# Patient Record
Sex: FEMALE | Race: WHITE | NOT HISPANIC OR LATINO | Employment: OTHER | URBAN - METROPOLITAN AREA
[De-identification: names, ages, dates, MRNs, and addresses within clinical notes are randomized per-mention and may not be internally consistent; named-entity substitution may affect disease eponyms.]

---

## 2022-11-11 ENCOUNTER — APPOINTMENT (OUTPATIENT)
Dept: CT IMAGING | Facility: HOSPITAL | Age: 72
End: 2022-11-11

## 2022-11-11 ENCOUNTER — HOSPITAL ENCOUNTER (OUTPATIENT)
Facility: HOSPITAL | Age: 72
Discharge: HOME | End: 2022-11-13
Attending: EMERGENCY MEDICINE | Admitting: STUDENT IN AN ORGANIZED HEALTH CARE EDUCATION/TRAINING PROGRAM

## 2022-11-11 ENCOUNTER — APPOINTMENT (OUTPATIENT)
Dept: GENERAL RADIOLOGY | Facility: HOSPITAL | Age: 72
End: 2022-11-11

## 2022-11-11 DIAGNOSIS — R07.9 CHEST PAIN, UNSPECIFIED TYPE: Primary | ICD-10-CM

## 2022-11-11 DIAGNOSIS — R06.00 DYSPNEA, UNSPECIFIED TYPE: ICD-10-CM

## 2022-11-11 PROBLEM — J44.9 COPD (CHRONIC OBSTRUCTIVE PULMONARY DISEASE): Status: ACTIVE | Noted: 2022-11-11

## 2022-11-11 PROBLEM — E78.5 HLD (HYPERLIPIDEMIA): Status: ACTIVE | Noted: 2022-11-11

## 2022-11-11 PROBLEM — R07.89 CHEST PAIN, ATYPICAL: Status: ACTIVE | Noted: 2022-11-11

## 2022-11-11 PROBLEM — R79.89 ELEVATED D-DIMER: Status: ACTIVE | Noted: 2022-11-11

## 2022-11-11 PROBLEM — R30.0 DYSURIA: Status: ACTIVE | Noted: 2022-11-11

## 2022-11-11 PROBLEM — I25.10 CAD (CORONARY ARTERY DISEASE): Status: ACTIVE | Noted: 2022-11-11

## 2022-11-11 PROBLEM — I10 HTN (HYPERTENSION): Status: ACTIVE | Noted: 2022-11-11

## 2022-11-11 LAB
ALBUMIN SERPL-MCNC: 4 G/DL (ref 3.5–5.2)
ALBUMIN/GLOB SERPL: 1.3 G/DL
ALP SERPL-CCNC: 111 U/L (ref 39–117)
ALT SERPL W P-5'-P-CCNC: 15 U/L (ref 1–33)
ANION GAP SERPL CALCULATED.3IONS-SCNC: 10.6 MMOL/L (ref 5–15)
ANION GAP SERPL CALCULATED.3IONS-SCNC: 9.9 MMOL/L (ref 5–15)
APTT PPP: 23.7 SECONDS (ref 22.7–35.4)
AST SERPL-CCNC: 15 U/L (ref 1–32)
BACTERIA UR QL AUTO: ABNORMAL /HPF
BASOPHILS # BLD AUTO: 0.02 10*3/MM3 (ref 0–0.2)
BASOPHILS # BLD AUTO: 0.04 10*3/MM3 (ref 0–0.2)
BASOPHILS NFR BLD AUTO: 0.3 % (ref 0–1.5)
BASOPHILS NFR BLD AUTO: 0.5 % (ref 0–1.5)
BILIRUB SERPL-MCNC: 0.5 MG/DL (ref 0–1.2)
BILIRUB UR QL STRIP: NEGATIVE
BUN SERPL-MCNC: 6 MG/DL (ref 8–23)
BUN SERPL-MCNC: 8 MG/DL (ref 8–23)
BUN/CREAT SERPL: 10.9 (ref 7–25)
BUN/CREAT SERPL: 14.3 (ref 7–25)
CALCIUM SPEC-SCNC: 9.1 MG/DL (ref 8.6–10.5)
CALCIUM SPEC-SCNC: 9.4 MG/DL (ref 8.6–10.5)
CHLORIDE SERPL-SCNC: 103 MMOL/L (ref 98–107)
CHLORIDE SERPL-SCNC: 99 MMOL/L (ref 98–107)
CLARITY UR: ABNORMAL
CO2 SERPL-SCNC: 23.1 MMOL/L (ref 22–29)
CO2 SERPL-SCNC: 24.4 MMOL/L (ref 22–29)
COLOR UR: YELLOW
CREAT SERPL-MCNC: 0.55 MG/DL (ref 0.57–1)
CREAT SERPL-MCNC: 0.56 MG/DL (ref 0.57–1)
D DIMER PPP FEU-MCNC: 7.64 MCGFEU/ML (ref 0–0.49)
DEPRECATED RDW RBC AUTO: 45.1 FL (ref 37–54)
DEPRECATED RDW RBC AUTO: 46.7 FL (ref 37–54)
EGFRCR SERPLBLD CKD-EPI 2021: 95.9 ML/MIN/1.73
EGFRCR SERPLBLD CKD-EPI 2021: 96.3 ML/MIN/1.73
EOSINOPHIL # BLD AUTO: 0.08 10*3/MM3 (ref 0–0.4)
EOSINOPHIL # BLD AUTO: 0.09 10*3/MM3 (ref 0–0.4)
EOSINOPHIL NFR BLD AUTO: 1.1 % (ref 0.3–6.2)
EOSINOPHIL NFR BLD AUTO: 1.3 % (ref 0.3–6.2)
ERYTHROCYTE [DISTWIDTH] IN BLOOD BY AUTOMATED COUNT: 13.4 % (ref 12.3–15.4)
ERYTHROCYTE [DISTWIDTH] IN BLOOD BY AUTOMATED COUNT: 13.7 % (ref 12.3–15.4)
FERRITIN SERPL-MCNC: 210 NG/ML (ref 13–150)
GLOBULIN UR ELPH-MCNC: 3 GM/DL
GLUCOSE SERPL-MCNC: 103 MG/DL (ref 65–99)
GLUCOSE SERPL-MCNC: 97 MG/DL (ref 65–99)
GLUCOSE UR STRIP-MCNC: NEGATIVE MG/DL
HCT VFR BLD AUTO: 30.3 % (ref 34–46.6)
HCT VFR BLD AUTO: 31.1 % (ref 34–46.6)
HGB BLD-MCNC: 10.2 G/DL (ref 12–15.9)
HGB BLD-MCNC: 10.4 G/DL (ref 12–15.9)
HGB UR QL STRIP.AUTO: ABNORMAL
HYALINE CASTS UR QL AUTO: ABNORMAL /LPF
IMM GRANULOCYTES # BLD AUTO: 0.01 10*3/MM3 (ref 0–0.05)
IMM GRANULOCYTES # BLD AUTO: 0.03 10*3/MM3 (ref 0–0.05)
IMM GRANULOCYTES NFR BLD AUTO: 0.2 % (ref 0–0.5)
IMM GRANULOCYTES NFR BLD AUTO: 0.4 % (ref 0–0.5)
INR PPP: 0.97 (ref 0.9–1.1)
IRON 24H UR-MRATE: 68 MCG/DL (ref 37–145)
IRON SATN MFR SERPL: 22 % (ref 20–50)
KETONES UR QL STRIP: ABNORMAL
LEUKOCYTE ESTERASE UR QL STRIP.AUTO: ABNORMAL
LYMPHOCYTES # BLD AUTO: 1.34 10*3/MM3 (ref 0.7–3.1)
LYMPHOCYTES # BLD AUTO: 1.62 10*3/MM3 (ref 0.7–3.1)
LYMPHOCYTES NFR BLD AUTO: 15.9 % (ref 19.6–45.3)
LYMPHOCYTES NFR BLD AUTO: 25.9 % (ref 19.6–45.3)
MCH RBC QN AUTO: 30.8 PG (ref 26.6–33)
MCH RBC QN AUTO: 31.2 PG (ref 26.6–33)
MCHC RBC AUTO-ENTMCNC: 33.4 G/DL (ref 31.5–35.7)
MCHC RBC AUTO-ENTMCNC: 33.7 G/DL (ref 31.5–35.7)
MCV RBC AUTO: 92 FL (ref 79–97)
MCV RBC AUTO: 92.7 FL (ref 79–97)
MONOCYTES # BLD AUTO: 0.42 10*3/MM3 (ref 0.1–0.9)
MONOCYTES # BLD AUTO: 0.74 10*3/MM3 (ref 0.1–0.9)
MONOCYTES NFR BLD AUTO: 6.7 % (ref 5–12)
MONOCYTES NFR BLD AUTO: 8.8 % (ref 5–12)
NEUTROPHILS NFR BLD AUTO: 4.11 10*3/MM3 (ref 1.7–7)
NEUTROPHILS NFR BLD AUTO: 6.19 10*3/MM3 (ref 1.7–7)
NEUTROPHILS NFR BLD AUTO: 65.6 % (ref 42.7–76)
NEUTROPHILS NFR BLD AUTO: 73.3 % (ref 42.7–76)
NITRITE UR QL STRIP: NEGATIVE
NRBC BLD AUTO-RTO: 0 /100 WBC (ref 0–0.2)
NRBC BLD AUTO-RTO: 0 /100 WBC (ref 0–0.2)
NT-PROBNP SERPL-MCNC: 177 PG/ML (ref 0–900)
PH UR STRIP.AUTO: 7 [PH] (ref 5–8)
PLATELET # BLD AUTO: 269 10*3/MM3 (ref 140–450)
PLATELET # BLD AUTO: 284 10*3/MM3 (ref 140–450)
PMV BLD AUTO: 8.9 FL (ref 6–12)
PMV BLD AUTO: 9 FL (ref 6–12)
POTASSIUM SERPL-SCNC: 3.5 MMOL/L (ref 3.5–5.2)
POTASSIUM SERPL-SCNC: 3.8 MMOL/L (ref 3.5–5.2)
PROT SERPL-MCNC: 7 G/DL (ref 6–8.5)
PROT UR QL STRIP: ABNORMAL
PROTHROMBIN TIME: 13 SECONDS (ref 11.7–14.2)
QT INTERVAL: 384 MS
RBC # BLD AUTO: 3.27 10*6/MM3 (ref 3.77–5.28)
RBC # BLD AUTO: 3.38 10*6/MM3 (ref 3.77–5.28)
RBC # UR STRIP: ABNORMAL /HPF
REF LAB TEST METHOD: ABNORMAL
SODIUM SERPL-SCNC: 134 MMOL/L (ref 136–145)
SODIUM SERPL-SCNC: 136 MMOL/L (ref 136–145)
SP GR UR STRIP: 1.01 (ref 1–1.03)
SQUAMOUS #/AREA URNS HPF: ABNORMAL /HPF
TIBC SERPL-MCNC: 304 MCG/DL (ref 298–536)
TRANSFERRIN SERPL-MCNC: 204 MG/DL (ref 200–360)
TROPONIN T SERPL-MCNC: <0.01 NG/ML (ref 0–0.03)
UROBILINOGEN UR QL STRIP: ABNORMAL
WBC # UR STRIP: ABNORMAL /HPF
WBC NRBC COR # BLD: 6.26 10*3/MM3 (ref 3.4–10.8)
WBC NRBC COR # BLD: 8.43 10*3/MM3 (ref 3.4–10.8)

## 2022-11-11 PROCEDURE — 82728 ASSAY OF FERRITIN: CPT | Performed by: INTERNAL MEDICINE

## 2022-11-11 PROCEDURE — G0378 HOSPITAL OBSERVATION PER HR: HCPCS

## 2022-11-11 PROCEDURE — 25010000002 HEPARIN (PORCINE) PER 1000 UNITS: Performed by: INTERNAL MEDICINE

## 2022-11-11 PROCEDURE — C1769 GUIDE WIRE: HCPCS | Performed by: INTERNAL MEDICINE

## 2022-11-11 PROCEDURE — 51798 US URINE CAPACITY MEASURE: CPT

## 2022-11-11 PROCEDURE — 63710000001 ASPIRIN 81 MG TABLET DELAYED-RELEASE: Performed by: INTERNAL MEDICINE

## 2022-11-11 PROCEDURE — A9270 NON-COVERED ITEM OR SERVICE: HCPCS | Performed by: INTERNAL MEDICINE

## 2022-11-11 PROCEDURE — C1894 INTRO/SHEATH, NON-LASER: HCPCS | Performed by: INTERNAL MEDICINE

## 2022-11-11 PROCEDURE — 0 IOPAMIDOL PER 1 ML: Performed by: INTERNAL MEDICINE

## 2022-11-11 PROCEDURE — 25010000002 MIDAZOLAM PER 1 MG: Performed by: INTERNAL MEDICINE

## 2022-11-11 PROCEDURE — 90791 PSYCH DIAGNOSTIC EVALUATION: CPT

## 2022-11-11 PROCEDURE — 93458 L HRT ARTERY/VENTRICLE ANGIO: CPT | Performed by: INTERNAL MEDICINE

## 2022-11-11 PROCEDURE — 25010000002 FENTANYL CITRATE (PF) 50 MCG/ML SOLUTION: Performed by: INTERNAL MEDICINE

## 2022-11-11 PROCEDURE — 83880 ASSAY OF NATRIURETIC PEPTIDE: CPT | Performed by: EMERGENCY MEDICINE

## 2022-11-11 PROCEDURE — 0 IOPAMIDOL PER 1 ML: Performed by: STUDENT IN AN ORGANIZED HEALTH CARE EDUCATION/TRAINING PROGRAM

## 2022-11-11 PROCEDURE — 72125 CT NECK SPINE W/O DYE: CPT

## 2022-11-11 PROCEDURE — 84484 ASSAY OF TROPONIN QUANT: CPT | Performed by: EMERGENCY MEDICINE

## 2022-11-11 PROCEDURE — 84466 ASSAY OF TRANSFERRIN: CPT | Performed by: INTERNAL MEDICINE

## 2022-11-11 PROCEDURE — 85025 COMPLETE CBC W/AUTO DIFF WBC: CPT | Performed by: INTERNAL MEDICINE

## 2022-11-11 PROCEDURE — 93005 ELECTROCARDIOGRAM TRACING: CPT | Performed by: EMERGENCY MEDICINE

## 2022-11-11 PROCEDURE — 72131 CT LUMBAR SPINE W/O DYE: CPT

## 2022-11-11 PROCEDURE — 80053 COMPREHEN METABOLIC PANEL: CPT | Performed by: INTERNAL MEDICINE

## 2022-11-11 PROCEDURE — 36415 COLL VENOUS BLD VENIPUNCTURE: CPT | Performed by: INTERNAL MEDICINE

## 2022-11-11 PROCEDURE — 99204 OFFICE O/P NEW MOD 45 MIN: CPT | Performed by: INTERNAL MEDICINE

## 2022-11-11 PROCEDURE — 63710000001 METOPROLOL TARTRATE 50 MG TABLET: Performed by: INTERNAL MEDICINE

## 2022-11-11 PROCEDURE — 63710000001 CLOPIDOGREL 75 MG TABLET: Performed by: INTERNAL MEDICINE

## 2022-11-11 PROCEDURE — 63710000001 ATORVASTATIN 80 MG TABLET: Performed by: INTERNAL MEDICINE

## 2022-11-11 PROCEDURE — 81001 URINALYSIS AUTO W/SCOPE: CPT | Performed by: STUDENT IN AN ORGANIZED HEALTH CARE EDUCATION/TRAINING PROGRAM

## 2022-11-11 PROCEDURE — 85025 COMPLETE CBC W/AUTO DIFF WBC: CPT | Performed by: EMERGENCY MEDICINE

## 2022-11-11 PROCEDURE — 87147 CULTURE TYPE IMMUNOLOGIC: CPT | Performed by: STUDENT IN AN ORGANIZED HEALTH CARE EDUCATION/TRAINING PROGRAM

## 2022-11-11 PROCEDURE — 85730 THROMBOPLASTIN TIME PARTIAL: CPT | Performed by: EMERGENCY MEDICINE

## 2022-11-11 PROCEDURE — 85379 FIBRIN DEGRADATION QUANT: CPT | Performed by: STUDENT IN AN ORGANIZED HEALTH CARE EDUCATION/TRAINING PROGRAM

## 2022-11-11 PROCEDURE — 85610 PROTHROMBIN TIME: CPT | Performed by: EMERGENCY MEDICINE

## 2022-11-11 PROCEDURE — 93010 ELECTROCARDIOGRAM REPORT: CPT | Performed by: INTERNAL MEDICINE

## 2022-11-11 PROCEDURE — 63710000001 ISOSORBIDE MONONITRATE 30 MG TABLET SUSTAINED-RELEASE 24 HOUR: Performed by: INTERNAL MEDICINE

## 2022-11-11 PROCEDURE — 87086 URINE CULTURE/COLONY COUNT: CPT | Performed by: STUDENT IN AN ORGANIZED HEALTH CARE EDUCATION/TRAINING PROGRAM

## 2022-11-11 PROCEDURE — 71045 X-RAY EXAM CHEST 1 VIEW: CPT

## 2022-11-11 PROCEDURE — 99285 EMERGENCY DEPT VISIT HI MDM: CPT

## 2022-11-11 PROCEDURE — 71275 CT ANGIOGRAPHY CHEST: CPT

## 2022-11-11 PROCEDURE — 84484 ASSAY OF TROPONIN QUANT: CPT | Performed by: NURSE PRACTITIONER

## 2022-11-11 PROCEDURE — 83540 ASSAY OF IRON: CPT | Performed by: INTERNAL MEDICINE

## 2022-11-11 RX ORDER — CLOPIDOGREL BISULFATE 75 MG/1
75 TABLET ORAL DAILY
Status: DISCONTINUED | OUTPATIENT
Start: 2022-11-11 | End: 2022-11-11

## 2022-11-11 RX ORDER — HEPARIN SODIUM 1000 [USP'U]/ML
INJECTION, SOLUTION INTRAVENOUS; SUBCUTANEOUS
Status: DISCONTINUED | OUTPATIENT
Start: 2022-11-11 | End: 2022-11-11 | Stop reason: HOSPADM

## 2022-11-11 RX ORDER — ASPIRIN 81 MG/1
81 TABLET ORAL DAILY
Status: DISCONTINUED | OUTPATIENT
Start: 2022-11-11 | End: 2022-11-13 | Stop reason: HOSPADM

## 2022-11-11 RX ORDER — ISOSORBIDE MONONITRATE 30 MG/1
30 TABLET, EXTENDED RELEASE ORAL
Status: DISCONTINUED | OUTPATIENT
Start: 2022-11-11 | End: 2022-11-13 | Stop reason: HOSPADM

## 2022-11-11 RX ORDER — NITROGLYCERIN 0.4 MG/1
0.4 TABLET SUBLINGUAL
Status: DISCONTINUED | OUTPATIENT
Start: 2022-11-11 | End: 2022-11-13 | Stop reason: HOSPADM

## 2022-11-11 RX ORDER — SODIUM CHLORIDE 9 MG/ML
100 INJECTION, SOLUTION INTRAVENOUS CONTINUOUS
Status: ACTIVE | OUTPATIENT
Start: 2022-11-11 | End: 2022-11-11

## 2022-11-11 RX ORDER — HYDROXYZINE HYDROCHLORIDE 25 MG/1
25 TABLET, FILM COATED ORAL 3 TIMES DAILY PRN
Status: DISCONTINUED | OUTPATIENT
Start: 2022-11-11 | End: 2022-11-13 | Stop reason: HOSPADM

## 2022-11-11 RX ORDER — CLOPIDOGREL BISULFATE 75 MG/1
75 TABLET ORAL DAILY
Status: DISCONTINUED | OUTPATIENT
Start: 2022-11-11 | End: 2022-11-13 | Stop reason: HOSPADM

## 2022-11-11 RX ORDER — MIDAZOLAM HYDROCHLORIDE 1 MG/ML
INJECTION INTRAMUSCULAR; INTRAVENOUS
Status: DISCONTINUED | OUTPATIENT
Start: 2022-11-11 | End: 2022-11-11 | Stop reason: HOSPADM

## 2022-11-11 RX ORDER — SODIUM CHLORIDE 0.9 % (FLUSH) 0.9 %
10 SYRINGE (ML) INJECTION AS NEEDED
Status: DISCONTINUED | OUTPATIENT
Start: 2022-11-11 | End: 2022-11-13 | Stop reason: HOSPADM

## 2022-11-11 RX ORDER — SODIUM CHLORIDE 0.9 % (FLUSH) 0.9 %
10 SYRINGE (ML) INJECTION EVERY 12 HOURS SCHEDULED
Status: DISCONTINUED | OUTPATIENT
Start: 2022-11-11 | End: 2022-11-13 | Stop reason: HOSPADM

## 2022-11-11 RX ORDER — FENTANYL CITRATE 50 UG/ML
INJECTION, SOLUTION INTRAMUSCULAR; INTRAVENOUS
Status: DISCONTINUED | OUTPATIENT
Start: 2022-11-11 | End: 2022-11-11 | Stop reason: HOSPADM

## 2022-11-11 RX ORDER — ACETAMINOPHEN 325 MG/1
650 TABLET ORAL EVERY 4 HOURS PRN
Status: DISCONTINUED | OUTPATIENT
Start: 2022-11-11 | End: 2022-11-13 | Stop reason: HOSPADM

## 2022-11-11 RX ORDER — ALBUTEROL SULFATE 2.5 MG/3ML
2.5 SOLUTION RESPIRATORY (INHALATION) EVERY 6 HOURS PRN
Status: DISCONTINUED | OUTPATIENT
Start: 2022-11-11 | End: 2022-11-13 | Stop reason: HOSPADM

## 2022-11-11 RX ORDER — SODIUM CHLORIDE 9 MG/ML
100 INJECTION, SOLUTION INTRAVENOUS CONTINUOUS
Status: DISCONTINUED | OUTPATIENT
Start: 2022-11-11 | End: 2022-11-11

## 2022-11-11 RX ORDER — VERAPAMIL HYDROCHLORIDE 2.5 MG/ML
INJECTION, SOLUTION INTRAVENOUS
Status: DISCONTINUED | OUTPATIENT
Start: 2022-11-11 | End: 2022-11-11 | Stop reason: HOSPADM

## 2022-11-11 RX ORDER — METOPROLOL TARTRATE 50 MG/1
50 TABLET, FILM COATED ORAL EVERY 12 HOURS SCHEDULED
Status: DISCONTINUED | OUTPATIENT
Start: 2022-11-11 | End: 2022-11-13 | Stop reason: HOSPADM

## 2022-11-11 RX ORDER — CYCLOBENZAPRINE HCL 10 MG
10 TABLET ORAL 3 TIMES DAILY PRN
Status: DISCONTINUED | OUTPATIENT
Start: 2022-11-11 | End: 2022-11-13 | Stop reason: HOSPADM

## 2022-11-11 RX ORDER — ONDANSETRON 2 MG/ML
4 INJECTION INTRAMUSCULAR; INTRAVENOUS EVERY 6 HOURS PRN
Status: DISCONTINUED | OUTPATIENT
Start: 2022-11-11 | End: 2022-11-13 | Stop reason: HOSPADM

## 2022-11-11 RX ORDER — ACETAMINOPHEN 650 MG/1
650 SUPPOSITORY RECTAL EVERY 4 HOURS PRN
Status: DISCONTINUED | OUTPATIENT
Start: 2022-11-11 | End: 2022-11-13 | Stop reason: HOSPADM

## 2022-11-11 RX ORDER — ATORVASTATIN CALCIUM 80 MG/1
80 TABLET, FILM COATED ORAL NIGHTLY
Status: DISCONTINUED | OUTPATIENT
Start: 2022-11-11 | End: 2022-11-13 | Stop reason: HOSPADM

## 2022-11-11 RX ORDER — ACETAMINOPHEN 160 MG/5ML
650 SOLUTION ORAL EVERY 4 HOURS PRN
Status: DISCONTINUED | OUTPATIENT
Start: 2022-11-11 | End: 2022-11-13 | Stop reason: HOSPADM

## 2022-11-11 RX ORDER — LIDOCAINE HYDROCHLORIDE 20 MG/ML
INJECTION, SOLUTION INFILTRATION; PERINEURAL
Status: DISCONTINUED | OUTPATIENT
Start: 2022-11-11 | End: 2022-11-11 | Stop reason: HOSPADM

## 2022-11-11 RX ORDER — SODIUM CHLORIDE 9 MG/ML
INJECTION, SOLUTION INTRAVENOUS
Status: COMPLETED | OUTPATIENT
Start: 2022-11-11 | End: 2022-11-11

## 2022-11-11 RX ADMIN — CLOPIDOGREL 75 MG: 75 TABLET, FILM COATED ORAL at 18:26

## 2022-11-11 RX ADMIN — ATORVASTATIN CALCIUM 80 MG: 80 TABLET, FILM COATED ORAL at 20:01

## 2022-11-11 RX ADMIN — SODIUM CHLORIDE 100 ML/HR: 9 INJECTION, SOLUTION INTRAVENOUS at 18:27

## 2022-11-11 RX ADMIN — METOPROLOL TARTRATE 50 MG: 50 TABLET, FILM COATED ORAL at 20:01

## 2022-11-11 RX ADMIN — METOPROLOL TARTRATE 50 MG: 50 TABLET, FILM COATED ORAL at 09:48

## 2022-11-11 RX ADMIN — IOPAMIDOL 95 ML: 755 INJECTION, SOLUTION INTRAVENOUS at 14:10

## 2022-11-11 RX ADMIN — Medication 10 ML: at 09:49

## 2022-11-11 RX ADMIN — Medication 10 ML: at 20:01

## 2022-11-11 RX ADMIN — ISOSORBIDE MONONITRATE 30 MG: 30 TABLET, EXTENDED RELEASE ORAL at 09:48

## 2022-11-11 RX ADMIN — ASPIRIN 81 MG: 81 TABLET, COATED ORAL at 09:48

## 2022-11-11 NOTE — ED NOTES
Nursing report ED to floor  [place]  72 y.o.  female    HPI :   Chief Complaint   Patient presents with    Shortness of Breath    Nausea    Vomiting       Admitting doctor:   Johnny Kirk MD    Admitting diagnosis:   The primary encounter diagnosis was Chest pain, unspecified type. A diagnosis of Dyspnea, unspecified type was also pertinent to this visit.    Code status:   Current Code Status       Date Active Code Status Order ID Comments User Context       11/11/2022 0514 CPR (Attempt to Resuscitate) [id number]  Samantha Adan, [facility]        Question Answer    Code Status (Patient has no pulse and is not breathing) CPR (Attempt to Resuscitate)    Medical Interventions (Patient has pulse or is breathing) Full Support                    Allergies:   Patient has no known allergies.    Isolation:   No active isolations    Intake and Output    Intake/Output Summary (Last 24 hours) at 11/11/2022 0541  Last data filed at 11/11/2022 0113  Gross per 24 hour   Intake 143 ml   Output --   Net 143 ml       Weight:       11/11/22  0222   Weight: 54.4 kg (120 lb)       Most recent vitals:   Vitals:    11/11/22 0336 11/11/22 0406 11/11/22 0436 11/11/22 0506   BP: (!) 161/108 142/62 (!) 186/91 157/78   BP Location:       Patient Position:       Pulse: 89 81 82 88   Resp:       Temp:       TempSrc:       SpO2: 94% 95% 94% 93%   Weight:       Height:           Active LDAs/IV Access:   Lines, Drains & Airways       Active LDAs       Name Placement date Placement time Site Days    Peripheral IV 11/11/22 0114 Posterior;Right Forearm 11/11/22 0114  Forearm  less than 1                    Labs (abnormal labs have a star):   Labs Reviewed   COMPREHENSIVE METABOLIC PANEL - Abnormal; Notable for the following components:       Result Value    Creatinine 0.56 (*)     Sodium 134 (*)     All other components within normal limits    Narrative:     GFR Normal >60  Chronic Kidney Disease <60  Kidney Failure <15    The GFR  formula is only valid for adults with stable renal function between ages 18 and 70.   CBC WITH AUTO DIFFERENTIAL - Abnormal; Notable for the following components:    RBC 3.38 (*)     Hemoglobin 10.4 (*)     Hematocrit 31.1 (*)     Lymphocyte % 15.9 (*)     All other components within normal limits   PROTIME-INR - Normal   APTT - Normal   BNP (IN-HOUSE) - Normal    Narrative:     Among patients with dyspnea, NT-proBNP is highly sensitive for the detection of acute congestive heart failure. In addition NT-proBNP of <300 pg/ml effectively rules out acute congestive heart failure with 99% negative predictive value.    Results may be falsely decreased if patient taking Biotin.     TROPONIN (IN-HOUSE) - Normal    Narrative:     Troponin T Reference Range:  <= 0.03 ng/mL-   Negative for AMI  >0.03 ng/mL-     Abnormal for myocardial necrosis.  Clinicians would have to utilize clinical acumen, EKG, Troponin and serial changes to determine if it is an Acute Myocardial Infarction or myocardial injury due to an underlying chronic condition.       Results may be falsely decreased if patient taking Biotin.     TROPONIN (IN-HOUSE) - Normal    Narrative:     Troponin T Reference Range:  <= 0.03 ng/mL-   Negative for AMI  >0.03 ng/mL-     Abnormal for myocardial necrosis.  Clinicians would have to utilize clinical acumen, EKG, Troponin and serial changes to determine if it is an Acute Myocardial Infarction or myocardial injury due to an underlying chronic condition.       Results may be falsely decreased if patient taking Biotin.     TROPONIN (IN-HOUSE)   BASIC METABOLIC PANEL   CBC WITH AUTO DIFFERENTIAL   IRON PROFILE   FERRITIN   CBC AND DIFFERENTIAL    Narrative:     The following orders were created for panel order CBC & Differential.  Procedure                               Abnormality         Status                     ---------                               -----------         ------                     CBC Auto  Differential[[id number]]        Abnormal            Final result                 Please view results for these tests on the individual orders.       EKG:   ECG 12 Lead Dyspnea   Preliminary Result   HEART RATE= 82  bpm   RR Interval= 732  ms   AZ Interval= 162  ms   P Horizontal Axis= -44  deg   P Front Axis= 70  deg   QRSD Interval= 96  ms   QT Interval= 384  ms   QRS Axis= -4  deg   T Wave Axis= 44  deg   - ABNORMAL ECG -   Sinus rhythm   Probable LVH with secondary repol abnrm   Electronically Signed By:    Date and Time of Study: 2022-11-11 02:13:49          Meds given in ED:   Medications   sodium chloride 0.9 % flush 10 mL (has no administration in time range)   sodium chloride 0.9 % flush 10 mL (has no administration in time range)   sodium chloride 0.9 % flush 10 mL (has no administration in time range)   nitroglycerin (NITROSTAT) SL tablet 0.4 mg (has no administration in time range)   acetaminophen (TYLENOL) tablet 650 mg (has no administration in time range)     Or   acetaminophen (TYLENOL) 160 MG/5ML solution 650 mg (has no administration in time range)     Or   acetaminophen (TYLENOL) suppository 650 mg (has no administration in time range)   ondansetron (ZOFRAN) injection 4 mg (has no administration in time range)       Imaging results:  CT Cervical Spine Without Contrast    Result Date: 11/11/2022  Electronically signed by Ambrocio Kumar M.D. on 11-11-22 at 0426    CT Lumbar Spine Without Contrast    Result Date: 11/11/2022  Electronically signed by Ambrocio Kumar M.D. on 11-11-22 at 0431    XR Chest 1 View    Result Date: 11/11/2022  Electronically signed by Jacinda Young MD on 11-11-22 at 0359     Ambulatory status:   - as tolerated    Social issues:   Social History     Socioeconomic History    Marital status:        NIH Stroke Scale:         Héctor Brewer RN  11/11/22 05:41 EST

## 2022-11-11 NOTE — ED NOTES
"Patient continues to shake in room, stating she \"cant breathe\" and \"theres something wrong with my heart\". Patient moaning in room. Patient offered nasal cannula with oxygen to help breathing although saturations are 95% on room air but patient declines. MD aware and in to assess patient.   "
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
## 2022-11-11 NOTE — H&P
Patient Name:  Asha Krishnan  YOB: 1950  MRN:  [id number]  Admit Date:  11/11/2022  Patient Care Team:  Capo Pedroza MD as PCP - General (Family Medicine)      Subjective   History Present Illness     Chief Complaint   Patient presents with   • Shortness of Breath   • Nausea   • Vomiting         History of Present Illness  Ms. Krishnan is a 72 y.o. history of hypertension, CAD status post stents, COPD, C-spine fracture 1995 status post surgery presenting with dyspnea and chest pain.   Recent admission at [facility] reviewed.  Patient was admitted for similar complaints, attempted to get Lexiscan but patient cannot tolerate lying flat.  Cardiology was consulted at this time who did not think that her chest pain was cardiac in etiology increase her Imdur to 60 mg daily and discharged her.  Patient says her chest pain has been going on and off since 2012, that she describes as pressure, in the center of her chest, nonradiating, improved since admission.  Patient also complaining of dyspnea, unable to state exact duration.    Patient also says she has had dysuria for weeks, denies any frequency or vaginal discharge.  States she feels like she nontreatment emptying her bladder.      Review of Systems   Constitutional: Negative for chills and fever.   HENT: Negative for rhinorrhea and sore throat.    Respiratory: Positive for cough and shortness of breath.    Cardiovascular: Negative for chest pain and leg swelling.   Gastrointestinal: Positive for abdominal pain and nausea. Negative for constipation, diarrhea and vomiting.   Genitourinary: Positive for difficulty urinating and dysuria. Negative for frequency and urgency.   Musculoskeletal: Negative for back pain and myalgias.   Skin: Negative for rash.   Neurological: Negative for dizziness and headaches.        Personal History     Past Medical History:   Diagnosis Date   • Hyperlipidemia      No past surgical history on file.  No family  history on file.     No current facility-administered medications on file prior to encounter.     No current outpatient medications on file prior to encounter.     No Known Allergies    Objective    Objective     Vital Signs  Temp:  [98 °F (36.7 °C)-98.3 °F (36.8 °C)] 98 °F (36.7 °C)  Heart Rate:  [] 89  Resp:  [20-22] 22  BP: (137-187)/() 156/81  SpO2:  [91 %-100 %] 97 %  on  Flow (L/min):  [2] 2;   Device (Oxygen Therapy): room air  Body mass index is 20.6 kg/m².    Physical Exam  Constitutional:       General: She is not in acute distress.     Appearance: She is not diaphoretic.   HENT:      Head: Normocephalic and atraumatic.      Mouth/Throat:      Mouth: Mucous membranes are moist.      Pharynx: Oropharynx is clear.   Eyes:      Extraocular Movements: Extraocular movements intact.      Conjunctiva/sclera: Conjunctivae normal.   Cardiovascular:      Rate and Rhythm: Normal rate and regular rhythm.      Heart sounds: No murmur heard.    No gallop.   Pulmonary:      Effort: Pulmonary effort is normal. No respiratory distress.      Breath sounds: No wheezing.      Comments: Bibaslar crackles  Abdominal:      General: Abdomen is flat. There is no distension.      Palpations: Abdomen is soft.      Tenderness: There is no abdominal tenderness.   Musculoskeletal:         General: No swelling or deformity. Normal range of motion.   Skin:     General: Skin is warm and dry.   Neurological:      General: No focal deficit present.      Mental Status: She is alert and oriented to person, place, and time.   Psychiatric:      Comments: Poor historian. Tangential thoughts.          Results Review:  I reviewed the patient's new clinical results.  I reviewed the patient's new imaging results and agree with the interpretation.  I reviewed the patient's other test results and agree with the interpretation  I personally viewed and interpreted the patient's EKG/Telemetry data      Lab Results (last 24 hours)      Procedure Component Value Units Date/Time    CBC & Differential [[id number]]  (Abnormal) Collected: 11/11/22 0233    Specimen: Blood Updated: 11/11/22 0250    Narrative:      The following orders were created for panel order CBC & Differential.  Procedure                               Abnormality         Status                     ---------                               -----------         ------                     CBC Auto Differential[[id number]]        Abnormal            Final result                 Please view results for these tests on the individual orders.    Comprehensive Metabolic Panel [[id number]]  (Abnormal) Collected: 11/11/22 0233    Specimen: Blood Updated: 11/11/22 0311     Glucose 97 mg/dL      BUN 8 mg/dL      Creatinine 0.56 mg/dL      Sodium 134 mmol/L      Potassium 3.8 mmol/L      Comment: Slight hemolysis detected by analyzer. Results may be affected.        Chloride 99 mmol/L      CO2 24.4 mmol/L      Calcium 9.4 mg/dL      Total Protein 7.0 g/dL      Albumin 4.00 g/dL      ALT (SGPT) 15 U/L      AST (SGOT) 15 U/L      Alkaline Phosphatase 111 U/L      Total Bilirubin 0.5 mg/dL      Globulin 3.0 gm/dL      A/G Ratio 1.3 g/dL      BUN/Creatinine Ratio 14.3     Anion Gap 10.6 mmol/L      eGFR 95.9 mL/min/1.73      Comment: National Kidney Foundation and American Society of Nephrology (ASN) Task Force recommended calculation based on the Chronic Kidney Disease Epidemiology Collaboration (CKD-EPI) equation refit without adjustment for race.       Narrative:      GFR Normal >60  Chronic Kidney Disease <60  Kidney Failure <15    The GFR formula is only valid for adults with stable renal function between ages 18 and 70.    Protime-INR [[id number]]  (Normal) Collected: 11/11/22 0233    Specimen: Blood Updated: 11/11/22 0326     Protime 13.0 Seconds      INR 0.97    aPTT [[id number]]  (Normal) Collected: 11/11/22 0233    Specimen: Blood Updated: 11/11/22 0326     PTT 23.7 seconds     BNP [[id number]]   (Normal) Collected: 11/11/22 0233    Specimen: Blood Updated: 11/11/22 0309     proBNP 177.0 pg/mL     Narrative:      Among patients with dyspnea, NT-proBNP is highly sensitive for the detection of acute congestive heart failure. In addition NT-proBNP of <300 pg/ml effectively rules out acute congestive heart failure with 99% negative predictive value.    Results may be falsely decreased if patient taking Biotin.      Troponin [[id number]]  (Normal) Collected: 11/11/22 0233    Specimen: Blood Updated: 11/11/22 0311     Troponin T <0.010 ng/mL     Narrative:      Troponin T Reference Range:  <= 0.03 ng/mL-   Negative for AMI  >0.03 ng/mL-     Abnormal for myocardial necrosis.  Clinicians would have to utilize clinical acumen, EKG, Troponin and serial changes to determine if it is an Acute Myocardial Infarction or myocardial injury due to an underlying chronic condition.       Results may be falsely decreased if patient taking Biotin.      CBC Auto Differential [[id number]]  (Abnormal) Collected: 11/11/22 0233    Specimen: Blood Updated: 11/11/22 0250     WBC 8.43 10*3/mm3      RBC 3.38 10*6/mm3      Hemoglobin 10.4 g/dL      Hematocrit 31.1 %      MCV 92.0 fL      MCH 30.8 pg      MCHC 33.4 g/dL      RDW 13.7 %      RDW-SD 46.7 fl      MPV 9.0 fL      Platelets 284 10*3/mm3      Neutrophil % 73.3 %      Lymphocyte % 15.9 %      Monocyte % 8.8 %      Eosinophil % 1.1 %      Basophil % 0.5 %      Immature Grans % 0.4 %      Neutrophils, Absolute 6.19 10*3/mm3      Lymphocytes, Absolute 1.34 10*3/mm3      Monocytes, Absolute 0.74 10*3/mm3      Eosinophils, Absolute 0.09 10*3/mm3      Basophils, Absolute 0.04 10*3/mm3      Immature Grans, Absolute 0.03 10*3/mm3      nRBC 0.0 /100 WBC     D-dimer, Quantitative [[id number]]  (Abnormal) Collected: 11/11/22 0233    Specimen: Blood Updated: 11/11/22 0858     D-Dimer, Quantitative 7.64 MCGFEU/mL     Narrative:      The Stago D-Dimer test used in conjunction with a clinical  pretest probability (PTP) assessment model, has been approved by the FDA to rule out the presence of venous thromboembolism (VTE) in outpatients suspected of deep venous thrombosis (DVT) or pulmonary embolism (PE). The cut-off for negative predictive value is <0.50 MCGFEU/mL.    Troponin [[id number]]  (Normal) Collected: 11/11/22 0422    Specimen: Blood Updated: 11/11/22 0454     Troponin T <0.010 ng/mL     Narrative:      Troponin T Reference Range:  <= 0.03 ng/mL-   Negative for AMI  >0.03 ng/mL-     Abnormal for myocardial necrosis.  Clinicians would have to utilize clinical acumen, EKG, Troponin and serial changes to determine if it is an Acute Myocardial Infarction or myocardial injury due to an underlying chronic condition.       Results may be falsely decreased if patient taking Biotin.            Imaging Results (Last 24 Hours)     Procedure Component Value Units Date/Time    CT Lumbar Spine Without Contrast [[id number]] Collected: 11/11/22 0432     Updated: 11/11/22 0432    Narrative:        Patient: PRIYA PARRA  Time Out: 04:31  Exam(s): CT L SPINE     EXAM:    CT Lumbar Spine Without Intravenous Contrast    CLINICAL HISTORY:     Reason for exam: back pain.    TECHNIQUE:    Axial computed tomography images of the lumbar spine without   intravenous contrast.  CTDI is 51.3 mGy and DLP is 1451.9 mGy-cm.  This   CT exam was performed according to the principle of ALARA (As Low As   Reasonably Achievable) by using one or more of the following dose   reduction techniques: automated exposure control, adjustment of the mA   and or kV according to patient size, and or use of iterative   reconstruction technique.    COMPARISON:    No relevant prior studies available.    FINDINGS:    Vertebrae:  No acute fracture.  Mild anterior superior loss of height   of the L5 vertebral body without acute fracture line consistent with a   chronic compression deformity.  Maintenance of height of the remainder of   the lumbar  vertebral bodies.  No spondylolisthesis.  Bones are osteopenic.        Discs spinal canal neural foramina: Relatively mild degenerative change   with posterior disc bulges at L3-4 and L4-5 with mild central canal   stenosis.  Bilateral facet and ligamentum flavum hypertrophy decrease in   lower lumbar spine.    Soft tissues: Paraspinal soft tissues are unremarkable.    Atherosclerotic vascular calcifications.    IMPRESSION:         No acute abnormality.  Chronic appearing L5 vertebral body wedge   deformity.  Mild multilevel degenerative changes.    Impression:          Electronically signed by Ambrocio Kumar M.D. on 11-11-22 at 0431    CT Cervical Spine Without Contrast [[id number]] Collected: 11/11/22 0427     Updated: 11/11/22 0427    Narrative:        Patient: PRIYA PARRA  Time Out: 04:26  Exam(s): CT C SPINE     EXAM:    CT Cervical Spine Without Intravenous Contrast    CLINICAL HISTORY:     Reason for exam: neck pain.    TECHNIQUE:    Axial computed tomography images of the cervical spine without   intravenous contrast.  CTDI is 51.3 mGy and DLP is 1451.9 mGy-cm.  This   CT exam was performed according to the principle of ALARA (As Low As   Reasonably Achievable) by using one or more of the following dose   reduction techniques: automated exposure control, adjustment of the mA   and or kV according to patient size, and or use of iterative   reconstruction technique.    COMPARISON:    No relevant prior studies available.    FINDINGS:    Vertebrae:  No acute fracture.  Maintenance of height of the vertebral   bodies.  No subluxation.  Bones are osteopenic.  Prominent confluent   bridging osteophytes spanning C1-T1.  Chronic appearing corticated   defects within the anterior osteophytes at C5-6 and C6-7 with   hypertrophic changes.    Discs spinal canal neural foramina: Hypertrophic changes surrounding   the odontoid with mild stenosis of the foramen magnum.  Posterior disc   ossified complex at C5-6  eccentric to the right and C6-7 eccentric to the   left with mild central canal stenosis.  Posterior laminectomy defect from   C4.- T1 with otherwise widely patent central canal.  Neural foramina are   grossly patent.    Soft tissues: Prevertebral soft tissues are unremarkable.    Atherosclerotic vascular calcifications.    IMPRESSION:       No acute fracture or subluxation.  Status post C4-C7 posterior laminectomy defect with a widely patent canal.    Multilevel degenerative changes greatest C5-6 and C6-7 with mild canal   stenosis.  Prominent anterior osteophytes throughout the cervical spine.  Recommend comparison with prior studies to determine interval change.    Impression:          Electronically signed by Ambrocio Kumar M.D. on 11-11-22 at 0426    XR Chest 1 View [[id number]] Collected: 11/11/22 0400     Updated: 11/11/22 0400    Narrative:        Patient: PRIYA PARRA  Time Out: 03:59  Exam(s): FILM CXR 1 VIEW     EXAM:    XR Chest, 1 View    CLINICAL HISTORY:     Reason for exam: soa.    TECHNIQUE:    Frontal view of the chest.    COMPARISON:    No relevant prior studies available.    FINDINGS:    Lungs:  No consolidation or mass.  Prominent interstitial markings.    Pleural space:  No acute findings    Heart:  No cardiomegaly.    Bones joints:  No acute findings.    IMPRESSION:         Prominent interstitial markings.      Impression:          Electronically signed by Jacinda Young MD on 11-11-22 at 0359              ECG 12 Lead Dyspnea   Preliminary Result   HEART RATE= 82  bpm   RR Interval= 732  ms   AR Interval= 162  ms   P Horizontal Axis= -44  deg   P Front Axis= 70  deg   QRSD Interval= 96  ms   QT Interval= 384  ms   QRS Axis= -4  deg   T Wave Axis= 44  deg   - ABNORMAL ECG -   Sinus rhythm   Probable LVH with secondary repol abnrm   Electronically Signed By:    Date and Time of Study: 2022-11-11 02:13:49           Assessment/Plan     Active Hospital Problems    Diagnosis  POA   • **Chest  pain, unspecified type [R07.9]  Yes   • Chest pain, atypical [R07.89]  Unknown   • CAD (coronary artery disease) [I25.10]  Unknown   • Dysuria [R30.0]  Unknown   • Dyspnea [R06.00]  Unknown   • Elevated d-dimer [R79.89]  Unknown   • COPD (chronic obstructive pulmonary disease) (HCC) [J44.9]  Unknown   • HTN (hypertension) [I10]  Unknown   • HLD (hyperlipidemia) [E78.5]  Unknown      Resolved Hospital Problems   No resolved problems to display.     Atypical chest pain  Dyspnea  -Cardiology consulted, appreciate recommendations  -Troponins negative, EKG without obvious signs of ischemia  -A1c 6.1%, D-dimer- 7.64, BNP normal  -CTA chest pending, lower extremity Dopplers pending  -plan for cardiac angiogram pending CTA chest    CAD status post stents  HTN  HLD  -Continue aspirin, statin, Imdur, metoprolol  -Holding Plavix for now   -Plan to restart bumex tomorrow after contrast procedures today    Dysuria, 2-3 weeks  Concern for bladder retention  -Bladder scan, UA    Anxiety  -hydroxyzine as needed.  [facility] consulted.    COPD  -Has been smoking since she was 19  -Albuterol as needed  -SaO2 goal of 88-92%          · I discussed the patient's findings and my recommendations with patient and nursing staff.    VTE Prophylaxis - SCDs. AC pending CTA and cardiac cath  Code Status - Full code.       Ino Hebert MD  [facility]  11/11/22  11:57 EST

## 2022-11-11 NOTE — ED NOTES
"Pt arrived via [facility] c/o N/V for \"days\" pt was discharge from [facility] approx 2 hours prior to EMS arrival according to family at scene.   "

## 2022-11-11 NOTE — ED PROVIDER NOTES
EMERGENCY DEPARTMENT ENCOUNTER    Room Number:  MORA/MORA  Date of encounter:  11/11/2022  PCP: Capo Pedroza MD  Historian: Patient      HPI:  Chief Complaint: Dyspnea  A complete HPI/ROS/PMH/PSH/SH/FH are unobtainable due to: None    Context: Asha Krishnan is a 72 y.o. female who presents to the ED c/o dyspnea.  Patient states that she was admitted to [facility] for CHF states he was discharged yesterday evening and was doing well at the time of discharge but became increasingly short of breath after being at home.  Reports that she had some chest pain did not resolve.  Does report some neck pain patient relates that she had a history of broken neck in 1995 states that she thinks ambulance ride exacerbated the pain in her neck.  No tingling or numbness no weakness.  No fevers or chills.  Also complaining of pain in her low back.      PAST MEDICAL HISTORY  Active Ambulatory Problems     Diagnosis Date Noted   • No Active Ambulatory Problems     Resolved Ambulatory Problems     Diagnosis Date Noted   • No Resolved Ambulatory Problems     Past Medical History:   Diagnosis Date   • Hyperlipidemia          PAST SURGICAL HISTORY  No past surgical history on file.      FAMILY HISTORY  No family history on file.      SOCIAL HISTORY  Social History     Socioeconomic History   • Marital status:          ALLERGIES  Patient has no known allergies.        REVIEW OF SYSTEMS  Review of Systems     All systems reviewed and negative except for those discussed in HPI.       PHYSICAL EXAM    I have reviewed the triage vital signs and nursing notes.    ED Triage Vitals   Temp Heart Rate Resp BP SpO2   11/11/22 0117 11/11/22 0117 11/11/22 0117 11/11/22 0117 11/11/22 0117   98.3 °F (36.8 °C) 120 20 162/74 100 %      Temp src Heart Rate Source Patient Position BP Location FiO2 (%)   11/11/22 0117 11/11/22 0222 11/11/22 0222 11/11/22 0222 --   Oral Monitor Sitting Right arm        Physical Exam  GENERAL: not  distressed  HENT: nares patent, posterior tenderness over C-spine no focal bony tenderness  EYES: no scleral icterus  CV: regular rhythm, regular rate  RESPIRATORY: normal effort, clear to auscultation bilaterally  ABDOMEN: soft  MUSCULOSKELETAL: no deformity  NEURO: alert, moves all extremities, follows commands  SKIN: warm, dry        LAB RESULTS  Recent Results (from the past 24 hour(s))   ECG 12 Lead Dyspnea    Collection Time: 11/11/22  2:13 AM   Result Value Ref Range    QT Interval 384 ms   Comprehensive Metabolic Panel    Collection Time: 11/11/22  2:33 AM    Specimen: Blood   Result Value Ref Range    Glucose 97 65 - 99 mg/dL    BUN 8 8 - 23 mg/dL    Creatinine 0.56 (L) 0.57 - 1.00 mg/dL    Sodium 134 (L) 136 - 145 mmol/L    Potassium 3.8 3.5 - 5.2 mmol/L    Chloride 99 98 - 107 mmol/L    CO2 24.4 22.0 - 29.0 mmol/L    Calcium 9.4 8.6 - 10.5 mg/dL    Total Protein 7.0 6.0 - 8.5 g/dL    Albumin 4.00 3.50 - 5.20 g/dL    ALT (SGPT) 15 1 - 33 U/L    AST (SGOT) 15 1 - 32 U/L    Alkaline Phosphatase 111 39 - 117 U/L    Total Bilirubin 0.5 0.0 - 1.2 mg/dL    Globulin 3.0 gm/dL    A/G Ratio 1.3 g/dL    BUN/Creatinine Ratio 14.3 7.0 - 25.0    Anion Gap 10.6 5.0 - 15.0 mmol/L    eGFR 95.9 >60.0 mL/min/1.73   Protime-INR    Collection Time: 11/11/22  2:33 AM    Specimen: Blood   Result Value Ref Range    Protime 13.0 11.7 - 14.2 Seconds    INR 0.97 0.90 - 1.10   aPTT    Collection Time: 11/11/22  2:33 AM    Specimen: Blood   Result Value Ref Range    PTT 23.7 22.7 - 35.4 seconds   BNP    Collection Time: 11/11/22  2:33 AM    Specimen: Blood   Result Value Ref Range    proBNP 177.0 0.0 - 900.0 pg/mL   Troponin    Collection Time: 11/11/22  2:33 AM    Specimen: Blood   Result Value Ref Range    Troponin T <0.010 0.000 - 0.030 ng/mL   CBC Auto Differential    Collection Time: 11/11/22  2:33 AM    Specimen: Blood   Result Value Ref Range    WBC 8.43 3.40 - 10.80 10*3/mm3    RBC 3.38 (L) 3.77 - 5.28 10*6/mm3    Hemoglobin  10.4 (L) 12.0 - 15.9 g/dL    Hematocrit 31.1 (L) 34.0 - 46.6 %    MCV 92.0 79.0 - 97.0 fL    MCH 30.8 26.6 - 33.0 pg    MCHC 33.4 31.5 - 35.7 g/dL    RDW 13.7 12.3 - 15.4 %    RDW-SD 46.7 37.0 - 54.0 fl    MPV 9.0 6.0 - 12.0 fL    Platelets 284 140 - 450 10*3/mm3    Neutrophil % 73.3 42.7 - 76.0 %    Lymphocyte % 15.9 (L) 19.6 - 45.3 %    Monocyte % 8.8 5.0 - 12.0 %    Eosinophil % 1.1 0.3 - 6.2 %    Basophil % 0.5 0.0 - 1.5 %    Immature Grans % 0.4 0.0 - 0.5 %    Neutrophils, Absolute 6.19 1.70 - 7.00 10*3/mm3    Lymphocytes, Absolute 1.34 0.70 - 3.10 10*3/mm3    Monocytes, Absolute 0.74 0.10 - 0.90 10*3/mm3    Eosinophils, Absolute 0.09 0.00 - 0.40 10*3/mm3    Basophils, Absolute 0.04 0.00 - 0.20 10*3/mm3    Immature Grans, Absolute 0.03 0.00 - 0.05 10*3/mm3    nRBC 0.0 0.0 - 0.2 /100 WBC   Troponin    Collection Time: 11/11/22  4:22 AM    Specimen: Blood   Result Value Ref Range    Troponin T <0.010 0.000 - 0.030 ng/mL       Ordered the above labs and independently reviewed the results.        RADIOLOGY  CT Cervical Spine Without Contrast    Result Date: 11/11/2022  Patient: PRIYA PARRA  Time Out: 04:26 Exam(s): CT C SPINE EXAM:   CT Cervical Spine Without Intravenous Contrast CLINICAL HISTORY:    Reason for exam: neck pain. TECHNIQUE:   Axial computed tomography images of the cervical spine without intravenous contrast.  CTDI is 51.3 mGy and DLP is 1451.9 mGy-cm.  This CT exam was performed according to the principle of ALARA (As Low As Reasonably Achievable) by using one or more of the following dose reduction techniques: automated exposure control, adjustment of the mA and or kV according to patient size, and or use of iterative reconstruction technique. COMPARISON:   No relevant prior studies available. FINDINGS:   Vertebrae:  No acute fracture.  Maintenance of height of the vertebral bodies.  No subluxation.  Bones are osteopenic.  Prominent confluent bridging osteophytes spanning C1-T1.  Chronic  appearing corticated defects within the anterior osteophytes at C5-6 and C6-7 with hypertrophic changes.   Discs spinal canal neural foramina: Hypertrophic changes surrounding the odontoid with mild stenosis of the foramen magnum.  Posterior disc ossified complex at C5-6 eccentric to the right and C6-7 eccentric to the left with mild central canal stenosis.  Posterior laminectomy defect from C4.- T1 with otherwise widely patent central canal.  Neural foramina are grossly patent.   Soft tissues: Prevertebral soft tissues are unremarkable.  Atherosclerotic vascular calcifications. IMPRESSION:     No acute fracture or subluxation. Status post C4-C7 posterior laminectomy defect with a widely patent canal.   Multilevel degenerative changes greatest C5-6 and C6-7 with mild canal stenosis.  Prominent anterior osteophytes throughout the cervical spine. Recommend comparison with prior studies to determine interval change.    Electronically signed by Ambrocio Kumar M.D. on 11-11-22 at 0426    CT Lumbar Spine Without Contrast    Result Date: 11/11/2022  Patient: PRIYA PARRA  Time Out: 04:31 Exam(s): CT L SPINE EXAM:   CT Lumbar Spine Without Intravenous Contrast CLINICAL HISTORY:    Reason for exam: back pain. TECHNIQUE:   Axial computed tomography images of the lumbar spine without intravenous contrast.  CTDI is 51.3 mGy and DLP is 1451.9 mGy-cm.  This CT exam was performed according to the principle of ALARA (As Low As Reasonably Achievable) by using one or more of the following dose reduction techniques: automated exposure control, adjustment of the mA and or kV according to patient size, and or use of iterative reconstruction technique. COMPARISON:   No relevant prior studies available. FINDINGS:   Vertebrae:  No acute fracture.  Mild anterior superior loss of height of the L5 vertebral body without acute fracture line consistent with a chronic compression deformity.  Maintenance of height of the remainder of the  lumbar vertebral bodies.  No spondylolisthesis.  Bones are osteopenic.     Discs spinal canal neural foramina: Relatively mild degenerative change with posterior disc bulges at L3-4 and L4-5 with mild central canal stenosis.  Bilateral facet and ligamentum flavum hypertrophy decrease in lower lumbar spine.   Soft tissues: Paraspinal soft tissues are unremarkable.  Atherosclerotic vascular calcifications. IMPRESSION:       No acute abnormality.  Chronic appearing L5 vertebral body wedge deformity.  Mild multilevel degenerative changes.    Electronically signed by Ambrocio Kumar M.D. on 11-11-22 at 0431    XR Chest 1 View    Result Date: 11/11/2022  Patient: PRIYA PARRA  Time Out: 03:59 Exam(s): FILM CXR 1 VIEW EXAM:   XR Chest, 1 View CLINICAL HISTORY:    Reason for exam: soa. TECHNIQUE:   Frontal view of the chest. COMPARISON:   No relevant prior studies available. FINDINGS:   Lungs:  No consolidation or mass.  Prominent interstitial markings.   Pleural space:  No acute findings   Heart:  No cardiomegaly.   Bones joints:  No acute findings. IMPRESSION:       Prominent interstitial markings.     Electronically signed by Jacinda Young MD on 11-11-22 at 5156      I ordered the above noted radiological studies. Reviewed by me and discussed with radiologist.  See dictation for official radiology interpretation.      PROCEDURES    Procedures      MEDICATIONS GIVEN IN ER    Medications   sodium chloride 0.9 % flush 10 mL (has no administration in time range)   sodium chloride 0.9 % flush 10 mL (has no administration in time range)   sodium chloride 0.9 % flush 10 mL (has no administration in time range)   nitroglycerin (NITROSTAT) SL tablet 0.4 mg (has no administration in time range)   acetaminophen (TYLENOL) tablet 650 mg (has no administration in time range)     Or   acetaminophen (TYLENOL) 160 MG/5ML solution 650 mg (has no administration in time range)     Or   acetaminophen (TYLENOL) suppository 650 mg (has  no administration in time range)   ondansetron (ZOFRAN) injection 4 mg (has no administration in time range)         PROGRESS, DATA ANALYSIS, CONSULTS, AND MEDICAL DECISION MAKING    All labs have been independently reviewed by me.  All radiology studies have been reviewed by me and discussed with radiologist dictating the report.   EKG's independently viewed and interpreted by me.  Discussion below represents my analysis of pertinent findings related to patient's condition, differential diagnosis, treatment plan and final disposition.        ED Course as of 11/11/22 0619 Fri Nov 11, 2022 0217 EKG          EKG time: 0213  Rhythm/Rate: Sinus rhythm rate 82  P waves and WA: Within normal limit  QRS, axis: Narrow regular  ST and T waves: Nonspecific    Interpreted Contemporaneously by me, independently viewed [TJ]   0520 I discussed reassuring findings with the patient.  Patient states that she does not feel safe going home. [TJ]      ED Course User Index  [TJ] Alexx Coe MD           PPE: The patient wore a surgical mask throughout the entire patient encounter. I wore an N95.    AS OF 06:19 EST VITALS:    BP - 137/59  HR - 76  TEMP - 98.3 °F (36.8 °C) (Oral)  O2 SATS - 93%        DIAGNOSIS  Final diagnoses:   Chest pain, unspecified type   Dyspnea, unspecified type         DISPOSITION  Admit           Alexx Coe MD  11/11/22 0619

## 2022-11-11 NOTE — PROGRESS NOTES
Discharge Planning Assessment  [facility]     Patient Name: Asha Krishnan  MRN: [id number]  Today's Date: 11/11/2022    Admit Date: 11/11/2022    Plan: Return to extended stay hotel with SO   Discharge Needs Assessment     Row Name 11/11/22 1447       Living Environment    People in Home significant other    Name(s) of People in Home Anna adam, [phone]    Current Living Arrangements hotel/motel    Provides Primary Care For no one    Family Caregiver if Needed significant other    Quality of Family Relationships helpful;involved;supportive    Able to Return to Prior Arrangements yes       Transition Planning    Patient/Family Anticipates Transition to home with family    Patient/Family Anticipated Services at Transition none    Transportation Anticipated public transportation;agency       Discharge Needs Assessment    Equipment Currently Used at Home wheelchair;cane, quad    Concerns to be Addressed discharge planning    Discharge Coordination/Progress Pending               Discharge Plan     Row Name 11/11/22 1490       Plan    Plan Return to extended stay hotel with SO    Patient/Family in Agreement with Plan yes    Plan Comments CCP met with pt at bedside to discuss d/c planning. Pt resides with her SO/kia (Anna Boggs, [phone]) in an extended stay hotel ([facility], [address], [place]), and reports use of quad cane and has a wheelchair if needed. Pt reports she keeps infrequent contact with her 6 children who live out of state and does not have their contact numbers to add to emergency contact list. Pt reports past PT when she lived in [place] where she is from, but has not had HH/SNF in [place]. Pt agreeable to Meds to Beds enrollment. Pt states that she will likely need transport at d/c. CCP to follow. Sandi Burns LCSW              Continued Care and Services - Admitted Since 11/11/2022    Coordination has not been started for this encounter.          Demographic  Summary     Row Name 11/11/22 1447       General Information    Admission Type observation    Arrived From emergency department    Required Notices Provided Observation Status Notice    Referral Source admission list    Reason for Consult discharge planning    Preferred Language English               Functional Status     Row Name 11/11/22 1447       Functional Status    Usual Activity Tolerance moderate    Current Activity Tolerance moderate       Functional Status, IADL    Medications assistive equipment and person    Meal Preparation assistive equipment and person    Housekeeping assistive equipment and person    Laundry assistive equipment and person    Shopping assistive equipment and person       Mental Status Summary    Recent Changes in Mental Status/Cognitive Functioning no changes               Psychosocial    No documentation.                Abuse/Neglect    No documentation.                Legal    No documentation.                Substance Abuse    No documentation.                Patient Forms    No documentation.                   Mena Bursn LCSW

## 2022-11-11 NOTE — DISCHARGE INSTRUCTIONS
[facility]  [address] [place] [address]    Coronary Angiogram (Radial/Ulnar Approach) After Care    Refer to this sheet in the next few weeks. These instructions provide you with information on caring for yourself after your procedure. Your caregiver may also give you more specific instructions. Your treatment has been planned according to current medical practices, but problems sometimes occur. Call your caregiver if you have any problems or questions after your procedure.    Home Care Instructions:  You may shower the day after the procedure. Remove the bandage (dressing) and gently wash the site with plain soap and water. Gently pat the site dry. You may apply a band aid daily for 2 days if desired.    Do not apply powder or lotion to the site.  Do not submerge the affected site in water for 3 to 5 days or until the site is completely healed.   Do not lift, push or pull anything over 5 pounds for 5 days after your procedure or as directed by your physician.  As a reference, a gallon of milk weighs 8 pounds.   Inspect the site at least twice daily. You may notice some bruising at the site and it may be tender for 1 to 2 weeks.     Increase your fluid intake for the next 2 days.    Keep arm elevated for 24 hours. For the remainder of the day, keep your arm in “Pledge of Allegiance” position when up and about.     You may drive 24 hours after the procedure unless otherwise instructed by your caregiver.  Do not operate machinery or power tools for 24 hours.  A responsible adult should be with you for the first 24 hours after you arrive home. Do not make any important legal decisions or sign legal papers for 24 hours.  Do not drink alcohol for 24 hours.    Metformin or any medications containing Metformin should not be taken for 48 hours after your procedure.      Call Your Doctor if:   You have unusual pain at the radial/ulnar (wrist) site.  You have redness, warmth, swelling, or pain at the  radial/ulnar (wrist) site.  You have drainage (other than a small amount of blood on the dressing).  `You have chills or a fever > 101.  Your arm becomes pale or dark, cool, tingly, or numb.  You develop chest pain, shortness of breath, feel faint or pass out.    You have heavy bleeding from the site, hold pressure on the site for 20 minutes.  If the bleeding stops, apply a fresh bandage and call your cardiologist.  However, if you        continue to have bleeding, call 911 and continue to apply pressure to the site.   You have any symptoms of a stroke.  Remember BE FAST  B-balance. Sudden trouble walking or loss of balance.  E-eyes.  Sudden changes in how you see or a sudden onset of a very bad headache.   F-face. Sudden weakness or loss of feeling of the face or facial droop on one side.   A-arms Sudden weakness or numbness in one arm.  One arm drifts down if they are both held out in front of you. This happens suddenly and usually on one side of the body.   S-speech.  Sudden trouble speaking, slurred speech or trouble understanding what are saying.   T-time  Time to call emergency services.  Write down the symptoms and the time they started.

## 2022-11-11 NOTE — ED NOTES
"This RN walked in to check on patient and found patient breathing through her mouth and shaking, stating shes having difficulty breathing and \"anxiety since coming here\". Patient alert and oriented, arousable to voice. When asked if patient has a history of anxiety, she states \"no\" but reports that the \"blood pressure cuff and all these things you're doing\" are causing her anxiety. Patient asked about medical history and home medications but states she is unable to give this information at this time stating \"I dont even know\". Patient given a warm blanket and states no further needs.  "
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
## 2022-11-11 NOTE — CONSULTS
"[facility] Consult, regarding anxiety.  Patient is evaluated alone in P587.  Chart is reviewed.  Patient was admitted for shortness of breath and chest pain.     Pt is a 72-year-old  white female with 6 adult children who live \"all over the country \".  Patient was  2 other times and both of those husbands are now .  Patient is originally from [place] and moved to [place] in .  She is living in a hotel with her  and states she wants to move out of [place].  Patient is retired, Sabianist sri, loves to read, and knit and aneesh.  Her support system consists of her  and friend  \"Darline\".  Patient states that she \"cannot wait to get out of here \".    Pt is A&O x4, calm and cooperative.  Patient becomes tearful when talking about stories on the news.    Mental Health Hx: No mental health history  Meds: Hydroxyzine as needed  Family Psych Hx: Dad with Parkinson's and brother with alcohol use disorder    Pt denies SI or HI.    Sleep: Good  Appetite: Good  Pt rates depression  0/10 and anxiety  0/10.  She states that her  gets her anxious.    Substance Use Hx: Patient smokes 1 pack/day but wants to quit.  Patient denies ETOH or illicit drug use.    Plan: Patient denies depression or anxiety.  Patient is not interested in mental health resources at this time.  Case discussed with primary RN.  Access will follow.            "
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
## 2022-11-12 ENCOUNTER — APPOINTMENT (OUTPATIENT)
Dept: CARDIOLOGY | Facility: HOSPITAL | Age: 72
End: 2022-11-12

## 2022-11-12 LAB
ANION GAP SERPL CALCULATED.3IONS-SCNC: 10.9 MMOL/L (ref 5–15)
AORTIC DIMENSIONLESS INDEX: 0.6 (DI)
BACTERIA SPEC AEROBE CULT: ABNORMAL
BH CV ECHO MEAS - AO MAX PG: 9.8 MMHG
BH CV ECHO MEAS - AO MEAN PG: 5.3 MMHG
BH CV ECHO MEAS - AO V2 MAX: 156.7 CM/SEC
BH CV ECHO MEAS - AO V2 VTI: 36.6 CM
BH CV ECHO MEAS - AVA(I,D): 1.74 CM2
BH CV ECHO MEAS - CONTRAST EF 4CH: 59 CM2
BH CV ECHO MEAS - EDV(CUBED): 74.8 ML
BH CV ECHO MEAS - EDV(MOD-SP2): 73 ML
BH CV ECHO MEAS - EDV(MOD-SP4): 90 ML
BH CV ECHO MEAS - EF(MOD-BP): 59.4 %
BH CV ECHO MEAS - EF(MOD-SP2): 61.6 %
BH CV ECHO MEAS - EF(MOD-SP4): 60 %
BH CV ECHO MEAS - ESV(CUBED): 25.7 ML
BH CV ECHO MEAS - ESV(MOD-SP2): 28 ML
BH CV ECHO MEAS - ESV(MOD-SP4): 36 ML
BH CV ECHO MEAS - FS: 30 %
BH CV ECHO MEAS - IVS/LVPW: 0.97 CM
BH CV ECHO MEAS - IVSD: 0.9 CM
BH CV ECHO MEAS - LAT PEAK E' VEL: 10.1 CM/SEC
BH CV ECHO MEAS - LV MASS(C)D: 120.7 GRAMS
BH CV ECHO MEAS - LV MAX PG: 3.5 MMHG
BH CV ECHO MEAS - LV MEAN PG: 1.56 MMHG
BH CV ECHO MEAS - LV V1 MAX: 93 CM/SEC
BH CV ECHO MEAS - LV V1 VTI: 21.3 CM
BH CV ECHO MEAS - LVIDD: 4.2 CM
BH CV ECHO MEAS - LVIDS: 2.9 CM
BH CV ECHO MEAS - LVOT AREA: 3 CM2
BH CV ECHO MEAS - LVOT DIAM: 1.95 CM
BH CV ECHO MEAS - LVPWD: 0.92 CM
BH CV ECHO MEAS - MED PEAK E' VEL: 5.7 CM/SEC
BH CV ECHO MEAS - MV A DUR: 0.16 SEC
BH CV ECHO MEAS - MV A MAX VEL: 93.4 CM/SEC
BH CV ECHO MEAS - MV DEC SLOPE: 254.4 CM/SEC2
BH CV ECHO MEAS - MV DEC TIME: 312 MSEC
BH CV ECHO MEAS - MV E MAX VEL: 70.3 CM/SEC
BH CV ECHO MEAS - MV E/A: 0.75
BH CV ECHO MEAS - MV MAX PG: 4 MMHG
BH CV ECHO MEAS - MV MEAN PG: 1.63 MMHG
BH CV ECHO MEAS - MV P1/2T: 104.5 MSEC
BH CV ECHO MEAS - MV V2 VTI: 28.7 CM
BH CV ECHO MEAS - MVA(P1/2T): 2.11 CM2
BH CV ECHO MEAS - MVA(VTI): 2.22 CM2
BH CV ECHO MEAS - PA ACC TIME: 0.13 SEC
BH CV ECHO MEAS - PA PR(ACCEL): 22 MMHG
BH CV ECHO MEAS - PA V2 MAX: 93.8 CM/SEC
BH CV ECHO MEAS - PULM A REVS DUR: 0.13 SEC
BH CV ECHO MEAS - PULM A REVS VEL: 24.8 CM/SEC
BH CV ECHO MEAS - PULM DIAS VEL: 42.8 CM/SEC
BH CV ECHO MEAS - PULM S/D: 1.43
BH CV ECHO MEAS - PULM SYS VEL: 61.4 CM/SEC
BH CV ECHO MEAS - RAP SYSTOLE: 3 MMHG
BH CV ECHO MEAS - RV MAX PG: 2.6 MMHG
BH CV ECHO MEAS - RV V1 MAX: 81 CM/SEC
BH CV ECHO MEAS - RV V1 VTI: 17.8 CM
BH CV ECHO MEAS - SV(LVOT): 63.7 ML
BH CV ECHO MEAS - SV(MOD-SP2): 45 ML
BH CV ECHO MEAS - SV(MOD-SP4): 54 ML
BH CV ECHO MEAS - TAPSE (>1.6): 1.9 CM
BH CV ECHO MEASUREMENTS AVERAGE E/E' RATIO: 8.9
BH CV LOWER VASCULAR LEFT COMMON FEMORAL AUGMENT: NORMAL
BH CV LOWER VASCULAR LEFT COMMON FEMORAL COMPETENT: NORMAL
BH CV LOWER VASCULAR LEFT COMMON FEMORAL COMPRESS: NORMAL
BH CV LOWER VASCULAR LEFT COMMON FEMORAL PHASIC: NORMAL
BH CV LOWER VASCULAR LEFT COMMON FEMORAL SPONT: NORMAL
BH CV LOWER VASCULAR LEFT DISTAL FEMORAL COMPRESS: NORMAL
BH CV LOWER VASCULAR LEFT GASTRONEMIUS COMPRESS: NORMAL
BH CV LOWER VASCULAR LEFT GREATER SAPH AK COMPRESS: NORMAL
BH CV LOWER VASCULAR LEFT GREATER SAPH BK COMPRESS: NORMAL
BH CV LOWER VASCULAR LEFT LESSER SAPH COMPRESS: NORMAL
BH CV LOWER VASCULAR LEFT MID FEMORAL AUGMENT: NORMAL
BH CV LOWER VASCULAR LEFT MID FEMORAL COMPETENT: NORMAL
BH CV LOWER VASCULAR LEFT MID FEMORAL COMPRESS: NORMAL
BH CV LOWER VASCULAR LEFT MID FEMORAL PHASIC: NORMAL
BH CV LOWER VASCULAR LEFT MID FEMORAL SPONT: NORMAL
BH CV LOWER VASCULAR LEFT PERONEAL COMPRESS: NORMAL
BH CV LOWER VASCULAR LEFT POPLITEAL AUGMENT: NORMAL
BH CV LOWER VASCULAR LEFT POPLITEAL COMPETENT: NORMAL
BH CV LOWER VASCULAR LEFT POPLITEAL COMPRESS: NORMAL
BH CV LOWER VASCULAR LEFT POPLITEAL PHASIC: NORMAL
BH CV LOWER VASCULAR LEFT POPLITEAL SPONT: NORMAL
BH CV LOWER VASCULAR LEFT POSTERIOR TIBIAL COMPRESS: NORMAL
BH CV LOWER VASCULAR LEFT PROFUNDA FEMORAL COMPRESS: NORMAL
BH CV LOWER VASCULAR LEFT PROXIMAL FEMORAL COMPRESS: NORMAL
BH CV LOWER VASCULAR LEFT SAPHENOFEMORAL JUNCTION COMPRESS: NORMAL
BH CV LOWER VASCULAR RIGHT COMMON FEMORAL AUGMENT: NORMAL
BH CV LOWER VASCULAR RIGHT COMMON FEMORAL COMPETENT: NORMAL
BH CV LOWER VASCULAR RIGHT COMMON FEMORAL COMPRESS: NORMAL
BH CV LOWER VASCULAR RIGHT COMMON FEMORAL PHASIC: NORMAL
BH CV LOWER VASCULAR RIGHT COMMON FEMORAL SPONT: NORMAL
BH CV LOWER VASCULAR RIGHT DISTAL FEMORAL COMPRESS: NORMAL
BH CV LOWER VASCULAR RIGHT GASTRONEMIUS COMPRESS: NORMAL
BH CV LOWER VASCULAR RIGHT GREATER SAPH AK COMPRESS: NORMAL
BH CV LOWER VASCULAR RIGHT GREATER SAPH BK COMPRESS: NORMAL
BH CV LOWER VASCULAR RIGHT LESSER SAPH COMPRESS: NORMAL
BH CV LOWER VASCULAR RIGHT MID FEMORAL AUGMENT: NORMAL
BH CV LOWER VASCULAR RIGHT MID FEMORAL COMPETENT: NORMAL
BH CV LOWER VASCULAR RIGHT MID FEMORAL COMPRESS: NORMAL
BH CV LOWER VASCULAR RIGHT MID FEMORAL PHASIC: NORMAL
BH CV LOWER VASCULAR RIGHT MID FEMORAL SPONT: NORMAL
BH CV LOWER VASCULAR RIGHT PERONEAL COMPRESS: NORMAL
BH CV LOWER VASCULAR RIGHT POPLITEAL AUGMENT: NORMAL
BH CV LOWER VASCULAR RIGHT POPLITEAL COMPETENT: NORMAL
BH CV LOWER VASCULAR RIGHT POPLITEAL COMPRESS: NORMAL
BH CV LOWER VASCULAR RIGHT POPLITEAL PHASIC: NORMAL
BH CV LOWER VASCULAR RIGHT POPLITEAL SPONT: NORMAL
BH CV LOWER VASCULAR RIGHT POSTERIOR TIBIAL COMPRESS: NORMAL
BH CV LOWER VASCULAR RIGHT PROFUNDA FEMORAL COMPRESS: NORMAL
BH CV LOWER VASCULAR RIGHT PROXIMAL FEMORAL COMPRESS: NORMAL
BH CV LOWER VASCULAR RIGHT SAPHENOFEMORAL JUNCTION COMPRESS: NORMAL
BH CV XLRA - RV BASE: 3.1 CM
BH CV XLRA - RV LENGTH: 6.1 CM
BH CV XLRA - RV MID: 2.7 CM
BH CV XLRA - TDI S': 11.3 CM/SEC
BUN SERPL-MCNC: 5 MG/DL (ref 8–23)
BUN/CREAT SERPL: 11.6 (ref 7–25)
CALCIUM SPEC-SCNC: 8.6 MG/DL (ref 8.6–10.5)
CHLORIDE SERPL-SCNC: 102 MMOL/L (ref 98–107)
CO2 SERPL-SCNC: 23.1 MMOL/L (ref 22–29)
CREAT SERPL-MCNC: 0.43 MG/DL (ref 0.57–1)
DEPRECATED RDW RBC AUTO: 42.9 FL (ref 37–54)
EGFRCR SERPLBLD CKD-EPI 2021: 102.2 ML/MIN/1.73
ERYTHROCYTE [DISTWIDTH] IN BLOOD BY AUTOMATED COUNT: 13 % (ref 12.3–15.4)
GLUCOSE SERPL-MCNC: 77 MG/DL (ref 65–99)
HCT VFR BLD AUTO: 28.8 % (ref 34–46.6)
HGB BLD-MCNC: 10 G/DL (ref 12–15.9)
LEFT ATRIUM VOLUME INDEX: 38.5 ML/M2
MAXIMAL PREDICTED HEART RATE: 146 BPM
MAXIMAL PREDICTED HEART RATE: 146 BPM
MCH RBC QN AUTO: 31.1 PG (ref 26.6–33)
MCHC RBC AUTO-ENTMCNC: 34.7 G/DL (ref 31.5–35.7)
MCV RBC AUTO: 89.4 FL (ref 79–97)
PLATELET # BLD AUTO: 250 10*3/MM3 (ref 140–450)
PMV BLD AUTO: 9.1 FL (ref 6–12)
POTASSIUM SERPL-SCNC: 3.7 MMOL/L (ref 3.5–5.2)
QT INTERVAL: 414 MS
RBC # BLD AUTO: 3.22 10*6/MM3 (ref 3.77–5.28)
SINUS: 3.1 CM
SODIUM SERPL-SCNC: 136 MMOL/L (ref 136–145)
STRESS TARGET HR: 124 BPM
STRESS TARGET HR: 124 BPM
WBC NRBC COR # BLD: 7.36 10*3/MM3 (ref 3.4–10.8)

## 2022-11-12 PROCEDURE — 93306 TTE W/DOPPLER COMPLETE: CPT | Performed by: INTERNAL MEDICINE

## 2022-11-12 PROCEDURE — A9270 NON-COVERED ITEM OR SERVICE: HCPCS | Performed by: INTERNAL MEDICINE

## 2022-11-12 PROCEDURE — 63710000001 ISOSORBIDE MONONITRATE 30 MG TABLET SUSTAINED-RELEASE 24 HOUR: Performed by: INTERNAL MEDICINE

## 2022-11-12 PROCEDURE — 25010000002 PERFLUTREN (DEFINITY) 8.476 MG IN SODIUM CHLORIDE (PF) 0.9 % 10 ML INJECTION: Performed by: INTERNAL MEDICINE

## 2022-11-12 PROCEDURE — 93970 EXTREMITY STUDY: CPT

## 2022-11-12 PROCEDURE — G0378 HOSPITAL OBSERVATION PER HR: HCPCS

## 2022-11-12 PROCEDURE — 93306 TTE W/DOPPLER COMPLETE: CPT

## 2022-11-12 PROCEDURE — 97162 PT EVAL MOD COMPLEX 30 MIN: CPT

## 2022-11-12 PROCEDURE — 63710000001 ATORVASTATIN 80 MG TABLET: Performed by: INTERNAL MEDICINE

## 2022-11-12 PROCEDURE — 63710000001 BUMETANIDE 2 MG TABLET: Performed by: STUDENT IN AN ORGANIZED HEALTH CARE EDUCATION/TRAINING PROGRAM

## 2022-11-12 PROCEDURE — 63710000001 HYDROXYZINE 25 MG TABLET: Performed by: INTERNAL MEDICINE

## 2022-11-12 PROCEDURE — 63710000001 ASPIRIN 81 MG TABLET DELAYED-RELEASE: Performed by: INTERNAL MEDICINE

## 2022-11-12 PROCEDURE — A9270 NON-COVERED ITEM OR SERVICE: HCPCS | Performed by: STUDENT IN AN ORGANIZED HEALTH CARE EDUCATION/TRAINING PROGRAM

## 2022-11-12 PROCEDURE — 63710000001 METOPROLOL TARTRATE 50 MG TABLET: Performed by: INTERNAL MEDICINE

## 2022-11-12 PROCEDURE — 63710000001 CYCLOBENZAPRINE 10 MG TABLET: Performed by: INTERNAL MEDICINE

## 2022-11-12 PROCEDURE — 63710000001 AMOXICILLIN 250 MG CAPSULE: Performed by: STUDENT IN AN ORGANIZED HEALTH CARE EDUCATION/TRAINING PROGRAM

## 2022-11-12 PROCEDURE — 93005 ELECTROCARDIOGRAM TRACING: CPT | Performed by: INTERNAL MEDICINE

## 2022-11-12 PROCEDURE — 80048 BASIC METABOLIC PNL TOTAL CA: CPT | Performed by: STUDENT IN AN ORGANIZED HEALTH CARE EDUCATION/TRAINING PROGRAM

## 2022-11-12 PROCEDURE — 94618 PULMONARY STRESS TESTING: CPT

## 2022-11-12 PROCEDURE — 85027 COMPLETE CBC AUTOMATED: CPT | Performed by: STUDENT IN AN ORGANIZED HEALTH CARE EDUCATION/TRAINING PROGRAM

## 2022-11-12 PROCEDURE — 97110 THERAPEUTIC EXERCISES: CPT

## 2022-11-12 PROCEDURE — 63710000001 CLOPIDOGREL 75 MG TABLET: Performed by: INTERNAL MEDICINE

## 2022-11-12 RX ORDER — BUMETANIDE 2 MG/1
2 TABLET ORAL DAILY
Status: DISCONTINUED | OUTPATIENT
Start: 2022-11-12 | End: 2022-11-13 | Stop reason: HOSPADM

## 2022-11-12 RX ORDER — AMOXICILLIN 250 MG/1
500 CAPSULE ORAL EVERY 6 HOURS SCHEDULED
Status: DISCONTINUED | OUTPATIENT
Start: 2022-11-12 | End: 2022-11-13 | Stop reason: HOSPADM

## 2022-11-12 RX ADMIN — Medication 10 ML: at 21:07

## 2022-11-12 RX ADMIN — BUMETANIDE 2 MG: 2 TABLET ORAL at 16:26

## 2022-11-12 RX ADMIN — Medication 10 ML: at 08:59

## 2022-11-12 RX ADMIN — AMOXICILLIN 500 MG: 250 CAPSULE ORAL at 16:26

## 2022-11-12 RX ADMIN — PERFLUTREN 1.5 ML: 6.52 INJECTION, SUSPENSION INTRAVENOUS at 08:27

## 2022-11-12 RX ADMIN — HYDROXYZINE HYDROCHLORIDE 25 MG: 25 TABLET ORAL at 20:46

## 2022-11-12 RX ADMIN — METOPROLOL TARTRATE 50 MG: 50 TABLET, FILM COATED ORAL at 08:59

## 2022-11-12 RX ADMIN — METOPROLOL TARTRATE 50 MG: 50 TABLET, FILM COATED ORAL at 20:46

## 2022-11-12 RX ADMIN — CYCLOBENZAPRINE 10 MG: 10 TABLET, FILM COATED ORAL at 20:46

## 2022-11-12 RX ADMIN — CLOPIDOGREL 75 MG: 75 TABLET, FILM COATED ORAL at 08:59

## 2022-11-12 RX ADMIN — ASPIRIN 81 MG: 81 TABLET, COATED ORAL at 08:59

## 2022-11-12 RX ADMIN — ISOSORBIDE MONONITRATE 30 MG: 30 TABLET, EXTENDED RELEASE ORAL at 08:59

## 2022-11-12 RX ADMIN — ATORVASTATIN CALCIUM 80 MG: 80 TABLET, FILM COATED ORAL at 20:46

## 2022-11-12 NOTE — PROGRESS NOTES
Name: Asha Krishnan ADMIT: 2022   : 1950  PCP: Capo Pedroza MD    MRN: [id number] LOS: 0 days   AGE/SEX: 72 y.o. female  ROOM: [facility]     Subjective   Subjective   No acute vents overnight.  Patient lying comfortably in bed.  Patient without any dyspnea or chest pain.  Patient seen without any oxygen on.  Requesting walker on discharge.  Review of Systems   Constitutional: Negative for chills and fever.   Respiratory: Negative for cough and shortness of breath.    Cardiovascular: Negative for chest pain and leg swelling.   Gastrointestinal: Negative for abdominal pain, diarrhea and nausea.   Skin: Negative for wound.     As above     Objective   Objective   Vital Signs  Temp:  [98.1 °F (36.7 °C)-98.7 °F (37.1 °C)] 98.1 °F (36.7 °C)  Heart Rate:  [63-72] 66  Resp:  [12-20] 20  BP: (116-159)/(53-77) 121/76  SpO2:  [92 %-98 %] 92 %  on   ;   Device (Oxygen Therapy): room air  Body mass index is 20.6 kg/m².  Physical Exam  Constitutional:       General: She is not in acute distress.     Appearance: She is not diaphoretic.   HENT:      Head: Normocephalic and atraumatic.      Mouth/Throat:      Mouth: Mucous membranes are moist.      Pharynx: Oropharynx is clear.   Eyes:      Extraocular Movements: Extraocular movements intact.      Conjunctiva/sclera: Conjunctivae normal.   Cardiovascular:      Rate and Rhythm: Normal rate and regular rhythm.      Heart sounds: No murmur heard.    No gallop.   Pulmonary:      Effort: Pulmonary effort is normal. No respiratory distress.      Breath sounds: No wheezing.      Comments: Bibaslar crackles  Abdominal:      General: Abdomen is flat. There is no distension.      Palpations: Abdomen is soft.      Tenderness: There is no abdominal tenderness.   Musculoskeletal:         General: No swelling or deformity. Normal range of motion.   Skin:     General: Skin is warm and dry.   Neurological:      General: No focal deficit present.      Mental Status: She  is alert and oriented to person, place, and time.       Results Review     I reviewed the patient's new clinical results.  Results from last 7 days   Lab Units 11/12/22  0650 11/11/22  1802 11/11/22  0233   WBC 10*3/mm3 7.36 6.26 8.43   HEMOGLOBIN g/dL 10.0* 10.2* 10.4*   PLATELETS 10*3/mm3 250 269 284     Results from last 7 days   Lab Units 11/12/22  0650 11/11/22  1802 11/11/22  0233   SODIUM mmol/L 136 136 134*   POTASSIUM mmol/L 3.7 3.5 3.8   CHLORIDE mmol/L 102 103 99   CO2 mmol/L 23.1 23.1 24.4   BUN mg/dL 5* 6* 8   CREATININE mg/dL 0.43* 0.55* 0.56*   GLUCOSE mg/dL 77 103* 97   Estimated Creatinine Clearance: 98.6 mL/min (A) (by C-G formula based on SCr of 0.43 mg/dL (L)).  Results from last 7 days   Lab Units 11/11/22  0233   ALBUMIN g/dL 4.00   BILIRUBIN mg/dL 0.5   ALK PHOS U/L 111   AST (SGOT) U/L 15   ALT (SGPT) U/L 15     Results from last 7 days   Lab Units 11/12/22  0650 11/11/22  1802 11/11/22  0233   CALCIUM mg/dL 8.6 9.1 9.4   ALBUMIN g/dL  --   --  4.00     No results found for: COVID19  Hemoglobin A1C   Date/Time Value Ref Range Status   11/10/2022 0302 6.1 (H) 4.3 - 5.6 % Final     Comment:     (note)  A1C% Reference Range:  4.3 - 5.6  Normal range  5.7 - 6.4  Pre-diabetic -increased risk for developing diabetes  mellitus.  >=6.5      Diabetic -diagnostic of diabetes mellitus.    Note: For diagnosis of diabetes in individuals without unequivocal  hyperglycemia, results should be confirmed by repeat testing.  Patients with conditions that shorten erythrocyte survival, such as  recovery from acute blood loss, hemolytic anemia, kidney disease,  or the presence of unstable hemogloblins like HbSS, HbCC, and HbSC  may yield falsely decreased HbA1c test results. Iron deficiency may  yield falsely increased HbA1c test results.       Duplex Venous Lower Extremity - Bilateral CAR  •  Normal bilateral lower extremity venous duplex scan.    I reviewed the patient's daily medications.  Scheduled  Medications  aspirin, 81 mg, Oral, Daily  atorvastatin, 80 mg, Oral, Nightly  clopidogrel, 75 mg, Oral, Daily  isosorbide mononitrate, 30 mg, Oral, Q24H  metoprolol tartrate, 50 mg, Oral, Q12H  sodium chloride, 10 mL, Intravenous, Q12H  tiotropium bromide monohydrate, 2 puff, Inhalation, Daily - RT    Infusions   Diet  Diet Regular; Cardiac         I have personally reviewed:  [x]  Laboratory   [x]  Microbiology   [x]  Radiology   []  EKG/Telemetry   [x]  Cardiology/Vascular   []  Pathology   []  Records     Assessment/Plan     Active Hospital Problems    Diagnosis  POA   • **Chest pain, unspecified type [R07.9]  Yes   • Chest pain, atypical [R07.89]  Unknown   • CAD (coronary artery disease) [I25.10]  Unknown   • Dysuria [R30.0]  Unknown   • Dyspnea [R06.00]  Unknown   • Elevated d-dimer [R79.89]  Unknown   • COPD (chronic obstructive pulmonary disease) (HCC) [J44.9]  Unknown   • HTN (hypertension) [I10]  Unknown   • HLD (hyperlipidemia) [E78.5]  Unknown      Resolved Hospital Problems   No resolved problems to display.       72 y.o. female admitted with Chest pain, unspecified type.    Atypical chest pain, resolved  Dyspnea, resolved  -Cardiology consulted, appreciate recommendations  -Troponins negative, EKG without obvious signs of ischemia  -A1c 6.1%, D-dimer- 7.64, BNP normal  -CTA with 1.3 cm nodule that needs CT follow-up in 3 months, lower extremity Dopplers with no clot  -Heart cath: Severe two-vessel coronary artery disease with 100% ostial RCA stenosis consistent with  with epicardial left to right collaterals filling the PDA and distal RCA.  Plan for medical management.  -Echo pending     CAD status post stents  HTN  HLD  -Continue Plavix, aspirin, statin, Imdur, metoprolol  -Plan to restart bumex tomorrow after contrast procedures today     Streptococcus agalactiae UTI  -UA consistent with UTI  -Amoxicillin 500 mg every 6 hours for 5 days     Anxiety  -hydroxyzine as needed.  Access Center  consulted.     COPD  -Has been smoking since she was 19  -Albuterol as needed  -SaO2 goal of 88-92%       · SCDs for DVT prophylaxis.  · Full code.  · Discussed with patient.  · Anticipate discharge home tomorrow.    PT pending.  Walker ordered.      Ino Hebert MD  [facility]  11/12/22  14:57 EST

## 2022-11-12 NOTE — PROGRESS NOTES
Patient doing well with good vital signs and no angina. Continue current medications, no contraindication to DC from cardiac standpoint.

## 2022-11-12 NOTE — THERAPY EVALUATION
Patient Name: Asha Krishnan  : 1950    MRN: [id number]                              Today's Date: 2022       Admit Date: 2022    Visit Dx:     ICD-10-CM ICD-9-CM   1. Chest pain, unspecified type  R07.9 786.50   2. Dyspnea, unspecified type  R06.00 786.09     Patient Active Problem List   Diagnosis   • Chest pain, atypical   • Chest pain, unspecified type   • CAD (coronary artery disease)   • Dysuria   • Dyspnea   • Elevated d-dimer   • COPD (chronic obstructive pulmonary disease) (HCC)   • HTN (hypertension)   • HLD (hyperlipidemia)     Past Medical History:   Diagnosis Date   • Hyperlipidemia      History reviewed. No pertinent surgical history.   General Information     Row Name 22 1509          Physical Therapy Time and Intention    Document Type evaluation  -EJ     Mode of Treatment physical therapy  -EJ     Row Name 22 1509          General Information    Patient Profile Reviewed yes  -EJ     Prior Level of Function independent:;ADL's;all household mobility;community mobility  using quad cane, also has wheelchairs at home  -EJ     Existing Precautions/Restrictions fall  -EJ     Barriers to Rehab medically complex  -EJ     Row Name 22 1509          Living Environment    People in Home significant other  living in a hotel w   -EJ     Row Name 22 1509          Cognition    Orientation Status (Cognition) oriented x 3  -EJ     Row Name 22 1509          Safety Issues, Functional Mobility    Impairments Affecting Function (Mobility) endurance/activity tolerance;strength  -EJ           User Key  (r) = Recorded By, (t) = Taken By, (c) = Cosigned By    Initials Name Provider Type    EJ Mayte Johnson, PT Physical Therapist               Mobility     Row Name 22 1514          Bed Mobility    Bed Mobility supine-sit;sit-supine  -EJ     Supine-Sit [place] (Bed Mobility) minimum assist (75% patient effort)  -EJ     Sit-Supine [place] (Bed  Mobility) standby assist;contact guard  -EJ     Assistive Device (Bed Mobility) head of bed elevated  -EJ     Row Name 11/12/22 1514          Sit-Stand Transfer    Sit-Stand [place] (Transfers) contact guard  -EJ     Assistive Device (Sit-Stand Transfers) walker, front-wheeled  -EJ     Row Name 11/12/22 1514          Gait/Stairs (Locomotion)    [place] Level (Gait) contact guard  -EJ     Assistive Device (Gait) walker, front-wheeled  -EJ     Distance in Feet (Gait) 25  -EJ     Deviations/Abnormal Patterns (Gait) m[place] decreased;stride length decreased  -EJ     Comment, (Gait/Stairs) limited by fatigue although no s/s of SOA with activity, no unsteadiness noted,  -EJ           User Key  (r) = Recorded By, (t) = Taken By, (c) = Cosigned By    Initials Name Provider Type    Mayte Pete, PT Physical Therapist               Obj/Interventions     [facility] Name 11/12/22 1515          Range of Motion Comprehensive    General Range of Motion no range of motion deficits identified  -EJ     Row Name 11/12/22 1515          Strength Comprehensive (MMT)    Comment, General Manual Muscle Testing (MMT) Assessment generalized weakness  -[facility] Name 11/12/22 1515          Balance    Balance Assessment sitting static balance;standing static balance;standing dynamic balance  -EJ     Static Sitting Balance independent  -EJ     Static Standing Balance standby assist  -EJ     Dynamic Standing Balance contact guard;standby assist  -EJ     Position/Device Used, Standing Balance walker, rolling  -EJ           User Key  (r) = Recorded By, (t) = Taken By, (c) = Cosigned By    Initials Name Provider Type    Mayte Pete, PT Physical Therapist               Goals/Plan     Row Name 11/12/22 1524          Bed Mobility Goal 1 (PT)    Activity/Assistive Device (Bed Mobility Goal 1, PT) bed mobility activities, all  -EJ     [place] Level/Cues Needed (Bed Mobility Goal 1, PT) standby assist  -EJ     Time Frame (Bed  Mobility Goal 1, PT) 5 days  -EJ     Row Name 11/12/22 1524          Transfer Goal 1 (PT)    Activity/Assistive Device (Transfer Goal 1, PT) transfers, all;walker, rolling  -EJ     [place] Level/Cues Needed (Transfer Goal 1, PT) standby assist  -EJ     Time Frame (Transfer Goal 1, PT) 5 days  -EJ     Row Name 11/12/22 1524          Gait Training Goal 1 (PT)    Activity/Assistive Device (Gait Training Goal 1, PT) gait (walking locomotion);walker, rolling  -EJ     [place] Level (Gait Training Goal 1, PT) standby assist  -EJ     Distance (Gait Training Goal 1, PT) 100  -EJ     Time Frame (Gait Training Goal 1, PT) 5 days  -EJ     Row Name 11/12/22 1524          Therapy Assessment/Plan (PT)    Planned Therapy Interventions (PT) balance training;bed mobility training;gait training;home exercise program;patient/family education;stretching;strengthening;stair training;ROM (range of motion);transfer training  -EJ           User Key  (r) = Recorded By, (t) = Taken By, (c) = Cosigned By    Initials Name Provider Type    Mayte Pete, PT Physical Therapist               Clinical Impression     Row Name 11/12/22 1517          Pain    Pretreatment Pain Rating 0/10 - no pain  -EJ     Row Name 11/12/22 1517          Plan of Care Review    Plan of Care Reviewed With patient  -EJ     Outcome Evaluation Pt seen for PT eval this afternoon. She was admitted to [facility] w complaints of chest pain. She has hx of COPD, HTN, HLD, CAD, and anxiety. At baseline, pt has been living in an extended stay hotel w her . She is using a quad cane for ambulation, but also has a wheelchair. Difficult to obtain clear hx from patient. Today, pt doing well. She states she needs oxygen, but her O2 is turned all the way down. She does present w generalized weakness, although appears close to her baseline. She was able to complete bed mobility w CGA. She also stood and ambulated approx 25 ft w CGA using Rwx. No unsteadiness noted. Pt  requested to turn around due to fatigue, although she does not exhibit any s/s of SOA.  No pulse ox connected to pt so unable to obtain O2 sats. Pt plans to return home w her  upon DC. Rolling walker ordered for pt. Discussed w MD. PT will continue to follow several times a week while pt is in hospital to ensure safety and maximize independence w mobility.  -EJ     Row Name 11/12/22 1517          Therapy Assessment/Plan (PT)    Rehab Potential (PT) good, to achieve stated therapy goals  -EJ     Criteria for Skilled Interventions Met (PT) yes  -EJ     Therapy Frequency (PT) 3 times/wk  -EJ     Row Name 11/12/22 1517          Positioning and Restraints    Pre-Treatment Position in bed  -EJ     Post Treatment Position bed  -EJ     In Bed notified nsg;supine;call light within reach;encouraged to call for assist;exit alarm on  -EJ           User Key  (r) = Recorded By, (t) = Taken By, (c) = Cosigned By    Initials Name Provider Type    Mayte Pete, PT Physical Therapist               Outcome Measures     Row Name 11/12/22 1524          How much help from another person do you currently need...    Turning from your back to your side while in flat bed without using bedrails? 4  -EJ     Moving from lying on back to sitting on the side of a flat bed without bedrails? 3  -EJ     Moving to and from a bed to a chair (including a wheelchair)? 3  -EJ     Standing up from a chair using your arms (e.g., wheelchair, bedside chair)? 3  -EJ     Climbing 3-5 steps with a railing? 3  -EJ     To walk in hospital room? 3  -EJ     AM-PAC 6 Clicks Score (PT) 19  -EJ     Highest level of mobility 6 --> Walked 10 steps or more  -EJ           User Key  (r) = Recorded By, (t) = Taken By, (c) = Cosigned By    Initials Name Provider Type    Mayte Pete, PT Physical Therapist                             Physical Therapy Education     Title: PT OT SLP Therapies (In Progress)     Topic: Physical Therapy (In Progress)      Point: Mobility training (Done)     Learning Progress Summary           Patient Acceptance, E,TB,D, VU,NR by MARGARET at 11/12/2022 9684                   Point: Home exercise program (Not Started)     Learner Progress:  Not documented in this visit.          Point: Body mechanics (Not Started)     Learner Progress:  Not documented in this visit.          Point: Precautions (Not Started)     Learner Progress:  Not documented in this visit.                      User Key     Initials Effective Dates Name Provider Type Discipline    MARGARET 06/16/21 -  Mayte Johnson, PT Physical Therapist PT              PT Recommendation and Plan  Planned Therapy Interventions (PT): balance training, bed mobility training, gait training, home exercise program, patient/family education, stretching, strengthening, stair training, ROM (range of motion), transfer training  Plan of Care Reviewed With: patient  Outcome Evaluation: Pt seen for PT eval this afternoon. She was admitted to [facility] w complaints of chest pain. She has hx of COPD, HTN, HLD, CAD, and anxiety. At baseline, pt has been living in an extended stay hotel w her . She is using a quad cane for ambulation, but also has a wheelchair. Difficult to obtain clear hx from patient. Today, pt doing well. She states she needs oxygen, but her O2 is turned all the way down. She does present w generalized weakness, although appears close to her baseline. She was able to complete bed mobility w CGA. She also stood and ambulated approx 25 ft w CGA using Rwx. No unsteadiness noted. Pt requested to turn around due to fatigue, although she does not exhibit any s/s of SOA.  No pulse ox connected to pt so unable to obtain O2 sats. Pt plans to return home w her  upon DC. Rolling walker ordered for pt. Discussed w MD. PT will continue to follow several times a week while pt is in hospital to ensure safety and maximize independence w mobility.     Time Calculation:    PT Charges     Row Name  11/12/22 1525             Time Calculation    Start Time 1445  -EJ      Stop Time 1509  -EJ      Time Calculation (min) 24 min  -EJ      PT Received On 11/12/22  -EJ      PT - Next Appointment 11/14/22  -EJ      PT Goal Re-Cert Due Date 11/17/22  -EJ         Time Calculation- PT    Total Timed Code Minutes- PT 18 minute(s)  -EJ            User Key  (r) = Recorded By, (t) = Taken By, (c) = Cosigned By    Initials Name Provider Type     Mayte Johnson, PT Physical Therapist              Therapy Charges for Today     Code Description Service Date Service Provider Modifiers Qty    66367152323 HC PT EVAL MOD COMPLEXITY 3 11/12/2022 Mayte Johnson, PT GP 1    58629747861 HC PT THER PROC EA 15 MIN 11/12/2022 Mayte Johnson, PT GP 1          PT G-Codes  AM-PAC 6 Clicks Score (PT): 19    Mayte Johnson, PT  11/12/2022

## 2022-11-12 NOTE — PLAN OF CARE
Goal Outcome Evaluation:      No new event this shift. Denies chest pain. Resting well.        Problem: Adult Inpatient Plan of Care  Goal: Plan of Care Review  Outcome: Ongoing, Progressing  Goal: Patient-Specific Goal (Individualized)  Outcome: Ongoing, Progressing  Goal: Absence of Hospital-Acquired Illness or Injury  Outcome: Ongoing, Progressing  Intervention: Identify and Manage Fall Risk  Recent Flowsheet Documentation  Taken 11/12/2022 0400 by Srinivas Sewell RN  Safety Promotion/Fall Prevention: safety round/check completed  Taken 11/12/2022 0200 by Srinivas Sewell RN  Safety Promotion/Fall Prevention: safety round/check completed  Taken 11/12/2022 0031 by Srinivas Sewell RN  Safety Promotion/Fall Prevention: safety round/check completed  Taken 11/11/2022 2208 by Srinivas Sewell RN  Safety Promotion/Fall Prevention: safety round/check completed  Taken 11/11/2022 2005 by Srinivas Sewell RN  Safety Promotion/Fall Prevention:   activity supervised   fall prevention program maintained   nonskid shoes/slippers when out of bed   safety round/check completed  Intervention: Prevent Skin Injury  Recent Flowsheet Documentation  Taken 11/12/2022 0400 by Srinivas Sewell RN  Body Position: upper extremity elevated  Taken 11/12/2022 0200 by Srinivas Sewell RN  Body Position: upper extremity elevated  Taken 11/12/2022 0031 by Srinivas Sewell RN  Body Position: upper extremity elevated  Taken 11/11/2022 2208 by Srinivas Sewell RN  Body Position: upper extremity elevated  Taken 11/11/2022 2005 by Srinivas Sewell RN  Body Position: upper extremity elevated  Intervention: Prevent and Manage VTE (Venous Thromboembolism) Risk  Recent Flowsheet Documentation  Taken 11/11/2022 2005 by Srinivas Sewell RN  Activity Management: activity adjusted per tolerance  VTE Prevention/Management:   bilateral   sequential compression devices on  Goal: Optimal Comfort and Wellbeing  Outcome: Ongoing,  Progressing  Intervention: Provide Person-Centered Care  Recent Flowsheet Documentation  Taken 11/11/2022 2005 by Srinivas Sewell RN  Trust Relationship/Rapport:   care explained   thoughts/feelings acknowledged  Goal: Readiness for Transition of Care  Outcome: Ongoing, Progressing     Problem: Pain Chronic (Persistent) (Comorbidity Management)  Goal: Acceptable Pain Control and Functional Ability  Outcome: Ongoing, Progressing  Intervention: Manage Persistent Pain  Recent Flowsheet Documentation  Taken 11/11/2022 2005 by Srinivas Sewell RN  Medication Review/Management: medications reviewed     Problem: Fall Injury Risk  Goal: Absence of Fall and Fall-Related Injury  Outcome: Ongoing, Progressing  Intervention: Identify and Manage Contributors  Recent Flowsheet Documentation  Taken 11/11/2022 2005 by Srinivas Sewell RN  Medication Review/Management: medications reviewed  Intervention: Promote Injury-Free Environment  Recent Flowsheet Documentation  Taken 11/12/2022 0400 by Srinivas Sewell RN  Safety Promotion/Fall Prevention: safety round/check completed  Taken 11/12/2022 0200 by Srinivas Sewell RN  Safety Promotion/Fall Prevention: safety round/check completed  Taken 11/12/2022 0031 by Srinivas Sewell RN  Safety Promotion/Fall Prevention: safety round/check completed  Taken 11/11/2022 2208 by Srinivas Sewell RN  Safety Promotion/Fall Prevention: safety round/check completed  Taken 11/11/2022 2005 by Srinivas Sewell RN  Safety Promotion/Fall Prevention:   activity supervised   fall prevention program maintained   nonskid shoes/slippers when out of bed   safety round/check completed     Problem: Chest Pain  Goal: Resolution of Chest Pain Symptoms  Outcome: Ongoing, Progressing

## 2022-11-12 NOTE — PROGRESS NOTES
Exercise Oximetry    Patient Name:Asha Krishnan   MRN: [id number]   Date: 11/12/22             ROOM AIR BASELINE   SpO2% 96   Heart Rate 92   Blood Pressure      EXERCISE ON ROOM AIR SpO2% EXERCISE ON O2 @  LPM SpO2%   1 MINUTE 95 1 MINUTE    2 MINUTES 93 2 MINUTES    3 MINUTES 93 3 MINUTES    4 MINUTES 94 4 MINUTES    5 MINUTES 95 5 MINUTES    6 MINUTES 92 6 MINUTES               Distance Walked  30 feet Distance Walked   Dyspnea (Nikki Scale)   Dyspnea (Nikki Scale)   Fatigue (Nikki Scale)   Fatigue (Nikki Scale)   SpO2% Post Exercise  94 SpO2% Post Exercise   HR Post Exercise  75 HR Post Exercise   Time to Recovery  none Time to Recovery     Comments: PT walked with walker and no C/O pain or SOB. PT requires no O2.

## 2022-11-12 NOTE — PLAN OF CARE
Goal Outcome Evaluation:  Plan of Care Reviewed With: patient           Outcome Evaluation: Pt seen for PT elise this afternoon. She was admitted to [facility] w complaints of chest pain. She has hx of COPD, HTN, HLD, CAD, and anxiety. At baseline, pt has been living in an extended stay hotel w her . She is using a quad cane for ambulation, but also has a wheelchair. Difficult to obtain clear hx from patient. Today, pt doing well. She states she needs oxygen, but her O2 is turned all the way down. She does present w generalized weakness, although appears close to her baseline. She was able to complete bed mobility w CGA. She also stood and ambulated approx 25 ft w CGA using Rwx. No unsteadiness noted. Pt requested to turn around due to fatigue, although she does not exhibit any s/s of SOA.  No pulse ox connected to pt so unable to obtain O2 sats. Pt plans to return home w her  upon DC. Rolling walker ordered for pt. Discussed w MD. PT will continue to follow several times a week while pt is in hospital to ensure safety and maximize independence w mobility.

## 2022-11-12 NOTE — NURSING NOTE
Access consulted due to anxiety.     Patient has been pleasant, appropriate. Slept and no complaints voiced per nurse. Declined mental health resources during initial consult.     Access following.

## 2022-11-12 NOTE — PLAN OF CARE
Problem: Adult Inpatient Plan of Care  Goal: Plan of Care Review  Outcome: Ongoing, Progressing  Flowsheets  Taken 11/12/2022 1619 by Marly Taylor, RN  Outcome Evaluation: Vitals as documented. No s/sx of distress. Alert and oriented, disorganized thought process and rambling noted. Pleasant and cooperative. Up to BR with miimal SBA. Taking PO well. Doppler of BLE negative for DVT. Plan to d/c home with  tomorrow. Will need transportation arranged.  Taken 11/12/2022 1517 by Mayte Johnson, PT  Plan of Care Reviewed With: patient  Goal: Patient-Specific Goal (Individualized)  Outcome: Ongoing, Progressing  Goal: Absence of Hospital-Acquired Illness or Injury  Outcome: Ongoing, Progressing  Intervention: Identify and Manage Fall Risk  Description: Perform standard risk assessment on admission using a validated tool or comprehensive approach appropriate to the patient; reassess fall risk frequently, with change in status or transfer to another level of care.  Communicate fall injury risk to interprofessional healthcare team.  Determine need for increased observation, equipment and environmental modification, such as low bed, signage and supportive, nonskid footwear.  Adjust safety measures to individual developmental age, stage and identified risk factors.  Reinforce the importance of safety and physical activity with patient and family.  Perform regular intentional rounding to assess need for position change, pain assessment and personal needs, including assistance with toileting.  Recent Flowsheet Documentation  Taken 11/12/2022 1400 by Marly Taylor, RN  Safety Promotion/Fall Prevention:   assistive device/personal items within reach   clutter free environment maintained   fall prevention program maintained   nonskid shoes/slippers when out of bed   room organization consistent   safety round/check completed  Taken 11/12/2022 1210 by Marly Taylor, RN  Safety Promotion/Fall Prevention:   activity  supervised   assistive device/personal items within reach   clutter free environment maintained   fall prevention program maintained   nonskid shoes/slippers when out of bed   room organization consistent   safety round/check completed  Taken 11/12/2022 1000 by Marly Taylor RN  Safety Promotion/Fall Prevention:   clutter free environment maintained   assistive device/personal items within reach   fall prevention program maintained   nonskid shoes/slippers when out of bed   safety round/check completed   room organization consistent  Taken 11/12/2022 0725 by Marly Taylor, RN  Safety Promotion/Fall Prevention:   assistive device/personal items within reach   clutter free environment maintained   fall prevention program maintained   nonskid shoes/slippers when out of bed   room organization consistent   safety round/check completed  Intervention: Prevent Skin Injury  Description: Perform a screening for skin injury risk, such as pressure or moisture associated skin damage on admission and at regular intervals throughout hospital stay.  Keep all areas of skin (especially folds) clean and dry.  Maintain adequate skin hydration.  Relieve and redistribute pressure and protect bony prominences; implement measures based on patient-specific risk factors.  Match turning and repositioning schedule to clinical condition.  Encourage weight shift frequently; assist with reposition if unable to complete independently.  Float heels off bed; avoid pressure on the Achilles tendon.  Keep skin free from extended contact with medical devices.  Encourage functional activity and mobility, as early as tolerated.  Use aids (e.g., slide boards, mechanical lift) during transfer.  Recent Flowsheet Documentation  Taken 11/12/2022 1400 by Marly Taylor, RN  Body Position:   position changed independently   neutral body alignment   neutral head position  Taken 11/12/2022 1210 by Marly Taylor, RN  Body Position:   position changed  independently   neutral head position   neutral body alignment   sitting up in bed  Taken 11/12/2022 1000 by Marly Taylor RN  Body Position:   position changed independently   neutral body alignment   neutral head position   sitting up in bed  Taken 11/12/2022 0725 by Marly Taylor RN  Body Position:   neutral body alignment   neutral head position   position changed independently   left   tilted  Intervention: Prevent and Manage VTE (Venous Thromboembolism) Risk  Description: Assess for VTE (venous thromboembolism) risk.  Encourage and assist with early ambulation.  Initiate and maintain compression or other therapy, as indicated, based on identified risk in accordance with organizational protocol and provider order.  Encourage both active and passive leg exercises while in bed, if unable to ambulate.  Recent Flowsheet Documentation  Taken 11/12/2022 1400 by Marly Taylor RN  Activity Management:   activity adjusted per tolerance   sitting, edge of bed  Taken 11/12/2022 1210 by Marly Taylor RN  Activity Management: activity adjusted per tolerance  Taken 11/12/2022 1000 by Marly Taylor RN  Activity Management: activity adjusted per tolerance  Taken 11/12/2022 0725 by Marly Taylor RN  Activity Management: activity adjusted per tolerance  Intervention: Prevent Infection  Description: Maintain skin and mucous membrane integrity; promote hand, oral and pulmonary hygiene.  Optimize fluid balance, nutrition, sleep and glycemic control to maximize infection resistance.  Identify potential sources of infection early to prevent or mitigate progression of infection (e.g., wound, lines, devices).  Evaluate ongoing need for invasive devices; remove promptly when no longer indicated.  Recent Flowsheet Documentation  Taken 11/12/2022 1400 by Marly Taylor RN  Infection Prevention:   environmental surveillance performed   equipment surfaces disinfected   personal protective equipment utilized   hand hygiene  promoted   rest/sleep promoted   single patient room provided   visitors restricted/screened  Taken 11/12/2022 1210 by Marly Taylor RN  Infection Prevention:   environmental surveillance performed   equipment surfaces disinfected   hand hygiene promoted   personal protective equipment utilized   single patient room provided   rest/sleep promoted   visitors restricted/screened  Taken 11/12/2022 1000 by Marly Taylor RN  Infection Prevention:   environmental surveillance performed   equipment surfaces disinfected   hand hygiene promoted   personal protective equipment utilized   rest/sleep promoted   single patient room provided   visitors restricted/screened  Taken 11/12/2022 0725 by Marly Taylor RN  Infection Prevention:   environmental surveillance performed   equipment surfaces disinfected   hand hygiene promoted   personal protective equipment utilized   rest/sleep promoted   single patient room provided   visitors restricted/screened  Goal: Optimal Comfort and Wellbeing  Outcome: Ongoing, Progressing  Intervention: Provide Person-Centered Care  Description: Use a family-focused approach to care.  Develop trust and rapport by proactively providing information, encouraging questions, addressing concerns and offering reassurance.  Acknowledge emotional response to hospitalization.  Recognize and utilize personal coping strategies.  Honor spiritual and cultural preferences.  Recent Flowsheet Documentation  Taken 11/12/2022 1210 by Marly Taylor RN  Trust Relationship/Rapport:   care explained   emotional support provided   empathic listening provided   choices provided   questions answered   questions encouraged   reassurance provided   thoughts/feelings acknowledged  Taken 11/12/2022 0725 by Marly Taylor RN  Trust Relationship/Rapport:   care explained   choices provided   emotional support provided   empathic listening provided   questions answered   questions encouraged   reassurance provided    thoughts/feelings acknowledged  Goal: Readiness for Transition of Care  Outcome: Ongoing, Progressing     Problem: Pain Chronic (Persistent) (Comorbidity Management)  Goal: Acceptable Pain Control and Functional Ability  Outcome: Ongoing, Progressing  Intervention: Manage Persistent Pain  Description: Evaluate pain level, effect of treatment and patient response at regular intervals.  Minimize pain stimuli; coordinate care and adjust environment (e.g., light, noise, unnecessary movement); promote sleep/rest.  Match pharmacologic analgesia to severity and type of pain mechanism (e.g., neuropathic, muscle, inflammatory); consider multimodal approach (e.g., nonopioid, opioid, adjuvant).  Provide medication at regular intervals; titrate to patient response.  Manage breakthrough pain with additional doses; consider rotation or switching medication.  Monitor for signs of substance tolerance (increased dose to reach desired effect, decreased effect with same dose).  Avoid abrupt withdrawal of medication, especially agents capable of causing physical dependence.  Manage medication-induced effects, such as constipation, nausea, pruritus, urinary retention, somnolence and dizziness.  Provide multimodal treatment interventions, such as physical activity, therapeutic exercise, yoga, TENS (transcutaneous electrical nerve stimulation) and manual therapy.  Train in functional activity modifications, such as body mechanics, posture, ergonomics, energy conservation and activity pacing.  Consider addition of complementary or alternative therapy, such as acupuncture, hypnosis or therapeutic touch.  Recent Flowsheet Documentation  Taken 11/12/2022 1400 by Marly Taylor RN  Medication Review/Management: medications reviewed  Taken 11/12/2022 1210 by Marly Taylor RN  Medication Review/Management: medications reviewed  Taken 11/12/2022 1000 by Marly Taylor RN  Medication Review/Management: medications reviewed  Taken 11/12/2022  0725 by Marly Taylor, RN  Medication Review/Management: medications reviewed  Intervention: Optimize Psychosocial Wellbeing  Description: Facilitate patient’s self-control over pain by providing pain information and allowing choices in treatment.  Consider and address emotional response to pain.  Explore and promote use of coping strategies; address barriers to successful coping.  Evaluate and assist with psychosocial, cultural and spiritual factors impacting pain.  Modify pain perception by using techniques, such as distraction, mindfulness, guided imagery, meditation or music.  Assess and monitor for signs and symptoms of behavioral health concerns, such as unhealthy substance use, depression and suicidal ideation.  Consider referral for ongoing coping support, such as cognitive behavioral therapy and mindfulness-based stress reduction.  Recent Flowsheet Documentation  Taken 11/12/2022 1210 by Marly Taylor, RN  Diversional Activities: television     Problem: Fall Injury Risk  Goal: Absence of Fall and Fall-Related Injury  Outcome: Ongoing, Progressing  Intervention: Identify and Manage Contributors  Description: Develop a fall prevention plan with the patient and caregiver/family.  Provide reorientation, appropriate sensory stimulation and routines with changes in mental status to decrease risk of fall.  Promote use of personal vision and auditory aids.  Assess assistance level required for safe and effective self-care; provide support as needed, such as toileting, mobilization. For age 65 and older, implement timed toileting with assistance.  Encourage physical activity, such as performance of mobility and self-care at highest level of patient ability, multicomponent exercise program and provision of appropriate assistive devices.  If fall occurs, assess the severity of injury; implement fall injury protocol. Determine the cause and revise fall injury prevention plan.  Regularly review medication contribution  to fall risk; adjust medication administration times to minimize risk of falling.  Consider risk related to polypharmacy and age.  Balance adequate pain management with potential for oversedation.  Recent Flowsheet Documentation  Taken 11/12/2022 1400 by Marly Taylor RN  Medication Review/Management: medications reviewed  Taken 11/12/2022 1210 by Marly Taylor RN  Medication Review/Management: medications reviewed  Taken 11/12/2022 1000 by Marly Taylor RN  Medication Review/Management: medications reviewed  Taken 11/12/2022 0725 by Marly Taylor RN  Medication Review/Management: medications reviewed  Intervention: Promote Injury-Free Environment  Description: Provide a safe, barrier-free environment that encourages independent activity.  Keep care area uncluttered and well-lighted.  Determine need for increased observation or monitoring.  Avoid use of devices that minimize mobility, such as restraints or indwelling urinary catheter.  Recent Flowsheet Documentation  Taken 11/12/2022 1400 by Marly Taylor RN  Safety Promotion/Fall Prevention:   assistive device/personal items within reach   clutter free environment maintained   fall prevention program maintained   nonskid shoes/slippers when out of bed   room organization consistent   safety round/check completed  Taken 11/12/2022 1210 by Marly Taylor RN  Safety Promotion/Fall Prevention:   activity supervised   assistive device/personal items within reach   clutter free environment maintained   fall prevention program maintained   nonskid shoes/slippers when out of bed   room organization consistent   safety round/check completed  Taken 11/12/2022 1000 by Marly Taylor RN  Safety Promotion/Fall Prevention:   clutter free environment maintained   assistive device/personal items within reach   fall prevention program maintained   nonskid shoes/slippers when out of bed   safety round/check completed   room organization consistent  Taken 11/12/2022 0725  by Marly Taylor, RN  Safety Promotion/Fall Prevention:   assistive device/personal items within reach   clutter free environment maintained   fall prevention program maintained   nonskid shoes/slippers when out of bed   room organization consistent   safety round/check completed     Problem: Chest Pain  Goal: Resolution of Chest Pain Symptoms  Outcome: Ongoing, Progressing   Goal Outcome Evaluation:              Outcome Evaluation: Vitals as documented. No s/sx of distress. Alert and oriented, disorganized thought process and rambling noted. Pleasant and cooperative. Up to BR with miimal SBA. Taking PO well. Doppler of BLE negative for DVT. Plan to d/c home with  tomorrow. Will need transportation arranged.

## 2022-11-13 VITALS
OXYGEN SATURATION: 93 % | HEIGHT: 64 IN | RESPIRATION RATE: 16 BRPM | TEMPERATURE: 97.6 F | SYSTOLIC BLOOD PRESSURE: 110 MMHG | WEIGHT: 120 LBS | HEART RATE: 66 BPM | BODY MASS INDEX: 20.49 KG/M2 | DIASTOLIC BLOOD PRESSURE: 56 MMHG

## 2022-11-13 LAB
ANION GAP SERPL CALCULATED.3IONS-SCNC: 8 MMOL/L (ref 5–15)
BUN SERPL-MCNC: 9 MG/DL (ref 8–23)
BUN/CREAT SERPL: 16.4 (ref 7–25)
CALCIUM SPEC-SCNC: 8.6 MG/DL (ref 8.6–10.5)
CHLORIDE SERPL-SCNC: 99 MMOL/L (ref 98–107)
CO2 SERPL-SCNC: 26 MMOL/L (ref 22–29)
CREAT SERPL-MCNC: 0.55 MG/DL (ref 0.57–1)
DEPRECATED RDW RBC AUTO: 44.8 FL (ref 37–54)
EGFRCR SERPLBLD CKD-EPI 2021: 96.3 ML/MIN/1.73
ERYTHROCYTE [DISTWIDTH] IN BLOOD BY AUTOMATED COUNT: 13.3 % (ref 12.3–15.4)
GLUCOSE SERPL-MCNC: 96 MG/DL (ref 65–99)
HCT VFR BLD AUTO: 29.9 % (ref 34–46.6)
HGB BLD-MCNC: 10.2 G/DL (ref 12–15.9)
MCH RBC QN AUTO: 31.1 PG (ref 26.6–33)
MCHC RBC AUTO-ENTMCNC: 34.1 G/DL (ref 31.5–35.7)
MCV RBC AUTO: 91.2 FL (ref 79–97)
PLATELET # BLD AUTO: 274 10*3/MM3 (ref 140–450)
PMV BLD AUTO: 9.1 FL (ref 6–12)
POTASSIUM SERPL-SCNC: 3.7 MMOL/L (ref 3.5–5.2)
RBC # BLD AUTO: 3.28 10*6/MM3 (ref 3.77–5.28)
SODIUM SERPL-SCNC: 133 MMOL/L (ref 136–145)
WBC NRBC COR # BLD: 6.39 10*3/MM3 (ref 3.4–10.8)

## 2022-11-13 PROCEDURE — 80048 BASIC METABOLIC PNL TOTAL CA: CPT | Performed by: STUDENT IN AN ORGANIZED HEALTH CARE EDUCATION/TRAINING PROGRAM

## 2022-11-13 PROCEDURE — A9270 NON-COVERED ITEM OR SERVICE: HCPCS | Performed by: INTERNAL MEDICINE

## 2022-11-13 PROCEDURE — 63710000001 ASPIRIN 81 MG TABLET DELAYED-RELEASE: Performed by: INTERNAL MEDICINE

## 2022-11-13 PROCEDURE — 85027 COMPLETE CBC AUTOMATED: CPT | Performed by: STUDENT IN AN ORGANIZED HEALTH CARE EDUCATION/TRAINING PROGRAM

## 2022-11-13 PROCEDURE — A9270 NON-COVERED ITEM OR SERVICE: HCPCS | Performed by: STUDENT IN AN ORGANIZED HEALTH CARE EDUCATION/TRAINING PROGRAM

## 2022-11-13 PROCEDURE — 63710000001 BUMETANIDE 2 MG TABLET: Performed by: STUDENT IN AN ORGANIZED HEALTH CARE EDUCATION/TRAINING PROGRAM

## 2022-11-13 PROCEDURE — 63710000001 AMOXICILLIN 250 MG CAPSULE: Performed by: STUDENT IN AN ORGANIZED HEALTH CARE EDUCATION/TRAINING PROGRAM

## 2022-11-13 PROCEDURE — 63710000001 ISOSORBIDE MONONITRATE 30 MG TABLET SUSTAINED-RELEASE 24 HOUR: Performed by: INTERNAL MEDICINE

## 2022-11-13 PROCEDURE — G0378 HOSPITAL OBSERVATION PER HR: HCPCS

## 2022-11-13 PROCEDURE — 63710000001 CLOPIDOGREL 75 MG TABLET: Performed by: INTERNAL MEDICINE

## 2022-11-13 PROCEDURE — 63710000001 METOPROLOL TARTRATE 50 MG TABLET: Performed by: INTERNAL MEDICINE

## 2022-11-13 RX ORDER — NITROGLYCERIN 0.4 MG/1
0.4 TABLET SUBLINGUAL
Refills: 12
Start: 2022-11-13

## 2022-11-13 RX ORDER — ATORVASTATIN CALCIUM 80 MG/1
80 TABLET, FILM COATED ORAL NIGHTLY
Qty: 90 TABLET
Start: 2022-11-13

## 2022-11-13 RX ORDER — METOPROLOL TARTRATE 50 MG/1
50 TABLET, FILM COATED ORAL EVERY 12 HOURS SCHEDULED
Start: 2022-11-13

## 2022-11-13 RX ORDER — CLOPIDOGREL BISULFATE 75 MG/1
75 TABLET ORAL DAILY
Qty: 30 TABLET
Start: 2022-11-13

## 2022-11-13 RX ORDER — ASPIRIN 81 MG/1
81 TABLET ORAL DAILY
Qty: 30 TABLET | Refills: 0 | Status: SHIPPED | OUTPATIENT
Start: 2022-11-13 | End: 2022-12-13

## 2022-11-13 RX ORDER — ISOSORBIDE MONONITRATE 30 MG/1
30 TABLET, EXTENDED RELEASE ORAL
Start: 2022-11-13

## 2022-11-13 RX ORDER — BUMETANIDE 2 MG/1
2 TABLET ORAL DAILY
Start: 2022-11-13

## 2022-11-13 RX ORDER — AMOXICILLIN 500 MG/1
500 CAPSULE ORAL EVERY 6 HOURS SCHEDULED
Qty: 17 CAPSULE | Refills: 0 | Status: SHIPPED | OUTPATIENT
Start: 2022-11-13 | End: 2022-11-18

## 2022-11-13 RX ADMIN — ISOSORBIDE MONONITRATE 30 MG: 30 TABLET, EXTENDED RELEASE ORAL at 10:32

## 2022-11-13 RX ADMIN — CLOPIDOGREL 75 MG: 75 TABLET, FILM COATED ORAL at 10:31

## 2022-11-13 RX ADMIN — ASPIRIN 81 MG: 81 TABLET, COATED ORAL at 10:31

## 2022-11-13 RX ADMIN — METOPROLOL TARTRATE 50 MG: 50 TABLET, FILM COATED ORAL at 10:32

## 2022-11-13 RX ADMIN — AMOXICILLIN 500 MG: 250 CAPSULE ORAL at 06:18

## 2022-11-13 RX ADMIN — AMOXICILLIN 500 MG: 250 CAPSULE ORAL at 00:43

## 2022-11-13 RX ADMIN — BUMETANIDE 2 MG: 2 TABLET ORAL at 10:31

## 2022-11-13 NOTE — NURSING NOTE
Access center follow up.    Pt.alert and oriented talkative with this writer. Pt. rested overnight. Appetite good this am. Pt. discussed not having money for meds at d/c. Informed pt.  to assist with d/c needs. Anxiety 4/10. Yesterday staff noted pt. mood pleasant and cooperative, conversation disorganized and rambling. Pt. participated in PT yesterday. D/C plan home with spouse. Access following.

## 2022-11-13 NOTE — PLAN OF CARE
Pt rested comfortably all shift, vss.      Problem: Adult Inpatient Plan of Care  Goal: Plan of Care Review  Outcome: Ongoing, Progressing  Goal: Patient-Specific Goal (Individualized)  Outcome: Ongoing, Progressing  Goal: Absence of Hospital-Acquired Illness or Injury  Outcome: Ongoing, Progressing  Intervention: Identify and Manage Fall Risk  Recent Flowsheet Documentation  Taken 11/13/2022 0400 by Mena Beebe RN  Safety Promotion/Fall Prevention:   fall prevention program maintained   safety round/check completed  Taken 11/13/2022 0200 by Mena Beebe RN  Safety Promotion/Fall Prevention:   fall prevention program maintained   safety round/check completed  Taken 11/13/2022 0000 by Mena Beebe RN  Safety Promotion/Fall Prevention:   fall prevention program maintained   safety round/check completed  Taken 11/12/2022 2000 by Mena Beebe RN  Safety Promotion/Fall Prevention:   activity supervised   assistive device/personal items within reach   clutter free environment maintained   fall prevention program maintained   lighting adjusted   nonskid shoes/slippers when out of bed   room organization consistent   safety round/check completed  Intervention: Prevent Skin Injury  Recent Flowsheet Documentation  Taken 11/13/2022 0400 by Mena Beebe RN  Body Position: position changed independently  Taken 11/13/2022 0200 by Mena Beebe RN  Body Position: position changed independently  Taken 11/13/2022 0000 by Mena Beebe RN  Body Position: position changed independently  Taken 11/12/2022 2000 by Mena Beebe RN  Body Position:   position changed independently   supine, legs elevated  Intervention: Prevent and Manage VTE (Venous Thromboembolism) Risk  Recent Flowsheet Documentation  Taken 11/13/2022 0400 by Mena Beebe RN  Activity Management: activity adjusted per tolerance  Taken 11/13/2022 0200 by Mena Beebe RN  Activity Management: activity adjusted per tolerance  Taken 11/13/2022 0000 by Abiel  RENALDO San  Activity Management: activity adjusted per tolerance  Taken 11/12/2022 2000 by Mena Beebe RN  Activity Management: activity adjusted per tolerance  VTE Prevention/Management:   sequential compression devices on   bilateral  Intervention: Prevent Infection  Recent Flowsheet Documentation  Taken 11/13/2022 0400 by Mena Beebe RN  Infection Prevention:   environmental surveillance performed   rest/sleep promoted   single patient room provided  Taken 11/13/2022 0200 by Mena Beebe RN  Infection Prevention:   environmental surveillance performed   rest/sleep promoted   single patient room provided  Taken 11/13/2022 0000 by Mena Beebe RN  Infection Prevention:   environmental surveillance performed   personal protective equipment utilized   single patient room provided   rest/sleep promoted  Taken 11/12/2022 2000 by Mena Beebe RN  Infection Prevention:   hand hygiene promoted   personal protective equipment utilized   rest/sleep promoted   single patient room provided  Goal: Optimal Comfort and Wellbeing  Outcome: Ongoing, Progressing  Intervention: Provide Person-Centered Care  Recent Flowsheet Documentation  Taken 11/12/2022 2000 by Mena Beebe RN  Trust Relationship/Rapport:   care explained   choices provided   emotional support provided  Goal: Readiness for Transition of Care  Outcome: Ongoing, Progressing     Problem: Pain Chronic (Persistent) (Comorbidity Management)  Goal: Acceptable Pain Control and Functional Ability  Outcome: Ongoing, Progressing  Intervention: Manage Persistent Pain  Recent Flowsheet Documentation  Taken 11/13/2022 0200 by Mena Beebe RN  Medication Review/Management: medications reviewed  Taken 11/12/2022 2000 by eMna Beebe RN  Medication Review/Management: medications reviewed     Problem: Fall Injury Risk  Goal: Absence of Fall and Fall-Related Injury  Outcome: Ongoing, Progressing  Intervention: Identify and Manage Contributors  Recent Flowsheet  Documentation  Taken 11/13/2022 0200 by Mena Beebe RN  Medication Review/Management: medications reviewed  Taken 11/12/2022 2000 by Mena Beebe RN  Medication Review/Management: medications reviewed  Intervention: Promote Injury-Free Environment  Recent Flowsheet Documentation  Taken 11/13/2022 0400 by Mena Beebe RN  Safety Promotion/Fall Prevention:   fall prevention program maintained   safety round/check completed  Taken 11/13/2022 0200 by Mena Beebe RN  Safety Promotion/Fall Prevention:   fall prevention program maintained   safety round/check completed  Taken 11/13/2022 0000 by Mena Beebe RN  Safety Promotion/Fall Prevention:   fall prevention program maintained   safety round/check completed  Taken 11/12/2022 2000 by Mena Beebe RN  Safety Promotion/Fall Prevention:   activity supervised   assistive device/personal items within reach   clutter free environment maintained   fall prevention program maintained   lighting adjusted   nonskid shoes/slippers when out of bed   room organization consistent   safety round/check completed     Problem: Chest Pain  Goal: Resolution of Chest Pain Symptoms  Outcome: Ongoing, Progressing     Problem: Skin Injury Risk Increased  Goal: Skin Health and Integrity  Outcome: Ongoing, Progressing  Intervention: Optimize Skin Protection  Recent Flowsheet Documentation  Taken 11/13/2022 0400 by Mena Beebe RN  Head of Bed (HOB) Positioning: HOB elevated  Taken 11/13/2022 0200 by Mena Beebe RN  Head of Bed (HOB) Positioning: HOB elevated  Taken 11/13/2022 0000 by Mena Beebe RN  Head of Bed (HOB) Positioning: HOB at 20-30 degrees  Taken 11/12/2022 2000 by Mena Beebe RN  Head of Bed (HOB) Positioning: HOB at 30-45 degrees   Goal Outcome Evaluation:

## 2022-11-13 NOTE — DISCHARGE SUMMARY
Patient Name: Asha Krishnan  : 1950  MRN: [id number]    Date of Admission: 2022  Date of Discharge:  2022  Primary Care Physician: Capo Pedroza MD      Chief Complaint:   Shortness of Breath, Nausea, and Vomiting      Discharge Diagnoses     Active Hospital Problems    Diagnosis  POA   • **Chest pain, unspecified type [R07.9]  Yes   • Chest pain, atypical [R07.89]  Unknown   • CAD (coronary artery disease) [I25.10]  Unknown   • Dysuria [R30.0]  Unknown   • Dyspnea [R06.00]  Unknown   • Elevated d-dimer [R79.89]  Unknown   • COPD (chronic obstructive pulmonary disease) (HCC) [J44.9]  Unknown   • HTN (hypertension) [I10]  Unknown   • HLD (hyperlipidemia) [E78.5]  Unknown      Resolved Hospital Problems   No resolved problems to display.        Hospital Course     Ms. Krishnan is a 72 y.o. female with a history of hypertension, hyperlipidemia, CAD status post stents presenting with chest pain and dyspnea.  D-dimer was elevated at 7, CTA was done which showed no pulmonary embolus.  Did have 1.3 cm lung nodule.  Patient states that she has been told she has a lung nodule before but does not know what size it was.  Needs follow-up CT scan in 3 months for monitoring.  Had cardiac cath with severe two-vessel coronary artery disease with collaterals, cardiology plans for medical management at this time.  Patient was found to have Streptococcus agalactiae UTI and was prescribed amoxicillin for total 5 days.  Patient has a history of COPD and still smoking, not on any inhalers at home.  Declines any inhalers with steroids in them.  Started Spiriva.  Patient worked with PT does not require home oxygen, walker was prescribed on discharge.    At the time of discharge patient was told to take all medications as prescribed, keep all follow-up appointments, and call their doctor or return to the hospital with any worsening or concerning symptoms.    Day of Discharge     Subjective:  Patient   sleeping in bed.  Sitting up.  States she had some gas overnight.  Been tolerating p.o. well.  Patient is anxious about being discharged home.    Review of Systems   Constitutional: Negative for chills and fever.   Respiratory: Positive for shortness of breath. Negative for cough.    Cardiovascular: Negative for chest pain and leg swelling.   Gastrointestinal: Positive for nausea. Negative for abdominal pain and diarrhea.       Physical Exam:  Temp:  [97.6 °F (36.4 °C)-98.1 °F (36.7 °C)] 97.6 °F (36.4 °C)  Heart Rate:  [61-66] 66  Resp:  [16-20] 16  BP: (110-142)/(56-76) 110/56  Body mass index is 20.6 kg/m².  Physical Exam  Constitutional:       General: She is not in acute distress.     Appearance: She is not diaphoretic.   HENT:      Head: Normocephalic and atraumatic.      Mouth/Throat:      Mouth: Mucous membranes are moist.      Pharynx: Oropharynx is clear.   Eyes:      Extraocular Movements: Extraocular movements intact.      Conjunctiva/sclera: Conjunctivae normal.   Cardiovascular:      Rate and Rhythm: Normal rate and regular rhythm.      Heart sounds: No murmur heard.    No gallop.   Pulmonary:      Effort: Pulmonary effort is normal. No respiratory distress.      Breath sounds: No wheezing.      Comments: Bibaslar crackles  Abdominal:      General: Abdomen is flat. There is no distension.      Palpations: Abdomen is soft.      Tenderness: There is no abdominal tenderness.   Musculoskeletal:         General: No swelling or deformity. Normal range of motion.   Skin:     General: Skin is warm and dry.   Neurological:      General: No focal deficit present.      Mental Status: She is alert and oriented to person, place, and time.       Consultants     Consult Orders (all) (From admission, onward)     Start     Ordered    11/11/22 0877  Inpatient Access Center Consult  Once        Provider:  (Not yet assigned)    11/11/22 0833    11/11/22 0514  Inpatient Cardiology Consult  Once        Specialty:  Cardiology   Provider:  Hema Aguiar MD    11/11/22 0514    11/11/22 0453  [facility] (on-call MD unless specified) Details  Once        Specialty:  Hospitalist  Provider:  Johnny Kirk MD    11/11/22 0452              Procedures     Imaging Results (All)     Procedure Component Value Units Date/Time    CT Angiogram Chest With & Without Contrast [[id number]] Collected: 11/11/22 1433     Updated: 11/11/22 1442    Narrative:      CT ANGIOGRAPHY OF THE CHEST WITH INTRAVENOUS CONTRAST AND 3-D  RECONSTRUCTIONS     HISTORY: Chest pain. Elevated d-dimer.     The CT scan was performed as an emergency procedure with CT angiography  protocol using intravenous contrast and 3-D reconstructions. The  following findings are present:     1. The pulmonary arteries are well-opacified and there is no evidence of  pulmonary embolus. There is no evidence of aortic aneurysm or  dissection.     2. There are severe changes of pulmonary emphysema. There is an area of  vaguely masslike parenchymal density in the right upper lobe measuring  up to 13 mm as seen on image 76. This is nonspecific and a follow-up  chest CT scan without contrast in 3 months is recommended to assess  short-term stability. Alternatively, this might be evaluated with PET  scan.     3. There is no mediastinal adenopathy. There is a slightly enlarged  right hilar lymph node measuring up to 1.8 cm which is nonspecific and  may be reactive. Surveillance on follow-up CT scan is recommended as  described above. There is no axillary adenopathy.     4. There is no pericardial effusion. The CT images through the upper  liver, spleen, and both adrenal glands are unremarkable.                 Radiation dose reduction techniques were utilized, including automated  exposure control and exposure modulation based on body size.     This report was finalized on 11/11/2022 2:39 PM by Dr. Jesus Jin M.D.       CT Lumbar Spine Without Contrast [[id number]] Collected: 11/11/22 0680      Updated: 11/11/22 0432    Narrative:        Patient: PRIYA PARRA  Time Out: 04:31  Exam(s): CT L SPINE     EXAM:    CT Lumbar Spine Without Intravenous Contrast    CLINICAL HISTORY:     Reason for exam: back pain.    TECHNIQUE:    Axial computed tomography images of the lumbar spine without   intravenous contrast.  CTDI is 51.3 mGy and DLP is 1451.9 mGy-cm.  This   CT exam was performed according to the principle of ALARA (As Low As   Reasonably Achievable) by using one or more of the following dose   reduction techniques: automated exposure control, adjustment of the mA   and or kV according to patient size, and or use of iterative   reconstruction technique.    COMPARISON:    No relevant prior studies available.    FINDINGS:    Vertebrae:  No acute fracture.  Mild anterior superior loss of height   of the L5 vertebral body without acute fracture line consistent with a   chronic compression deformity.  Maintenance of height of the remainder of   the lumbar vertebral bodies.  No spondylolisthesis.  Bones are osteopenic.        Discs spinal canal neural foramina: Relatively mild degenerative change   with posterior disc bulges at L3-4 and L4-5 with mild central canal   stenosis.  Bilateral facet and ligamentum flavum hypertrophy decrease in   lower lumbar spine.    Soft tissues: Paraspinal soft tissues are unremarkable.    Atherosclerotic vascular calcifications.    IMPRESSION:         No acute abnormality.  Chronic appearing L5 vertebral body wedge   deformity.  Mild multilevel degenerative changes.    Impression:          Electronically signed by Ambrocio Kumar M.D. on 11-11-22 at 0431    CT Cervical Spine Without Contrast [[id number]] Collected: 11/11/22 0427     Updated: 11/11/22 0427    Narrative:        Patient: PRIYA PARRA  Time Out: 04:26  Exam(s): CT C SPINE     EXAM:    CT Cervical Spine Without Intravenous Contrast    CLINICAL HISTORY:     Reason for exam: neck pain.    TECHNIQUE:    Axial  computed tomography images of the cervical spine without   intravenous contrast.  CTDI is 51.3 mGy and DLP is 1451.9 mGy-cm.  This   CT exam was performed according to the principle of ALARA (As Low As   Reasonably Achievable) by using one or more of the following dose   reduction techniques: automated exposure control, adjustment of the mA   and or kV according to patient size, and or use of iterative   reconstruction technique.    COMPARISON:    No relevant prior studies available.    FINDINGS:    Vertebrae:  No acute fracture.  Maintenance of height of the vertebral   bodies.  No subluxation.  Bones are osteopenic.  Prominent confluent   bridging osteophytes spanning C1-T1.  Chronic appearing corticated   defects within the anterior osteophytes at C5-6 and C6-7 with   hypertrophic changes.    Discs spinal canal neural foramina: Hypertrophic changes surrounding   the odontoid with mild stenosis of the foramen magnum.  Posterior disc   ossified complex at C5-6 eccentric to the right and C6-7 eccentric to the   left with mild central canal stenosis.  Posterior laminectomy defect from   C4.- T1 with otherwise widely patent central canal.  Neural foramina are   grossly patent.    Soft tissues: Prevertebral soft tissues are unremarkable.    Atherosclerotic vascular calcifications.    IMPRESSION:       No acute fracture or subluxation.  Status post C4-C7 posterior laminectomy defect with a widely patent canal.    Multilevel degenerative changes greatest C5-6 and C6-7 with mild canal   stenosis.  Prominent anterior osteophytes throughout the cervical spine.  Recommend comparison with prior studies to determine interval change.    Impression:          Electronically signed by Ambrocio Kumar M.D. on 11-11-22 at 0426    XR Chest 1 View [[id number]] Collected: 11/11/22 0400     Updated: 11/11/22 0400    Narrative:        Patient: PRIYA PARRA  Time Out: 03:59  Exam(s): FILM CXR 1 VIEW     EXAM:    XR Chest, 1  View    CLINICAL HISTORY:     Reason for exam: soa.    TECHNIQUE:    Frontal view of the chest.    COMPARISON:    No relevant prior studies available.    FINDINGS:    Lungs:  No consolidation or mass.  Prominent interstitial markings.    Pleural space:  No acute findings    Heart:  No cardiomegaly.    Bones joints:  No acute findings.    IMPRESSION:         Prominent interstitial markings.      Impression:          Electronically signed by Jacinda Young MD on 11-11-22 at 0359          Pertinent Labs     Results from last 7 days   Lab Units 11/13/22  0648 11/12/22  0650 11/11/22  1802 11/11/22  0233   WBC 10*3/mm3 6.39 7.36 6.26 8.43   HEMOGLOBIN g/dL 10.2* 10.0* 10.2* 10.4*   PLATELETS 10*3/mm3 274 250 269 284     Results from last 7 days   Lab Units 11/13/22  0648 11/12/22  0650 11/11/22 1802 11/11/22  0233   SODIUM mmol/L 133* 136 136 134*   POTASSIUM mmol/L 3.7 3.7 3.5 3.8   CHLORIDE mmol/L 99 102 103 99   CO2 mmol/L 26.0 23.1 23.1 24.4   BUN mg/dL 9 5* 6* 8   CREATININE mg/dL 0.55* 0.43* 0.55* 0.56*   GLUCOSE mg/dL 96 77 103* 97   Estimated Creatinine Clearance: 77.1 mL/min (A) (by C-G formula based on SCr of 0.55 mg/dL (L)).  Results from last 7 days   Lab Units 11/11/22  0233   ALBUMIN g/dL 4.00   BILIRUBIN mg/dL 0.5   ALK PHOS U/L 111   AST (SGOT) U/L 15   ALT (SGPT) U/L 15     Results from last 7 days   Lab Units 11/13/22  0648 11/12/22  0650 11/11/22  1802 11/11/22  0233   CALCIUM mg/dL 8.6 8.6 9.1 9.4   ALBUMIN g/dL  --   --   --  4.00       Results from last 7 days   Lab Units 11/11/22  1802 11/11/22  0422 11/11/22  0233 11/09/22  2042 11/09/22  1848   TROPONIN I ng/mL  --   --   --  0.016 0.015   TROPONIN T ng/mL <0.010 <0.010 <0.010  --   --    PROBNP pg/mL  --   --  177.0  --   --    D DIMER QUANT MCGFEU/mL  --   --  7.64*  --   --            Invalid input(s): LDLCALC  Results from last 7 days   Lab Units 11/11/22  1224   URINECX  >100,000 CFU/mL Streptococcus agalactiae (Group B)*       Test  Results Pending at Discharge       Discharge Details        Discharge Medications      New Medications      Instructions Start Date   Aspirin Low Dose 81 MG EC tablet  Generic drug: aspirin   81 mg, Oral, Daily      atorvastatin 80 MG tablet  Commonly known as: LIPITOR   80 mg, Oral, Nightly      clopidogrel 75 MG tablet  Commonly known as: PLAVIX   75 mg, Oral, Daily      isosorbide mononitrate 30 MG 24 hr tablet  Commonly known as: IMDUR   30 mg, Oral, Every 24 Hours Scheduled      metoprolol tartrate 50 MG tablet  Commonly known as: LOPRESSOR   50 mg, Oral, Every 12 Hours Scheduled      nitroglycerin 0.4 MG SL tablet  Commonly known as: NITROSTAT   0.4 mg, Sublingual, Every 5 Minutes PRN, Take no more than 3 doses in 15 minutes.      Spiriva Respimat 2.5 MCG/ACT aerosol solution inhaler  Generic drug: tiotropium bromide monohydrate   2 puffs, Inhalation, Daily - RT             Allergies   Allergen Reactions   • Latex Anaphylaxis     Pt states whole body swells including throat   • Tylenol [Acetaminophen] Anaphylaxis     States whole body swells including throat         Discharge Disposition:  Home or Self Care    Discharge Diet:  Diet Order   Procedures   • Diet Regular; Cardiac       Discharge Activity:   As tolerated    CODE STATUS:    Code Status and Medical Interventions:   Ordered at: 11/11/22 4398     Level Of Support Discussed With:    Patient     Code Status (Patient has no pulse and is not breathing):    CPR (Attempt to Resuscitate)     Medical Interventions (Patient has pulse or is breathing):    Full Support     Release to patient:    Routine Release       No future appointments.   Follow-up Information     Capo Pedroza MD. Schedule an appointment as soon as possible for a visit.    Specialty: Family Medicine  Contact information:  [address] [id number] [phone]                         Time Spent on Discharge:  Greater than 30 minutes      Ino Hebert  MD [facility]  11/13/22  09:37 EST

## 2022-11-13 NOTE — PROGRESS NOTES
Continued Stay Note  [facility]     Patient Name: Asha Krishnna  MRN: [id number]  Today's Date: 11/13/2022    Admit Date: 11/11/2022    Plan: DC to extended stay hotel (pt. living there prior to admit) via [employer] cab........Jerson RN   Discharge Plan     Row Name 11/13/22 0950       Plan    Plan DC to extended stay hotel (pt. living there prior to admit) via [employer] cab........Jerson MACARIO    Patient/Family in Agreement with Plan yes    Plan Comments Inbound call from RN stating patient to discharge today and will need transport. Pt. verifies discharge to [address]. Pt. able to get in and out of a vehicle unassisted and agreeable to z-trip for transport. [employer]-trip voucher completed and given to RN to schedule when patient ready for DC. Pt. states she is unable to pay $50.16 co-pay for medications from [facility]. [facility] and CCP will cover medication cost, meds will be delivered to bedside, RN aware. No further needs identified........Jerson RN               Discharge Codes    No documentation.               Expected Discharge Date and Time     Expected Discharge Date Expected Discharge Time    Nov 12, 2022             Mena Thompson, RN

## 2022-11-15 NOTE — PROGRESS NOTES
Case Management Discharge Note      Final Note: [facility] Extended Stay Hotel via [employer] taxi         Selected Continued Care - Discharged on 11/13/2022 Admission date: 11/11/2022 - Discharge disposition: Home or Self Care    Destination    No services have been selected for the patient.              Durable Medical Equipment    No services have been selected for the patient.              Dialysis/Infusion    No services have been selected for the patient.              Home Medical Care    No services have been selected for the patient.              Therapy    No services have been selected for the patient.              Community Resources    No services have been selected for the patient.              Community & DME    No services have been selected for the patient.                  Transportation Services  Taxi: other ([employer])    Final Discharge Disposition Code: 01 - home or self-care

## 2023-01-02 ENCOUNTER — TRANSCRIBE ORDERS (OUTPATIENT)
Dept: PHYSICAL THERAPY | Facility: HOSPITAL | Age: 73
End: 2023-01-02
Payer: MEDICARE

## 2023-01-02 DIAGNOSIS — Z74.09 REQUIRES SCOOTER FOR MOBILITY: Primary | ICD-10-CM

## 2023-01-13 ENCOUNTER — HOSPITAL ENCOUNTER (OUTPATIENT)
Dept: PHYSICAL THERAPY | Facility: HOSPITAL | Age: 73
Setting detail: THERAPIES SERIES
Discharge: HOME | End: 2023-01-13
Payer: MEDICARE

## 2023-01-13 DIAGNOSIS — Z46.89 FITTING OR ADJUSTMENT OF WHEELCHAIR: Primary | ICD-10-CM

## 2023-01-13 DIAGNOSIS — R26.89 BALANCE PROBLEMS: ICD-10-CM

## 2023-01-13 DIAGNOSIS — R29.6 FALLS FREQUENTLY: ICD-10-CM

## 2023-01-13 PROCEDURE — 97162 PT EVAL MOD COMPLEX 30 MIN: CPT

## 2023-01-13 NOTE — THERAPY EVALUATION
.Outpatient Physical Therapy Neuro Initial Evaluation  [facility]     Patient Name: Asha Krishnan  : 1950  MRN: [id number]  Today's Date: 2023      Visit Date: 2023    Patient Active Problem List   Diagnosis   • Chest pain, atypical   • Chest pain, unspecified type   • CAD (coronary artery disease)   • Dysuria   • Dyspnea   • Elevated d-dimer   • COPD (chronic obstructive pulmonary disease) (HCC)   • HTN (hypertension)   • HLD (hyperlipidemia)        Past Medical History:   Diagnosis Date   • Hyperlipidemia         Past Surgical History:   Procedure Laterality Date   • CARDIAC CATHETERIZATION N/A 2022    Procedure: Left Heart Cath;  Surgeon: Tashi De La Torre MD;  Location:  [facility];  Service: Cardiovascular;  Laterality: N/A;   • CARDIAC CATHETERIZATION N/A 2022    Procedure: Coronary angiography;  Surgeon: Tashi De La Torre MD;  Location:  [facility]ATH [facility];  Service: Cardiovascular;  Laterality: N/A;         Visit Dx:     ICD-10-CM ICD-9-CM   1. Fitting or adjustment of wheelchair  Z46.89 V53.8   2. Falls frequently  R29.6 V15.88   3. Balance problems  R26.89 781.99        Patient History     Row Name 23 1145             History    Chief Complaint Balance Problems;Difficulty Walking;Difficulty with daily activities  -LB      Date Current Problem(s) Began 23  Referral date  -LB      Brief Description of Current Complaint Ms. Krishnan is 73 y/o female who was referred to PT for evaluation of motorized mobility device.  The patient reports constant falls within her home and difficulty using a manual wheelchair  -LB      Patient/Caregiver Goals Improve mobility  -LB      Smoking Status yes  -LB      Hand Dominance right-handed  -LB         Fall Risk Assessment    Any falls in the past year: Yes  -LB      Number of falls reported in the last 12 months over 300  -LB      Factors that contributed to the fall: Lost balance;Tripped   -LB      Does patient have a fear of falling Yes (comment)  -LB         Services    Are you currently receiving Home Health services No  -LB         Daily Activities    Primary Language English  -LB      Are you able to read Yes  -LB      Are you able to write Yes  -LB      Pt Participated in POC and Goals Yes  -LB         Safety    Are you being hurt, hit, or frightened by anyone at home or in your life? No  -LB      Are you being neglected by a caregiver No  -LB            User Key  (r) = Recorded By, (t) = Taken By, (c) = Cosigned By    Initials Name Provider Type    LB Vianca Geller, PT Physical Therapist                     PT Neuro     Row Name 01/13/23 7913             Subjective Comments    Subjective Comments I fall every day.  I fall out of bed or while walking with my walker or transfering to my wheelchair  -LB         Precautions and Contraindications    Precautions/Limitations fall precautions  -LB         Subjective Pain    Able to rate subjective pain? yes  -LB      Pre-Treatment Pain Level 10  -LB      Post-Treatment Pain Level 10  -LB      Subjective Pain Comment Legs, shoulders, and back  -LB         Home Living    Current Living Arrangements hotel/motel  Extended stay hotel  -LB      Home Accessibility wheelchair accessible  -LB      Home Equipment Rolling walker;Wheelchair-manual  -LB         Cognition    Overall Cognitive Status WFL  -LB      Arousal/Alertness Appropriate responses to stimuli  -LB      Memory Appears intact  -LB      Orientation Level Oriented X4  -LB      Deficits Fully aware of deficits  -LB         Sensation    Sensation WNL? WFL  -LB         Posture/Observations    Alignment Options Forward head;Rounded shoulders  -LB      Forward Head Mild  -LB      Rounded Shoulders Mild  -LB      Posture/Observations Comments Pt arrived to unit in manual wheelchair.  SO present with the patient  -LB         General ROM    GENERAL ROM COMMENTS Bilateral UE Shoulder ROM to only about 80  degrees; Bilateral LEs PROM is WFL for sitting, transfers and standing  -LB         MMT (Manual Muscle Testing)    Rt Lower Ext Rt Knee WFL;Rt Ankle WFL;Rt Hip Flexion;Rt Hip ABduction  -LB      Lt Lower Ext Lt Hip Flexion;Lt Hip ABduction;Lt Knee Extension;Lt Knee Flexion;Lt Ankle Plantarflexion;Lt Ankle Dorsiflexion  -LB         MMT Right Lower Ext    Rt Hip Flexion MMT, Gross Movement (3+/5) fair plus  -LB      Rt Hip ABduction MMT, Gross Movement (3+/5) fair plus  -LB         MMT Left Lower Ext    Lt Hip Flexion MMT, Gross Movement (3-/5) fair minus  -LB      Lt Hip ABduction MMT, Gross Movement (3-/5) fair minus  -LB      Lt Knee Extension MMT, Gross Movement (3-/5) fair minus  -LB      Lt Knee Flexion MMT, Gross Movement (3-/5) fair minus  -LB      Lt Ankle Plantarflexion MMT, Gross Movement (3/5) fair  -LB      Lt Ankle Dorsiflexion MMT, Gross Movement (3/5) fair  -LB         Mobility    Additional Documentation Wheelchair Mobility/Management (Group)  -LB         Transfers    Sit-Stand [place] (Transfers) standby assist  -LB      Stand-Sit [place] (Transfers) standby assist  -LB      Transfers, Sit-Stand-Sit, Assist Device rolling walker  -LB      Comment, (Transfers) Pt initially stands with flexed knees and hips, reaching for support from assistive device or objects close by  -LB         Gait/Stairs (Locomotion)    [place] Level (Gait) standby assist  -LB      Assistive Device (Gait) walker, front-wheeled  -LB      Distance in Feet (Gait) 20; 30  -LB      Pattern (Gait) step-to;step-through  -LB      Deviations/Abnormal Patterns (Gait) bilateral deviations;base of support, narrow;gait speed decreased;stride length decreased  -LB      Bilateral Gait Deviations forward flexed posture;heel strike decreased  -LB      Gait Assessment/Intervention Knees flexed in stance; small stride length and foot clearane  -LB         Wheelchair Mobility/Management    Mobility Activities (Wheelchair) forward  propulsion;turning;steering  -LB      Forward Propulsion [place] (Wheelchair) modified independence  -LB      Steering [place] (Wheelchair) modified independence  -LB      Turning [place] (Wheelchair) modified independence  -LB      Distance Propelled in Feet (Wheelchair) 50  -LB      Comment, Wheelchair Mobility pt took 18 secs to propell a light weight manual wheelchair with a turn  -LB      Management Activities (Wheelchair) armrest management;footrest/footplate management;wheel locks/brakes management  -LB      Footrest Management [place] (Wheelchair) independent  -LB      Wheel Locks/Brakes Management [place] (Wheelchair) independent  -LB         Balance Skills Training    Sitting-Level of Assistance Independent  -LB      Standing-Level of Assistance Close supervision  -LB      Static Standing Balance Support assistive device  -LB      Gait Balance-Level of Assistance Contact guard;Close supervision  -LB      Gait Balance Support assistive device  -LB      Balance Comments See TUG and Tinetti  -LB            User Key  (r) = Recorded By, (t) = Taken By, (c) = Cosigned By    Initials Name Provider Type    Vianca Aranda, PT Physical Therapist                        Therapy Education  Education Details: Risk for falls and Different assistive devices available  Given: Fall prevention and home safety, Mobility training  Program: New  How Provided: Verbal, Demonstration  Provided to: Patient  Level of Understanding: Verbalized         PT Assessment/Plan     Row Name 01/13/23 3136          PT Assessment    Assessment Comments Ms. Krishnan is 71 y/o female with history of HTN, hyperlidemia, CAD with recent cath and stents, and COPD.  Based on evaluation findings which included both the Tinetti and Tug outcome measures the patient does have a risk for falls.  The patient reports falls over many years and currently today the patient reports daily falls.  The falls are occuring while the  patient is transfering out of bed, using a wheeled walker and while tranfering into her light weight manual wheelchair. The patient has history of cervical spine fracture in 1995 resulting in residual pain and limited shoulder ROM and strength and therefore has complaints of pain while propelling a manaul wheelchair of 10/10.  While using a manual wheelchair the patient did complain of SOA but 02 sats were 94% on RA and HR was 78.   The patient displays several gait deviations with the use a front wheeled walker including poor bilateral foot clearance and mild knee flexion in all phases of gait and forward flexed posture.   The patient time on the TUG was 75 seconds and Tinetti score of 13/28 using a front wheeled walker indicates a high risk for falls. The patient reports a history of left leg fracture and repair and tested with  weakness throughout the LEs that might hinder her ability to quickly place a foot on the floor if a scooter would tip and therefore the patient could not use a scooter to assist with ADLs.   The patient has issues of incontinence due to the significant time it takes to navigate to the bathroom.  The patient does have a BSC but is reports falls during the tranfer there as well.  The patient does have the mental capabilities and right UE ROM and strength to safely operate a power wheelchair.  The patient's current home is an extended stay hotel and her SO is assisting her to propel to the elevator and down to the lobby. The [employer] 613 power wheelchair with a captain seat and swing away joystick mount will allow the patient to remain independent in her home environment.  Chema root, ATP, a [place]  was present in the aid in the determination of appropriate powered mobility device.  -LB     Please refer to paper survey for additional self-reported information Yes  -LB     Rehab Potential Good  -LB     Patient/caregiver participated in establishment of treatment plan and  "goals Yes  -LB        PT Plan    PT Frequency One time visit  -LB     Planned CPT's? PT EVAL MOD COMPLELITY: 35215  -LB           User Key  (r) = Recorded By, (t) = Taken By, (c) = Cosigned By    Initials Name Provider Type    Vianca Aranda PT Physical Therapist                    OP Exercises     Row Name 01/13/23 4219             Subjective Comments    Subjective Comments I fall every day.  I fall out of bed or while walking with my walker or transfering to my wheelchair  -LB         Subjective Pain    Able to rate subjective pain? yes  -LB      Pre-Treatment Pain Level 10  -LB      Post-Treatment Pain Level 10  -LB      Subjective Pain Comment Legs, shoulders, and back  -LB            User Key  (r) = Recorded By, (t) = Taken By, (c) = Cosigned By    Initials Name Provider Type    Vianca Aranda, PT Physical Therapist                            Outcome Measure Options: Tinetti, Timed Up and Go (TUG)  Tinetti Assessment  Sitting Balance: Steady,safe  Arises: Able, uses arms  Attempts to Rise: Able, requires > 1 attempt  Immediate Standing Balance (first 5 sec): Steady, with support  Standing Balance: Steady, stance > 4 inch LAN & requires support  Sternal Nudge (feet close together): Stagger, grabs, catches self  Eyes Closed (feet close together): Steady  Turning 360 Degrees- Steps: Continuous steps  Turning 360 Degrees- Steadiness: Unsteady (staggers, grabs)  Sitting Down: Uses arms or not a smooth motion  Tinetti Balance Score: 9  Gait Initiation (immediate after told \"go\"): Any hesitancy, multiple attempts to start  Step Length- Right Swing: Right swing foot does not pass Left stance leg  Step Length- Left Swing: Left swing foot does not pass Right  Foot Clearance- Right Foot: Right foot does not completely clear floor  Foot Clearance- Left Foot: Left foot does not completely clear floor  Step Symmetry: Right and Left step length equal  Step Continuity: Steps appear continuous  Path (excursion): " Mild/moderate deviation or use of aid  Trunk: Marked sway or uses device  Base of Support: Heels close while walking  Gait Score: 4  Tinetti Total Score: 13  Tinetti Assistive Device: rolling walker  Timed Up and Go (TUG)  TUG Test 1: 75 seconds  Timed Up and Go Comments: FWW, pt distracted and talked during TUG    Time Calculation:   Start Time: 1100  Stop Time: 1145  Time Calculation (min): 45 min  Untimed Charges  PT Eval/Re-eval Minutes: 45  Total Minutes  Untimed Charges Total Minutes: 45   Total Minutes: 45   Therapy Charges for Today     Code Description Service Date Service Provider Modifiers Qty    55142600663 HC PT EVAL MOD COMPLEXITY 3 1/13/2023 Vianca Geller, PT GP 1          PT G-Codes  Outcome Measure Options: Tinetti, Timed Up and Go (TUG)  Tinetti Total Score: 13  TUG Test 1: 75 seconds     Patient was intermittently wearing a face mask during this therapy encounter. Therapist used appropriate personal protective equipment including mask and gloves.  Mask used was standard procedure mask. Appropriate PPE was worn during the entire therapy session. Hand hygiene was completed before and after therapy session. Patient is not in enhanced droplet precautions.         Vianca Geller, PT  1/13/2023

## 2023-08-01 ENCOUNTER — HOSPITAL ENCOUNTER (INPATIENT)
Facility: HOSPITAL | Age: 73
LOS: 2 days | Discharge: HOME | DRG: 948 | End: 2023-08-03
Attending: EMERGENCY MEDICINE | Admitting: INTERNAL MEDICINE
Payer: MEDICARE

## 2023-08-01 ENCOUNTER — APPOINTMENT (OUTPATIENT)
Dept: CT IMAGING | Facility: HOSPITAL | Age: 73
DRG: 948 | End: 2023-08-01
Payer: MEDICARE

## 2023-08-01 DIAGNOSIS — R41.82 ALTERED MENTAL STATUS, UNSPECIFIED ALTERED MENTAL STATUS TYPE: Primary | ICD-10-CM

## 2023-08-01 PROBLEM — R91.1 PULMONARY NODULE 1 CM OR GREATER IN DIAMETER: Status: ACTIVE | Noted: 2023-08-01

## 2023-08-01 PROBLEM — I48.0 PAF (PAROXYSMAL ATRIAL FIBRILLATION): Status: ACTIVE | Noted: 2023-08-01

## 2023-08-01 PROBLEM — D64.9 ANEMIA: Status: ACTIVE | Noted: 2023-08-01

## 2023-08-01 LAB
ALBUMIN SERPL-MCNC: 4.2 G/DL (ref 3.5–5.2)
ALBUMIN/GLOB SERPL: 1.6 G/DL
ALP SERPL-CCNC: 129 U/L (ref 39–117)
ALT SERPL W P-5'-P-CCNC: 16 U/L (ref 1–33)
AMPHET+METHAMPHET UR QL: NEGATIVE
ANION GAP SERPL CALCULATED.3IONS-SCNC: 11 MMOL/L (ref 5–15)
AST SERPL-CCNC: 26 U/L (ref 1–32)
BACTERIA UR QL AUTO: ABNORMAL /HPF
BARBITURATES UR QL SCN: NEGATIVE
BASOPHILS # BLD AUTO: 0.02 10*3/MM3 (ref 0–0.2)
BASOPHILS NFR BLD AUTO: 0.3 % (ref 0–1.5)
BENZODIAZ UR QL SCN: NEGATIVE
BILIRUB SERPL-MCNC: 0.6 MG/DL (ref 0–1.2)
BILIRUB UR QL STRIP: NEGATIVE
BUN SERPL-MCNC: 7 MG/DL (ref 8–23)
BUN/CREAT SERPL: 15.6 (ref 7–25)
CALCIUM SPEC-SCNC: 9.5 MG/DL (ref 8.6–10.5)
CANNABINOIDS SERPL QL: NEGATIVE
CHLORIDE SERPL-SCNC: 105 MMOL/L (ref 98–107)
CK SERPL-CCNC: 397 U/L (ref 20–180)
CLARITY UR: ABNORMAL
CO2 SERPL-SCNC: 24 MMOL/L (ref 22–29)
COCAINE UR QL: NEGATIVE
COLOR UR: ABNORMAL
CREAT SERPL-MCNC: 0.45 MG/DL (ref 0.57–1)
DEPRECATED RDW RBC AUTO: 44.8 FL (ref 37–54)
EGFRCR SERPLBLD CKD-EPI 2021: 100.5 ML/MIN/1.73
EOSINOPHIL # BLD AUTO: 0.02 10*3/MM3 (ref 0–0.4)
EOSINOPHIL NFR BLD AUTO: 0.3 % (ref 0.3–6.2)
ERYTHROCYTE [DISTWIDTH] IN BLOOD BY AUTOMATED COUNT: 13.4 % (ref 12.3–15.4)
ETHANOL BLD-MCNC: <10 MG/DL (ref 0–10)
ETHANOL UR QL: <0.01 %
FENTANYL UR-MCNC: NEGATIVE NG/ML
GEN 5 2HR TROPONIN T REFLEX: 15 NG/L
GLOBULIN UR ELPH-MCNC: 2.6 GM/DL
GLUCOSE SERPL-MCNC: 97 MG/DL (ref 65–99)
GLUCOSE UR STRIP-MCNC: NEGATIVE MG/DL
HCT VFR BLD AUTO: 32 % (ref 34–46.6)
HGB BLD-MCNC: 10.7 G/DL (ref 12–15.9)
HGB UR QL STRIP.AUTO: NEGATIVE
HYALINE CASTS UR QL AUTO: ABNORMAL /LPF
IMM GRANULOCYTES # BLD AUTO: 0.01 10*3/MM3 (ref 0–0.05)
IMM GRANULOCYTES NFR BLD AUTO: 0.2 % (ref 0–0.5)
INR PPP: 1.09 (ref 0.9–1.1)
KETONES UR QL STRIP: NEGATIVE
LEUKOCYTE ESTERASE UR QL STRIP.AUTO: NEGATIVE
LYMPHOCYTES # BLD AUTO: 1.2 10*3/MM3 (ref 0.7–3.1)
LYMPHOCYTES NFR BLD AUTO: 19 % (ref 19.6–45.3)
MAGNESIUM SERPL-MCNC: 1.8 MG/DL (ref 1.6–2.4)
MCH RBC QN AUTO: 30.6 PG (ref 26.6–33)
MCHC RBC AUTO-ENTMCNC: 33.4 G/DL (ref 31.5–35.7)
MCV RBC AUTO: 91.4 FL (ref 79–97)
METHADONE UR QL SCN: NEGATIVE
MONOCYTES # BLD AUTO: 0.33 10*3/MM3 (ref 0.1–0.9)
MONOCYTES NFR BLD AUTO: 5.2 % (ref 5–12)
NEUTROPHILS NFR BLD AUTO: 4.73 10*3/MM3 (ref 1.7–7)
NEUTROPHILS NFR BLD AUTO: 75 % (ref 42.7–76)
NITRITE UR QL STRIP: POSITIVE
NRBC BLD AUTO-RTO: 0 /100 WBC (ref 0–0.2)
NT-PROBNP SERPL-MCNC: 499 PG/ML (ref 0–1800)
OPIATES UR QL: NEGATIVE
OXYCODONE UR QL SCN: NEGATIVE
PH UR STRIP.AUTO: 5.5 [PH] (ref 5–8)
PLATELET # BLD AUTO: 294 10*3/MM3 (ref 140–450)
PMV BLD AUTO: 9 FL (ref 6–12)
POTASSIUM SERPL-SCNC: 3.7 MMOL/L (ref 3.5–5.2)
PROT SERPL-MCNC: 6.8 G/DL (ref 6–8.5)
PROT UR QL STRIP: ABNORMAL
PROTHROMBIN TIME: 14.3 SECONDS (ref 11.7–14.2)
QT INTERVAL: 392 MS
RBC # BLD AUTO: 3.5 10*6/MM3 (ref 3.77–5.28)
RBC # UR STRIP: ABNORMAL /HPF
REF LAB TEST METHOD: ABNORMAL
SODIUM SERPL-SCNC: 140 MMOL/L (ref 136–145)
SP GR UR STRIP: 1.02 (ref 1–1.03)
SQUAMOUS #/AREA URNS HPF: ABNORMAL /HPF
TROPONIN T DELTA: -3 NG/L
TROPONIN T SERPL HS-MCNC: 18 NG/L
TSH SERPL DL<=0.05 MIU/L-ACNC: 2.46 UIU/ML (ref 0.27–4.2)
UROBILINOGEN UR QL STRIP: ABNORMAL
WBC # UR STRIP: ABNORMAL /HPF
WBC NRBC COR # BLD: 6.31 10*3/MM3 (ref 3.4–10.8)

## 2023-08-01 PROCEDURE — 84484 ASSAY OF TROPONIN QUANT: CPT | Performed by: EMERGENCY MEDICINE

## 2023-08-01 PROCEDURE — 93005 ELECTROCARDIOGRAM TRACING: CPT | Performed by: EMERGENCY MEDICINE

## 2023-08-01 PROCEDURE — 85025 COMPLETE CBC W/AUTO DIFF WBC: CPT | Performed by: EMERGENCY MEDICINE

## 2023-08-01 PROCEDURE — 84443 ASSAY THYROID STIM HORMONE: CPT | Performed by: EMERGENCY MEDICINE

## 2023-08-01 PROCEDURE — 70450 CT HEAD/BRAIN W/O DYE: CPT

## 2023-08-01 PROCEDURE — 82550 ASSAY OF CK (CPK): CPT | Performed by: EMERGENCY MEDICINE

## 2023-08-01 PROCEDURE — 85610 PROTHROMBIN TIME: CPT | Performed by: EMERGENCY MEDICINE

## 2023-08-01 PROCEDURE — 80307 DRUG TEST PRSMV CHEM ANLYZR: CPT | Performed by: EMERGENCY MEDICINE

## 2023-08-01 PROCEDURE — 36415 COLL VENOUS BLD VENIPUNCTURE: CPT

## 2023-08-01 PROCEDURE — 82077 ASSAY SPEC XCP UR&BREATH IA: CPT | Performed by: EMERGENCY MEDICINE

## 2023-08-01 PROCEDURE — 80053 COMPREHEN METABOLIC PANEL: CPT | Performed by: EMERGENCY MEDICINE

## 2023-08-01 PROCEDURE — 99285 EMERGENCY DEPT VISIT HI MDM: CPT

## 2023-08-01 PROCEDURE — 83735 ASSAY OF MAGNESIUM: CPT | Performed by: EMERGENCY MEDICINE

## 2023-08-01 PROCEDURE — 83880 ASSAY OF NATRIURETIC PEPTIDE: CPT | Performed by: EMERGENCY MEDICINE

## 2023-08-01 PROCEDURE — 25010000002 ZIPRASIDONE MESYLATE PER 10 MG: Performed by: EMERGENCY MEDICINE

## 2023-08-01 PROCEDURE — 81001 URINALYSIS AUTO W/SCOPE: CPT | Performed by: EMERGENCY MEDICINE

## 2023-08-01 PROCEDURE — 93010 ELECTROCARDIOGRAM REPORT: CPT | Performed by: INTERNAL MEDICINE

## 2023-08-01 RX ORDER — POLYETHYLENE GLYCOL 3350 17 G/17G
17 POWDER, FOR SOLUTION ORAL DAILY PRN
Status: DISCONTINUED | OUTPATIENT
Start: 2023-08-01 | End: 2023-08-03 | Stop reason: HOSPADM

## 2023-08-01 RX ORDER — ISOSORBIDE MONONITRATE 30 MG/1
30 TABLET, EXTENDED RELEASE ORAL
Status: DISCONTINUED | OUTPATIENT
Start: 2023-08-01 | End: 2023-08-03 | Stop reason: HOSPADM

## 2023-08-01 RX ORDER — SODIUM CHLORIDE 0.9 % (FLUSH) 0.9 %
10 SYRINGE (ML) INJECTION AS NEEDED
Status: DISCONTINUED | OUTPATIENT
Start: 2023-08-01 | End: 2023-08-03 | Stop reason: HOSPADM

## 2023-08-01 RX ORDER — BUMETANIDE 2 MG/1
2 TABLET ORAL DAILY
Status: DISCONTINUED | OUTPATIENT
Start: 2023-08-01 | End: 2023-08-03 | Stop reason: HOSPADM

## 2023-08-01 RX ORDER — BISACODYL 5 MG/1
5 TABLET, DELAYED RELEASE ORAL DAILY PRN
Status: DISCONTINUED | OUTPATIENT
Start: 2023-08-01 | End: 2023-08-03 | Stop reason: HOSPADM

## 2023-08-01 RX ORDER — METOPROLOL TARTRATE 50 MG/1
50 TABLET, FILM COATED ORAL EVERY 12 HOURS SCHEDULED
Status: DISCONTINUED | OUTPATIENT
Start: 2023-08-01 | End: 2023-08-03 | Stop reason: HOSPADM

## 2023-08-01 RX ORDER — SODIUM CHLORIDE 0.9 % (FLUSH) 0.9 %
10 SYRINGE (ML) INJECTION EVERY 12 HOURS SCHEDULED
Status: DISCONTINUED | OUTPATIENT
Start: 2023-08-01 | End: 2023-08-03 | Stop reason: HOSPADM

## 2023-08-01 RX ORDER — BISACODYL 10 MG
10 SUPPOSITORY, RECTAL RECTAL DAILY PRN
Status: DISCONTINUED | OUTPATIENT
Start: 2023-08-01 | End: 2023-08-03 | Stop reason: HOSPADM

## 2023-08-01 RX ORDER — OLANZAPINE 5 MG/1
5 TABLET ORAL NIGHTLY
Status: DISCONTINUED | OUTPATIENT
Start: 2023-08-01 | End: 2023-08-03

## 2023-08-01 RX ORDER — ATORVASTATIN CALCIUM 20 MG/1
80 TABLET, FILM COATED ORAL NIGHTLY
Status: DISCONTINUED | OUTPATIENT
Start: 2023-08-01 | End: 2023-08-03 | Stop reason: HOSPADM

## 2023-08-01 RX ORDER — CLOPIDOGREL BISULFATE 75 MG/1
75 TABLET ORAL DAILY
Status: DISCONTINUED | OUTPATIENT
Start: 2023-08-01 | End: 2023-08-03 | Stop reason: HOSPADM

## 2023-08-01 RX ORDER — SODIUM CHLORIDE 9 MG/ML
40 INJECTION, SOLUTION INTRAVENOUS AS NEEDED
Status: DISCONTINUED | OUTPATIENT
Start: 2023-08-01 | End: 2023-08-03 | Stop reason: HOSPADM

## 2023-08-01 RX ORDER — AMOXICILLIN 250 MG
2 CAPSULE ORAL 2 TIMES DAILY
Status: DISCONTINUED | OUTPATIENT
Start: 2023-08-01 | End: 2023-08-03 | Stop reason: HOSPADM

## 2023-08-01 RX ORDER — NITROGLYCERIN 0.4 MG/1
0.4 TABLET SUBLINGUAL
Status: DISCONTINUED | OUTPATIENT
Start: 2023-08-01 | End: 2023-08-03 | Stop reason: HOSPADM

## 2023-08-01 RX ORDER — ONDANSETRON 2 MG/ML
4 INJECTION INTRAMUSCULAR; INTRAVENOUS EVERY 6 HOURS PRN
Status: DISCONTINUED | OUTPATIENT
Start: 2023-08-01 | End: 2023-08-03 | Stop reason: HOSPADM

## 2023-08-01 RX ORDER — ZIPRASIDONE MESYLATE 20 MG/ML
20 INJECTION, POWDER, LYOPHILIZED, FOR SOLUTION INTRAMUSCULAR ONCE
Status: COMPLETED | OUTPATIENT
Start: 2023-08-01 | End: 2023-08-01

## 2023-08-01 RX ORDER — SODIUM CHLORIDE 9 MG/ML
50 INJECTION, SOLUTION INTRAVENOUS CONTINUOUS
Status: DISCONTINUED | OUTPATIENT
Start: 2023-08-01 | End: 2023-08-03 | Stop reason: HOSPADM

## 2023-08-01 RX ADMIN — ZIPRASIDONE MESYLATE 20 MG: 20 INJECTION, POWDER, LYOPHILIZED, FOR SOLUTION INTRAMUSCULAR at 11:03

## 2023-08-01 RX ADMIN — METOPROLOL TARTRATE 50 MG: 50 TABLET, FILM COATED ORAL at 20:39

## 2023-08-01 RX ADMIN — OLANZAPINE 5 MG: 5 TABLET ORAL at 20:39

## 2023-08-01 RX ADMIN — BUMETANIDE 2 MG: 2 TABLET ORAL at 20:39

## 2023-08-01 RX ADMIN — ATORVASTATIN CALCIUM 80 MG: 20 TABLET, FILM COATED ORAL at 20:39

## 2023-08-01 RX ADMIN — CLOPIDOGREL BISULFATE 75 MG: 75 TABLET, FILM COATED ORAL at 20:39

## 2023-08-01 RX ADMIN — ISOSORBIDE MONONITRATE 30 MG: 30 TABLET, EXTENDED RELEASE ORAL at 20:39

## 2023-08-01 RX ADMIN — SODIUM CHLORIDE 100 ML/HR: 9 INJECTION, SOLUTION INTRAVENOUS at 18:24

## 2023-08-01 RX ADMIN — Medication 10 ML: at 20:40

## 2023-08-01 NOTE — ED NOTES
Nursing report ED to floor  Asha Krishnan  73 y.o.  female    HPI :   Chief Complaint   Patient presents with    Altered Mental Status     Per the family,she is confused today       Admitting doctor:   Dale Rey MD    Admitting diagnosis:   The encounter diagnosis was Altered mental status, unspecified altered mental status type.    Code status:   Current Code Status       Date Active Code Status Order ID Comments User Context       Prior            Allergies:   Latex and Tylenol [acetaminophen]    Isolation:   No active isolations    Intake and Output  No intake or output data in the 24 hours ending 08/01/23 1548    Weight:       08/01/23  1035   Weight: 49.9 kg (110 lb)       Most recent vitals:   Vitals:    08/01/23 1413 08/01/23 1438 08/01/23 1445 08/01/23 1458   BP:  (!) 189/51     BP Location:       Patient Position:       Pulse: 95  101 95   Resp:       Temp:       TempSrc:       SpO2: 93%  97% 96%   Weight:       Height:           Active LDAs/IV Access:   Lines, Drains & Airways       Active LDAs       None                    Labs (abnormal labs have a star):   Labs Reviewed   COMPREHENSIVE METABOLIC PANEL - Abnormal; Notable for the following components:       Result Value    BUN 7 (*)     Creatinine 0.45 (*)     Alkaline Phosphatase 129 (*)     All other components within normal limits    Narrative:     GFR Normal >60  Chronic Kidney Disease <60  Kidney Failure <15    The GFR formula is only valid for adults with stable renal function between ages 18 and 70.   PROTIME-INR - Abnormal; Notable for the following components:    Protime 14.3 (*)     All other components within normal limits   URINALYSIS W/ MICROSCOPIC IF INDICATED (NO CULTURE) - Abnormal; Notable for the following components:    Color, UA Dark Yellow (*)     Appearance, UA Cloudy (*)     Protein, UA 30 mg/dL (1+) (*)     Nitrite, UA Positive (*)     All other components within normal limits   CK - Abnormal; Notable for the following  components:    Creatine Kinase 397 (*)     All other components within normal limits   TROPONIN - Abnormal; Notable for the following components:    HS Troponin T 18 (*)     All other components within normal limits    Narrative:     High Sensitive Troponin T Reference Range:  <10.0 ng/L- Negative Female for AMI  <15.0 ng/L- Negative Male for AMI  >=10 - Abnormal Female indicating possible myocardial injury.  >=15 - Abnormal Male indicating possible myocardial injury.   Clinicians would have to utilize clinical acumen, EKG, Troponin, and serial changes to determine if it is an Acute Myocardial Infarction or myocardial injury due to an underlying chronic condition.        CBC WITH AUTO DIFFERENTIAL - Abnormal; Notable for the following components:    RBC 3.50 (*)     Hemoglobin 10.7 (*)     Hematocrit 32.0 (*)     Lymphocyte % 19.0 (*)     All other components within normal limits   HIGH SENSITIVITIY TROPONIN T 2HR - Abnormal; Notable for the following components:    HS Troponin T 15 (*)     All other components within normal limits    Narrative:     High Sensitive Troponin T Reference Range:  <10.0 ng/L- Negative Female for AMI  <15.0 ng/L- Negative Male for AMI  >=10 - Abnormal Female indicating possible myocardial injury.  >=15 - Abnormal Male indicating possible myocardial injury.   Clinicians would have to utilize clinical acumen, EKG, Troponin, and serial changes to determine if it is an Acute Myocardial Infarction or myocardial injury due to an underlying chronic condition.        URINALYSIS, MICROSCOPIC ONLY - Abnormal; Notable for the following components:    Bacteria, UA 1+ (*)     All other components within normal limits   BNP (IN-HOUSE) - Normal    Narrative:     Among patients with dyspnea, NT-proBNP is highly sensitive for the detection of acute congestive heart failure. In addition NT-proBNP of <300 pg/ml effectively rules out acute congestive heart failure with 99% negative predictive  value.    Results may be falsely decreased if patient taking Biotin.     URINE DRUG SCREEN - Normal    Narrative:     Negative Thresholds Per Drugs Screened:    Amphetamines                 500 ng/ml  Barbiturates                 200 ng/ml  Benzodiazepines              100 ng/ml  Cocaine                      300 ng/ml  Methadone                    300 ng/ml  Opiates                      300 ng/ml  Oxycodone                    100 ng/ml  THC                           50 ng/ml  Fentanyl                       5 ng/ml      The Normal Value for all drugs tested is negative. This report includes final unconfirmed screening results to be used for medical treatment purposes only. Unconfirmed results must not be used for non-medical purposes such as employment or legal testing. Clinical consideration should be applied to any drug of abuse test, particularly when unconfirmed results are used.           MAGNESIUM - Normal   TSH - Normal   ETHANOL   CBC AND DIFFERENTIAL    Narrative:     The following orders were created for panel order CBC & Differential.  Procedure                               Abnormality         Status                     ---------                               -----------         ------                     CBC Auto Differential[[id number]]        Abnormal            Final result                 Please view results for these tests on the individual orders.       EKG:   ECG 12 Lead Altered Mental Status   Final Result   HEART RATE= 81  bpm   RR Interval= 741  ms   LA Interval= 155  ms   P Horizontal Axis= -31  deg   P Front Axis= 66  deg   QRSD Interval= 87  ms   QT Interval= 392  ms   QRS Axis= -9  deg   T Wave Axis= 40  deg   - OTHERWISE NORMAL ECG -   Sinus rhythm   Atrial premature complex   When compared with ECG of 12-Nov-2022 6:38:00,   No significant change   Electronically Signed By: Tashi De La Torre ([facility]) 01-Aug-2023 14:12:14   Date and Time of Study: 2023-08-01 11:51:20          Meds given in ED:    Medications   ziprasidone (GEODON) injection 20 mg (20 mg Intramuscular Given 8/1/23 1103)       Imaging results:  CT Head Without Contrast    Result Date: 8/1/2023  There is no evidence of acute infarction or of intracranial hemorrhage. Further evaluation could be performed with MRI examination of the brain as indicated.    Radiation dose reduction techniques were utilized, including automated exposure control and exposure modulation based on body size.        Ambulatory status:   -up with assist    Social issues:   Social History     Socioeconomic History    Marital status:    Substance and Sexual Activity    Alcohol use: Not Currently       NIH Stroke Scale:       Shandra Cooney RN  08/01/23 15:48 EDT

## 2023-08-01 NOTE — H&P
Internal medicine history and physical  INTERNAL MEDICINE   [facility]       Patient Identification:  Name: Asha Krishnan  Age: 73 y.o.  Sex: female  :  1950  MRN: [id number]                   Primary Care Physician: Capo Pedroza MD                               Date of admission:2023    Chief Complaint: Brought to the emergency room with patient's  for evaluation of progressive confusion disorientation and acting differently and not making any sense since    History of Present Illness:   Source of information review of records and discussion with ER care provider.  Patient herself is all over the place and not oriented to place and time and at present denies any physical complaints.  Patient is a 73-year-old female with past medical history remarkable for hypertension paroxysmal atrial fibrillation COPD coronary artery disease and anemia and also has known history of pulmonary nodules which upon recent CT scan of the chest at [facility] when she was there for evaluation of chest pain shows enlargement with nodule have spiculated margins and size greater than 1 cm.  Patient was recently at [facility] for evaluation of chest pain with negative work-up.  Patient does have underlying history of anxiety and was recently started on Ambien.  Patient is actively hallucinating and as reported in the emergency room note from her  that she is not taking any of her medications.  At present patient denies any specific complaints but does engage in conversations with no substance or contacts.      Past Medical History:  Past Medical History:   Diagnosis Date    Cataract     Hyperlipidemia      Past Surgical History:  Past Surgical History:   Procedure Laterality Date    CARDIAC CATHETERIZATION N/A 2022    Procedure: Left Heart Cath;  Surgeon: Tashi De La Torre MD;  Location: [facility] INVASIVE LOCATION;  Service: Cardiovascular;  Laterality: N/A;     CARDIAC CATHETERIZATION N/A 11/11/2022    Procedure: Coronary angiography;  Surgeon: Tashi De La Torre MD;  Location: [facility] CATH INVASIVE LOCATION;  Service: Cardiovascular;  Laterality: N/A;      Home Meds:  Medications Prior to Admission   Medication Sig Dispense Refill Last Dose    nitroglycerin (NITROSTAT) 0.4 MG SL tablet Place 1 tablet under the tongue Every 5 (Five) Minutes As Needed for Chest Pain (Only if SBP Greater Than 100). Take no more than 3 doses in 15 minutes.  12 Past Month    atorvastatin (LIPITOR) 80 MG tablet Take 1 tablet by mouth Every Night. 90 tablet  Unknown    bumetanide (BUMEX) 2 MG tablet Take 1 tablet by mouth Daily.   Unknown    clopidogrel (PLAVIX) 75 MG tablet Take 1 tablet by mouth Daily. 30 tablet  Unknown    isosorbide mononitrate (IMDUR) 30 MG 24 hr tablet Take 1 tablet by mouth Daily.   Unknown    metoprolol tartrate (LOPRESSOR) 50 MG tablet Take 1 tablet by mouth Every 12 (Twelve) Hours.   Unknown    tiotropium bromide monohydrate (SPIRIVA RESPIMAT) 2.5 MCG/ACT aerosol solution inhaler Inhale 2 puffs Daily. 4 g 0 Unknown     Current Meds:   No current facility-administered medications for this encounter.  Allergies:  Allergies   Allergen Reactions    Latex Anaphylaxis     Pt states whole body swells including throat    Tylenol [Acetaminophen] Anaphylaxis     States whole body swells including throat     Social History:   Social History     Tobacco Use    Smoking status: Every Day     Packs/day: 2.00     Types: Cigarettes    Smokeless tobacco: Not on file   Substance Use Topics    Alcohol use: Not Currently      Family History:  History reviewed. No pertinent family history.       Review of Systems  See history of present illness and past medical history.  Could not be obtained from patient because of the hallucinations confusion and out of context tangential conversation.      Vitals:   /57 (BP Location: Right arm, Patient Position: Lying)   Pulse 73   Temp 97.5 øF  "(36.4 øC) (Oral)   Resp 18   Ht 162.6 cm (64\")   Wt 49.9 kg (110 lb)   SpO2 95%   BMI 18.88 kg/mý   I/O: No intake or output data in the 24 hours ending 08/01/23 4093  Exam:  Patient is examined using the personal protective equipment as per guidelines from infection control for this particular patient as enacted.  Hand washing was performed before and after patient interaction.  General Appearance:  Alert awake female who is talking about things without any context but is not in any acute distress and does not appear ill or toxic.   Head:    Normocephalic, without obvious abnormality, atraumatic   Eyes:    PERRL, conjunctiva/corneas clear, EOM's intact, both eyes   Ears:    Normal external ear canals, both ears   Nose:   Nares normal, septum midline, mucosa normal, no drainage    or sinus tenderness   Throat:   Lips, tongue, gums normal; oral mucosa pink and moist   Neck: No adenopathy detected   Back:     Symmetric, no curvature, ROM normal, no CVA tenderness   Lungs:     Clear to auscultation bilaterally, respirations unlabored   Chest Wall:    No tenderness or deformity    Heart:  S1-S2 regular   Abdomen:   Soft nontender   Extremities:   Extremities normal, atraumatic, no cyanosis or edema   Pulses:   Pulses palpable in all extremities; symmetric all extremities   Skin: No bruising or ecchymotic changes noted   Neurologic: Grossly nonfocal examination but not oriented to place and time       Data Review:      I reviewed the patient's new clinical results.  Results from last 7 days   Lab Units 08/01/23  1149 07/26/23  0130   WBC 10*3/mm3 6.31 6.67   HEMOGLOBIN g/dL 10.7* 10.4*   PLATELETS 10*3/mm3 294 297     Results from last 7 days   Lab Units 08/01/23  1149 07/27/23  0304 07/26/23  0944   SODIUM mmol/L 140  --   --    POTASSIUM mmol/L 3.7 4.3 3.1*   CHLORIDE mmol/L 105  --   --    CO2 mmol/L 24.0  --   --    BUN mg/dL 7*  --   --    CREATININE mg/dL 0.45*  --   --    CALCIUM mg/dL 9.5  --   --    GLUCOSE " mg/dL 97  --   --      CT Head Without Contrast    Result Date: 8/1/2023  There is no evidence of acute infarction or of intracranial hemorrhage. Further evaluation could be performed with MRI examination of the brain as indicated.    Radiation dose reduction techniques were utilized, including automated exposure control and exposure modulation based on body size.  This report was finalized on 8/1/2023 6:45 PM by Dr. Johnny Alfredo M.D.     Microbiology Results (last 10 days)       ** No results found for the last 240 hours. **          ECG 12 Lead Altered Mental Status   Final Result   HEART RATE= 81  bpm   RR Interval= 741  ms   FL Interval= 155  ms   P Horizontal Axis= -31  deg   P Front Axis= 66  deg   QRSD Interval= 87  ms   QT Interval= 392  ms   QRS Axis= -9  deg   T Wave Axis= 40  deg   - OTHERWISE NORMAL ECG -   Sinus rhythm   Atrial premature complex   When compared with ECG of 12-Nov-2022 6:38:00,   No significant change   Electronically Signed By: Tasih De La Torre ([facility]) 01-Aug-2023 14:12:14   Date and Time of Study: 2023-08-01 11:51:20        CT scan of the chest performed on 7/26/2023 interpreted by Dr. Damon at [facility]:  IMPRESSION: 1. Abnormal exam. Irregularly marginated 1.4 x 1.2 cm left upper lobenodule demonstrated in the setting of severe chronic pulmonaryemphysema and previously measuring 0.7 cm on comparison June 27, 2022is considered highly suspicious for pulmonary malignancy. Furtherevaluation is recommended. Consider biopsy for confirmation.Dictated by: Moris Damon M.D.Images and Report reviewed and interpreted by: Moris Damon M.D.<PS><Electronically signed by: Moris Damon M.D.>07/26/2023 1129D: 07/26/2023 1126T: 07/26/2023 1126   Assessment:  Active Hospital Problems    Diagnosis  POA    **Altered mental status, unspecified [R41.82]  Yes    PAF (paroxysmal atrial fibrillation) [I48.0]  Unknown    Pulmonary nodule 1 cm or greater in diameter [R91.1]  Unknown     Anemia [D64.9]  Unknown    COPD (chronic obstructive pulmonary disease) [J44.9]  Yes    HTN (hypertension) [I10]  Yes    CAD (coronary artery disease) [I25.10]  Yes       Medical decision making/care plan: See admitting orders  Altered mental status with hallucination disorientation pressured conversation and noncompliance with medications starting earlier this morning in the context of recent diagnosis of anxiety and interaction of Ambien by primary care provider with work-up at our facility so far negative though recent CT scan of the chest shows enlarging pulmonary nodule concerning for malignancy-this presentation could very well be progression of underlying psychiatric disorder versus phenomena secondary to underlying other medical issues causing stress and possibilities to consider would be metastatic lesions to the brain not seen on the CAT scan versus withdrawal from Ambien versus paraneoplastic process.  Patient and her  reportedly denies any ongoing drinking alcohol so withdrawal less likely or but possibility of [place] need to be explored if she had prior history of drinking.  Plan is to admit the patient monitor her behavior and address her hallucinations with Zyprexa.  Patient has received Geodon in the emergency room.  Consult psychiatry service and arrange for MRI of the brain and provide her empirically with thiamine and multivitamins.  Check B12 and folate level and consider neurology consultation.  History of paroxysmal atrial fibrillation-continue her beta-blocker patient is currently not noted to be on any anticoagulation therapy and that needs to be explored.  Currently in sinus rhythm.  Pulmonary nodule greater than 1 cm noted on the CT scan with enlargement in size documented on the CT scan report-consider thoracic surgery consultation or at least make arrangements for out of hospital follow-up.  COPD-continue with as needed nebulizer treatment and inhalers and provided with  continuous pulse ox monitoring.  Hypertension-continue antihypertensive regimen and avoid hypotensive episodes.  Coronary artery disease-continue her statin and Plavix and continue with beta-blocker and nitrates.    Dale Rey MD   8/1/2023  17:53 EDT    Parts of this note may be an electronic transcription/translation of spoken language to printed text using the Dragon dictation system.

## 2023-08-01 NOTE — ED PROVIDER NOTES
" EMERGENCY DEPARTMENT ENCOUNTER    Room Number:  P384/1  PCP: Capo Pedroza MD  Patient Care Team:  Capo Pedroza MD as PCP - General (Family Medicine)   Independent Historians: Patient's  although even this is limited    HPI:  Chief Complaint: \"Talking crazy\"    A complete HPI/ROS/PMH/PSH/SH/FH are unobtainable due to: Patient's altered mental status      Context: Asha Krishnan is a 73 y.o. female who presents to the ED c/o acute change in mental status this morning.  Patient's  brings her to the emergency department because she is hallucinating and talking about witches and acting bizarre this morning.  He says that since she was discharged from [facility] she has not been taking any of her heart medications but just started acting strangely this am.  He says he barely got patient in a robe and almost called ems for patient because he could not convince her to come to the ER.  They have been together for more than 2 decades and he has never seen her act this way. She has no psych diagnoses, has never been hospitalized for psych concerns, is not on any psychiatric medications except for ambien for sleep which is new since June he believes.  Medical history and presenting history both confounded by patient and  being very distraught, yelling in the department at each other, and  noted to be wearing a hospital bracelet from another facility but unwilling to give details of what his hospital visit was for or how a hospital visit for himself fits into his wife's HPI today.    Review of prior external notes (non-ED) -and- Review of prior external test results outside of this encounter: I reviewed outside records from admission last week at a [facility] facility for chest pain.  Pt has known CAD and had ct chest and multiple trops all negative after eval by cardiology.  Cont medical management per cards notes. I also reviewed her pmd notes from Dr. Pedroza in June where she " expressed concerns about anxiety and sleep and he did do a depression assessment on her and decided to start/continue ambien although patient had requested xanax per his note.    Prescription drug monitoring program review:         PAST MEDICAL HISTORY  Active Ambulatory Problems     Diagnosis Date Noted    Chest pain, atypical 11/11/2022    Chest pain, unspecified type 11/11/2022    CAD (coronary artery disease) 11/11/2022    Dysuria 11/11/2022    Dyspnea 11/11/2022    Elevated d-dimer 11/11/2022    COPD (chronic obstructive pulmonary disease) 11/11/2022    HTN (hypertension) 11/11/2022    HLD (hyperlipidemia) 11/11/2022     Resolved Ambulatory Problems     Diagnosis Date Noted    No Resolved Ambulatory Problems     Past Medical History:   Diagnosis Date    Cataract     Hyperlipidemia          PAST SURGICAL HISTORY  Past Surgical History:   Procedure Laterality Date    CARDIAC CATHETERIZATION N/A 11/11/2022    Procedure: Left Heart Cath;  Surgeon: Tashi De La Torre MD;  Location: [facility] CATH INVASIVE LOCATION;  Service: Cardiovascular;  Laterality: N/A;    CARDIAC CATHETERIZATION N/A 11/11/2022    Procedure: Coronary angiography;  Surgeon: Tashi De La Torre MD;  Location: [facility] CATH INVASIVE LOCATION;  Service: Cardiovascular;  Laterality: N/A;         FAMILY HISTORY  History reviewed. No pertinent family history.      SOCIAL HISTORY  Social History     Socioeconomic History    Marital status:    Tobacco Use    Smoking status: Every Day     Packs/day: 2.00     Types: Cigarettes   Vaping Use    Vaping Use: Never used   Substance and Sexual Activity    Alcohol use: Not Currently    Drug use: Defer    Sexual activity: Defer     Heavy daily tobacco smoker    ALLERGIES  Latex and Tylenol [acetaminophen]        REVIEW OF SYSTEMS  Review of Systems  Included in HPI  All systems reviewed and negative except for those discussed in HPI.      PHYSICAL EXAM    I have reviewed the triage vital signs and nursing  notes.    ED Triage Vitals [08/01/23 0945]   Temp Heart Rate Resp BP SpO2   98.5 øF (36.9 øC) 111 18 -- 90 %      Temp src Heart Rate Source Patient Position BP Location FiO2 (%)   Tympanic Monitor -- -- --       Physical Exam  GENERAL: disheveled, thin cf, difficult to redirect or get to answer any questions, alert, agitated and oriented to person and place only, wearing dirty house slippers and a robe  SKIN: Warm, dry  HENT: Normocephalic, atraumatic  EYES: no scleral icterus  CV: regular rhythm, regular rate, no murmurs  RESPIRATORY: normal effort, lungs clear, no wheezes  ABDOMEN: soft, nontender, nondistended  MUSCULOSKELETAL: no deformity  NEURO: alert, moves all extremities, agitated, sitting in a wheelchair but using her legs to push herself into hallway in efforts to leave, face symmetric, oriented to person and place  Psych: denies si/hi, denies hallucinations but is tangential and confabulating with rapid speech and difficult to redirect at all, she does not appear to be responding to internal stimuli, she does not appear paranoid                                                               LAB RESULTS  Recent Results (from the past 24 hour(s))   Comprehensive Metabolic Panel    Collection Time: 08/01/23 11:49 AM    Specimen: Blood   Result Value Ref Range    Glucose 97 65 - 99 mg/dL    BUN 7 (L) 8 - 23 mg/dL    Creatinine 0.45 (L) 0.57 - 1.00 mg/dL    Sodium 140 136 - 145 mmol/L    Potassium 3.7 3.5 - 5.2 mmol/L    Chloride 105 98 - 107 mmol/L    CO2 24.0 22.0 - 29.0 mmol/L    Calcium 9.5 8.6 - 10.5 mg/dL    Total Protein 6.8 6.0 - 8.5 g/dL    Albumin 4.2 3.5 - 5.2 g/dL    ALT (SGPT) 16 1 - 33 U/L    AST (SGOT) 26 1 - 32 U/L    Alkaline Phosphatase 129 (H) 39 - 117 U/L    Total Bilirubin 0.6 0.0 - 1.2 mg/dL    Globulin 2.6 gm/dL    A/G Ratio 1.6 g/dL    BUN/Creatinine Ratio 15.6 7.0 - 25.0    Anion Gap 11.0 5.0 - 15.0 mmol/L    eGFR 100.5 >60.0 mL/min/1.73   Protime-INR    Collection Time: 08/01/23  11:49 AM    Specimen: Blood   Result Value Ref Range    Protime 14.3 (H) 11.7 - 14.2 Seconds    INR 1.09 0.90 - 1.10   BNP    Collection Time: 08/01/23 11:49 AM    Specimen: Blood   Result Value Ref Range    proBNP 499.0 0.0 - 1,800.0 pg/mL   CK    Collection Time: 08/01/23 11:49 AM    Specimen: Blood   Result Value Ref Range    Creatine Kinase 397 (H) 20 - 180 U/L   High Sensitivity Troponin T    Collection Time: 08/01/23 11:49 AM    Specimen: Blood   Result Value Ref Range    HS Troponin T 18 (H) <10 ng/L   Ethanol    Collection Time: 08/01/23 11:49 AM    Specimen: Blood   Result Value Ref Range    Ethanol <10 0 - 10 mg/dL    Ethanol % <0.010 %   Magnesium    Collection Time: 08/01/23 11:49 AM    Specimen: Blood   Result Value Ref Range    Magnesium 1.8 1.6 - 2.4 mg/dL   TSH    Collection Time: 08/01/23 11:49 AM    Specimen: Blood   Result Value Ref Range    TSH 2.460 0.270 - 4.200 uIU/mL   CBC Auto Differential    Collection Time: 08/01/23 11:49 AM    Specimen: Blood   Result Value Ref Range    WBC 6.31 3.40 - 10.80 10*3/mm3    RBC 3.50 (L) 3.77 - 5.28 10*6/mm3    Hemoglobin 10.7 (L) 12.0 - 15.9 g/dL    Hematocrit 32.0 (L) 34.0 - 46.6 %    MCV 91.4 79.0 - 97.0 fL    MCH 30.6 26.6 - 33.0 pg    MCHC 33.4 31.5 - 35.7 g/dL    RDW 13.4 12.3 - 15.4 %    RDW-SD 44.8 37.0 - 54.0 fl    MPV 9.0 6.0 - 12.0 fL    Platelets 294 140 - 450 10*3/mm3    Neutrophil % 75.0 42.7 - 76.0 %    Lymphocyte % 19.0 (L) 19.6 - 45.3 %    Monocyte % 5.2 5.0 - 12.0 %    Eosinophil % 0.3 0.3 - 6.2 %    Basophil % 0.3 0.0 - 1.5 %    Immature Grans % 0.2 0.0 - 0.5 %    Neutrophils, Absolute 4.73 1.70 - 7.00 10*3/mm3    Lymphocytes, Absolute 1.20 0.70 - 3.10 10*3/mm3    Monocytes, Absolute 0.33 0.10 - 0.90 10*3/mm3    Eosinophils, Absolute 0.02 0.00 - 0.40 10*3/mm3    Basophils, Absolute 0.02 0.00 - 0.20 10*3/mm3    Immature Grans, Absolute 0.01 0.00 - 0.05 10*3/mm3    nRBC 0.0 0.0 - 0.2 /100 WBC   ECG 12 Lead Altered Mental Status    Collection  Time: 08/01/23 11:51 AM   Result Value Ref Range    QT Interval 392 ms   Urinalysis With Microscopic If Indicated (No Culture) - Urine, Clean Catch    Collection Time: 08/01/23 12:37 PM    Specimen: Urine, Clean Catch   Result Value Ref Range    Color, UA Dark Yellow (A) Yellow, Straw    Appearance, UA Cloudy (A) Clear    pH, UA 5.5 5.0 - 8.0    Specific Gravity, UA 1.020 1.005 - 1.030    Glucose, UA Negative Negative    Ketones, UA Negative Negative    Bilirubin, UA Negative Negative    Blood, UA Negative Negative    Protein, UA 30 mg/dL (1+) (A) Negative    Leuk Esterase, UA Negative Negative    Nitrite, UA Positive (A) Negative    Urobilinogen, UA 0.2 E.U./dL 0.2 - 1.0 E.U./dL   Urine Drug Screen - Urine, Clean Catch    Collection Time: 08/01/23 12:37 PM    Specimen: Urine, Clean Catch   Result Value Ref Range    Amphet/Methamphet, Screen Negative Negative    Barbiturates Screen, Urine Negative Negative    Benzodiazepine Screen, Urine Negative Negative    Cocaine Screen, Urine Negative Negative    Opiate Screen Negative Negative    THC, Screen, Urine Negative Negative    Methadone Screen, Urine Negative Negative    Oxycodone Screen, Urine Negative Negative    Fentanyl, Urine Negative Negative   Urinalysis, Microscopic Only - Urine, Clean Catch    Collection Time: 08/01/23 12:37 PM    Specimen: Urine, Clean Catch   Result Value Ref Range    RBC, UA None Seen None Seen, 0-2 /HPF    WBC, UA None Seen None Seen, 0-2 /HPF    Bacteria, UA 1+ (A) None Seen /HPF    Squamous Epithelial Cells, UA 0-2 None Seen, 0-2 /HPF    Hyaline Casts, UA None Seen None Seen /LPF    Methodology Manual Light Microscopy    High Sensitivity Troponin T 2Hr    Collection Time: 08/01/23  1:48 PM    Specimen: Arm, Right; Blood   Result Value Ref Range    HS Troponin T 15 (H) <10 ng/L    Troponin T Delta -3 >=-4 - <+4 ng/L       Ordered the above labs and independently reviewed the results.        RADIOLOGY  CT Head Without Contrast    Result  Date: 8/1/2023  CT HEAD WITHOUT CONTRAST  HISTORY: Delirium, confusion.  COMPARISON: None.  FINDINGS: The brain and ventricles are symmetrical. There is age-appropriate atrophy. Moderate-to-severe vascular calcification involving the carotid siphons and involving the dominant left vertebral artery is appreciated. No focal area of decreased attenuation to suggest acute infarction is identified.      There is no evidence of acute infarction or of intracranial hemorrhage. Further evaluation could be performed with MRI examination of the brain as indicated.    Radiation dose reduction techniques were utilized, including automated exposure control and exposure modulation based on body size.  This report was finalized on 8/1/2023 6:45 PM by Dr. Johnny Alfredo M.D.       I ordered the above noted radiological studies. Reviewed by me and discussed with radiologist.  See dictation for official radiology interpretation.    I independently viewed patient's head ct and found no midline shift nor obvious ICH on my interpretation.    EKG          EKG time: 11:51  Rhythm/Rate: NSR at rate 81  P waves and TX: Normal  QRS, axis: Normal  ST and T waves: No ST segment changes or T wave changes    Interpreted Contemporaneously by me, independently viewed    PROCEDURES    Procedures      MEDICATIONS GIVEN IN ER    Medications   atorvastatin (LIPITOR) tablet 80 mg (80 mg Oral Given 8/1/23 2039)   clopidogrel (PLAVIX) tablet 75 mg (75 mg Oral Given 8/1/23 2039)   isosorbide mononitrate (IMDUR) 24 hr tablet 30 mg (30 mg Oral Given 8/1/23 2039)   metoprolol tartrate (LOPRESSOR) tablet 50 mg (50 mg Oral Given 8/1/23 2039)   nitroglycerin (NITROSTAT) SL tablet 0.4 mg (has no administration in time range)   tiotropium (SPIRIVA RESPIMAT) 2.5 mcg/act aerosol solution inhaler (has no administration in time range)   bumetanide (BUMEX) tablet 2 mg (2 mg Oral Given 8/1/23 2039)   sodium chloride 0.9 % flush 10 mL (10 mL Intravenous Given 8/1/23  2040)   sodium chloride 0.9 % flush 10 mL (has no administration in time range)   sodium chloride 0.9 % infusion 40 mL (has no administration in time range)   sennosides-docusate (PERICOLACE) 8.6-50 MG per tablet 2 tablet (2 tablets Oral Not Given 8/1/23 2039)     And   polyethylene glycol (MIRALAX) packet 17 g (has no administration in time range)     And   bisacodyl (DULCOLAX) EC tablet 5 mg (has no administration in time range)     And   bisacodyl (DULCOLAX) suppository 10 mg (has no administration in time range)   sodium chloride 0.9 % infusion (100 mL/hr Intravenous New Bag 8/1/23 1824)   Potassium Replacement - Follow Nurse / BPA Driven Protocol (has no administration in time range)   Magnesium Standard Dose Replacement - Follow Nurse / BPA Driven Protocol (has no administration in time range)   Phosphorus Replacement - Follow Nurse / BPA Driven Protocol (has no administration in time range)   Calcium Replacement - Follow Nurse / BPA Driven Protocol (has no administration in time range)   ondansetron (ZOFRAN) injection 4 mg (has no administration in time range)   OLANZapine (zyPREXA) tablet 5 mg (5 mg Oral Given 8/1/23 2039)   ziprasidone (GEODON) injection 20 mg (20 mg Intramuscular Given 8/1/23 1103)         ORDERS PLACED DURING THIS VISIT:  Orders Placed This Encounter   Procedures    CT Head Without Contrast    Comprehensive Metabolic Panel    Protime-INR    BNP    Urinalysis With Microscopic If Indicated (No Culture) - Urine, Clean Catch    Urine Drug Screen - Urine, Clean Catch    CK    High Sensitivity Troponin T    Ethanol    Magnesium    TSH    CBC Auto Differential    High Sensitivity Troponin T 2Hr    Urinalysis, Microscopic Only - Urine, Clean Catch    Comprehensive Metabolic Panel    CBC Auto Differential    Iron Profile    Ferritin    Procalcitonin    CK    Diet: Cardiac Diets; Healthy Heart (2-3 Na+); Texture: Regular Texture (IDDSI 7); Fluid Consistency: Thin (IDDSI 0)    Vital Signs    Intake  & Output    Weigh Patient    Oral Care    Saline Lock & Maintain IV Access    Place Sequential Compression Device    Maintain Sequential Compression Device    Pulse Oximetry, Continuous    Code Status and Medical Interventions:    [facility] (on-call MD unless specified) Details    Psych / Access Center    Inpatient Psychiatrist Consult    ECG 12 Lead Altered Mental Status    Insert Peripheral IV    Inpatient Admission    Legal Status 72 Hour Hold    CBC & Differential         PROGRESS, DATA ANALYSIS, CONSULTS, AND MEDICAL DECISION MAKING    All labs have been independently interpreted by me.  All radiology studies have been reviewed by me and discussed with radiologist dictating the report.   EKG's independently viewed and interpreted by me.  Discussion below represents my analysis of pertinent findings related to patient's condition, differential diagnosis, treatment plan and final disposition.    Differential diagnosis includes but is not limited to medication effects, delirium due to electrolyte disturbance, renal failure, hepatic encephalopathy, UTI/urosepsis, intoxication or drug effects, acs, stroke, malignancy, dementia with acute decompensation.  Given that patient has no psych history per  and record review (other than insomnia), plan broad workup, 72 hour hold (as patient already threatening to leave), geodon IM X 1 as needed for extreme agitation to facilitate medical workup, tox screen and labs to include trop, tsh, urine and electrolytes as well as imaging to include ct head.   is amenable to plan and patient is non-decisional to agree or disagree with further evaluation.    ED Course as of 08/01/23 2311   Tue Aug 01, 2023   1523 I discussed the patient's case with Dr. Rye. Will consult psych [AR]      ED Course User Index  [AR] Katelyn Pitts MD             AS OF 23:11 EDT VITALS:    BP - 126/55  HR - 79  TEMP - 97.6 øF (36.4 øC) (Oral)  O2 SATS - 97%        DIAGNOSIS  Final diagnoses:    Altered mental status, unspecified altered mental status type         DISPOSITION  ED Disposition       ED Disposition   Decision to Admit    Condition   --    Comment   Level of Care: Med/Surg [1]   Diagnosis: Altered mental status, unspecified [[id number]]   Admitting Physician: JAZZY GALLARDO [[id number]]   Attending Physician: JAZZY GALLARDO [[id number]]   Bed Request Comments: will need 1:1 sitter, on 72 hour hold   Certification: I Certify That Inpatient Hospital Services Are Medically Necessary For Greater Than 2 Midnights                    Note Disclaimer: At [facility], we believe that sharing information builds trust and better relationships. You are receiving this note because you recently visited [facility]. It is possible you will see health information before a provider has talked with you about it. This kind of information can be easy to misunderstand. To help you fully understand what it means for your health, we urge you to discuss this note with your provider.         Katelyn Pitts MD  08/01/23 8217

## 2023-08-01 NOTE — CONSULTS
Noted consult for [facility]. Chart review indicates d/t behaviors and inability to obtain accurate assessment that this patient will require consult for psychiatrist. Will place order.

## 2023-08-01 NOTE — ED NOTES
"Pt arrival to room 28 @ home with her spouse, c/o confusion, agitation, delious  conversations about \"Moravian and Witches\". She denies any complaints , her family reports she is argumentative. She expresses word salad, and tangential thoughts. She has been aggressively arguing with her spouse, stating \"we are not \". IT is difficult to determine any complaint from the conversation with her. He further states that he needs to go pay the rent and get his phone fixed. He has left phone numbers to contact him.  "

## 2023-08-02 ENCOUNTER — APPOINTMENT (OUTPATIENT)
Dept: MRI IMAGING | Facility: HOSPITAL | Age: 73
DRG: 948 | End: 2023-08-02
Payer: MEDICARE

## 2023-08-02 LAB
ALBUMIN SERPL-MCNC: 3.7 G/DL (ref 3.5–5.2)
ALBUMIN/GLOB SERPL: 1.5 G/DL
ALP SERPL-CCNC: 114 U/L (ref 39–117)
ALT SERPL W P-5'-P-CCNC: 14 U/L (ref 1–33)
ANION GAP SERPL CALCULATED.3IONS-SCNC: 10 MMOL/L (ref 5–15)
AST SERPL-CCNC: 20 U/L (ref 1–32)
BASOPHILS # BLD AUTO: 0.04 10*3/MM3 (ref 0–0.2)
BASOPHILS NFR BLD AUTO: 0.5 % (ref 0–1.5)
BILIRUB SERPL-MCNC: 0.6 MG/DL (ref 0–1.2)
BUN SERPL-MCNC: 7 MG/DL (ref 8–23)
BUN/CREAT SERPL: 12.5 (ref 7–25)
CALCIUM SPEC-SCNC: 8.9 MG/DL (ref 8.6–10.5)
CHLORIDE SERPL-SCNC: 102 MMOL/L (ref 98–107)
CK SERPL-CCNC: 180 U/L (ref 20–180)
CO2 SERPL-SCNC: 28 MMOL/L (ref 22–29)
CREAT SERPL-MCNC: 0.56 MG/DL (ref 0.57–1)
DEPRECATED RDW RBC AUTO: 43.8 FL (ref 37–54)
EGFRCR SERPLBLD CKD-EPI 2021: 95.3 ML/MIN/1.73
EOSINOPHIL # BLD AUTO: 0.11 10*3/MM3 (ref 0–0.4)
EOSINOPHIL NFR BLD AUTO: 1.4 % (ref 0.3–6.2)
ERYTHROCYTE [DISTWIDTH] IN BLOOD BY AUTOMATED COUNT: 13.4 % (ref 12.3–15.4)
FERRITIN SERPL-MCNC: 98.1 NG/ML (ref 13–150)
FOLATE SERPL-MCNC: 10.6 NG/ML (ref 4.78–24.2)
GLOBULIN UR ELPH-MCNC: 2.4 GM/DL
GLUCOSE SERPL-MCNC: 87 MG/DL (ref 65–99)
HCT VFR BLD AUTO: 30.4 % (ref 34–46.6)
HGB BLD-MCNC: 10.3 G/DL (ref 12–15.9)
IMM GRANULOCYTES # BLD AUTO: 0.02 10*3/MM3 (ref 0–0.05)
IMM GRANULOCYTES NFR BLD AUTO: 0.3 % (ref 0–0.5)
IRON 24H UR-MRATE: 40 MCG/DL (ref 37–145)
IRON SATN MFR SERPL: 14 % (ref 20–50)
LYMPHOCYTES # BLD AUTO: 1.48 10*3/MM3 (ref 0.7–3.1)
LYMPHOCYTES NFR BLD AUTO: 19 % (ref 19.6–45.3)
MCH RBC QN AUTO: 30.7 PG (ref 26.6–33)
MCHC RBC AUTO-ENTMCNC: 33.9 G/DL (ref 31.5–35.7)
MCV RBC AUTO: 90.7 FL (ref 79–97)
MONOCYTES # BLD AUTO: 0.52 10*3/MM3 (ref 0.1–0.9)
MONOCYTES NFR BLD AUTO: 6.7 % (ref 5–12)
NEUTROPHILS NFR BLD AUTO: 5.6 10*3/MM3 (ref 1.7–7)
NEUTROPHILS NFR BLD AUTO: 72.1 % (ref 42.7–76)
NRBC BLD AUTO-RTO: 0.1 /100 WBC (ref 0–0.2)
PLATELET # BLD AUTO: 306 10*3/MM3 (ref 140–450)
PMV BLD AUTO: 9.1 FL (ref 6–12)
POTASSIUM SERPL-SCNC: 3.5 MMOL/L (ref 3.5–5.2)
POTASSIUM SERPL-SCNC: 4.7 MMOL/L (ref 3.5–5.2)
PROCALCITONIN SERPL-MCNC: 0.04 NG/ML (ref 0–0.25)
PROT SERPL-MCNC: 6.1 G/DL (ref 6–8.5)
RBC # BLD AUTO: 3.35 10*6/MM3 (ref 3.77–5.28)
SODIUM SERPL-SCNC: 140 MMOL/L (ref 136–145)
TIBC SERPL-MCNC: 288 MCG/DL (ref 298–536)
TRANSFERRIN SERPL-MCNC: 193 MG/DL (ref 200–360)
VIT B12 BLD-MCNC: 263 PG/ML (ref 211–946)
WBC NRBC COR # BLD: 7.77 10*3/MM3 (ref 3.4–10.8)

## 2023-08-02 PROCEDURE — 0 GADOBENATE DIMEGLUMINE 529 MG/ML SOLUTION: Performed by: STUDENT IN AN ORGANIZED HEALTH CARE EDUCATION/TRAINING PROGRAM

## 2023-08-02 PROCEDURE — 82607 VITAMIN B-12: CPT | Performed by: INTERNAL MEDICINE

## 2023-08-02 PROCEDURE — 94799 UNLISTED PULMONARY SVC/PX: CPT

## 2023-08-02 PROCEDURE — 85025 COMPLETE CBC W/AUTO DIFF WBC: CPT | Performed by: INTERNAL MEDICINE

## 2023-08-02 PROCEDURE — 84145 PROCALCITONIN (PCT): CPT | Performed by: INTERNAL MEDICINE

## 2023-08-02 PROCEDURE — 83540 ASSAY OF IRON: CPT | Performed by: INTERNAL MEDICINE

## 2023-08-02 PROCEDURE — 84466 ASSAY OF TRANSFERRIN: CPT | Performed by: INTERNAL MEDICINE

## 2023-08-02 PROCEDURE — 82550 ASSAY OF CK (CPK): CPT | Performed by: INTERNAL MEDICINE

## 2023-08-02 PROCEDURE — 84132 ASSAY OF SERUM POTASSIUM: CPT | Performed by: STUDENT IN AN ORGANIZED HEALTH CARE EDUCATION/TRAINING PROGRAM

## 2023-08-02 PROCEDURE — 99221 1ST HOSP IP/OBS SF/LOW 40: CPT | Performed by: PSYCHIATRY & NEUROLOGY

## 2023-08-02 PROCEDURE — 70553 MRI BRAIN STEM W/O & W/DYE: CPT

## 2023-08-02 PROCEDURE — 82728 ASSAY OF FERRITIN: CPT | Performed by: INTERNAL MEDICINE

## 2023-08-02 PROCEDURE — 82746 ASSAY OF FOLIC ACID SERUM: CPT | Performed by: INTERNAL MEDICINE

## 2023-08-02 PROCEDURE — A9577 INJ MULTIHANCE: HCPCS | Performed by: STUDENT IN AN ORGANIZED HEALTH CARE EDUCATION/TRAINING PROGRAM

## 2023-08-02 PROCEDURE — 94640 AIRWAY INHALATION TREATMENT: CPT

## 2023-08-02 PROCEDURE — 80053 COMPREHEN METABOLIC PANEL: CPT | Performed by: INTERNAL MEDICINE

## 2023-08-02 RX ORDER — POTASSIUM CHLORIDE 750 MG/1
40 TABLET, FILM COATED, EXTENDED RELEASE ORAL EVERY 4 HOURS
Status: COMPLETED | OUTPATIENT
Start: 2023-08-02 | End: 2023-08-02

## 2023-08-02 RX ADMIN — POTASSIUM CHLORIDE 40 MEQ: 750 TABLET, EXTENDED RELEASE ORAL at 12:08

## 2023-08-02 RX ADMIN — METOPROLOL TARTRATE 50 MG: 50 TABLET, FILM COATED ORAL at 09:08

## 2023-08-02 RX ADMIN — Medication 10 ML: at 20:57

## 2023-08-02 RX ADMIN — ISOSORBIDE MONONITRATE 30 MG: 30 TABLET, EXTENDED RELEASE ORAL at 09:08

## 2023-08-02 RX ADMIN — POTASSIUM CHLORIDE 40 MEQ: 750 TABLET, EXTENDED RELEASE ORAL at 09:08

## 2023-08-02 RX ADMIN — ATORVASTATIN CALCIUM 80 MG: 20 TABLET, FILM COATED ORAL at 20:56

## 2023-08-02 RX ADMIN — BUMETANIDE 2 MG: 2 TABLET ORAL at 09:08

## 2023-08-02 RX ADMIN — TIOTROPIUM BROMIDE INHALATION SPRAY 2 PUFF: 3.12 SPRAY, METERED RESPIRATORY (INHALATION) at 08:50

## 2023-08-02 RX ADMIN — OLANZAPINE 5 MG: 5 TABLET ORAL at 20:56

## 2023-08-02 RX ADMIN — CLOPIDOGREL BISULFATE 75 MG: 75 TABLET, FILM COATED ORAL at 09:08

## 2023-08-02 RX ADMIN — Medication 10 ML: at 09:08

## 2023-08-02 RX ADMIN — SENNOSIDES AND DOCUSATE SODIUM 2 TABLET: 50; 8.6 TABLET ORAL at 09:08

## 2023-08-02 RX ADMIN — METOPROLOL TARTRATE 50 MG: 50 TABLET, FILM COATED ORAL at 20:55

## 2023-08-02 RX ADMIN — GADOBENATE DIMEGLUMINE 10 ML: 529 INJECTION, SOLUTION INTRAVENOUS at 10:51

## 2023-08-02 NOTE — PROGRESS NOTES
Name: Asha Krishnan ADMIT: 2023   : 1950  PCP: Capo Pedroza MD    MRN: [id number] LOS: 1 days   AGE/SEX: 73 y.o. female  ROOM: [facility]     Subjective   Subjective   No complaints. Appears improved. Oriented x 3. Rapid speech but appropriate w/conversation       Objective   Objective   Vital Signs  Temp:  [97.1 øF (36.2 øC)-98 øF (36.7 øC)] 97.5 øF (36.4 øC)  Heart Rate:  [] 59  Resp:  [15-18] 18  BP: (109-189)/(48-58) 113/54  SpO2:  [91 %-97 %] 95 %  on   ;   Device (Oxygen Therapy): room air  Body mass index is 18.88 kg/mý.  Physical Exam  Vitals and nursing note reviewed.   Constitutional:       General: She is not in acute distress.     Appearance: She is underweight. She is ill-appearing. She is not toxic-appearing.      Comments: Frail, elderly  Chronically ill appearing   HENT:      Head: Normocephalic.      Mouth/Throat:      Mouth: Mucous membranes are moist.   Eyes:      Conjunctiva/sclera: Conjunctivae normal.   Cardiovascular:      Rate and Rhythm: Normal rate and regular rhythm.   Pulmonary:      Effort: Pulmonary effort is normal. No respiratory distress.      Breath sounds: No wheezing or rales.   Abdominal:      General: Bowel sounds are normal.      Palpations: Abdomen is soft.   Musculoskeletal:      Cervical back: Neck supple.      Right lower leg: No edema.      Left lower leg: No edema.   Skin:     General: Skin is warm and dry.      Findings: Bruising present.      Comments: Lateral aspect rt eye bruising   Neurological:      Mental Status: She is alert and oriented to person, place, and time.   Psychiatric:         Mood and Affect: Mood normal.      Comments: Rapid speech but appropriate w/conversation     Results Review     I reviewed the patient's new clinical results.  Results from last 7 days   Lab Units 23  0646 23  1149   WBC 10*3/mm3 7.77 6.31   HEMOGLOBIN g/dL 10.3* 10.7*   PLATELETS 10*3/mm3 306 294     Results from last 7 days   Lab  Units 08/02/23  0646 08/01/23  1149 07/27/23  0304   SODIUM mmol/L 140 140  --    POTASSIUM mmol/L 3.5 3.7 4.3   CHLORIDE mmol/L 102 105  --    CO2 mmol/L 28.0 24.0  --    BUN mg/dL 7* 7*  --    CREATININE mg/dL 0.56* 0.45*  --    GLUCOSE mg/dL 87 97  --    EGFR mL/min/1.73 95.3 100.5  --      Results from last 7 days   Lab Units 08/02/23  0646 08/01/23  1149   ALBUMIN g/dL 3.7 4.2   BILIRUBIN mg/dL 0.6 0.6   ALK PHOS U/L 114 129*   AST (SGOT) U/L 20 26   ALT (SGPT) U/L 14 16     Results from last 7 days   Lab Units 08/02/23  0646 08/01/23  1149   CALCIUM mg/dL 8.9 9.5   ALBUMIN g/dL 3.7 4.2   MAGNESIUM mg/dL  --  1.8     Results from last 7 days   Lab Units 08/02/23  0646   PROCALCITONIN ng/mL 0.04     No results found for: HGBA1C, POCGLU    CT Head Without Contrast    Result Date: 8/1/2023  There is no evidence of acute infarction or of intracranial hemorrhage. Further evaluation could be performed with MRI examination of the brain as indicated.    Radiation dose reduction techniques were utilized, including automated exposure control and exposure modulation based on body size.  This report was finalized on 8/1/2023 6:45 PM by Dr. Johnny Alfredo M.D.       I have personally reviewed all medications:  Scheduled Medications  atorvastatin, 80 mg, Oral, Nightly  bumetanide, 2 mg, Oral, Daily  clopidogrel, 75 mg, Oral, Daily  isosorbide mononitrate, 30 mg, Oral, Q24H  metoprolol tartrate, 50 mg, Oral, Q12H  OLANZapine, 5 mg, Oral, Nightly  senna-docusate sodium, 2 tablet, Oral, BID  sodium chloride, 10 mL, Intravenous, Q12H  thiamine, 100 mg, Oral, Daily  tiotropium bromide monohydrate, 2 puff, Inhalation, Daily - RT    Infusions  sodium chloride, 50 mL/hr, Last Rate: Stopped (08/02/23 0240)    Diet  Diet: Cardiac Diets; Healthy Heart (2-3 Na+); Texture: Regular Texture (IDDSI 7); Fluid Consistency: Thin (IDDSI 0)    I have personally reviewed:  [x]  Laboratory   [x]  Microbiology   [x]  Radiology   [x]   "EKG/Telemetry  [x]  Cardiology/Vascular   []  Pathology    []  Records       Assessment/Plan     Active Hospital Problems    Diagnosis  POA    **Altered mental status, unspecified [R41.82]  Yes    PAF (paroxysmal atrial fibrillation) [I48.0]  Unknown    Pulmonary nodule 1 cm or greater in diameter [R91.1]  Unknown    Anemia [D64.9]  Unknown    COPD (chronic obstructive pulmonary disease) [J44.9]  Yes    HTN (hypertension) [I10]  Yes    CAD (coronary artery disease) [I25.10]  Yes      Resolved Hospital Problems   No resolved problems to display.       73 y.o. female admitted with Altered mental status, unspecified.    AMS:  -Presented with hallucinations. CTH negative. Prescribed ambien, pt thinks she has been getting her medications mixed up, possibly taking incorrectly; recommend to stop this medication due to presenting symptoms and report of frequent falls. Oriented x 3 on exam. MRI brain pending. Psychiatry & Neurology consulted to evaluate. Started on zyprexa, thiamine. CPK elevated on arrival, now normalized. Decrease IVF's    COPD/Pulm nodule:  -No wheezing or hypoxia. Inhaler continued. CT chest from recent hospitalization showed pulm nodule. Per dc summary, \"Patient offered lung biopsy but she refused for now anyhow IR were not able to have it done because patient is on blood thinner and patient has to stay off blood thinner for 3 days. Patient to follow-up closely with pulmonary and IR as outpatient I recommend highly to have the lung biopsy done to rule out lung cancer\".     CAD/Paroxysmal afib:  -Denies chest pain, CHF symptoms. HR controlled. Not on AC. Metoprolol, plavix, isosorbide    HTN:  -BP acceptable acutely. Echo 11/2022 EF 59%. LV diastolic impaired relaxation. Prescribed bumex    Anemia:  -Hgb stable. Monitor      SCDs for DVT prophylaxis.  Full code.  Discussed with patient and nursing staff.  Anticipate discharge  TBD, PT eval pending        EMORY Jones  [facility]  08/02/23  13:05 EDT

## 2023-08-02 NOTE — NURSING NOTE
Nurse paged  on call patient refused to be pricked again to place a new IV ,the previous Ivs were infiltrated.  We will continue monitoring

## 2023-08-02 NOTE — PLAN OF CARE
Goal Outcome Evaluation:  Patient is alert v/s stable plan is EEG . MRI brain unremarkable. We will continue monitoring.

## 2023-08-02 NOTE — CONSULTS
NEUROLOGY CONSULT     DOS: 2023  NAME: Asha Krishnan   : 1950  PCP: Capo Pedroza MD  CC: Stroke  Referring MD: Dale Rey MD      Neurological Problem and Interval History:  She appears to be at her neurological baseline (which may include some tangential speech and psychotic symptoms). She is alert and oriented. Not clear but  endorses this si her baseline. Underlying psyc? MRI is unremarkable. She and  relate AMS to the Ambien which is reasonable. Should get EEG to round out evaluation. Psychiatry to evaluate also as Ambien D/C'ed).      Past Medical/Surgical Hx:  Past Medical History:   Diagnosis Date    Cataract     Hyperlipidemia      Past Surgical History:   Procedure Laterality Date    CARDIAC CATHETERIZATION N/A 2022    Procedure: Left Heart Cath;  Surgeon: Tashi De La Torre MD;  Location: [facility] CATH INVASIVE LOCATION;  Service: Cardiovascular;  Laterality: N/A;    CARDIAC CATHETERIZATION N/A 2022    Procedure: Coronary angiography;  Surgeon: Tashi De La Torre MD;  Location: [facility] CATH INVASIVE LOCATION;  Service: Cardiovascular;  Laterality: N/A;         Medications On Admission  Medications Prior to Admission   Medication Sig Dispense Refill Last Dose    atorvastatin (LIPITOR) 80 MG tablet Take 1 tablet by mouth Every Night. 90 tablet  2023    bumetanide (BUMEX) 2 MG tablet Take 1 tablet by mouth Daily.   2023    clopidogrel (PLAVIX) 75 MG tablet Take 1 tablet by mouth Daily. 30 tablet  2023    isosorbide mononitrate (IMDUR) 30 MG 24 hr tablet Take 1 tablet by mouth Daily. (Patient taking differently: Take 2 tablets by mouth Daily.)   2023    nitroglycerin (NITROSTAT) 0.4 MG SL tablet Place 1 tablet under the tongue Every 5 (Five) Minutes As Needed for Chest Pain (Only if SBP Greater Than 100). Take no more than 3 doses in 15 minutes.  12 Past Month    metoprolol tartrate (LOPRESSOR) 50 MG tablet Take 1 tablet by mouth Every 12  "(Twelve) Hours.   Unknown    tiotropium bromide monohydrate (SPIRIVA RESPIMAT) 2.5 MCG/ACT aerosol solution inhaler Inhale 2 puffs Daily. 4 g 0 Unknown       Allergies:  Allergies   Allergen Reactions    Latex Anaphylaxis     Pt states whole body swells including throat    Tylenol [Acetaminophen] Anaphylaxis     States whole body swells including throat       Social Hx:  Social History     Socioeconomic History    Marital status:    Tobacco Use    Smoking status: Every Day     Packs/day: 2.00     Types: Cigarettes   Vaping Use    Vaping Use: Never used   Substance and Sexual Activity    Alcohol use: Not Currently    Drug use: Defer    Sexual activity: Defer       Family Hx:  History reviewed. No pertinent family history.    Review of Imaging (Interpretation of images not reports):  MRI brain: unremarkable    Laboratory Results:   Lab Results   Component Value Date    GLUCOSE 87 08/02/2023    CALCIUM 8.9 08/02/2023     08/02/2023    K 4.7 08/02/2023    CO2 28.0 08/02/2023     08/02/2023    BUN 7 (L) 08/02/2023    CREATININE 0.56 (L) 08/02/2023    BCR 12.5 08/02/2023    ANIONGAP 10.0 08/02/2023     Lab Results   Component Value Date    WBC 7.77 08/02/2023    HGB 10.3 (L) 08/02/2023    HCT 30.4 (L) 08/02/2023    MCV 90.7 08/02/2023     08/02/2023     No results found for: CHOL  No results found for: HDL  No results found for: LDL  No results found for: TRIG  Lab Results   Component Value Date    HGBA1C 6.1 (H) 11/10/2022     Lab Results   Component Value Date    INR 1.09 08/01/2023    INR 0.97 11/11/2022    INR 1.0 11/09/2022    PROTIME 14.3 (H) 08/01/2023    PROTIME 13.0 11/11/2022    PROTIME 12.0 11/09/2022         Physical Examination:   /47 (BP Location: Left arm, Patient Position: Sitting)   Pulse 67   Temp 97.1 øF (36.2 øC) (Oral)   Resp 18   Ht 162.6 cm (64\")   Wt 49.9 kg (110 lb)   SpO2 94%   BMI 18.88 kg/mý   General Appearance:   Well developed, well nourished, well " groomed, alert, and cooperative.  HEENT: Normocephalic. Atraumatic. No JVD, no tracheal deviation.  Neck and Spine: Normal range of motion.  Normal alignment. No mass or tenderness. No bruits.  Peripheral Vasculature: Radial pulses are equal and symmetric. No signs of distal embolization.  Extremities:    No edema or deformities.   Skin:    No rashes or discoloration    Neurological examination:  Higher Integrative  Function: Oriented to time, place and person.  CN II: Pupils are equal, round, and reactive to light. Blinks to visual threat and counts fingers in all fields  CN III IV VI: Extraocular movements are full without nystagmus.   CN V: Normal facial sensation   CN VII: Facial movements are symmetric. No labial dysarthria  CN VIII:   Auditory acuity is normal.  CN IX & X:   No palatal or pharnygeal dysarthria.  CN XI: Turns head without abnormality.   CN XII:   The tongue is midline.  No lingual dysarthria.   Motor: Normal muscle strength, bulk and tone in upper and lower extremities.  No fasciculations, rigidity, spasticity, or abnormal movements.  Reflexes: symmetric in the upper and lower extremities. Plantar responses are flexor.  Sensation: Normal to light touch  Station and Gait: Deferred for bed rest    Coordination: Finger-to-nose test shows no dysmetria.        Diagnoses / Discussion:  She appears to be at her neurological baseline (which may include some tangential speech and psychotic symptoms). She is alert and oriented. Not clear but  endorses this si her baseline. Underlying psyc? MRI is unremarkable. She and  relate AMS to the Ambien which is reasonable. Should get EEG to round out evaluation. Psychiatry to evaluate also as Ambien D/C'ed).    Plan:  EEG  MRI brain unremarkable  B12 on the low side, defer to the primary team for replacement  F/u in neurology clinic in 1-2 months for detailed neuropsychological evaluation     I have discussed the above with the patient and  family.  Time spent with patient: 60min    MDM  Reviewed: previous chart, nursing note and vitals  Reviewed previous: labs and MRI  Interpretation: labs and MRI  Total time providing critical care: 30-74 minutes. This excludes time spent performing separately reportable procedures and services.  Consults: neurology       Gardenia Barrios MD  Neurology

## 2023-08-02 NOTE — CASE MANAGEMENT/SOCIAL WORK
Discharge Planning Assessment  [facility]     Patient Name: Asha Krishnan  MRN: [id number]  Today's Date: 8/2/2023    Admit Date: 8/1/2023    Plan: Home with SO   Discharge Needs Assessment       Row Name 08/02/23 1543       Living Environment    People in Home significant other    Current Living Arrangements hotel/motel    Potentially Unsafe Housing Conditions none    Primary Care Provided by self    Provides Primary Care For no one    Family Caregiver if Needed significant other    Quality of Family Relationships involved;supportive    Able to Return to Prior Arrangements yes       Resource/Environmental Concerns    Resource/Environmental Concerns none    Transportation Concerns no car       Transition Planning    Patient/Family Anticipates Transition to home with family    Patient/Family Anticipated Services at Transition none    Transportation Anticipated public transportation       Discharge Needs Assessment    Readmission Within the Last 30 Days no previous admission in last 30 days    Equipment Currently Used at Home cane, quad tip;cane, quad;wheelchair, motorized    Concerns to be Addressed discharge planning    Equipment Needed After Discharge none                   Discharge Plan       Row Name 08/02/23 1600       Plan    Plan Home with SO    Patient/Family in Agreement with Plan yes    Plan Comments CCP met with pt at bedside to discuss d/c planning. Pt resides with her SO/fianc‚ (Anna Boggs, [phone]) in an extended stay hotel ([facility], [address] , [place]), and reports use of quad cane and has a motorized wheelchair. Pt reports past PT when she lived in [place] where she is from, but has not had HH/SNF in [place]. Pt agreeable to Meds to Beds enrollment. Pt states that she will likely need transport at d/c.                  Continued Care and Services - Admitted Since 8/1/2023    Coordination has not been started for this encounter.          Demographic Summary       Row Name  08/02/23 1542       General Information    Admission Type inpatient    Required Notices Provided Important Message from Medicare    Referral Source admission list    Reason for Consult discharge planning    Preferred Language English                   Functional Status       Row Name 08/02/23 1543       Functional Status    Usual Activity Tolerance moderate    Current Activity Tolerance fair       Functional Status, IADL    Medications independent    Meal Preparation independent    Housekeeping independent    Laundry independent    Shopping independent       Mental Status    General Appearance WDL WDL       Mental Status Summary    Recent Changes in Mental Status/Cognitive Functioning behavior patterns                   Psychosocial    No documentation.                  Abuse/Neglect    No documentation.                  Legal    No documentation.                  Substance Abuse    No documentation.                  Patient Forms    No documentation.                     Padmaja Shteh RN

## 2023-08-02 NOTE — PLAN OF CARE
Problem: Adult Inpatient Plan of Care  Goal: Plan of Care Review  Outcome: Ongoing, Progressing  Flowsheets (Taken 8/2/2023 1042)  Progress: improving  Plan of Care Reviewed With: patient  Goal: Patient-Specific Goal (Individualized)  Outcome: Ongoing, Progressing  Goal: Absence of Hospital-Acquired Illness or Injury  Outcome: Ongoing, Progressing  Intervention: Identify and Manage Fall Risk  Recent Flowsheet Documentation  Taken 8/2/2023 1000 by Betty Lemus RN  Safety Promotion/Fall Prevention: (MRI)   activity supervised   safety round/check completed   patient off unit  Taken 8/2/2023 0800 by Betty Lemus RN  Safety Promotion/Fall Prevention:   activity supervised   safety round/check completed  Intervention: Prevent Skin Injury  Recent Flowsheet Documentation  Taken 8/2/2023 0800 by Betty Lemus RN  Body Position:   supine   legs elevated  Intervention: Prevent and Manage VTE (Venous Thromboembolism) Risk  Recent Flowsheet Documentation  Taken 8/2/2023 0800 by Betty Lemus RN  Activity Management: activity minimized  Goal: Optimal Comfort and Wellbeing  Outcome: Ongoing, Progressing  Goal: Readiness for Transition of Care  Outcome: Ongoing, Progressing     Problem: Fall Injury Risk  Goal: Absence of Fall and Fall-Related Injury  Outcome: Ongoing, Progressing  Intervention: Identify and Manage Contributors  Recent Flowsheet Documentation  Taken 8/2/2023 0800 by Betty Lemus RN  Medication Review/Management: medications reviewed  Intervention: Promote Injury-Free Environment  Recent Flowsheet Documentation  Taken 8/2/2023 1000 by Betty Lemus RN  Safety Promotion/Fall Prevention: (MRI)   activity supervised   safety round/check completed   patient off unit  Taken 8/2/2023 0800 by Betty Lemus RN  Safety Promotion/Fall Prevention:   activity supervised   safety round/check completed  Goal: Absence of Fall and Fall-Related Injury  Outcome: Ongoing, Progressing  Intervention: Identify and Manage  Contributors  Recent Flowsheet Documentation  Taken 8/2/2023 0800 by Betty Lemus RN  Medication Review/Management: medications reviewed  Intervention: Promote Injury-Free Environment  Recent Flowsheet Documentation  Taken 8/2/2023 1000 by Betty Lemus RN  Safety Promotion/Fall Prevention: (MRI)   activity supervised   safety round/check completed   patient off unit  Taken 8/2/2023 0800 by Betty Lemus RN  Safety Promotion/Fall Prevention:   activity supervised   safety round/check completed     Problem: Confusion Acute  Goal: Optimal Cognitive Function  Outcome: Ongoing, Progressing     Problem: Skin Injury Risk Increased  Goal: Skin Health and Integrity  Outcome: Ongoing, Progressing  Intervention: Optimize Skin Protection  Recent Flowsheet Documentation  Taken 8/2/2023 0800 by Betty Lemus RN  Head of Bed (HOB) Positioning: HOB elevated   Goal Outcome Evaluation:  Plan of Care Reviewed With: patient        Progress: improving

## 2023-08-03 VITALS
OXYGEN SATURATION: 98 % | SYSTOLIC BLOOD PRESSURE: 176 MMHG | HEIGHT: 64 IN | RESPIRATION RATE: 16 BRPM | DIASTOLIC BLOOD PRESSURE: 88 MMHG | HEART RATE: 74 BPM | BODY MASS INDEX: 18.78 KG/M2 | TEMPERATURE: 97.3 F | WEIGHT: 110 LBS

## 2023-08-03 PROCEDURE — 97162 PT EVAL MOD COMPLEX 30 MIN: CPT

## 2023-08-03 PROCEDURE — 99233 SBSQ HOSP IP/OBS HIGH 50: CPT | Performed by: NURSE PRACTITIONER

## 2023-08-03 PROCEDURE — 94664 DEMO&/EVAL PT USE INHALER: CPT

## 2023-08-03 PROCEDURE — 94799 UNLISTED PULMONARY SVC/PX: CPT

## 2023-08-03 PROCEDURE — 97530 THERAPEUTIC ACTIVITIES: CPT

## 2023-08-03 RX ORDER — CHOLECALCIFEROL (VITAMIN D3) 125 MCG
500 CAPSULE ORAL DAILY
Status: DISCONTINUED | OUTPATIENT
Start: 2023-08-03 | End: 2023-08-03 | Stop reason: HOSPADM

## 2023-08-03 RX ORDER — LANOLIN ALCOHOL/MO/W.PET/CERES
500 CREAM (GRAM) TOPICAL DAILY
Qty: 15 TABLET | Refills: 0 | Status: SHIPPED | OUTPATIENT
Start: 2023-08-04

## 2023-08-03 RX ORDER — LANOLIN ALCOHOL/MO/W.PET/CERES
100 CREAM (GRAM) TOPICAL DAILY
Qty: 30 TABLET | Refills: 0 | Status: SHIPPED | OUTPATIENT
Start: 2023-08-04

## 2023-08-03 RX ORDER — CYANOCOBALAMIN 1000 UG/ML
100 INJECTION, SOLUTION INTRAMUSCULAR; SUBCUTANEOUS DAILY
Status: DISCONTINUED | OUTPATIENT
Start: 2023-08-03 | End: 2023-08-03

## 2023-08-03 RX ADMIN — Medication 10 ML: at 09:23

## 2023-08-03 RX ADMIN — METOPROLOL TARTRATE 50 MG: 50 TABLET, FILM COATED ORAL at 09:22

## 2023-08-03 RX ADMIN — BUMETANIDE 2 MG: 2 TABLET ORAL at 09:23

## 2023-08-03 RX ADMIN — Medication 100 MG: at 09:22

## 2023-08-03 RX ADMIN — Medication 500 MCG: at 09:23

## 2023-08-03 RX ADMIN — ISOSORBIDE MONONITRATE 30 MG: 30 TABLET, EXTENDED RELEASE ORAL at 09:23

## 2023-08-03 RX ADMIN — CLOPIDOGREL BISULFATE 75 MG: 75 TABLET, FILM COATED ORAL at 09:22

## 2023-08-03 RX ADMIN — TIOTROPIUM BROMIDE INHALATION SPRAY 2 PUFF: 3.12 SPRAY, METERED RESPIRATORY (INHALATION) at 08:01

## 2023-08-03 RX ADMIN — SENNOSIDES AND DOCUSATE SODIUM 2 TABLET: 50; 8.6 TABLET ORAL at 09:22

## 2023-08-03 NOTE — PLAN OF CARE
Problem: Adult Inpatient Plan of Care  Goal: Plan of Care Review  8/3/2023 1422 by Betty Lemus RN  Outcome: Ongoing, Progressing  8/3/2023 1422 by Betty Lemus RN  Outcome: Ongoing, Progressing  Flowsheets  Taken 8/3/2023 1422 by Betty Lemus RN  Progress: no change  Taken 8/3/2023 1108 by Flavio Vaughn PT  Plan of Care Reviewed With: patient  8/3/2023 1021 by Betty Lemus RN  Outcome: Ongoing, Progressing  Flowsheets  Taken 8/3/2023 1021  Progress: no change  Taken 8/2/2023 1042  Plan of Care Reviewed With: patient  Goal: Patient-Specific Goal (Individualized)  8/3/2023 1422 by Betty Lemus RN  Outcome: Ongoing, Progressing  8/3/2023 1422 by Betty Lemus RN  Outcome: Ongoing, Progressing  8/3/2023 1021 by Betty Lemus RN  Outcome: Ongoing, Progressing  Goal: Absence of Hospital-Acquired Illness or Injury  8/3/2023 1422 by Betty Lemus RN  Outcome: Ongoing, Progressing  8/3/2023 1422 by Betty Lemus RN  Outcome: Ongoing, Progressing  8/3/2023 1021 by Betty Lemus RN  Outcome: Ongoing, Progressing  Intervention: Identify and Manage Fall Risk  Recent Flowsheet Documentation  Taken 8/3/2023 1000 by Betty Lemus RN  Safety Promotion/Fall Prevention:   safety round/check completed   room organization consistent  Intervention: Prevent Skin Injury  Recent Flowsheet Documentation  Taken 8/3/2023 1000 by Betty Lemus RN  Body Position: sitting up in bed  Taken 8/3/2023 0800 by Betty Lemus RN  Body Position: sitting up in bed  Intervention: Prevent and Manage VTE (Venous Thromboembolism) Risk  Recent Flowsheet Documentation  Taken 8/3/2023 1000 by Betty Lemus RN  Activity Management: activity encouraged  Goal: Optimal Comfort and Wellbeing  8/3/2023 1422 by Betty Lemus RN  Outcome: Ongoing, Progressing  8/3/2023 1422 by Betty Lemus RN  Outcome: Ongoing, Progressing  8/3/2023 1021 by Betty Lemus, RN  Outcome: Ongoing, Progressing  Goal: Readiness for Transition of Care  8/3/2023  1422 by Betty Lemus RN  Outcome: Ongoing, Progressing  8/3/2023 1422 by Betty Lemus RN  Outcome: Ongoing, Progressing  8/3/2023 1021 by Betty Lemus RN  Outcome: Ongoing, Progressing     Problem: Fall Injury Risk  Goal: Absence of Fall and Fall-Related Injury  8/3/2023 1422 by Betty Lemus RN  Outcome: Ongoing, Progressing  8/3/2023 1422 by Betty Lemus RN  Outcome: Ongoing, Progressing  8/3/2023 1021 by Betty Lemus RN  Outcome: Ongoing, Progressing  Intervention: Promote Injury-Free Environment  Recent Flowsheet Documentation  Taken 8/3/2023 1000 by Betty Lemus RN  Safety Promotion/Fall Prevention:   safety round/check completed   room organization consistent  Goal: Absence of Fall and Fall-Related Injury  8/3/2023 1422 by Betty Lemus RN  Outcome: Ongoing, Progressing  8/3/2023 1422 by Betty Lemus RN  Outcome: Ongoing, Progressing  8/3/2023 1021 by Betty Lemus RN  Outcome: Ongoing, Progressing  Intervention: Promote Injury-Free Environment  Recent Flowsheet Documentation  Taken 8/3/2023 1000 by Betty Lemus RN  Safety Promotion/Fall Prevention:   safety round/check completed   room organization consistent     Problem: Confusion Acute  Goal: Optimal Cognitive Function  8/3/2023 1422 by Betty Lemus RN  Outcome: Ongoing, Progressing  8/3/2023 1422 by Betty Lemus RN  Outcome: Ongoing, Progressing  8/3/2023 1021 by Betty Lemus RN  Outcome: Ongoing, Progressing     Problem: Skin Injury Risk Increased  Goal: Skin Health and Integrity  8/3/2023 1422 by Betty Lemus RN  Outcome: Ongoing, Progressing  8/3/2023 1422 by Betty Lemus RN  Outcome: Ongoing, Progressing  8/3/2023 1021 by Betty Lemus RN  Outcome: Ongoing, Progressing  Intervention: Optimize Skin Protection  Recent Flowsheet Documentation  Taken 8/3/2023 1000 by Betty Lemus RN  Head of Bed (HOB) Positioning: HOB at 30-45 degrees  Taken 8/3/2023 0800 by Betty Lemus, RN  Head of Bed (HOB) Positioning: HOB at 30-45  degrees   Goal Outcome Evaluation:  Plan of Care Reviewed With: patient        Progress: no change

## 2023-08-03 NOTE — PROGRESS NOTES
IDENTIFYING INFORMATION: The patient is a 73-year-old white female admitted with mental status changes now thought to have been caused by zolpidem.    CHIEF COMPLAINT: None given    INFORMANT: Patient,  and chart    RELIABILITY: Good    HISTORY OF PRESENT ILLNESS: The patient is a 73-year-old white female admitted with mental status changes.  She had had progressive confusion and disorientation according to the chart.  The symptoms appear to have occurred concurrently with recent initiation of zolpidem.  On admission the patient was actively hallucinating.  The symptoms seem to have resolved with discontinuation of zolpidem.  When seen today, the patient is pleasant cooperative and denies any psychotic symptoms.  She denies hallucinations and denies any suicidal or homicidal ideation.  She denies any recent changes in mood or appetite but reports chronic sleep issues.  She denies abuse of any psychoactive substances.  She reports that her only prior psychiatric contact was related to issues with her son  many years ago.    PAST PSYCHIATRIC HISTORY: As above    PAST MEDICAL HISTORY: Significant for hypertension, paroxysmal atrial fibrillation, COPD, coronary artery disease and anemia, cataracts and hyperlipidemia    MEDICATIONS:   Current Facility-Administered Medications   Medication Dose Route Frequency Provider Last Rate Last Admin    atorvastatin (LIPITOR) tablet 80 mg  80 mg Oral Nightly Dale Rey MD   80 mg at 08/02/23 2056    sennosides-docusate (PERICOLACE) 8.6-50 MG per tablet 2 tablet  2 tablet Oral BID Dale Rey MD   2 tablet at 08/03/23 0922    And    polyethylene glycol (MIRALAX) packet 17 g  17 g Oral Daily PRN Dale Rey MD        And    bisacodyl (DULCOLAX) EC tablet 5 mg  5 mg Oral Daily PRN Dale Rey MD        And    bisacodyl (DULCOLAX) suppository 10 mg  10 mg Rectal Daily PRN Dale Rey MD        bumetanide (BUMEX) tablet 2 mg  2 mg Oral Daily Dale Rey MD   2 mg at  08/03/23 0923    Calcium Replacement - Follow Nurse / BPA Driven Protocol   Does not apply PRN Dale Rey MD        clopidogrel (PLAVIX) tablet 75 mg  75 mg Oral Daily Dale Rey MD   75 mg at 08/03/23 0922    isosorbide mononitrate (IMDUR) 24 hr tablet 30 mg  30 mg Oral Q24H Dale Rey MD   30 mg at 08/03/23 0923    Magnesium Standard Dose Replacement - Follow Nurse / BPA Driven Protocol   Does not apply PRN Dale Rey MD        metoprolol tartrate (LOPRESSOR) tablet 50 mg  50 mg Oral Q12H Dale Rey MD   50 mg at 08/03/23 0922    nitroglycerin (NITROSTAT) SL tablet 0.4 mg  0.4 mg Sublingual Q5 Min PRN Dale Rey MD        OLANZapine (zyPREXA) tablet 5 mg  5 mg Oral Nightly Dale Rey MD   5 mg at 08/02/23 2056    ondansetron (ZOFRAN) injection 4 mg  4 mg Intravenous Q6H PRN Dale Rey MD        Phosphorus Replacement - Follow Nurse / BPA Driven Protocol   Does not apply PRN Dale Rey MD        Potassium Replacement - Follow Nurse / BPA Driven Protocol   Does not apply PRN Dale Rey MD        sodium chloride 0.9 % flush 10 mL  10 mL Intravenous Q12H Dale Rey MD   10 mL at 08/03/23 0923    sodium chloride 0.9 % flush 10 mL  10 mL Intravenous PRN Dale Rey MD        sodium chloride 0.9 % infusion 40 mL  40 mL Intravenous PRN Dale Rey MD        sodium chloride 0.9 % infusion  50 mL/hr Intravenous Continuous Hailey Humphreys, APRN 50 mL/hr at 08/02/23 1318 50 mL/hr at 08/02/23 1318    thiamine (VITAMIN B-1) tablet 100 mg  100 mg Oral Daily Dale Rey MD   100 mg at 08/03/23 0922    tiotropium (SPIRIVA RESPIMAT) 2.5 mcg/act aerosol solution inhaler  2 puff Inhalation Daily - RT Dale Rey MD   2 puff at 08/03/23 0801    vitamin B-12 (CYANOCOBALAMIN) tablet 500 mcg  500 mcg Oral Daily Hailey Humphreys APRN   500 mcg at 08/03/23 0945     Latex Tylenol    ALLERGIES: Latex and Tylenol    FAMILY HISTORY: The patient's son suffered from psychiatric  illness    SOCIAL HISTORY: The patient lives with her .  She denies abuse of any psychoactive substances.    MENTAL STATUS EXAM: The patient is a thin white female appearing her stated age.  She has no apparent physical distress at the time of examination.  She is awake alert and oriented all spheres.  Her mood is euthymic her affect congruent.  Speech is well coherent.  There are no gross deficits in memory or cognition noted.  Intelligence is judged to be in the average range based on fund of knowledge, the patient is cooperative Athen review.  She denies current suicidal or homicidal ideation or psychotic features.  Her judgment and insight are intact.    ASSETS/LIABILITIES: Supportive family/health issues    DIAGNOSTIC IMPRESSION: Metabolic encephalopathy secondary to zolpidem, resolved, medical problems as noted previously    PLAN: According to , patient is at her baseline of mentation and is exhibiting no signs of psychosis or other alteration of mentation.  It would appear that her altered mental status was secondary to her zolpidem (which she may have been taking an excessive dose).  I have recommended that the patient not take Ambien in the future (it is already been discontinued).  From a psychiatric standpoint she appears stable and I will sign off.

## 2023-08-03 NOTE — THERAPY EVALUATION
Patient Name: Asha Krishnan  : 1950    MRN: [id number]                              Today's Date: 8/3/2023       Admit Date: 2023    Visit Dx:     ICD-10-CM ICD-9-CM   1. Altered mental status, unspecified altered mental status type  R41.82 780.97     Patient Active Problem List   Diagnosis    Chest pain, atypical    Chest pain, unspecified type    CAD (coronary artery disease)    Dysuria    Dyspnea    Elevated d-dimer    COPD (chronic obstructive pulmonary disease)    HTN (hypertension)    HLD (hyperlipidemia)    Altered mental status, unspecified    PAF (paroxysmal atrial fibrillation)    Pulmonary nodule 1 cm or greater in diameter    Anemia     Past Medical History:   Diagnosis Date    Cataract     Hyperlipidemia      Past Surgical History:   Procedure Laterality Date    CARDIAC CATHETERIZATION N/A 2022    Procedure: Left Heart Cath;  Surgeon: Tashi De La Torre MD;  Location: [facility] CATH INVASIVE LOCATION;  Service: Cardiovascular;  Laterality: N/A;    CARDIAC CATHETERIZATION N/A 2022    Procedure: Coronary angiography;  Surgeon: Tashi De La Torre MD;  Location: [facility] CATH INVASIVE LOCATION;  Service: Cardiovascular;  Laterality: N/A;      General Information       Row Name 23 1103          Physical Therapy Time and Intention    Document Type evaluation  -MS     Mode of Treatment physical therapy;individual therapy  -MS       Row Name 23 1103          General Information    Patient Profile Reviewed yes  -MS     Prior Level of Function --  Primary means of mobility is via electric W/C but she is able to ambulate short distances in home with use of cane/Rwx if needed.  -MS     Existing Precautions/Restrictions fall   Exit alarm; Sitting present in room.  -MS       Row Name 23 1103          Cognition    Orientation Status (Cognition) oriented to;person;place  -MS       Row Name 23 1103          Safety Issues, Functional Mobility    Comment, Safety  Issues/Impairments (Mobility) Gait belt used for safety.  -MS               User Key  (r) = Recorded By, (t) = Taken By, (c) = Cosigned By      Initials Name Provider Type    MS Vaughn Flavio BALL, PT Physical Therapist                   Mobility       Row Name 08/03/23 1104          Bed Mobility    Bed Mobility supine-sit;sit-supine  -MS     Supine-Sit [place] (Bed Mobility) contact guard  -MS     Sit-Supine [place] (Bed Mobility) contact guard  -MS       Row Name 08/03/23 1104          Sit-Stand Transfer    Sit-Stand [place] (Transfers) contact guard  -MS     Assistive Device (Sit-Stand Transfers) --  HHA x1  -MS       Row Name 08/03/23 1104          Gait/Stairs (Locomotion)    [place] Level (Gait) contact guard  -MS     Assistive Device (Gait) --  HHA x 1  -MS     Distance in Feet (Gait) 12 feet  -MS     Deviations/Abnormal Patterns (Gait) michael decreased;stride length decreased  -MS     Bilateral Gait Deviations forward flexed posture  -MS     Comment, (Gait/Stairs) Verbal/tactile cues given for posture correction.  -MS               User Key  (r) = Recorded By, (t) = Taken By, (c) = Cosigned By      Initials Name Provider Type    MS Vaughn Flavio BALL, PT Physical Therapist                   Obj/Interventions       Row Name 08/03/23 1105          Range of Motion Comprehensive    Comment, General Range of Motion BUE/LE (WFL's)  -MS       Row Name 08/03/23 1105          Strength Comprehensive (MMT)    Comment, General Manual Muscle Testing (MMT) Assessment BUE/LE (3+/5)  -MS               User Key  (r) = Recorded By, (t) = Taken By, (c) = Cosigned By      Initials Name Provider Type    MS Vaughn Flavio BALL, PT Physical Therapist                   Goals/Plan       Row Name 08/03/23 1106          Bed Mobility Goal 1 (PT)    Activity/Assistive Device (Bed Mobility Goal 1, PT) bed mobility activities, all  -MS     [place] Level/Cues Needed (Bed Mobility Goal 1, PT) independent  -MS     Time  Frame (Bed Mobility Goal 1, PT) long term goal (LTG);1 week  -MS       Row Name 08/03/23 1106          Transfer Goal 1 (PT)    Activity/Assistive Device (Transfer Goal 1, PT) transfers, all  -MS     [place] Level/Cues Needed (Transfer Goal 1, PT) standby assist  -MS     Time Frame (Transfer Goal 1, PT) long term goal (LTG);1 week  -MS       Row Name 08/03/23 1106          Gait Training Goal 1 (PT)    Activity/Assistive Device (Gait Training Goal 1, PT) gait (walking locomotion)  With A.A.D.  -MS     [place] Level (Gait Training Goal 1, PT) standby assist  -MS     Distance (Gait Training Goal 1, PT) 30 feet  -MS     Time Frame (Gait Training Goal 1, PT) long term goal (LTG);1 week  -MS       Row Name 08/03/23 1106          Therapy Assessment/Plan (PT)    Planned Therapy Interventions (PT) balance training;bed mobility training;gait training;home exercise program;postural re-education;transfer training;strengthening;patient/family education  -MS               User Key  (r) = Recorded By, (t) = Taken By, (c) = Cosigned By      Initials Name Provider Type    MS Flavio Vaughn LIZZY, PT Physical Therapist                   Clinical Impression       Row Name 08/03/23 1105          Pain    Pretreatment Pain Rating 0/10 - no pain  -MS     Posttreatment Pain Rating 0/10 - no pain  -MS       Row Name 08/03/23 1105          Plan of Care Review    Plan of Care Reviewed With patient;family  -MS       Row Name 08/03/23 1105          Therapy Assessment/Plan (PT)    Rehab Potential (PT) good, to achieve stated therapy goals  -MS     Criteria for Skilled Interventions Met (PT) skilled treatment is necessary  -MS     Therapy Frequency (PT) 5 times/wk  -MS       Row Name 08/03/23 1105          Positioning and Restraints    Pre-Treatment Position in bed  -MS     Post Treatment Position bed  -MS     In Bed notified nsg;supine;call light within reach;encouraged to call for assist;exit alarm on;with family/caregiver;with nsg   Sitter present in room.  -MS               User Key  (r) = Recorded By, (t) = Taken By, (c) = Cosigned By      Initials Name Provider Type    MS RuckerVaughnFlavio, PT Physical Therapist                   Outcome Measures       Row Name 08/03/23 1107          How much help from another person do you currently need...    Turning from your back to your side while in flat bed without using bedrails? 3  -MS     Moving from lying on back to sitting on the side of a flat bed without bedrails? 3  -MS     Moving to and from a bed to a chair (including a wheelchair)? 3  -MS     Standing up from a chair using your arms (e.g., wheelchair, bedside chair)? 3  -MS     Climbing 3-5 steps with a railing? 3  -MS     To walk in hospital room? 3  -MS     AM-PAC 6 Clicks Score (PT) 18  -MS     Highest level of mobility 6 --> Walked 10 steps or more  -MS       Row Name 08/03/23 1107          Functional Assessment    Outcome Measure Options AM-PAC 6 Clicks Basic Mobility (PT)  -MS               User Key  (r) = Recorded By, (t) = Taken By, (c) = Cosigned By      Initials Name Provider Type    MS VaughnFlavio, PT Physical Therapist                                 Physical Therapy Education       Title: PT OT SLP Therapies (Done)       Topic: Physical Therapy (Done)       Point: Mobility training (Done)       Learning Progress Summary             Patient Acceptance, E,D, VU,NR by MS at 8/3/2023 1107    Acceptance, E, VU by AW at 8/3/2023 1021    Acceptance, E, VU by AW at 8/2/2023 1042                         Point: Home exercise program (Done)       Learning Progress Summary             Patient Acceptance, E, VU by AW at 8/3/2023 1021    Acceptance, E, VU by AW at 8/2/2023 1042                         Point: Body mechanics (Done)       Learning Progress Summary             Patient Acceptance, E,D, VU,NR by MS at 8/3/2023 1107    Acceptance, E, VU by AW at 8/3/2023 1021    Acceptance, E, VU by AW at 8/2/2023 1042                          Point: Precautions (Done)       Learning Progress Summary             Patient Acceptance, E,D, VU,NR by MS at 8/3/2023 1107    Acceptance, E, VU by AW at 8/3/2023 1021    Acceptance, E, VU by AW at 8/2/2023 1042                                         User Key       Initials Effective Dates Name Provider Type Discipline    MS 06/16/21 -  Flavio Vaughn, PT Physical Therapist PT    AW 06/02/23 -  Betty Lemus, RN Registered Nurse Nurse                  PT Recommendation and Plan  Planned Therapy Interventions (PT): balance training, bed mobility training, gait training, home exercise program, postural re-education, transfer training, strengthening, patient/family education  Plan of Care Reviewed With: patient  Outcome Evaluation: Pt. is a 73 year old Female admitted to the hospital with A.M.S.  Pt. reports that prior to admission her primary means of mobility is via Electric W/C but she is able to ambulate short distances with use of Cane/Wx if needed.  Pt. currently presents with decreased strength, decreased balance, and decreased tolerance to functional activity.  This AM, pt. able to ambulate 12 feet, CGA x 1, with HHA x 1.  Pt. requires CGA x 1 for bed mobility and CGA x 1 for sit <-> stand transfers.  Verbal/tactile cues given during ambulation for posture correction.  Pt. will benefit from skilled inpt. P.T. (while here in the hospital) to address her functional deficits and to assist pt. in regaining her maximum level of independence with functional mobility.     Time Calculation:         PT Charges       Row Name 08/03/23 1111             Time Calculation    Start Time 1025  -MS      Stop Time 1039  -MS      Time Calculation (min) 14 min  -MS      PT Received On 08/03/23  -MS      PT - Next Appointment 08/04/23  -MS      PT Goal Re-Cert Due Date 08/10/23  -MS         Time Calculation- PT    Total Timed Code Minutes- PT 13 minute(s)  -MS                User Key  (r) = Recorded By, (t) = Taken By, (c) =  Cosigned By      Initials Name Provider Type    Flavio Gonzales, PT Physical Therapist                  Therapy Charges for Today       Code Description Service Date Service Provider Modifiers Qty    48323413736 HC PT EVAL MOD COMPLEXITY 2 8/3/2023 Flavio Vaughn, PT GP 1    43868959826 HC PT THERAPEUTIC ACT EA 15 MIN 8/3/2023 Flavio Vaughn, PT GP 1            PT G-Codes  Outcome Measure Options: AM-PAC 6 Clicks Basic Mobility (PT)  AM-PAC 6 Clicks Score (PT): 18  PT Discharge Summary  Anticipated Discharge Disposition (PT): home with assist    Flavio Vaughn, PT  8/3/2023

## 2023-08-03 NOTE — DISCHARGE SUMMARY
Patient Name: Asha Krishnan  : 1950  MRN: [id number]    Date of Admission: 2023  Date of Discharge:  8/3/2023  Primary Care Physician: Capo Pedroza MD      Chief Complaint:   Altered Mental Status (Per the family,she is confused today)      Discharge Diagnoses     Active Hospital Problems    Diagnosis  POA    **Altered mental status, unspecified [R41.82]  Yes    PAF (paroxysmal atrial fibrillation) [I48.0]  Unknown    Pulmonary nodule 1 cm or greater in diameter [R91.1]  Unknown    Anemia [D64.9]  Unknown    COPD (chronic obstructive pulmonary disease) [J44.9]  Yes    HTN (hypertension) [I10]  Yes    CAD (coronary artery disease) [I25.10]  Yes      Resolved Hospital Problems   No resolved problems to display.        Hospital Course     Ms. Krishnan is a 73 y.o. female with a history of HLD, anemia, pulmonary nodule, paroxysmal afib, COPD, CAD, HTN who presented to [facility] initially complaining of AMS.  Please see the admitting history and physical for further details.  Recently prescribed ambien for sleep. Pt feels she may have mixed up her meds and took ambien instead of her daily aspirin. CTH and MRI brain were negative. Neurology and Psychiatry were consulted. Given short course zyprexa for agitation/hallucinations when first admitted. She has returned to baseline, confirmed by her significant other. Psychiatry did not feel further workup was needed but pt should avoid ambien indefinitely. Neurology recommended EEG for completeness but pt is eager to discharge. This can be completed outpatient although not as sensitive after time has passed. Found to have low B12, started on po replacement. It is noted that patient had finding of pulmonary nodule at previous, recent hospitalization at [facility] where lung biopsy was offered but pt refused. She will need further follow up. Medically stable for discharge.  Day of Discharge     Subjective:  Feels well. Wants to go home. No  new issues.     Physical Exam:  Temp:  [97.1 øF (36.2 øC)-97.3 øF (36.3 øC)] 97.3 øF (36.3 øC)  Heart Rate:  [65-76] 74  Resp:  [16-18] 16  BP: (104-176)/(47-88) 176/88  Body mass index is 18.88 kg/mý.  Physical Exam  Vitals and nursing note reviewed.   Constitutional:       General: She is not in acute distress.     Appearance: She is ill-appearing. She is not toxic-appearing.   HENT:      Head: Normocephalic.      Mouth/Throat:      Mouth: Mucous membranes are moist.   Eyes:      Conjunctiva/sclera: Conjunctivae normal.   Cardiovascular:      Rate and Rhythm: Normal rate and regular rhythm.   Pulmonary:      Effort: Pulmonary effort is normal. No respiratory distress.      Breath sounds: No wheezing or rales.   Abdominal:      General: Bowel sounds are normal.      Palpations: Abdomen is soft.   Musculoskeletal:      Cervical back: Neck supple.      Right lower leg: No edema.      Left lower leg: No edema.   Skin:     General: Skin is warm and dry.   Neurological:      General: No focal deficit present.      Mental Status: She is alert and oriented to person, place, and time.   Psychiatric:         Mood and Affect: Mood normal.         Behavior: Behavior normal.       Consultants     Consult Orders (all) (From admission, onward)       Start     Ordered    08/02/23 0431  Inpatient Neurology Consult General  Once        Specialty:  Neurology  Provider:  Lizzy Caceres MD    08/02/23 0431    08/01/23 1910  Inpatient Psychiatrist Consult  Once        Specialty:  Psychiatry  Provider:  Capo Anderson III, MD    08/01/23 1910    08/01/23 1523  Psych / [facility] Center  Once        Provider:  (Not yet assigned)    08/01/23 1523    08/01/23 1445  [facility] (on-call MD unless specified) Details  Once        Specialty:  Hospitalist  Provider:  (Not yet assigned)    08/01/23 1444                  Procedures     * Surgery not found *    Imaging Results (All)       Procedure Component Value Units Date/Time    MRI Brain  With & Without Contrast [[id number]] Collected: 08/02/23 1341     Updated: 08/02/23 1657    Narrative:      MRI EXAMINATION OF THE BRAIN WITH AND WITHOUT CONTRAST     HISTORY: Confusion, agitation.     COMPARISON: CT head 08/01/2023.     TECHNIQUE: An MRI examination of the brain was performed utilizing  sagittal T1, axial diffusion, T1, T2, T2 FLAIR, gradient echo T2 as well  as axial and coronal T1 postcontrast weighted sequences.     FINDINGS: There is no evidence of restricted diffusion to suggest acute  infarction. The brain and ventricles are symmetrical. There is expected  flow-void in the basilar artery and in the distal aspect of the internal  carotid arteries bilaterally on the axial T2 sequence. The left  vertebral artery is dominant. Scattered areas of increased signal  intensity involving the white matter of the cerebral hemispheres are  appreciated bilaterally on the T2 FLAIR sequence consistent with  mild-to-moderate small vessel ischemic disease. There is no evidence of  mass, mass effect or of abnormal enhancement after contrast  administration. A prominent pannus is noted posterior to the dens. A CT  examination of the cervical spine performed 11/11/2022 demonstrated  fusion from C1 to C5.       Impression:      There is no evidence of acute infarction. Small vessel  ischemic disease is noted. There is no evidence of mass or of abnormal  enhancement.     This report was finalized on 8/2/2023 4:54 PM by Dr. Johnny Alfredo M.D.       CT Head Without Contrast [[id number]] Collected: 08/01/23 1223     Updated: 08/01/23 1848    Narrative:      CT HEAD WITHOUT CONTRAST     HISTORY: Delirium, confusion.     COMPARISON: None.     FINDINGS: The brain and ventricles are symmetrical. There is  age-appropriate atrophy. Moderate-to-severe vascular calcification  involving the carotid siphons and involving the dominant left vertebral  artery is appreciated. No focal area of decreased attenuation to suggest  acute  infarction is identified.       Impression:      There is no evidence of acute infarction or of intracranial  hemorrhage. Further evaluation could be performed with MRI examination  of the brain as indicated.           Radiation dose reduction techniques were utilized, including automated  exposure control and exposure modulation based on body size.     This report was finalized on 8/1/2023 6:45 PM by Dr. Johnny Alfredo M.D.             Results for orders placed during the hospital encounter of 11/11/22    Duplex Venous Lower Extremity - Bilateral CAR    Interpretation Summary    Normal bilateral lower extremity venous duplex scan.    Results for orders placed during the hospital encounter of 11/11/22    Adult Transthoracic Echo Complete W/ Cont if Necessary Per Protocol    Interpretation Summary    Left ventricular systolic function is normal. Calculated left ventricular EF = 59.4% Normal left ventricular cavity size and wall thickness noted. All left ventricular wall segments contract normally. Left ventricular diastolic function is consistent with (grade I) impaired relaxation.    Pertinent Labs     Results from last 7 days   Lab Units 08/02/23  0646 08/01/23  1149   WBC 10*3/mm3 7.77 6.31   HEMOGLOBIN g/dL 10.3* 10.7*   PLATELETS 10*3/mm3 306 294     Results from last 7 days   Lab Units 08/02/23  1437 08/02/23  0646 08/01/23  1149   SODIUM mmol/L  --  140 140   POTASSIUM mmol/L 4.7 3.5 3.7   CHLORIDE mmol/L  --  102 105   CO2 mmol/L  --  28.0 24.0   BUN mg/dL  --  7* 7*   CREATININE mg/dL  --  0.56* 0.45*   GLUCOSE mg/dL  --  87 97   EGFR mL/min/1.73  --  95.3 100.5     Results from last 7 days   Lab Units 08/02/23  0646 08/01/23  1149   ALBUMIN g/dL 3.7 4.2   BILIRUBIN mg/dL 0.6 0.6   ALK PHOS U/L 114 129*   AST (SGOT) U/L 20 26   ALT (SGPT) U/L 14 16     Results from last 7 days   Lab Units 08/02/23  0646 08/01/23  1149   CALCIUM mg/dL 8.9 9.5   ALBUMIN g/dL 3.7 4.2   MAGNESIUM mg/dL  --  1.8       Results  from last 7 days   Lab Units 08/02/23  0646 08/01/23  1348 08/01/23  1149   CK TOTAL U/L 180  --  397*   HSTROP T ng/L  --  15* 18*   PROBNP pg/mL  --   --  499.0           Invalid input(s): LDLCALC          Test Results Pending at Discharge       Discharge Details        Discharge Medications        New Medications        Instructions Start Date   thiamine 100 MG tablet  Commonly known as: VITAMIN B1   100 mg, Oral, Daily   Start Date: August 4, 2023     vitamin B-12 1000 MCG tablet  Commonly known as: CYANOCOBALAMIN   500 mcg, Oral, Daily   Start Date: August 4, 2023            Continue These Medications        Instructions Start Date   atorvastatin 80 MG tablet  Commonly known as: LIPITOR   80 mg, Oral, Nightly      bumetanide 2 MG tablet  Commonly known as: BUMEX   2 mg, Oral, Daily      clopidogrel 75 MG tablet  Commonly known as: PLAVIX   75 mg, Oral, Daily      isosorbide mononitrate 30 MG 24 hr tablet  Commonly known as: IMDUR   30 mg, Oral, Every 24 Hours Scheduled      metoprolol tartrate 50 MG tablet  Commonly known as: LOPRESSOR   50 mg, Oral, Every 12 Hours Scheduled      nitroglycerin 0.4 MG SL tablet  Commonly known as: NITROSTAT   0.4 mg, Sublingual, Every 5 Minutes PRN, Take no more than 3 doses in 15 minutes.      Spiriva Respimat 2.5 MCG/ACT aerosol solution inhaler  Generic drug: tiotropium bromide monohydrate   2 puffs, Inhalation, Daily - RT               Allergies   Allergen Reactions    Latex Anaphylaxis     Pt states whole body swells including throat    Tylenol [Acetaminophen] Anaphylaxis     States whole body swells including throat       Discharge Disposition:  Home or Self Care      Discharge Diet:  Diet Order   Procedures    Diet: Cardiac Diets; Healthy Heart (2-3 Na+); Texture: Regular Texture (IDDSI 7); Fluid Consistency: Thin (IDDSI 0)       Discharge Activity:   Activity Instructions       Activity as Tolerated              CODE STATUS:    Code Status and Medical Interventions:    Ordered at: 08/01/23 1756     Code Status (Patient has no pulse and is not breathing):    CPR (Attempt to Resuscitate)     Medical Interventions (Patient has pulse or is breathing):    Full Support     Release to patient:    Routine Release       No future appointments.   Follow-up Information       Capo Pedroza MD. Schedule an appointment as soon as possible for a visit in 1 week(s).    Specialty: Family Medicine  Why: Hospital follow up  Contact information:  [address] [facility] [address]  [facility] [id number] [phone]               Gardenia Barrios MD. Schedule an appointment as soon as possible for a visit.    Specialty: Neurology  Why: Follow up as recommended  Contact information:  [address]  [facility] [address]  [facility] [id number] [phone]                             Time Spent on Discharge:  Greater than 30 minutes      EMORY Jones  [facility]  08/03/23  15:36 EDT

## 2023-08-03 NOTE — PLAN OF CARE
Problem: Adult Inpatient Plan of Care  Goal: Plan of Care Review  8/3/2023 1422 by Betty Lemus RN  Outcome: Ongoing, Progressing  Flowsheets  Taken 8/3/2023 1422 by Betty Lemus RN  Progress: no change  Taken 8/3/2023 1108 by Flavio Vaughn PT  Plan of Care Reviewed With: patient  8/3/2023 1021 by Betty Lemus RN  Outcome: Ongoing, Progressing  Flowsheets  Taken 8/3/2023 1021  Progress: no change  Taken 8/2/2023 1042  Plan of Care Reviewed With: patient  Goal: Patient-Specific Goal (Individualized)  8/3/2023 1422 by Betty Lemus RN  Outcome: Ongoing, Progressing  8/3/2023 1021 by Betty Lemus RN  Outcome: Ongoing, Progressing  Goal: Absence of Hospital-Acquired Illness or Injury  8/3/2023 1422 by Betty Lemus RN  Outcome: Ongoing, Progressing  8/3/2023 1021 by Betty Lemus RN  Outcome: Ongoing, Progressing  Intervention: Identify and Manage Fall Risk  Recent Flowsheet Documentation  Taken 8/3/2023 1000 by Betty Lemus RN  Safety Promotion/Fall Prevention:   safety round/check completed   room organization consistent  Intervention: Prevent Skin Injury  Recent Flowsheet Documentation  Taken 8/3/2023 1000 by Betty Lemus RN  Body Position: sitting up in bed  Taken 8/3/2023 0800 by Betty Lemus RN  Body Position: sitting up in bed  Intervention: Prevent and Manage VTE (Venous Thromboembolism) Risk  Recent Flowsheet Documentation  Taken 8/3/2023 1000 by Betty Lemus RN  Activity Management: activity encouraged  Goal: Optimal Comfort and Wellbeing  8/3/2023 1422 by Betty Lemus RN  Outcome: Ongoing, Progressing  8/3/2023 1021 by Betty Lemus RN  Outcome: Ongoing, Progressing  Goal: Readiness for Transition of Care  8/3/2023 1422 by Betty Lemus RN  Outcome: Ongoing, Progressing  8/3/2023 1021 by Betty Lemus RN  Outcome: Ongoing, Progressing     Problem: Fall Injury Risk  Goal: Absence of Fall and Fall-Related Injury  8/3/2023 1422 by Betty Lemus, RENALDO  Outcome: Ongoing,  Progressing  8/3/2023 1021 by Betty Lemus RN  Outcome: Ongoing, Progressing  Intervention: Promote Injury-Free Environment  Recent Flowsheet Documentation  Taken 8/3/2023 1000 by Betty Lemus RN  Safety Promotion/Fall Prevention:   safety round/check completed   room organization consistent  Goal: Absence of Fall and Fall-Related Injury  8/3/2023 1422 by Betty Lemus RN  Outcome: Ongoing, Progressing  8/3/2023 1021 by Betty Lemus RN  Outcome: Ongoing, Progressing  Intervention: Promote Injury-Free Environment  Recent Flowsheet Documentation  Taken 8/3/2023 1000 by Betty Lemus RN  Safety Promotion/Fall Prevention:   safety round/check completed   room organization consistent     Problem: Confusion Acute  Goal: Optimal Cognitive Function  8/3/2023 1422 by Betty Lemus RN  Outcome: Ongoing, Progressing  8/3/2023 1021 by Betty Lemus RN  Outcome: Ongoing, Progressing     Problem: Skin Injury Risk Increased  Goal: Skin Health and Integrity  8/3/2023 1422 by Betty Lemus RN  Outcome: Ongoing, Progressing  8/3/2023 1021 by Betty Lemus RN  Outcome: Ongoing, Progressing  Intervention: Optimize Skin Protection  Recent Flowsheet Documentation  Taken 8/3/2023 1000 by Betty Lemus RN  Head of Bed (HOB) Positioning: HOB at 30-45 degrees  Taken 8/3/2023 0800 by Betty Lemus RN  Head of Bed (HOB) Positioning: HOB at 30-45 degrees   Goal Outcome Evaluation:  Plan of Care Reviewed With: patient        Progress: no change

## 2023-08-03 NOTE — PROGRESS NOTES
"DOS: 8/3/2023  NAME: Asha Krishnan   : 1950  PCP: Capo Pedroza MD  Chief Complaint   Patient presents with    Altered Mental Status     Per the family,she is confused today     Stroke    Subjective: Patient is anxious to leave, asking when EEG will be completed.  States she had an episode of AMS due to confusing medications and perhaps taking extra Ambien, stating she usually only takes half a pill. Denies confusion, h/a, or focal weakness. Sitter at bedside. Pt seen in follow up today, however the problem is new to the examiner.      Objective:  Vital signs: /88 (BP Location: Right arm, Patient Position: Lying)   Pulse 74   Temp 97.3 øF (36.3 øC) (Oral)   Resp 16   Ht 162.6 cm (64\")   Wt 49.9 kg (110 lb)   SpO2 98%   BMI 18.88 kg/mý       General appearance: Thin, alert and cooperative.   HEENT: Normocephalic.   Cardiac: Regular rate and rhythm. No murmurs.   Peripheral Vasculature: Radial pulses are equal and symmetric.  Chest Exam: Clear to auscultation bilaterally, no wheezes, no rhonchi.  Extremities: Normal, no edema.   Skin: No rashes or birthmarks.     Higher integrative function: Oriented to time, place, person. Mildly impaired recent memory, attention/concentration. Spontaneous speech, fund of vocabulary are normal.   CN II: Normal visual fields.   CN III IV VI: Extraocular movements are full without nystagmus. Pupils are equal, round, and reactive to light.   CN V: Normal facial sensation.  CN VII: Facial movements are symmetric, no weakness.   CN VIII: Auditory acuity is normal.   CN IX & X: Symmetric palatal movement.   CN XI: Sternocleidomastoid and trapezius are normal. No weakness.   CN XII: The tongue is midline.   Motor: Normal muscle strength, bulk, and tone in upper and lower extremities. No fasciculations, rigidity, spasticity or abnormal movements.   Sensation: Normal light touch in all extremities.   Station and gait: Deferred.   Coordination: Finger to nose " test showed no dysmetria.     Scheduled Meds:atorvastatin, 80 mg, Oral, Nightly  bumetanide, 2 mg, Oral, Daily  clopidogrel, 75 mg, Oral, Daily  isosorbide mononitrate, 30 mg, Oral, Q24H  metoprolol tartrate, 50 mg, Oral, Q12H  OLANZapine, 5 mg, Oral, Nightly  senna-docusate sodium, 2 tablet, Oral, BID  sodium chloride, 10 mL, Intravenous, Q12H  thiamine, 100 mg, Oral, Daily  tiotropium bromide monohydrate, 2 puff, Inhalation, Daily - RT  vitamin B-12, 500 mcg, Oral, Daily      Continuous Infusions:sodium chloride, 50 mL/hr, Last Rate: 50 mL/hr (08/02/23 1318)      PRN Meds:.  senna-docusate sodium **AND** polyethylene glycol **AND** bisacodyl **AND** bisacodyl    Calcium Replacement - Follow Nurse / BPA Driven Protocol    Magnesium Standard Dose Replacement - Follow Nurse / BPA Driven Protocol    nitroglycerin    ondansetron    Phosphorus Replacement - Follow Nurse / BPA Driven Protocol    Potassium Replacement - Follow Nurse / BPA Driven Protocol    sodium chloride    sodium chloride    Laboratory results:  Lab Results   Component Value Date    GLUCOSE 87 08/02/2023    CALCIUM 8.9 08/02/2023     08/02/2023    K 4.7 08/02/2023    CO2 28.0 08/02/2023     08/02/2023    BUN 7 (L) 08/02/2023    CREATININE 0.56 (L) 08/02/2023    BCR 12.5 08/02/2023    ANIONGAP 10.0 08/02/2023     Lab Results   Component Value Date    WBC 7.77 08/02/2023    HGB 10.3 (L) 08/02/2023    HCT 30.4 (L) 08/02/2023    MCV 90.7 08/02/2023     08/02/2023     No results found for: CHOL  No results found for: HDL  No results found for: LDL  No results found for: TRIG       Review and interpretation of imaging: MR brain images viewed by me with, no acute findings seen.  CT Head Without Contrast    Result Date: 8/1/2023  CT HEAD WITHOUT CONTRAST  HISTORY: Delirium, confusion.  COMPARISON: None.  FINDINGS: The brain and ventricles are symmetrical. There is age-appropriate atrophy. Moderate-to-severe vascular calcification involving  the carotid siphons and involving the dominant left vertebral artery is appreciated. No focal area of decreased attenuation to suggest acute infarction is identified.      There is no evidence of acute infarction or of intracranial hemorrhage. Further evaluation could be performed with MRI examination of the brain as indicated.    Radiation dose reduction techniques were utilized, including automated exposure control and exposure modulation based on body size.  This report was finalized on 8/1/2023 6:45 PM by Dr. Johnny Alfredo M.D.      MRI Brain With & Without Contrast    Result Date: 8/2/2023  MRI EXAMINATION OF THE BRAIN WITH AND WITHOUT CONTRAST  HISTORY: Confusion, agitation.  COMPARISON: CT head 08/01/2023.  TECHNIQUE: An MRI examination of the brain was performed utilizing sagittal T1, axial diffusion, T1, T2, T2 FLAIR, gradient echo T2 as well as axial and coronal T1 postcontrast weighted sequences.  FINDINGS: There is no evidence of restricted diffusion to suggest acute infarction. The brain and ventricles are symmetrical. There is expected flow-void in the basilar artery and in the distal aspect of the internal carotid arteries bilaterally on the axial T2 sequence. The left vertebral artery is dominant. Scattered areas of increased signal intensity involving the white matter of the cerebral hemispheres are appreciated bilaterally on the T2 FLAIR sequence consistent with mild-to-moderate small vessel ischemic disease. There is no evidence of mass, mass effect or of abnormal enhancement after contrast administration. A prominent pannus is noted posterior to the dens. A CT examination of the cervical spine performed 11/11/2022 demonstrated fusion from C1 to C5.      There is no evidence of acute infarction. Small vessel ischemic disease is noted. There is no evidence of mass or of abnormal enhancement.  This report was finalized on 8/2/2023 4:54 PM by Dr. Johnny Alfredo M.D.       Impression:  73-year-old  female, smoker, with hypertension, hyperlipidemia, paroxysmal A-fib not on AC, COPD, anxiety and known history of lung nodules who was admitted with AMS.  The patient reported she got her medications confused and may have taken extra Ambien.  In the ED she was noted to be actively hallucinating.  It was reported that Ambien had recently been started.  When she was initially seen by Dr. Barrios yesterday she was noted to have some tangential speech and psychotic symptoms but was alert and oriented and felt to be at or near her neurologic baseline.  Initial CT head was negative for acute findings.  MRI brain and EEG were ordered.    Work-up:  MRI brain with and without contrast completed 8/2: No acute findings.  Small vessel disease   noted.  No mass or abnormal enhancement.  Labs: Folate 10.6, B12 263, CK level 397 down to 180, UA with 1+ bacteria, no WBCs and leuk esterase, positive for nitrates.  TSH 2.46, alcohol level not elevated, UDS negative.    Diagnosis:  AMS, toxic/metabolic encephalopathy r/t Ambien use  Low B12    Plan:  Recommend inpatient EEG, patient refusing, will place outpatient EEG order though not as sensitive after time has passed  Started on oral B12 replacement  Seen by psychiatry today, rec no further ambien.   D/W Dr Barrios and [facility] today.

## 2023-08-03 NOTE — PLAN OF CARE
Goal Outcome Evaluation:  Plan of Care Reviewed With: patient           Outcome Evaluation: Pt. is a 73 year old Female admitted to the hospital with ROSA.  Pt. reports that prior to admission her primary means of mobility is via Electric W/C but she is able to ambulate short distances with use of Cane/Wx if needed.  Pt. currently presents with decreased strength, decreased balance, and decreased tolerance to functional activity.  This AM, pt. able to ambulate 12 feet, CGA x 1, with HHA x 1.  Pt. requires CGA x 1 for bed mobility and CGA x 1 for sit <-> stand transfers.  Verbal/tactile cues given during ambulation for posture correction.  Pt. will benefit from skilled inpt. P.T. (while here in the hospital) to address her functional deficits and to assist pt. in regaining her maximum level of independence with functional mobility.      Anticipated Discharge Disposition (PT): home with assist    Patient was not wearing a face mask during this therapy encounter. Therapist used appropriate personal protective equipment including eye protection, mask, and gloves.  Mask used was standard procedure mask. Appropriate PPE was worn during the entire therapy session. Hand hygiene was completed before and after therapy session. Patient is not in enhanced droplet precautions.

## 2023-08-04 ENCOUNTER — READMISSION MANAGEMENT (OUTPATIENT)
Dept: CALL CENTER | Facility: HOSPITAL | Age: 73
End: 2023-08-04
Payer: MEDICARE

## 2023-08-04 NOTE — CASE MANAGEMENT/SOCIAL WORK
Case Management Discharge Note      Final Note: Home with family transport. Orders reviewed no further needs.         Selected Continued Care - Discharged on 8/3/2023 Admission date: 8/1/2023 - Discharge disposition: Home or Self Care      Destination    No services have been selected for the patient.                Durable Medical Equipment    No services have been selected for the patient.                Dialysis/Infusion    No services have been selected for the patient.                Home Medical Care    No services have been selected for the patient.                Therapy    No services have been selected for the patient.                Community Resources    No services have been selected for the patient.                Community & DME    No services have been selected for the patient.                         Final Discharge Disposition Code: 01 - home or self-care

## 2023-08-08 ENCOUNTER — READMISSION MANAGEMENT (OUTPATIENT)
Dept: CALL CENTER | Facility: HOSPITAL | Age: 73
End: 2023-08-08
Payer: MEDICARE

## 2023-08-08 NOTE — OUTREACH NOTE
COPD/PN Week 1 Survey      Flowsheet Row Responses   [facility] patient discharged from? [facility]   Does the patient have one of the following disease processes/diagnoses(primary or secondary)? COPD   Week 1 attempt successful? Yes   Call start time 0813   Call end time 0822   List who call center can speak with pt   Meds reviewed with patient/caregiver? Yes   Is the patient having any side effects they believe may be caused by any medication additions or changes? No   Does the patient have all medications ordered at discharge? Yes   Is the patient taking all medications as directed (includes completed medication regime)? Yes   Comments regarding appointments Pt will call the office for appointment.   Does the patient have a primary care provider?  Yes   Does the patient have an appointment with their PCP or specialist within 7 days of discharge? No   Nursing Interventions Educated patient on importance of making appointment   Has the patient kept scheduled appointments due by today? N/A   Has home health visited the patient within 72 hours of discharge? N/A   Pulse Ox monitoring None   Psychosocial issues? No   Did the patient receive a copy of their discharge instructions? Yes   Nursing interventions Reviewed instructions with patient   What is the patient's perception of their health status since discharge? Improving   Is the patient able to teach back COPD zones? Yes   Patient reports what zone on this call? Green Zone   Green Zone Reports doing well, Breathing without shortness of breath   Green Zone interventions: Take daily medications   Week 1 call completed? Yes   Wrap up additional comments Pt reports feeling well. Pt does not require 02 @ this time. Pt reports having all medications. Pt will call the office for f/u appointment. Pt presently living in an extended stay hotel.   Call end time 0822            [facility] CALL - Registered Nurse

## 2024-07-13 ENCOUNTER — APPOINTMENT (OUTPATIENT)
Dept: CT IMAGING | Facility: HOSPITAL | Age: 74
End: 2024-07-13
Payer: MEDICARE

## 2024-07-13 ENCOUNTER — APPOINTMENT (OUTPATIENT)
Dept: GENERAL RADIOLOGY | Facility: HOSPITAL | Age: 74
End: 2024-07-13
Payer: MEDICARE

## 2024-07-13 ENCOUNTER — HOSPITAL ENCOUNTER (INPATIENT)
Facility: HOSPITAL | Age: 74
LOS: 16 days | Discharge: NURSING, REHAB OR LONG-TERM CARE | End: 2024-08-01
Attending: EMERGENCY MEDICINE | Admitting: HOSPITALIST
Payer: MEDICARE

## 2024-07-13 DIAGNOSIS — J96.01 ACUTE HYPOXEMIC RESPIRATORY FAILURE: Primary | ICD-10-CM

## 2024-07-13 DIAGNOSIS — J44.1 COPD WITH EXACERBATION: ICD-10-CM

## 2024-07-13 DIAGNOSIS — I48.91 ATRIAL FIBRILLATION WITH RAPID VENTRICULAR RESPONSE: ICD-10-CM

## 2024-07-13 DIAGNOSIS — E43 SEVERE MALNUTRITION: ICD-10-CM

## 2024-07-13 LAB
ALBUMIN SERPL-MCNC: 3.9 G/DL (ref 3.5–5.2)
ALBUMIN/GLOB SERPL: 1.4 G/DL
ALP SERPL-CCNC: 100 U/L (ref 39–117)
ALT SERPL W P-5'-P-CCNC: 10 U/L (ref 1–33)
ANION GAP SERPL CALCULATED.3IONS-SCNC: 13 MMOL/L (ref 5–15)
APTT PPP: 31.1 SECONDS (ref 22.7–35.4)
ARTERIAL PATENCY WRIST A: ABNORMAL
AST SERPL-CCNC: 17 U/L (ref 1–32)
ATMOSPHERIC PRESS: 752.6 MMHG
B PARAPERT DNA SPEC QL NAA+PROBE: NOT DETECTED
B PERT DNA SPEC QL NAA+PROBE: NOT DETECTED
BASE EXCESS BLDA CALC-SCNC: 3.8 MMOL/L (ref 0–2)
BASOPHILS # BLD AUTO: 0.03 10*3/MM3 (ref 0–0.2)
BASOPHILS NFR BLD AUTO: 0.4 % (ref 0–1.5)
BDY SITE: ABNORMAL
BILIRUB SERPL-MCNC: 0.7 MG/DL (ref 0–1.2)
BUN SERPL-MCNC: 20 MG/DL (ref 8–23)
BUN/CREAT SERPL: 30.8 (ref 7–25)
C PNEUM DNA NPH QL NAA+NON-PROBE: NOT DETECTED
CALCIUM SPEC-SCNC: 9 MG/DL (ref 8.6–10.5)
CHLORIDE SERPL-SCNC: 100 MMOL/L (ref 98–107)
CO2 BLDA-SCNC: 30 MMOL/L (ref 23–27)
CO2 SERPL-SCNC: 28 MMOL/L (ref 22–29)
CREAT SERPL-MCNC: 0.65 MG/DL (ref 0.57–1)
D DIMER PPP FEU-MCNC: 1.72 MCGFEU/ML (ref 0–0.76)
D-LACTATE SERPL-SCNC: 1.5 MMOL/L (ref 0.5–2)
DEPRECATED RDW RBC AUTO: 44.6 FL (ref 37–54)
DEVICE COMMENT: ABNORMAL
EGFRCR SERPLBLD CKD-EPI 2021: 91.4 ML/MIN/1.73
EOSINOPHIL # BLD AUTO: 0 10*3/MM3 (ref 0–0.4)
EOSINOPHIL NFR BLD AUTO: 0 % (ref 0.3–6.2)
ERYTHROCYTE [DISTWIDTH] IN BLOOD BY AUTOMATED COUNT: 13.5 % (ref 12.3–15.4)
FLUAV SUBTYP SPEC NAA+PROBE: NOT DETECTED
FLUBV RNA ISLT QL NAA+PROBE: NOT DETECTED
GLOBULIN UR ELPH-MCNC: 2.8 GM/DL
GLUCOSE SERPL-MCNC: 75 MG/DL (ref 65–99)
HADV DNA SPEC NAA+PROBE: NOT DETECTED
HCO3 BLDA-SCNC: 28.7 MMOL/L (ref 22–28)
HCOV 229E RNA SPEC QL NAA+PROBE: NOT DETECTED
HCOV HKU1 RNA SPEC QL NAA+PROBE: NOT DETECTED
HCOV NL63 RNA SPEC QL NAA+PROBE: NOT DETECTED
HCOV OC43 RNA SPEC QL NAA+PROBE: NOT DETECTED
HCT VFR BLD AUTO: 35.7 % (ref 34–46.6)
HEMODILUTION: NO
HGB BLD-MCNC: 11.7 G/DL (ref 12–15.9)
HMPV RNA NPH QL NAA+NON-PROBE: NOT DETECTED
HPIV1 RNA ISLT QL NAA+PROBE: NOT DETECTED
HPIV2 RNA SPEC QL NAA+PROBE: NOT DETECTED
HPIV3 RNA NPH QL NAA+PROBE: NOT DETECTED
HPIV4 P GENE NPH QL NAA+PROBE: NOT DETECTED
IMM GRANULOCYTES # BLD AUTO: 0.02 10*3/MM3 (ref 0–0.05)
IMM GRANULOCYTES NFR BLD AUTO: 0.3 % (ref 0–0.5)
INHALED O2 CONCENTRATION: 21 %
INR PPP: 1.08 (ref 0.9–1.1)
LYMPHOCYTES # BLD AUTO: 1.12 10*3/MM3 (ref 0.7–3.1)
LYMPHOCYTES NFR BLD AUTO: 15.7 % (ref 19.6–45.3)
M PNEUMO IGG SER IA-ACNC: NOT DETECTED
MCH RBC QN AUTO: 29.8 PG (ref 26.6–33)
MCHC RBC AUTO-ENTMCNC: 32.8 G/DL (ref 31.5–35.7)
MCV RBC AUTO: 91.1 FL (ref 79–97)
MODALITY: ABNORMAL
MONOCYTES # BLD AUTO: 0.42 10*3/MM3 (ref 0.1–0.9)
MONOCYTES NFR BLD AUTO: 5.9 % (ref 5–12)
NEUTROPHILS NFR BLD AUTO: 5.53 10*3/MM3 (ref 1.7–7)
NEUTROPHILS NFR BLD AUTO: 77.7 % (ref 42.7–76)
NRBC BLD AUTO-RTO: 0 /100 WBC (ref 0–0.2)
NT-PROBNP SERPL-MCNC: 1789 PG/ML (ref 0–1800)
O2 A-A PPRESDIFF RESPIRATORY: 0.6 MMHG
PCO2 BLDA: 43.3 MM HG (ref 35–45)
PH BLDA: 7.43 PH UNITS (ref 7.35–7.45)
PLATELET # BLD AUTO: 293 10*3/MM3 (ref 140–450)
PMV BLD AUTO: 8.9 FL (ref 6–12)
PO2 BLD: 303 MM[HG] (ref 0–500)
PO2 BLDA: 63.7 MM HG (ref 80–100)
POTASSIUM SERPL-SCNC: 3.4 MMOL/L (ref 3.5–5.2)
PROCALCITONIN SERPL-MCNC: 0.04 NG/ML (ref 0–0.25)
PROT SERPL-MCNC: 6.7 G/DL (ref 6–8.5)
PROTHROMBIN TIME: 14.2 SECONDS (ref 11.7–14.2)
RBC # BLD AUTO: 3.92 10*6/MM3 (ref 3.77–5.28)
RHINOVIRUS RNA SPEC NAA+PROBE: NOT DETECTED
RSV RNA NPH QL NAA+NON-PROBE: NOT DETECTED
SAO2 % BLDCOA: 92.4 % (ref 92–98.5)
SARS-COV-2 RNA NPH QL NAA+NON-PROBE: NOT DETECTED
SODIUM SERPL-SCNC: 141 MMOL/L (ref 136–145)
TOTAL RATE: 16 BREATHS/MINUTE
TROPONIN T SERPL HS-MCNC: 22 NG/L
WBC NRBC COR # BLD AUTO: 7.12 10*3/MM3 (ref 3.4–10.8)

## 2024-07-13 PROCEDURE — 84484 ASSAY OF TROPONIN QUANT: CPT | Performed by: EMERGENCY MEDICINE

## 2024-07-13 PROCEDURE — 83880 ASSAY OF NATRIURETIC PEPTIDE: CPT | Performed by: EMERGENCY MEDICINE

## 2024-07-13 PROCEDURE — 84145 PROCALCITONIN (PCT): CPT | Performed by: EMERGENCY MEDICINE

## 2024-07-13 PROCEDURE — 71045 X-RAY EXAM CHEST 1 VIEW: CPT

## 2024-07-13 PROCEDURE — 93005 ELECTROCARDIOGRAM TRACING: CPT | Performed by: EMERGENCY MEDICINE

## 2024-07-13 PROCEDURE — 99285 EMERGENCY DEPT VISIT HI MDM: CPT

## 2024-07-13 PROCEDURE — 25010000002 METHYLPREDNISOLONE PER 125 MG: Performed by: EMERGENCY MEDICINE

## 2024-07-13 PROCEDURE — 85025 COMPLETE CBC W/AUTO DIFF WBC: CPT | Performed by: EMERGENCY MEDICINE

## 2024-07-13 PROCEDURE — 36600 WITHDRAWAL OF ARTERIAL BLOOD: CPT | Performed by: EMERGENCY MEDICINE

## 2024-07-13 PROCEDURE — 80053 COMPREHEN METABOLIC PANEL: CPT | Performed by: EMERGENCY MEDICINE

## 2024-07-13 PROCEDURE — 36415 COLL VENOUS BLD VENIPUNCTURE: CPT

## 2024-07-13 PROCEDURE — 85730 THROMBOPLASTIN TIME PARTIAL: CPT | Performed by: EMERGENCY MEDICINE

## 2024-07-13 PROCEDURE — 93010 ELECTROCARDIOGRAM REPORT: CPT | Performed by: INTERNAL MEDICINE

## 2024-07-13 PROCEDURE — 0202U NFCT DS 22 TRGT SARS-COV-2: CPT | Performed by: EMERGENCY MEDICINE

## 2024-07-13 PROCEDURE — 94799 UNLISTED PULMONARY SVC/PX: CPT

## 2024-07-13 PROCEDURE — 94640 AIRWAY INHALATION TREATMENT: CPT

## 2024-07-13 PROCEDURE — 94761 N-INVAS EAR/PLS OXIMETRY MLT: CPT

## 2024-07-13 PROCEDURE — 82803 BLOOD GASES ANY COMBINATION: CPT | Performed by: EMERGENCY MEDICINE

## 2024-07-13 PROCEDURE — 83605 ASSAY OF LACTIC ACID: CPT | Performed by: EMERGENCY MEDICINE

## 2024-07-13 PROCEDURE — 85610 PROTHROMBIN TIME: CPT | Performed by: EMERGENCY MEDICINE

## 2024-07-13 PROCEDURE — 85379 FIBRIN DEGRADATION QUANT: CPT | Performed by: HOSPITALIST

## 2024-07-13 RX ORDER — PREDNISONE 20 MG/1
20 TABLET ORAL 2 TIMES DAILY WITH MEALS
Status: DISCONTINUED | OUTPATIENT
Start: 2024-07-13 | End: 2024-07-13

## 2024-07-13 RX ORDER — METHYLPREDNISOLONE SODIUM SUCCINATE 125 MG/2ML
125 INJECTION, POWDER, LYOPHILIZED, FOR SOLUTION INTRAMUSCULAR; INTRAVENOUS ONCE
Status: COMPLETED | OUTPATIENT
Start: 2024-07-13 | End: 2024-07-13

## 2024-07-13 RX ORDER — ALBUTEROL SULFATE 2.5 MG/3ML
2.5 SOLUTION RESPIRATORY (INHALATION) 4 TIMES DAILY
Status: DISCONTINUED | OUTPATIENT
Start: 2024-07-13 | End: 2024-07-14

## 2024-07-13 RX ORDER — ASPIRIN 81 MG/1
324 TABLET, CHEWABLE ORAL ONCE
Status: COMPLETED | OUTPATIENT
Start: 2024-07-13 | End: 2024-07-13

## 2024-07-13 RX ORDER — SODIUM CHLORIDE 0.9 % (FLUSH) 0.9 %
10 SYRINGE (ML) INJECTION AS NEEDED
Status: DISCONTINUED | OUTPATIENT
Start: 2024-07-13 | End: 2024-08-01 | Stop reason: HOSPADM

## 2024-07-13 RX ORDER — BUDESONIDE 0.5 MG/2ML
0.5 INHALANT ORAL
Status: DISCONTINUED | OUTPATIENT
Start: 2024-07-13 | End: 2024-08-01 | Stop reason: HOSPADM

## 2024-07-13 RX ORDER — PREDNISONE 20 MG/1
20 TABLET ORAL 2 TIMES DAILY WITH MEALS
Status: DISCONTINUED | OUTPATIENT
Start: 2024-07-14 | End: 2024-07-13

## 2024-07-13 RX ORDER — IPRATROPIUM BROMIDE AND ALBUTEROL SULFATE 2.5; .5 MG/3ML; MG/3ML
3 SOLUTION RESPIRATORY (INHALATION) ONCE
Status: COMPLETED | OUTPATIENT
Start: 2024-07-13 | End: 2024-07-13

## 2024-07-13 RX ORDER — POTASSIUM CHLORIDE 1.5 G/1.58G
40 POWDER, FOR SOLUTION ORAL ONCE
Status: COMPLETED | OUTPATIENT
Start: 2024-07-13 | End: 2024-07-14

## 2024-07-13 RX ORDER — DILTIAZEM HYDROCHLORIDE 5 MG/ML
10 INJECTION INTRAVENOUS ONCE
Status: COMPLETED | OUTPATIENT
Start: 2024-07-13 | End: 2024-07-13

## 2024-07-13 RX ADMIN — IPRATROPIUM BROMIDE AND ALBUTEROL SULFATE 3 ML: .5; 3 SOLUTION RESPIRATORY (INHALATION) at 21:32

## 2024-07-13 RX ADMIN — METHYLPREDNISOLONE SODIUM SUCCINATE 125 MG: 125 INJECTION INTRAMUSCULAR; INTRAVENOUS at 21:29

## 2024-07-13 RX ADMIN — DILTIAZEM HYDROCHLORIDE 10 MG: 5 INJECTION, SOLUTION INTRAVENOUS at 21:56

## 2024-07-13 RX ADMIN — ASPIRIN 324 MG: 81 TABLET, CHEWABLE ORAL at 21:30

## 2024-07-14 PROBLEM — E43 SEVERE MALNUTRITION: Status: ACTIVE | Noted: 2024-07-14

## 2024-07-14 LAB
GEN 5 2HR TROPONIN T REFLEX: 21 NG/L
QT INTERVAL: 329 MS
QT INTERVAL: 421 MS
QTC INTERVAL: 441 MS
QTC INTERVAL: 451 MS
TROPONIN T DELTA: -1 NG/L

## 2024-07-14 PROCEDURE — 94799 UNLISTED PULMONARY SVC/PX: CPT

## 2024-07-14 PROCEDURE — G0378 HOSPITAL OBSERVATION PER HR: HCPCS

## 2024-07-14 PROCEDURE — 99222 1ST HOSP IP/OBS MODERATE 55: CPT | Performed by: INTERNAL MEDICINE

## 2024-07-14 PROCEDURE — 94761 N-INVAS EAR/PLS OXIMETRY MLT: CPT

## 2024-07-14 PROCEDURE — 63710000001 PREDNISONE PER 1 MG: Performed by: INTERNAL MEDICINE

## 2024-07-14 PROCEDURE — 94760 N-INVAS EAR/PLS OXIMETRY 1: CPT

## 2024-07-14 PROCEDURE — 93005 ELECTROCARDIOGRAM TRACING: CPT | Performed by: INTERNAL MEDICINE

## 2024-07-14 PROCEDURE — 84484 ASSAY OF TROPONIN QUANT: CPT | Performed by: EMERGENCY MEDICINE

## 2024-07-14 PROCEDURE — 94664 DEMO&/EVAL PT USE INHALER: CPT

## 2024-07-14 PROCEDURE — 93010 ELECTROCARDIOGRAM REPORT: CPT | Performed by: INTERNAL MEDICINE

## 2024-07-14 RX ORDER — ALBUTEROL SULFATE 2.5 MG/3ML
2.5 SOLUTION RESPIRATORY (INHALATION)
Status: DISCONTINUED | OUTPATIENT
Start: 2024-07-14 | End: 2024-07-20

## 2024-07-14 RX ORDER — CLOPIDOGREL BISULFATE 75 MG/1
75 TABLET ORAL DAILY
Status: DISCONTINUED | OUTPATIENT
Start: 2024-07-14 | End: 2024-07-25

## 2024-07-14 RX ORDER — LOSARTAN POTASSIUM 25 MG/1
25 TABLET ORAL NIGHTLY
Status: DISCONTINUED | OUTPATIENT
Start: 2024-07-14 | End: 2024-07-25

## 2024-07-14 RX ORDER — ATORVASTATIN CALCIUM 80 MG/1
80 TABLET, FILM COATED ORAL NIGHTLY
Status: DISCONTINUED | OUTPATIENT
Start: 2024-07-14 | End: 2024-07-25

## 2024-07-14 RX ORDER — POTASSIUM CHLORIDE 1.5 G/1.58G
40 POWDER, FOR SOLUTION ORAL ONCE
Status: COMPLETED | OUTPATIENT
Start: 2024-07-14 | End: 2024-07-14

## 2024-07-14 RX ORDER — BUMETANIDE 2 MG/1
2 TABLET ORAL DAILY
Status: DISCONTINUED | OUTPATIENT
Start: 2024-07-14 | End: 2024-07-16

## 2024-07-14 RX ORDER — ISOSORBIDE MONONITRATE 30 MG/1
30 TABLET, EXTENDED RELEASE ORAL
Status: DISCONTINUED | OUTPATIENT
Start: 2024-07-14 | End: 2024-07-25

## 2024-07-14 RX ORDER — PREDNISONE 20 MG/1
20 TABLET ORAL
Status: COMPLETED | OUTPATIENT
Start: 2024-07-14 | End: 2024-07-15

## 2024-07-14 RX ORDER — UREA 10 %
500 LOTION (ML) TOPICAL DAILY
Status: DISCONTINUED | OUTPATIENT
Start: 2024-07-14 | End: 2024-07-25

## 2024-07-14 RX ORDER — POTASSIUM CHLORIDE 750 MG/1
40 TABLET, FILM COATED, EXTENDED RELEASE ORAL ONCE
Status: DISCONTINUED | OUTPATIENT
Start: 2024-07-14 | End: 2024-07-14

## 2024-07-14 RX ADMIN — TIOTROPIUM BROMIDE INHALATION SPRAY 2 PUFF: 3.12 SPRAY, METERED RESPIRATORY (INHALATION) at 07:54

## 2024-07-14 RX ADMIN — POTASSIUM CHLORIDE 40 MEQ: 1.5 POWDER, FOR SOLUTION ORAL at 15:38

## 2024-07-14 RX ADMIN — METOPROLOL TARTRATE 25 MG: 25 TABLET, FILM COATED ORAL at 20:14

## 2024-07-14 RX ADMIN — ALBUTEROL SULFATE 2.5 MG: 2.5 SOLUTION RESPIRATORY (INHALATION) at 20:04

## 2024-07-14 RX ADMIN — BUDESONIDE 0.5 MG: 0.5 INHALANT ORAL at 20:05

## 2024-07-14 RX ADMIN — CLOPIDOGREL BISULFATE 75 MG: 75 TABLET, FILM COATED ORAL at 02:50

## 2024-07-14 RX ADMIN — Medication 100 MG: at 08:25

## 2024-07-14 RX ADMIN — Medication 500 MCG: at 08:25

## 2024-07-14 RX ADMIN — ALBUTEROL SULFATE 2.5 MG: 2.5 SOLUTION RESPIRATORY (INHALATION) at 11:40

## 2024-07-14 RX ADMIN — ATORVASTATIN CALCIUM 80 MG: 80 TABLET, FILM COATED ORAL at 20:14

## 2024-07-14 RX ADMIN — ALBUTEROL SULFATE 2.5 MG: 2.5 SOLUTION RESPIRATORY (INHALATION) at 07:54

## 2024-07-14 RX ADMIN — ISOSORBIDE MONONITRATE 30 MG: 30 TABLET, EXTENDED RELEASE ORAL at 08:25

## 2024-07-14 RX ADMIN — BUMETANIDE 2 MG: 2 TABLET ORAL at 15:38

## 2024-07-14 RX ADMIN — PREDNISONE 20 MG: 20 TABLET ORAL at 15:38

## 2024-07-14 RX ADMIN — POTASSIUM CHLORIDE 40 MEQ: 1.5 POWDER, FOR SOLUTION ORAL at 02:49

## 2024-07-14 RX ADMIN — ATORVASTATIN CALCIUM 80 MG: 80 TABLET, FILM COATED ORAL at 02:50

## 2024-07-14 RX ADMIN — BUDESONIDE 0.5 MG: 0.5 INHALANT ORAL at 07:54

## 2024-07-14 RX ADMIN — METOPROLOL TARTRATE 25 MG: 25 TABLET, FILM COATED ORAL at 02:50

## 2024-07-14 RX ADMIN — ALBUTEROL SULFATE 2.5 MG: 2.5 SOLUTION RESPIRATORY (INHALATION) at 15:18

## 2024-07-14 NOTE — PROGRESS NOTES
Name: Asha Krishnan ADMIT: 2024   : 1950  PCP: Capo Pedroza MD    MRN: [id number] LOS: 1 days   AGE/SEX: 74 y.o. female  ROOM: [facility]   Subjective   Chief Complaint   Patient presents with    Shortness of Breath     74-year-old female with COPD, coronary artery disease with stable angina, last heart catheterization was 2022.  At that time showed severe two-vessel coronary artery disease with 100% ostial stenosis consistent with total occlusion and distal RCA as well as an 80% ostial in-stent stenosis at that time they recommended medical management given complex stent anatomy and questionable compliance.  They recommended dual antiplatelet therapy and beta-blocker and nitro and high-dose statin.     Patient now comes the hospital because of shortness of air off and on for a while, tachycardia and found to be in atrial fibrillation with a heart rate of 108.  Was given Cardizem in the emergency room and heart rate has improved. Patient is given Solu-Medrol, DuoNebs in the emergency room and the patient has improved. D-dimer is elevated greater than 1 but the patient is currently refusing CT angio of the chest    ROS  No f/c  No n/v  No cp/palp  + soa/cough    Objective   Vital Signs  Temp:  [97 °F (36.1 °C)-98 °F (36.7 °C)] 97 °F (36.1 °C)  Heart Rate:  [] 74  Resp:  [16-20] 18  BP: (130-165)/(57-80) 132/57  SpO2:  [91 %-100 %] 95 %  on  Flow (L/min):  [2] 2;   Device (Oxygen Therapy): room air  Body mass index is 15.12 kg/m².    Physical Exam  Constitutional:       General: She is not in acute distress.     Appearance: She is ill-appearing (chronically).   HENT:      Head: Normocephalic and atraumatic.   Eyes:      General: No scleral icterus.  Cardiovascular:      Rate and Rhythm: Regular rhythm.      Heart sounds: Normal heart sounds.   Pulmonary:      Effort: Pulmonary effort is normal. Respiratory distress (slight) present.      Breath sounds: Wheezing (slight) present.  "  Abdominal:      General: There is no distension.      Palpations: Abdomen is soft.   Musculoskeletal:      Cervical back: Neck supple.   Neurological:      Mental Status: She is alert.   Psychiatric:         Behavior: Behavior normal.     Very chronically ill  Looks older than stated age  Underweight      Results Review:       I reviewed the patient's new clinical results.  Results from last 7 days   Lab Units 07/13/24  2101   WBC 10*3/mm3 7.12   HEMOGLOBIN g/dL 11.7*   PLATELETS 10*3/mm3 293     Results from last 7 days   Lab Units 07/13/24  2101   SODIUM mmol/L 141   POTASSIUM mmol/L 3.4*   CHLORIDE mmol/L 100   CO2 mmol/L 28.0   BUN mg/dL 20   CREATININE mg/dL 0.65   GLUCOSE mg/dL 75   Estimated Creatinine Clearance: 43.6 mL/min (by C-G formula based on SCr of 0.65 mg/dL).  Results from last 7 days   Lab Units 07/13/24  2101   ALBUMIN g/dL 3.9   BILIRUBIN mg/dL 0.7   ALK PHOS U/L 100   AST (SGOT) U/L 17   ALT (SGPT) U/L 10     Results from last 7 days   Lab Units 07/13/24  2101   CALCIUM mg/dL 9.0   ALBUMIN g/dL 3.9     Results from last 7 days   Lab Units 07/13/24  2101   PROCALCITONIN ng/mL 0.04   LACTATE mmol/L 1.5       Coag   Results from last 7 days   Lab Units 07/13/24  2101   INR  1.08   APTT seconds 31.1     HbA1C   Lab Results   Component Value Date    HGBA1C 6.1 (H) 11/10/2022     Infection   Results from last 7 days   Lab Units 07/13/24  2101   PROCALCITONIN ng/mL 0.04     Radiology(recent) No radiology results for the last day  HS Troponin T   Date Value Ref Range Status   07/14/2024 21 (H) <14 ng/L Final   07/13/2024 22 (H) <14 ng/L Final     No components found for: \"TSH;2\"    albuterol, 2.5 mg, Nebulization, 4x Daily - RT  atorvastatin, 80 mg, Oral, Nightly  budesonide, 0.5 mg, Nebulization, BID - RT  clopidogrel, 75 mg, Oral, Daily  isosorbide mononitrate, 30 mg, Oral, Q24H  losartan, 25 mg, Oral, Nightly  metoprolol tartrate, 25 mg, Oral, Q12H  thiamine, 100 mg, Oral, Daily  tiotropium bromide " monohydrate, 2 puff, Inhalation, Daily - RT  vitamin B-12, 500 mcg, Oral, Daily       Diet: Cardiac; Healthy Heart (2-3 Na+); Texture: Mechanical Ground (NDD 2); Fluid Consistency: Thin (IDDSI 0)      Assessment & Plan      Active Hospital Problems    Diagnosis  POA    **Acute respiratory failure with hypoxia [J96.01]  Yes    PAF (paroxysmal atrial fibrillation) [I48.0]  Yes    CAD (coronary artery disease) [I25.10]  Yes    Elevated d-dimer [R79.89]  Yes    COPD (chronic obstructive pulmonary disease) [J44.9]  Yes    Dyspnea [R06.00]  Yes    HTN (hypertension) [I10]  Yes      Resolved Hospital Problems   No resolved problems to display.     74-year-old female with COPD, coronary artery disease with stable angina, last heart catheterization was 11/11/2022.  At that time showed severe two-vessel coronary artery disease with 100% ostial stenosis consistent with total occlusion and distal RCA as well as an 80% ostial in-stent stenosis at that time they recommended medical management given complex stent anatomy and questionable compliance.  They recommended dual antiplatelet therapy and beta-blocker and nitro and high-dose statin.     Patient now comes the hospital because of shortness of air off and on for a while, tachycardia and found to be in atrial fibrillation with a heart rate of 108.  Was given Cardizem in the emergency room and heart rate has improved. Patient is given Solu-Medrol, DuoNebs in the emergency room and the patient has improved. D-dimer is elevated greater than 1 but the patient is currently refusing CT angio of the chest       Copd  -possible exacerbation  -spiriva/pulmicort/aluterol  -Patient was given steroids in the emergency room  -Hold off on further IV steroids, will use inhaled steroids for now and give 2 days of po prednisone     Atrial fib with RVR with normalization of HR with cardizem 10 iv  -she states she has not been taking her home meds because her  usually helps but he is  unable to help her with the meds  -Restart the patient's metoprolol to slightly lower dose  -Will ask cardiology to the patient consultation  -Minimal troponin elevation of 22 and on repeat 21  -EKG no evidence of ischemia but does show atrial fibrillation  -Not on anticoagulation but is on antiplatelet therapy      Hypokalemia  -replace     Severe coronary artery disease with multiple prior stents  -Troponin stable as noted in EKG no ischemic change  -Cardiology evaluation  -Continue with plavix, aspirin given in the emergency room  -Continue with statin, Imdur and metoprolol    ELVI Haji MD  [facility]  07/14/24  07:49 EDT

## 2024-07-14 NOTE — PLAN OF CARE
Goal Outcome Evaluation:  Plan of Care Reviewed With: patient        Progress: no change  Outcome Evaluation: VSS, no complaints. O2 sats fine on room air. No complaints of shortness of breath

## 2024-07-14 NOTE — CONSULTS
"Nutrition Services    Patient Name:  Asha Krishnan  YOB: 1950  MRN: [id number]  Admit Date:  7/13/2024Assessment Date:  07/14/24    Summary: ALEIDA. 74 yoF admitted with SOA. PMH: COPD, afib, CAD, heart cath 11/2022. She eats a soft diet at home. She has difficulty chewing meats- will down grade. C/o difficulty swallowing- SLP consulted. Noted 28# weight loss in the last year (-25.5%, significant). She does drink a boost per day at home. She is severely malnourished.   Labs: Na 141, K 3.4L, BUN 20, Cr 0.65, glu 75  Meds: lipitor, plavix, B1, B12    Patient meets ASPEN/AND criteria for nutrition diagnosis of severe malnutrition of starvation related illness based on: NFPE results, BMI, poor oral intake <50% for >5 days, significant weight loss 25.5% in 1 year     PLAN  - downgrade to ground meats  - boost BID   - encourage oral intakes   - follow SLP evaluation and recommendations   - replace K     RD to follow     CLINICAL NUTRITION ASSESSMENT      Reason for Assessment Nurse Admission Screen     Diagnosis/Problem   Acute respiratory failure    Medical/Surgical History Past Medical History:   Diagnosis Date    Atrial fibrillation, unspecified type     Cataract     COPD (chronic obstructive pulmonary disease)     Hyperlipidemia     Hypertension        Past Surgical History:   Procedure Laterality Date    CARDIAC CATHETERIZATION N/A 11/11/2022    Procedure: Left Heart Cath;  Surgeon: Tashi De La Torre MD;  Location: [facility] INVASIVE LOCATION;  Service: Cardiovascular;  Laterality: N/A;    CARDIAC CATHETERIZATION N/A 11/11/2022    Procedure: Coronary angiography;  Surgeon: Tashi De La Torre MD;  Location: [facility] INVASIVE LOCATION;  Service: Cardiovascular;  Laterality: N/A;        Anthropometrics        Current Height  Current Weight  BMI kg/m2 Height: 157.5 cm (62\")  Weight: 37.5 kg (82 lb 10.8 oz) (07/14/24 0202)  Body mass index is 15.12 kg/m².   Adjusted BMI (if applicable)    BMI " Category Underweight (18.4 or below)   Ideal Body Weight (IBW) 110#   Usual Body Weight (UBW) 110#   Weight Trend Loss   Weight History Wt Readings from Last 30 Encounters:   07/14/24 0202 37.5 kg (82 lb 10.8 oz)   07/13/24 2156 40.8 kg (90 lb)   08/01/23 1035 49.9 kg (110 lb)   11/12/22 0826 54.4 kg (120 lb)   11/11/22 0222 54.4 kg (120 lb)   11/11/22 0117 54.4 kg (120 lb)        Estimated/Assessed Needs       Energy Requirements    Weight for Calculation 37.5kg   Method for Estimation  35-40 kcal/kg   EST Needs (kcal/day) 4157-8041       Protein Requirements    Weight for Calculation 37.5kg   EST Protein Needs (g/kg) 1.2 - 1.5 gm/kg   EST Daily Needs (g/day) 45-57       Fluid Requirements     Method for Estimation 1 mL/kcal    Estimated Needs (mL/day) 6393-3269      Labs       Pertinent Labs    Results from last 7 days   Lab Units 07/13/24  2101   SODIUM mmol/L 141   POTASSIUM mmol/L 3.4*   CHLORIDE mmol/L 100   CO2 mmol/L 28.0   BUN mg/dL 20   CREATININE mg/dL 0.65   CALCIUM mg/dL 9.0   BILIRUBIN mg/dL 0.7   ALK PHOS U/L 100   ALT (SGPT) U/L 10   AST (SGOT) U/L 17   GLUCOSE mg/dL 75     Results from last 7 days   Lab Units 07/13/24  2101   HEMOGLOBIN g/dL 11.7*   HEMATOCRIT % 35.7   WBC 10*3/mm3 7.12   ALBUMIN g/dL 3.9     Results from last 7 days   Lab Units 07/13/24  2101   INR  1.08   APTT seconds 31.1   PLATELETS 10*3/mm3 293     COVID19   Date Value Ref Range Status   07/13/2024 Not Detected Not Detected - Ref. Range Final     Lab Results   Component Value Date    HGBA1C 6.1 (H) 11/10/2022          Medications           Scheduled Medications albuterol, 2.5 mg, Nebulization, 4x Daily - RT  atorvastatin, 80 mg, Oral, Nightly  budesonide, 0.5 mg, Nebulization, BID - RT  clopidogrel, 75 mg, Oral, Daily  isosorbide mononitrate, 30 mg, Oral, Q24H  losartan, 25 mg, Oral, Nightly  metoprolol tartrate, 25 mg, Oral, Q12H  thiamine, 100 mg, Oral, Daily  tiotropium bromide monohydrate, 2 puff, Inhalation, Daily -  RT  vitamin B-12, 500 mcg, Oral, Daily       Infusions     PRN Medications   [COMPLETED] Insert Peripheral IV **AND** sodium chloride     Physical Findings          General Findings alert, frail, generalized wasting, loss of muscle mass, loss of subcutaneous fat, oriented   Oral/Mouth Cavity WDL, dental appliance   Edema  no edema   Gastrointestinal WDL, last bowel movement: 7/13   Skin  skin intact   Tubes/Drains/Lines none   NFPE See Malnutrition Severity Assessment, Date Completed: 7/14 HT     Malnutrition Severity Assessment      Patient meets criteria for : Severe Malnutrition  Malnutrition Type (Last 8 Hours)       Malnutrition Severity Assessment       Row Name 07/14/24 1334       Malnutrition Severity Assessment    Malnutrition Type Starvation - Related Malnutrition      Row Name 07/14/24 1334       Insufficient Energy Intake     Insufficient Energy Intake Findings Severe    Insufficient Energy Intake  < or equal to 50% of est. energy requirement for > or equal to 5d)      Row Name 07/14/24 1334       Unintentional Weight Loss     Unintentional Weight Loss Findings None      Row Name 07/14/24 1334       Muscle Loss    Loss of Muscle Mass Findings Severe    [place] Region Severe - deep hollowing/scooping, lack of muscle to touch, facial bones well defined    Clavicle Bone Region Severe - protruding prominent bone    Acromion Bone Region Severe - squared shoulders, bones, and acromion process protrusion prominent    Scapular Bone Region Severe - prominent bones, depressions easily visible between ribs, scapula, spine, shoulders    Dorsal Hand Region Severe - prominent depression    Patellar Region Severe - prominent bone, square looking, very little muscle definition    Anterior Thigh Region Severe - line/depression along thigh, obviously thin    Posterior Calf Region Severe - thin with very little definition/firmness      Row Name 07/14/24 1334       Fat Loss    Subcutaneous Fat Loss Findings Severe    Orbital  Region  Severe - pronounced hollowness/depression, dark circles, loose saggy skin    Upper Arm Region Severe - mostly skin, very little space between folds, fingers touch    Thoracic & Lumbar Region Severe - ribs visible with prominent depressions, iliac crest very prominent      Row Name 07/14/24 1334       Criteria Met (Must meet criteria for severity in at least 2 of these categories: M Wasting, Fat Loss, Fluid, Secondary Signs, Wt. Status, Intake)    Patient meets criteria for  Severe Malnutrition                        Current Nutrition Orders & Evaluation of Intake       Oral Nutrition     Food Allergies NKFA   Current PO Diet Diet: Cardiac; Healthy Heart (2-3 Na+); Texture: Soft to Chew (NDD 3); Soft to Chew: Chopped Meat; Fluid Consistency: Thin (IDDSI 0)   Supplement n/a   PO Evaluation     % PO Intake <25%    Factors Affecting Intake: chewing difficulty, decreased appetite, early satiety , swallow impairment   --  PES STATEMENT / NUTRITION DIAGNOSIS      Nutrition Dx Problem  Problem: Malnutrition (severe)  Etiology: Factors Affecting Nutrition - weakness, fatigue, dysphagia    Signs/Symptoms: Report of Minimal PO Intake, NFPE Results, BMI, Unintended Weight Change, and Report/Observation     NUTRITION INTERVENTION / PLAN OF CARE      Intervention Goal(s) Meet estimated needs, Increase intake, Accepts oral nutrition supplement, Maintain weight, and No significant weight loss         RD Intervention/Action Liberalize or adjust diet to: regular, Adjusted supplement, Encourage intake, Continue to monitor, Care plan reviewed, and Recommend/order: ground meats   --      Prescription/Orders:       PO Diet Soft to chew with ground meats      Supplements Boost BID       Enteral Nutrition       Parenteral Nutrition    New Prescription Ordered? Yes   --      Monitor/Evaluation Per protocol, I&O, PO intake, Supplement intake, Pertinent labs, Weight   Discharge Plan/Needs Pending clinical course   --    RD to follow  per protocol.      Electronically signed by:  Natalee Mullins RD  07/14/24 13:00 EDT

## 2024-07-14 NOTE — CONSULTS
Date of Hospital Visit: 24  Encounter Provider: Padmaja Carr MD  Place of Service: [facility] CARDIOLOGY  Patient Name: Asha Krishnan  :1950  Referral Provider: Ed Coe MD    Chief complaint    History of Present Illness:    Asha Krishnan is a 74 y.o. female       She has history of COPD, CAD, h/o breast cancer, GERD, mobility issues and uses a wheelchair and a cane, hypertension, hyperlipidemia, history of migraines.  She has known history of  RCA , s/p bifurcation stenting of the left main LAD and circumflex arteries in 2017.    Cardiac catheterization done 2022 which showed patent stent in the ostial left main into the LAD, mild mid LAD in-stent restenosis, 80% in-stent circumflex stenosis likely from a crush stent, occluded ostial RCA.  Medical management was recommended.  She has had normal LVEF.    Stress nuclear perfusion study on 3/15/2024 at [facility] showed medium to large size inferior infarct, normal LV systolic function, no evidence of ischemia.  Echocardiogram done 2023 showed ejection fraction 60%, normal diastolic function, no significant valvular abnormalities.    She presented with acute respiratory failure with hypoxia and was found to be in atrial fibrillation with RVR.  She was also treated with Solu-Medrol and DuoNebs in the emergency department.  Chest x-ray showed hyperinflation of lungs.  D-dimer was elevated but patient refused CTA chest.  High-sensitivity troponin was minimally elevated, normal proBNP, hemoglobin 11.7 with otherwise normal CBC.  ECG showed atrial fibrillation, heart rate 108 bpm, no ischemic ST changes.    She has known history of atrial fibrillation and says that as long she takes her medications, she remains in rhythm.  She normally has to crush her medications to swallow them that she has dysphagia.  Her  got sick and hospitalized with chest pain and he crushes her meds for her.  She does  not have the strength to do it.  Because of this, she had not been able to take her metoprolol.  She does fall.  She is very weak.  She has very poor appetite.  She only gets chest pain if her heart rate gets fast.  She has mild chronic short windedness.      Past Medical History:   Diagnosis Date    Atrial fibrillation, unspecified type     Cataract     COPD (chronic obstructive pulmonary disease)     Hyperlipidemia     Hypertension        Past Surgical History:   Procedure Laterality Date    CARDIAC CATHETERIZATION N/A 11/11/2022    Procedure: Left Heart Cath;  Surgeon: Tashi De La Torre MD;  Location: [facility] CATH INVASIVE LOCATION;  Service: Cardiovascular;  Laterality: N/A;    CARDIAC CATHETERIZATION N/A 11/11/2022    Procedure: Coronary angiography;  Surgeon: Tashi De La Torre MD;  Location: [facility] INVASIVE LOCATION;  Service: Cardiovascular;  Laterality: N/A;       Medications Prior to Admission   Medication Sig Dispense Refill Last Dose    atorvastatin (LIPITOR) 80 MG tablet Take 1 tablet by mouth Every Night. 90 tablet  Past Month    bumetanide (BUMEX) 2 MG tablet Take 1 tablet by mouth Daily.   Past Month    clopidogrel (PLAVIX) 75 MG tablet Take 1 tablet by mouth Daily. 30 tablet  Past Month    isosorbide mononitrate (IMDUR) 30 MG 24 hr tablet Take 1 tablet by mouth Daily. (Patient taking differently: Take 2 tablets by mouth Daily.)   Past Month    metoprolol tartrate (LOPRESSOR) 50 MG tablet Take 1 tablet by mouth Every 12 (Twelve) Hours.   Past Month    nitroglycerin (NITROSTAT) 0.4 MG SL tablet Place 1 tablet under the tongue Every 5 (Five) Minutes As Needed for Chest Pain (Only if SBP Greater Than 100). Take no more than 3 doses in 15 minutes.  12 7/13/2024    thiamine (VITAMIN B1) 100 MG tablet Take 1 tablet by mouth Daily. 30 tablet 0 Past Month    tiotropium bromide monohydrate (SPIRIVA RESPIMAT) 2.5 MCG/ACT aerosol solution inhaler Inhale 2 puffs Daily. 4 g 0 Past Month    vitamin B-12  "(CYANOCOBALAMIN) 1000 MCG tablet Take 0.5 tablets by mouth Daily. 15 tablet 0 Past Month       Current Meds  Scheduled Meds:albuterol, 2.5 mg, Nebulization, 4x Daily - RT  atorvastatin, 80 mg, Oral, Nightly  budesonide, 0.5 mg, Nebulization, BID - RT  clopidogrel, 75 mg, Oral, Daily  isosorbide mononitrate, 30 mg, Oral, Q24H  metoprolol tartrate, 25 mg, Oral, Q12H  thiamine, 100 mg, Oral, Daily  tiotropium bromide monohydrate, 2 puff, Inhalation, Daily - RT  vitamin B-12, 500 mcg, Oral, Daily      Continuous Infusions:   PRN Meds:.  [COMPLETED] Insert Peripheral IV **AND** sodium chloride    Allergies as of 07/13/2024 - Reviewed 07/13/2024   Allergen Reaction Noted    Latex Anaphylaxis 11/11/2022    Tylenol [acetaminophen] Anaphylaxis 11/11/2022       Social History     Socioeconomic History    Marital status:    Tobacco Use    Smoking status: Every Day     Current packs/day: 2.00     Types: Cigarettes    Smokeless tobacco: Never   Vaping Use    Vaping status: Never Used   Substance and Sexual Activity    Alcohol use: Not Currently    Drug use: Not Currently    Sexual activity: Defer       History reviewed. No pertinent family history.    REVIEW OF SYSTEMS:   12 point ROS was performed and is negative except as outlined in HPI       Objective:   Temp:  [97.6 °F (36.4 °C)-98 °F (36.7 °C)] 97.6 °F (36.4 °C)  Heart Rate:  [] 61  Resp:  [16-20] 20  BP: (130-165)/(58-80) 130/58  Body mass index is 15.12 kg/m².  Flowsheet Rows      Flowsheet Row First Filed Value   Admission Height 157.5 cm (62\") Documented at 07/13/2024 2156   Admission Weight 40.8 kg (90 lb) Documented at 07/13/2024 2156          Vitals:    07/14/24 0754   BP:    Pulse: 61   Resp: 20   Temp:    SpO2: 100%       General Appearance:    Alert, cooperative, in no acute distress, cachexia   Head:    Normocephalic, without obvious abnormality, atraumatic   Throat:   oral mucosa moist   Lungs:   Decreased throughout,respirations regular, even and " unlabored    Heart:    Regular rhythm and normal rate, normal S1 and S2, no murmur, no gallop, no rub, no click   Extremities:   Moves all extremities well, no edema, no cyanosis, no redness   Pulses:   Pulses palpable and equal bilaterally. Normal radial, carotid,  dorsalis pedis and posterior tibial pulses bilaterally.      Lab Review:      Results from last 7 days   Lab Units 07/13/24  2101   SODIUM mmol/L 141   POTASSIUM mmol/L 3.4*   CHLORIDE mmol/L 100   CO2 mmol/L 28.0   BUN mg/dL 20   CREATININE mg/dL 0.65   CALCIUM mg/dL 9.0   BILIRUBIN mg/dL 0.7   ALK PHOS U/L 100   ALT (SGPT) U/L 10   AST (SGOT) U/L 17   GLUCOSE mg/dL 75     Results from last 7 days   Lab Units 07/14/24  0044 07/13/24  2101   HSTROP T ng/L 21* 22*     @LABRCNT(bnp)@  Results from last 7 days   Lab Units 07/13/24  2101   WBC 10*3/mm3 7.12   HEMOGLOBIN g/dL 11.7*   HEMATOCRIT % 35.7   PLATELETS 10*3/mm3 293     Results from last 7 days   Lab Units 07/13/24  2101   INR  1.08   APTT seconds 31.1               I personally viewed and interpreted the patient's EKG/Telemetry data        Acute respiratory failure with hypoxia      Assessment and Plan:    Acute hypoxic respiratory failure, better  Atrial fibrillation with rapid ventricular response 7/13/24, heart rate this morning is in the 60s in NSR.  Continue oral medications  COPD  CAD, stable recent stress and echo.  Denies any angina  Hypertension, blood pressure was mildly elevated, try losartan 25 mg nightly.  Hyperlipidemia, on atorvastatin at home.  Cachexia with malnutrition  Severe dysphagia      Cardiac status stable, continue current medications, we will see as needed.    Padmaja Carr MD  07/14/24  07:57 EDT.  Time spent in reviewing chart, discussion and examination:

## 2024-07-14 NOTE — ED PROVIDER NOTES
EMERGENCY DEPARTMENT ENCOUNTER  Room Number:  17/17  PCP: Capo Pedroza MD  Independent Historians: Patient and EMS      HPI:  Chief Complaint: had concerns including Shortness of Breath.     A complete HPI/ROS/PMH/PSH/SH/FH are unobtainable due to: Poor historian    Chronic or social conditions impacting patient care (Social Determinants of Health): None      Context: Asha Krishnan is a 74 y.o. female with a medical history of CAD, COPD, hypertension and hyperlipidemia who reports that she stopped smoking a few days ago presents to the ED c/o acute dyspnea.  She is unable to give any exact time but tells me it has been for a while.  When asked exactly what caused her to call ambulance tonight, she said she and her  was wheezing so she decided to come.  EMS reports that they found the patient hypoxemic on room air in the 70s with an irregularly, irregular tachycardic rhythm.  Patient with no acute focal pain complaints but tells me that she broke her neck in 1995.      Review of prior external notes (non-ED) -and- Review of prior external test results outside of this encounter:  Hospital discharge summary 11/13/2022 reviewed: Patient with history of hypertension, hyperlipidemia, CAD and dyspnea was admitted for evaluation of chest discomfort.      PAST MEDICAL HISTORY  Active Ambulatory Problems     Diagnosis Date Noted    Chest pain, atypical 11/11/2022    Chest pain, unspecified type 11/11/2022    CAD (coronary artery disease) 11/11/2022    Dysuria 11/11/2022    Dyspnea 11/11/2022    Elevated d-dimer 11/11/2022    COPD (chronic obstructive pulmonary disease) 11/11/2022    HTN (hypertension) 11/11/2022    HLD (hyperlipidemia) 11/11/2022    Altered mental status, unspecified 08/01/2023    PAF (paroxysmal atrial fibrillation) 08/01/2023    Pulmonary nodule 1 cm or greater in diameter 08/01/2023    Anemia 08/01/2023     Resolved Ambulatory Problems     Diagnosis Date Noted    No Resolved  Ambulatory Problems     Past Medical History:   Diagnosis Date    Cataract     Hyperlipidemia          PAST SURGICAL HISTORY  Past Surgical History:   Procedure Laterality Date    CARDIAC CATHETERIZATION N/A 11/11/2022    Procedure: Left Heart Cath;  Surgeon: Tashi De La Torre MD;  Location:  [facility];  Service: Cardiovascular;  Laterality: N/A;    CARDIAC CATHETERIZATION N/A 11/11/2022    Procedure: Coronary angiography;  Surgeon: Tashi De La Torre MD;  Location:  [facility];  Service: Cardiovascular;  Laterality: N/A;         FAMILY HISTORY  History reviewed. No pertinent family history.      SOCIAL HISTORY  Social History     Socioeconomic History    Marital status:    Tobacco Use    Smoking status: Every Day     Current packs/day: 2.00     Types: Cigarettes   Vaping Use    Vaping status: Never Used   Substance and Sexual Activity    Alcohol use: Not Currently    Drug use: Defer    Sexual activity: Defer         ALLERGIES  Latex and Tylenol [acetaminophen]      REVIEW OF SYSTEMS  Review of Systems  Included in HPI  All systems reviewed and negative except for those discussed in HPI.      PHYSICAL EXAM    I have reviewed the triage vital signs and nursing notes.    ED Triage Vitals   Temp Pulse Resp BP SpO2   -- -- -- -- --      Temp src Heart Rate Source Patient Position BP Location FiO2 (%)   -- -- -- -- --       Physical Exam    Physical Exam   Constitutional: Tachypneic and sitting upright.  Able to speak in full sentences.  Very thin and frail, borderline cachectic.  HENT:  Head: Normocephalic and atraumatic.   Oropharynx: Mucous membranes are moist.   Eyes: . No scleral icterus. No conjunctival pallor.  Neck: Normal range of motion. Neck supple.   Cardiovascular: Pink warm and well perfused throughout.  Tachycardic and irregularly irregular  Pulmonary/Chest: Tachypneic.  Breath sounds diminished bilaterally with coarse rhonchi bilateral bases.  Abdominal: Soft.  There is no tenderness. There is no rebound and no guarding.   Musculoskeletal: Moves all extremities equally.  No edema  Neurological: Alert and oriented.  No acute focal deficit appreciated.  Skin: Skin is pink, warm, and dry.   Psychiatric: Mood and affect normal.   Nursing note and vitals reviewed.             LAB RESULTS  Recent Results (from the past 24 hour(s))   ECG 12 Lead Dyspnea    Collection Time: 07/13/24  8:57 PM   Result Value Ref Range    QT Interval 329 ms    QTC Interval 441 ms   Comprehensive Metabolic Panel    Collection Time: 07/13/24  9:01 PM    Specimen: Blood   Result Value Ref Range    Glucose 75 65 - 99 mg/dL    BUN 20 8 - 23 mg/dL    Creatinine 0.65 0.57 - 1.00 mg/dL    Sodium 141 136 - 145 mmol/L    Potassium 3.4 (L) 3.5 - 5.2 mmol/L    Chloride 100 98 - 107 mmol/L    CO2 28.0 22.0 - 29.0 mmol/L    Calcium 9.0 8.6 - 10.5 mg/dL    Total Protein 6.7 6.0 - 8.5 g/dL    Albumin 3.9 3.5 - 5.2 g/dL    ALT (SGPT) 10 1 - 33 U/L    AST (SGOT) 17 1 - 32 U/L    Alkaline Phosphatase 100 39 - 117 U/L    Total Bilirubin 0.7 0.0 - 1.2 mg/dL    Globulin 2.8 gm/dL    A/G Ratio 1.4 g/dL    BUN/Creatinine Ratio 30.8 (H) 7.0 - 25.0    Anion Gap 13.0 5.0 - 15.0 mmol/L    eGFR 91.4 >60.0 mL/min/1.73   Protime-INR    Collection Time: 07/13/24  9:01 PM    Specimen: Blood   Result Value Ref Range    Protime 14.2 11.7 - 14.2 Seconds    INR 1.08 0.90 - 1.10   aPTT    Collection Time: 07/13/24  9:01 PM    Specimen: Blood   Result Value Ref Range    PTT 31.1 22.7 - 35.4 seconds   BNP    Collection Time: 07/13/24  9:01 PM    Specimen: Blood   Result Value Ref Range    proBNP 1,789.0 0.0 - 1,800.0 pg/mL   High Sensitivity Troponin T    Collection Time: 07/13/24  9:01 PM    Specimen: Blood   Result Value Ref Range    HS Troponin T 22 (H) <14 ng/L   Lactic Acid, Plasma    Collection Time: 07/13/24  9:01 PM    Specimen: Blood   Result Value Ref Range    Lactate 1.5 0.5 - 2.0 mmol/L   Procalcitonin    Collection Time:  07/13/24  9:01 PM    Specimen: Blood   Result Value Ref Range    Procalcitonin 0.04 0.00 - 0.25 ng/mL   CBC Auto Differential    Collection Time: 07/13/24  9:01 PM    Specimen: Blood   Result Value Ref Range    WBC 7.12 3.40 - 10.80 10*3/mm3    RBC 3.92 3.77 - 5.28 10*6/mm3    Hemoglobin 11.7 (L) 12.0 - 15.9 g/dL    Hematocrit 35.7 34.0 - 46.6 %    MCV 91.1 79.0 - 97.0 fL    MCH 29.8 26.6 - 33.0 pg    MCHC 32.8 31.5 - 35.7 g/dL    RDW 13.5 12.3 - 15.4 %    RDW-SD 44.6 37.0 - 54.0 fl    MPV 8.9 6.0 - 12.0 fL    Platelets 293 140 - 450 10*3/mm3    Neutrophil % 77.7 (H) 42.7 - 76.0 %    Lymphocyte % 15.7 (L) 19.6 - 45.3 %    Monocyte % 5.9 5.0 - 12.0 %    Eosinophil % 0.0 (L) 0.3 - 6.2 %    Basophil % 0.4 0.0 - 1.5 %    Immature Grans % 0.3 0.0 - 0.5 %    Neutrophils, Absolute 5.53 1.70 - 7.00 10*3/mm3    Lymphocytes, Absolute 1.12 0.70 - 3.10 10*3/mm3    Monocytes, Absolute 0.42 0.10 - 0.90 10*3/mm3    Eosinophils, Absolute 0.00 0.00 - 0.40 10*3/mm3    Basophils, Absolute 0.03 0.00 - 0.20 10*3/mm3    Immature Grans, Absolute 0.02 0.00 - 0.05 10*3/mm3    nRBC 0.0 0.0 - 0.2 /100 WBC   D-dimer, Quantitative    Collection Time: 07/13/24  9:01 PM    Specimen: Blood   Result Value Ref Range    D-Dimer, Quantitative 1.72 (H) 0.00 - 0.76 MCGFEU/mL   Respiratory Panel PCR w/COVID-19(SARS-CoV-2) [facility] In-House, NP Swab in UT/VTM, 2 HR TAT - Swab, Nasopharynx    Collection Time: 07/13/24  9:02 PM    Specimen: Nasopharynx; Swab   Result Value Ref Range    ADENOVIRUS, PCR Not Detected Not Detected    Coronavirus 229E Not Detected Not Detected    Coronavirus HKU1 Not Detected Not Detected    Coronavirus NL63 Not Detected Not Detected    Coronavirus OC43 Not Detected Not Detected    COVID19 Not Detected Not Detected - Ref. Range    Human Metapneumovirus Not Detected Not Detected    Human Rhinovirus/Enterovirus Not Detected Not Detected    Influenza A PCR Not Detected Not Detected    Influenza B PCR Not Detected  Not Detected    Parainfluenza Virus 1 Not Detected Not Detected    Parainfluenza Virus 2 Not Detected Not Detected    Parainfluenza Virus 3 Not Detected Not Detected    Parainfluenza Virus 4 Not Detected Not Detected    RSV, PCR Not Detected Not Detected    Bordetella pertussis pcr Not Detected Not Detected    Bordetella parapertussis PCR Not Detected Not Detected    Chlamydophila pneumoniae PCR Not Detected Not Detected    Mycoplasma pneumo by PCR Not Detected Not Detected   Blood Gas, Arterial -    Collection Time: 07/13/24  9:33 PM    Specimen: Arterial Blood   Result Value Ref Range    Site Right Brachial     Elmer's Test N/A     pH, Arterial 7.429 7.350 - 7.450 pH units    pCO2, Arterial 43.3 35.0 - 45.0 mm Hg    pO2, Arterial 63.7 (L) 80.0 - 100.0 mm Hg    HCO3, Arterial 28.7 (H) 22.0 - 28.0 mmol/L    Base Excess, Arterial 3.8 (H) 0.0 - 2.0 mmol/L    O2 Saturation, Arterial 92.4 92.0 - 98.5 %    A-a DO2 0.6 mmHg    CO2 Content 30.0 (H) 23 - 27 mmol/L    Barometric Pressure for Blood Gas 752.6000 mmHg    Modality Room Air     FIO2 21 %    Rate 16 Breaths/minute    Hemodilution No     Device Comment sat 95%     PO2/FIO2 303 0 - 500   High Sensitivity Troponin T 2Hr    Collection Time: 07/14/24 12:44 AM    Specimen: Blood   Result Value Ref Range    HS Troponin T 21 (H) <14 ng/L    Troponin T Delta -1 >=-4 - <+4 ng/L         RADIOLOGY  XR Chest 1 View    Result Date: 7/13/2024  XR CHEST 1 VW-  HISTORY: Shortness of air.  COMPARISON: Chest radiograph 11/11/2022  FINDINGS: A single view of the chest was obtained.  Support Devices:  None. Cardiac Silhouette/Mediastinum/Mary Beth: The cardiac silhouette is normal in size. There are coronary artery stents. There is calcific aortic atherosclerosis. Lungs/Pleural Spaces: There is emphysema. There is no large pleural effusion. There is no focal consolidation. Chest Wall/Diaphragm/Upper Abdomen: There is degenerative disc disease. There are old rib fractures.   CONCLUSION(S):    1.  Emphysema. No focal consolidation or large pleural effusion.  This report was finalized on 7/13/2024 9:31 PM by Dr. Nette Vázquez M.D on Workstation: [id number]         MEDICATIONS GIVEN IN ER  Medications   sodium chloride 0.9 % flush 10 mL (has no administration in time range)   budesonide (PULMICORT) nebulizer solution 0.5 mg (0.5 mg Nebulization Not Given 7/14/24 0113)   tiotropium (SPIRIVA RESPIMAT) 2.5 mcg/act aerosol solution inhaler (has no administration in time range)   albuterol (PROVENTIL) nebulizer solution 0.083% 2.5 mg/3mL (2.5 mg Nebulization Not Given 7/14/24 0113)   potassium chloride (KLOR-CON) packet 40 mEq (has no administration in time range)   ipratropium-albuterol (DUO-NEB) nebulizer solution 3 mL (3 mL Nebulization Given 7/13/24 2132)   methylPREDNISolone sodium succinate (SOLU-Medrol) injection 125 mg (125 mg Intravenous Given 7/13/24 2129)   aspirin chewable tablet 324 mg (324 mg Oral Given 7/13/24 2130)   dilTIAZem (CARDIZEM) injection 10 mg (10 mg Intravenous Given 7/13/24 2156)         ORDERS PLACED DURING THIS VISIT:  Orders Placed This Encounter   Procedures    Respiratory Panel PCR w/COVID-19(SARS-CoV-2) [facility] In-House, NP Swab in UTM/VTM, 2 HR TAT - Swab, Nasopharynx    XR Chest 1 View    Comprehensive Metabolic Panel    Protime-INR    aPTT    BNP    Blood Gas, Arterial -    High Sensitivity Troponin T    Lactic Acid, Plasma    Procalcitonin    CBC Auto Differential    High Sensitivity Troponin T 2Hr    D-dimer, Quantitative    Monitor Blood Pressure    Continuous Pulse Oximetry    [facility] (on-call MD unless specified) Details    Inpatient Cardiology Consult    Discontinue Patient Isolation    Oxygen Therapy- Nasal Cannula; 2 LPM    ECG 12 Lead Dyspnea    Insert Peripheral IV    Inpatient Admission    CBC & Differential         OUTPATIENT MEDICATION MANAGEMENT:  Current Facility-Administered Medications Ordered in Epic   Medication Dose Route Frequency Provider  Last Rate Last Admin    albuterol (PROVENTIL) nebulizer solution 0.083% 2.5 mg/3mL  2.5 mg Nebulization 4x Daily Johnny Conde MD        budesonide (PULMICORT) nebulizer solution 0.5 mg  0.5 mg Nebulization BID - RT Johnny Conde MD        potassium chloride (KLOR-CON) packet 40 mEq  40 mEq Oral Once Johnny Conde MD        sodium chloride 0.9 % flush 10 mL  10 mL Intravenous PRN Ed Coe MD        tiotropium (SPIRIVA RESPIMAT) 2.5 mcg/act aerosol solution inhaler  2 puff Inhalation Daily - RT Johnny Conde MD         Current Outpatient Medications Ordered in Epic   Medication Sig Dispense Refill    atorvastatin (LIPITOR) 80 MG tablet Take 1 tablet by mouth Every Night. 90 tablet     bumetanide (BUMEX) 2 MG tablet Take 1 tablet by mouth Daily.      clopidogrel (PLAVIX) 75 MG tablet Take 1 tablet by mouth Daily. 30 tablet     isosorbide mononitrate (IMDUR) 30 MG 24 hr tablet Take 1 tablet by mouth Daily. (Patient taking differently: Take 2 tablets by mouth Daily.)      metoprolol tartrate (LOPRESSOR) 50 MG tablet Take 1 tablet by mouth Every 12 (Twelve) Hours.      nitroglycerin (NITROSTAT) 0.4 MG SL tablet Place 1 tablet under the tongue Every 5 (Five) Minutes As Needed for Chest Pain (Only if SBP Greater Than 100). Take no more than 3 doses in 15 minutes.  12    thiamine (VITAMIN B1) 100 MG tablet Take 1 tablet by mouth Daily. 30 tablet 0    tiotropium bromide monohydrate (SPIRIVA RESPIMAT) 2.5 MCG/ACT aerosol solution inhaler Inhale 2 puffs Daily. 4 g 0    vitamin B-12 (CYANOCOBALAMIN) 1000 MCG tablet Take 0.5 tablets by mouth Daily. 15 tablet 0         PROCEDURES  Procedures      Total critical care time: Approximately 40 minutes    Due to a high probability of clinically significant, life threatening deterioration, the patient required my highest level of preparedness to intervene emergently and I personally spent this critical care time directly and personally managing the patient.  This critical care time included obtaining a history; examining the patient; vital sign monitoring; ordering and review of studies; arranging urgent treatment with development of a management plan; evaluation of patient's response to treatment; frequent reassessment; and, discussions with other providers.    This critical care time was performed to assess and manage the high probability of imminent, life-threatening deterioration that could result in multi-organ failure. It was exclusive of separately billable procedures and treating other patients and teaching time.    Please see MDM section and the rest of the note for further information on patient assessment and treatment.        PROGRESS, DATA ANALYSIS, CONSULTS, AND MEDICAL DECISION MAKING  All labs have been independently interpreted by me.  All radiology studies have been reviewed by me. All EKG's have been independently viewed and interpreted by me.  Discussion below represents my analysis of pertinent findings related to patient's condition, differential diagnosis, treatment plan and final disposition.    Differential diagnosis:   My differential diagnosis for dyspnea includes but is not limited to:  Asthma, COPD, pneumonia, pulmonary embolus, acute respiratory distress syndrome, pneumothorax, pleural effusion, pulmonary fibrosis, congestive heart failure, myocardial infarction, DKA, uremia, acidosis, sepsis, anemia, drug related, hyperventilation, CNS disease      Clinical Scores:                  ED Course as of 07/14/24 0143   Sat Jul 13, 2024 2103 EKG           EKG time: 2057  Rhythm/Rate: Irregularly irregular consistent with A-fib/flutter with ventricular rate   P waves and NJ: Sawtooth pattern potentially suggesting a flutter  QRS, axis: Narrow complex with noted ventricular ectopy  ST and T waves: No STEMI    Interpreted Contemporaneously by me, independently viewed  Comparison: 8/1/2023-previously sinus   [RS]   2104 Though EMS reported  initially the patient told them she had a history of atrial fibrillation I cannot find any such documentation in the records.  Certainly her long history of smoking and COPD put her at risk for A-fib/flutter.  Initiating evaluation and treatment with aspirin, steroids and DuoNeb while assessing for other acute life threats. [RS]   2144 Procalcitonin: 0.04 [RS]   2144 proBNP: 1,789.0 [RS]   2144 Lactate: 1.5 [RS]   2144 pH, Arterial: 7.429 [RS]   2144 pCO2, Arterial: 43.3 [RS]   2144 pO2, Arterial(!): 63.7 [RS]   2144 HS Troponin T(!): 22 [RS]   2144 Glucose: 75 [RS]   2144 BUN: 20 [RS]   2144 Creatinine: 0.65 [RS]   2144 Sodium: 141 [RS]   2144 Potassium(!): 3.4 [RS]   2144 WBC: 7.12 [RS]   2144 Hemoglobin(!): 11.7 [RS]   2144 RADIOLOGY      Study: Single view chest  Findings: Changes consistent with emphysema  I independently viewed and interpreted these images contemporaneously with treatment.    [RS]   2146 Reviewed all findings with patient was feeling much improved after breathing treatment.  Agreeable with plan for admission. [RS]   2227 COVID19: Not Detected [RS]   2227 Influenza A PCR: Not Detected [RS]   2227 Influenza B PCR: Not Detected [RS]   2234 CONSULT        Provider: Dr. Conde[facility]    Discussion: Reviewed patient history, ED presentation and evaluation.  Agreeable to accept patient for admission.  Request D-dimer.    Agreeable c treatment and planned disposition.         [RS]   Sun Jul 14, 2024   0143 Patient declined CTA chest as she did not feel comfortable trying to lay down.  Primary team notified. [RS]      ED Course User Index  [RS] Ed Coe MD         Prescription drug monitoring program review:     AS OF 01:43 EDT VITALS:    BP - 146/59  HR - 65  TEMP - 98 °F (36.7 °C) (Temporal)  O2 SATS - 94%    COMPLEXITY OF CARE  The patient requires admission.      DIAGNOSIS  Final diagnoses:   Acute hypoxemic respiratory failure   COPD with exacerbation   Atrial fibrillation with rapid  ventricular response         DISPOSITION  ED Disposition       ED Disposition   Decision to Admit    Condition   --    Comment   Level of Care: Telemetry [5]   Diagnosis: Acute respiratory failure with hypoxia [774995]   Admitting Physician: CLARENCE BENZ [[id number]]   Certification: I Certify That Inpatient Hospital Services Are Medically Necessary For Greater Than 2 Midnights                    ADMISSION    Discussed treatment plan and reason for admission with pt/family and admitting physician.  Pt/family voiced understanding of the plan for admission for further testing/treatment as needed.       Please note that portions of this document were completed with a voice recognition program.    Note Disclaimer: At [facility], we believe that sharing information builds trust and better relationships. You are receiving this note because you recently visited [facility]. It is possible you will see health information before a provider has talked with you about it. This kind of information can be easy to misunderstand. To help you fully understand what it means for your health, we urge you to discuss this note with your provider.         Ed Coe MD  07/13/24 2234       Ed Coe MD  07/14/24 0143

## 2024-07-14 NOTE — PLAN OF CARE
Goal Outcome Evaluation:   VSS. A&OX4. SR, at times irregular sinus. 02 @ 2LPM. No c/o pain or discomfort. W/C bound at home. Pt eats a soft diet normally d/t losing dentures. Does c/o difficulty swallowing food and pills. SLP consult placed. Cardio consult called. Call light in reach.

## 2024-07-14 NOTE — ED NOTES
Nursing report ED to floor  Asha ChristieMcLean  74 y.o.  female    HPI :  HPI (Adult)  Stated Reason for Visit: SOA, A flutter with RVR  History Obtained From: EMS    Chief Complaint  Chief Complaint   Patient presents with    Shortness of Breath       Admitting doctor:   Johnny Conde MD    Admitting diagnosis:   The primary encounter diagnosis was Acute hypoxemic respiratory failure. Diagnoses of COPD with exacerbation and Atrial fibrillation with rapid ventricular response were also pertinent to this visit.    Code status:   Current Code Status       Date Active Code Status Order ID Comments User Context       Prior            Allergies:   Latex and Tylenol [acetaminophen]    Isolation:   No active isolations    Intake and Output  No intake or output data in the 24 hours ending 07/14/24 0030    Weight:       07/13/24  2156   Weight: 40.8 kg (90 lb)       Most recent vitals:   Vitals:    07/13/24 2246 07/13/24 2301 07/13/24 2316 07/14/24 0001   BP: 145/69 133/78 133/80 154/61   BP Location: Left arm  Left arm Left arm   Patient Position: Sitting  Sitting Sitting   Pulse: 78 90 90 68   Resp: 20  18 18   Temp:       TempSrc:       SpO2: 97% 96% 96% 93%   Weight:       Height:           Active LDAs/IV Access:   Lines, Drains & Airways       Active LDAs       Name Placement date Placement time Site Days    Peripheral IV 07/13/24 2054 Right Antecubital 07/13/24 2054  Antecubital  less than 1                    Labs (abnormal labs have a star):   Labs Reviewed   COMPREHENSIVE METABOLIC PANEL - Abnormal; Notable for the following components:       Result Value    Potassium 3.4 (*)     BUN/Creatinine Ratio 30.8 (*)     All other components within normal limits    Narrative:     GFR Normal >60  Chronic Kidney Disease <60  Kidney Failure <15    The GFR formula is only valid for adults with stable renal function between ages 18 and 70.   BLOOD GAS, ARTERIAL - Abnormal; Notable for the following components:    pO2,  "Arterial 63.7 (*)     HCO3, Arterial 28.7 (*)     Base Excess, Arterial 3.8 (*)     CO2 Content 30.0 (*)     All other components within normal limits   TROPONIN - Abnormal; Notable for the following components:    HS Troponin T 22 (*)     All other components within normal limits    Narrative:     High Sensitive Troponin T Reference Range:  <14.0 ng/L- Negative Female for AMI  <22.0 ng/L- Negative Male for AMI  >=14 - Abnormal Female indicating possible myocardial injury.  >=22 - Abnormal Male indicating possible myocardial injury.   Clinicians would have to utilize clinical acumen, EKG, Troponin, and serial changes to determine if it is an Acute Myocardial Infarction or myocardial injury due to an underlying chronic condition.        CBC WITH AUTO DIFFERENTIAL - Abnormal; Notable for the following components:    Hemoglobin 11.7 (*)     Neutrophil % 77.7 (*)     Lymphocyte % 15.7 (*)     Eosinophil % 0.0 (*)     All other components within normal limits   D-DIMER, QUANTITATIVE - Abnormal; Notable for the following components:    D-Dimer, Quantitative 1.72 (*)     All other components within normal limits    Narrative:     According to the assay 's published package insert, a normal (<0.50 MCGFEU/mL) D-dimer result in conjunction with a non-high clinical probability assessment, excludes deep vein thrombosis (DVT) and pulmonary embolism (PE) with high sensitivity.    D-dimer values increase with age and this can make VTE exclusion of an older population difficult. To address this, the [employer], based on best available evidence and recent guidelines, recommends that clinicians use age-adjusted D-dimer thresholds in patients greater than 50 years of age with: a) a low probability of PE who do not meet all Pulmonary Embolism Rule Out Criteria, or b) in those with intermediate probability of PE.   The formula for an age-adjusted D-dimer cut-off is \"age/100\".  For example, a 60 year old " patient would have an age-adjusted cut-off of 0.60 MCGFEU/mL and an 80 year old 0.80 MCGFEU/mL.   RESPIRATORY PANEL PCR W/ COVID-19 (SARS-COV-2), NP SWAB IN UTM/VTP, 2 HR TAT - Normal    Narrative:     In the setting of a positive respiratory panel with a viral infection PLUS a negative procalcitonin without other underlying concern for bacterial infection, consider observing off antibiotics or discontinuation of antibiotics and continue supportive care. If the respiratory panel is positive for atypical bacterial infection (Bordetella pertussis, Chlamydophila pneumoniae, or Mycoplasma pneumoniae), consider antibiotic de-escalation to target atypical bacterial infection.   PROTIME-INR - Normal   APTT - Normal   BNP (IN-HOUSE) - Normal    Narrative:     This assay is used as an aid in the diagnosis of individuals suspected of having heart failure. It can be used as an aid in the diagnosis of acute decompensated heart failure (ADHF) in patients presenting with signs and symptoms of ADHF to the emergency department (ED). In addition, NT-proBNP of <300 pg/mL indicates ADHF is not likely.    Age Range Result Interpretation  NT-proBNP Concentration (pg/mL:      <50             Positive            >450                   Gray                 300-450                    Negative             <300    50-75           Positive            >900                  Gray                300-900                  Negative            <300      >75             Positive            >1800                  Gray                300-1800                  Negative            <300   LACTIC ACID, PLASMA - Normal   PROCALCITONIN - Normal    Narrative:     As a Marker for Sepsis (Non-Neonates):    1. <0.5 ng/mL represents a low risk of severe sepsis and/or septic shock.  2. >2 ng/mL represents a high risk of severe sepsis and/or septic shock.    As a Marker for Lower Respiratory Tract Infections that require antibiotic therapy:    PCT on Admission     "Antibiotic Therapy       6-12 Hrs later    >0.5                Strongly Recommended  >0.25 - <0.5        Recommended   0.1 - 0.25          Discouraged              Remeasure/reassess PCT  <0.1                Strongly Discouraged     Remeasure/reassess PCT    As 28 day mortality risk marker: \"Change in Procalcitonin Result\" (>80% or <=80%) if Day 0 (or Day 1) and Day 4 values are available. Refer to http://www.[facility]-pct-calculator.com    Change in PCT <=80%  A decrease of PCT levels below or equal to 80% defines a positive change in PCT test result representing a higher risk for 28-day all-cause mortality of patients diagnosed with severe sepsis for septic shock.    Change in PCT >80%  A decrease of PCT levels of more than 80% defines a negative change in PCT result representing a lower risk for 28-day all-cause mortality of patients diagnosed with severe sepsis or septic shock.      HIGH SENSITIVITIY TROPONIN T 2HR   CBC AND DIFFERENTIAL    Narrative:     The following orders were created for panel order CBC & Differential.  Procedure                               Abnormality         Status                     ---------                               -----------         ------                     CBC Auto Differential[[id number]]        Abnormal            Final result                 Please view results for these tests on the individual orders.       EKG:   ECG 12 Lead Dyspnea   Preliminary Result   HEART AELU=507  bpm   RR Lvqnsxqp=564  ms   AK Interval=  ms   P Horizontal Axis=  deg   P Front Axis=  deg   QRSD Interval=82  ms   QT Gnyagrcl=693  ms   QUvF=928  ms   QRS Axis=-44  deg   T Wave Axis=214  deg   - ABNORMAL ECG -   Atrial fibrillation   Probable LVH with secondary repol abnrm   Baseline wander in lead(s) V1,V4   Date and Time of Study:2024-07-13 20:57:43          Meds given in ED:   Medications   sodium chloride 0.9 % flush 10 mL (has no administration in time range)   budesonide (PULMICORT) nebulizer " solution 0.5 mg (has no administration in time range)   tiotropium (SPIRIVA RESPIMAT) 2.5 mcg/act aerosol solution inhaler (has no administration in time range)   albuterol (PROVENTIL) nebulizer solution 0.083% 2.5 mg/3mL (has no administration in time range)   potassium chloride (KLOR-CON) packet 40 mEq (has no administration in time range)   ipratropium-albuterol (DUO-NEB) nebulizer solution 3 mL (3 mL Nebulization Given 7/13/24 2132)   methylPREDNISolone sodium succinate (SOLU-Medrol) injection 125 mg (125 mg Intravenous Given 7/13/24 2129)   aspirin chewable tablet 324 mg (324 mg Oral Given 7/13/24 2130)   dilTIAZem (CARDIZEM) injection 10 mg (10 mg Intravenous Given 7/13/24 2156)       Imaging results:  No radiology results for the last day    Ambulatory status:   - bedrest      Social issues:   Social History     Socioeconomic History    Marital status:    Tobacco Use    Smoking status: Every Day     Current packs/day: 2.00     Types: Cigarettes   Vaping Use    Vaping status: Never Used   Substance and Sexual Activity    Alcohol use: Not Currently    Drug use: Defer    Sexual activity: Defer       Peripheral Neurovascular  Peripheral Neurovascular (Adult)  Peripheral Neurovascular WDL: WDL    Neuro Cognitive  Neuro Cognitive (Adult)  Cognitive/Neuro/Behavioral WDL: WDL, all  Level of Consciousness: Alert  Arousal Level: opens eyes spontaneously  Orientation: oriented x 4  Speech: clear, spontaneous  Mood/Behavior: cooperative, calm    Learning  Learning Assessment (Adult)  Learning Readiness and Ability: no barriers identified  Education Provided  Person Taught: patient  Teaching Method: verbal instruction  Teaching Focus: risk factors/triggers, diagnostic test, medical device/equipment use  Education Outcome Evaluation: eager to learn, acceptance expressed, verbalizes understanding    Respiratory  Respiratory (Adult)  Airway WDL: WDL  Respiratory WDL  Respiratory WDL: .WDL except, all  Rhythm/Pattern,  Respiratory: shallow, shortness of breath    Abdominal Pain       Pain Assessments  Pain (Adult)  (0-10) Pain Rating: Rest: 0  (0-10) Pain Rating: Activity: 0    NIH Stroke Scale       Irma Marshall RN  07/14/24 00:30 EDT

## 2024-07-14 NOTE — PLAN OF CARE
Goal Outcome Evaluation:     Problem: Malnutrition  Goal: Improved Nutritional Intake  Outcome: Ongoing, Progressing

## 2024-07-14 NOTE — H&P
[facility]   HISTORY AND PHYSICAL    Patient Name: Asha Krishnan  : 1950  MRN: [id number]  Primary Care Physician:  Capo Pedroza MD  Date of admission: 2024    Subjective   Subjective     Chief Complaint: soa    History of Present Illness  74-year-old female with COPD, coronary artery disease with stable angina, last heart catheterization was 2022.  At that time showed severe two-vessel coronary artery disease with 100% ostial stenosis consistent with total occlusion and distal RCA as well as an 80% ostial in-stent stenosis at that time they recommended medical management given complex stent anatomy and questionable compliance.  They recommended dual antiplatelet therapy and beta-blocker and nitro and high-dose statin.    Patient now comes the hospital because of shortness of air off and on for a while, tachycardia and found to be in atrial fibrillation with a heart rate of 108.  Was given Cardizem in the emergency room and heart rate has improved.    Patient is given Solu-Medrol, DuoNebs in the emergency room and the patient has improved.    Chest x-ray shows marked hyperinflation    D-dimer is elevated greater than 1 but the patient is currently refusing CT angio of the chest        Review of Systems     Personal History     Past Medical History:   Diagnosis Date   • Cataract    • Hyperlipidemia        Past Surgical History:   Procedure Laterality Date   • CARDIAC CATHETERIZATION N/A 2022    Procedure: Left Heart Cath;  Surgeon: Tashi De La Torre MD;  Location: [facility] CATH INVASIVE LOC[facility];  Service: Cardiovascular;  Laterality: N/A;   • CARDIAC CATHETERIZATION N/A 2022    Procedure: Coronary angiography;  Surgeon: Tashi De La Torre MD;  Location:  [facility] CATH INVASIVE LOCATION;  Service: Cardiovascular;  Laterality: N/A;       Family History: family history is not on file. Otherwise pertinent FHx was reviewed and not pertinent to current issue.    Social  History:  reports that she has been smoking cigarettes. She does not have any smokeless tobacco history on file. She reports that she does not currently use alcohol. Drug use questions deferred to the physician.    Home Medications:  atorvastatin, bumetanide, clopidogrel, isosorbide mononitrate, metoprolol tartrate, nitroglycerin, thiamine, tiotropium bromide monohydrate, and vitamin B-12    Allergies:  Allergies   Allergen Reactions   • Latex Anaphylaxis     Pt states whole body swells including throat   • Tylenol [Acetaminophen] Anaphylaxis     States whole body swells including throat       Objective    Objective     Vitals:   Temp:  [97.8 °F (36.6 °C)] 97.8 °F (36.6 °C)  Heart Rate:  [] 88  Resp:  [18-20] 20  BP: (155-160)/(63-78) 160/63  Flow (L/min):  [2] 2    Physical Exam  Constitutional:       Appearance: She is well-developed.   HENT:      Head: Normocephalic and atraumatic.      Mouth/Throat:      Pharynx: No oropharyngeal exudate.   Eyes:      Pupils: Pupils are equal, round, and reactive to light.   Neck:      Thyroid: No thyromegaly.      Trachea: No tracheal deviation.   Cardiovascular:      Rate and Rhythm: Normal rate. Rhythm irregular.      Heart sounds: No murmur heard.     No friction rub. No gallop.   Pulmonary:      Effort: Pulmonary effort is normal.      Breath sounds: Rhonchi present. No wheezing.   Abdominal:      General: There is no distension.      Palpations: Abdomen is soft.      Tenderness: There is no abdominal tenderness.   Musculoskeletal:         General: No deformity. Normal range of motion.      Cervical back: Normal range of motion and neck supple.   Skin:     General: Skin is warm and dry.      Coloration: Skin is not pale.      Findings: No erythema.   Neurological:      Mental Status: She is alert.   Psychiatric:         Mood and Affect: Mood normal.         Behavior: Behavior normal.         Result Review    Result Review:  I have personally reviewed the results from  the time of this admission to 7/13/2024 23:04 EDT and agree with these findings:  []  Laboratory list / accordion  [x]  Microbiology  [x]  Radiology  [x]  EKG/Telemetry   [x]  Cardiology/Vascular   [x]  Pathology  [x]  Old records  []  Other:  Most notable findings include: xray is hyperinflated      Assessment & Plan   Assessment / Plan     Brief Patient Summary:  Asha Krishnan is a 74 y.o. female who soa     Active Hospital Problems:  Active Hospital Problems    Diagnosis    • **Acute respiratory failure with hypoxia      Plan:   Acute hypoxemic respiratory failure  -improved, mild soa but seems to be close to baseline  -Oxygen saturations 93% on 2 L    Copd  -possible exacerbation  -spiriva/pulmicort/aluterol  -Patient was given steroids in the emergency room  -Hold off on further IV and oral steroids, will use inhaled steroids for now    Atrial fib with RVR with normalization of HR with cardizem 10 iv  -she states she has not been taking her home meds because her  usually helps but he is unable to help her with the meds  -Restart the patient's metoprolol to slightly lower dose  -Will ask cardiology to the patient consultation  -Minimal troponin elevation of 22 and on repeat 21  -EKG no evidence of ischemia but does show atrial fibrillation  -Not on anticoagulation but is on antiplatelet therapy     Hypokalemia  -replace    Severe coronary artery disease with multiple prior stents  -Troponin stable as noted in EKG no ischemic change  -Cardiology evaluation  -Continue with plavix, aspirin given in the emergency room  -Continue with statin, Imdur and metoprolol    D-dimer is elevated greater than 1 but the patient is currently refusing CT angio of the chest     VTE Prophylaxis:  Mechanical VTE prophylaxis orders are present.        CODE STATUS:       Admission Status:  I believe this patient meets observation status.    Patient seen and examined on 7/13    Johnny Conde MD

## 2024-07-15 LAB
ANION GAP SERPL CALCULATED.3IONS-SCNC: 10.3 MMOL/L (ref 5–15)
BUN SERPL-MCNC: 24 MG/DL (ref 8–23)
BUN/CREAT SERPL: 42.1 (ref 7–25)
CALCIUM SPEC-SCNC: 9.4 MG/DL (ref 8.6–10.5)
CHLORIDE SERPL-SCNC: 96 MMOL/L (ref 98–107)
CO2 SERPL-SCNC: 27.7 MMOL/L (ref 22–29)
CREAT SERPL-MCNC: 0.57 MG/DL (ref 0.57–1)
DEPRECATED RDW RBC AUTO: 42.4 FL (ref 37–54)
EGFRCR SERPLBLD CKD-EPI 2021: 94.3 ML/MIN/1.73
ERYTHROCYTE [DISTWIDTH] IN BLOOD BY AUTOMATED COUNT: 13.3 % (ref 12.3–15.4)
GLUCOSE SERPL-MCNC: 99 MG/DL (ref 65–99)
HCT VFR BLD AUTO: 32.7 % (ref 34–46.6)
HGB BLD-MCNC: 11.2 G/DL (ref 12–15.9)
MAGNESIUM SERPL-MCNC: 2.2 MG/DL (ref 1.6–2.4)
MCH RBC QN AUTO: 30.2 PG (ref 26.6–33)
MCHC RBC AUTO-ENTMCNC: 34.3 G/DL (ref 31.5–35.7)
MCV RBC AUTO: 88.1 FL (ref 79–97)
PLATELET # BLD AUTO: 293 10*3/MM3 (ref 140–450)
PMV BLD AUTO: 9.2 FL (ref 6–12)
POTASSIUM SERPL-SCNC: 4.3 MMOL/L (ref 3.5–5.2)
RBC # BLD AUTO: 3.71 10*6/MM3 (ref 3.77–5.28)
SODIUM SERPL-SCNC: 134 MMOL/L (ref 136–145)
WBC NRBC COR # BLD AUTO: 15.51 10*3/MM3 (ref 3.4–10.8)

## 2024-07-15 PROCEDURE — 94761 N-INVAS EAR/PLS OXIMETRY MLT: CPT

## 2024-07-15 PROCEDURE — 83735 ASSAY OF MAGNESIUM: CPT | Performed by: INTERNAL MEDICINE

## 2024-07-15 PROCEDURE — 63710000001 PREDNISONE PER 1 MG: Performed by: INTERNAL MEDICINE

## 2024-07-15 PROCEDURE — 85027 COMPLETE CBC AUTOMATED: CPT | Performed by: INTERNAL MEDICINE

## 2024-07-15 PROCEDURE — 97162 PT EVAL MOD COMPLEX 30 MIN: CPT

## 2024-07-15 PROCEDURE — 80048 BASIC METABOLIC PNL TOTAL CA: CPT | Performed by: INTERNAL MEDICINE

## 2024-07-15 PROCEDURE — G0378 HOSPITAL OBSERVATION PER HR: HCPCS

## 2024-07-15 PROCEDURE — 93010 ELECTROCARDIOGRAM REPORT: CPT | Performed by: INTERNAL MEDICINE

## 2024-07-15 PROCEDURE — 92610 EVALUATE SWALLOWING FUNCTION: CPT

## 2024-07-15 PROCEDURE — 94664 DEMO&/EVAL PT USE INHALER: CPT

## 2024-07-15 PROCEDURE — 97530 THERAPEUTIC ACTIVITIES: CPT

## 2024-07-15 PROCEDURE — 93005 ELECTROCARDIOGRAM TRACING: CPT | Performed by: INTERNAL MEDICINE

## 2024-07-15 PROCEDURE — 94799 UNLISTED PULMONARY SVC/PX: CPT

## 2024-07-15 RX ORDER — ALBUTEROL SULFATE 90 UG/1
2 AEROSOL, METERED RESPIRATORY (INHALATION) EVERY 4 HOURS PRN
Qty: 8.5 G | Refills: 0 | Status: SHIPPED | OUTPATIENT
Start: 2024-07-15

## 2024-07-15 RX ORDER — DILTIAZEM HYDROCHLORIDE 5 MG/ML
5 INJECTION INTRAVENOUS ONCE
Status: COMPLETED | OUTPATIENT
Start: 2024-07-15 | End: 2024-07-15

## 2024-07-15 RX ORDER — PREDNISONE 20 MG/1
20 TABLET ORAL
Qty: 2 TABLET | Refills: 0 | Status: SHIPPED | OUTPATIENT
Start: 2024-07-15 | End: 2024-07-31 | Stop reason: HOSPADM

## 2024-07-15 RX ORDER — LOSARTAN POTASSIUM 25 MG/1
25 TABLET ORAL NIGHTLY
Qty: 30 TABLET | Refills: 0 | Status: SHIPPED | OUTPATIENT
Start: 2024-07-15 | End: 2024-07-31 | Stop reason: HOSPADM

## 2024-07-15 RX ADMIN — BUDESONIDE 0.5 MG: 0.5 INHALANT ORAL at 19:39

## 2024-07-15 RX ADMIN — TIOTROPIUM BROMIDE INHALATION SPRAY 2 PUFF: 3.12 SPRAY, METERED RESPIRATORY (INHALATION) at 07:53

## 2024-07-15 RX ADMIN — ISOSORBIDE MONONITRATE 30 MG: 30 TABLET, EXTENDED RELEASE ORAL at 09:48

## 2024-07-15 RX ADMIN — DILTIAZEM HYDROCHLORIDE 5 MG: 5 INJECTION, SOLUTION INTRAVENOUS at 17:51

## 2024-07-15 RX ADMIN — ALBUTEROL SULFATE 2.5 MG: 2.5 SOLUTION RESPIRATORY (INHALATION) at 10:56

## 2024-07-15 RX ADMIN — ALBUTEROL SULFATE 2.5 MG: 2.5 SOLUTION RESPIRATORY (INHALATION) at 14:43

## 2024-07-15 RX ADMIN — METOPROLOL TARTRATE 25 MG: 25 TABLET, FILM COATED ORAL at 09:47

## 2024-07-15 RX ADMIN — DILTIAZEM HYDROCHLORIDE 5 MG: 5 INJECTION, SOLUTION INTRAVENOUS at 18:58

## 2024-07-15 RX ADMIN — ATORVASTATIN CALCIUM 80 MG: 80 TABLET, FILM COATED ORAL at 20:51

## 2024-07-15 RX ADMIN — Medication 500 MCG: at 09:48

## 2024-07-15 RX ADMIN — LOSARTAN POTASSIUM 25 MG: 25 TABLET, FILM COATED ORAL at 20:51

## 2024-07-15 RX ADMIN — BUMETANIDE 2 MG: 2 TABLET ORAL at 09:48

## 2024-07-15 RX ADMIN — Medication 10 ML: at 09:48

## 2024-07-15 RX ADMIN — Medication 100 MG: at 09:48

## 2024-07-15 RX ADMIN — CLOPIDOGREL BISULFATE 75 MG: 75 TABLET, FILM COATED ORAL at 09:48

## 2024-07-15 RX ADMIN — ALBUTEROL SULFATE 2.5 MG: 2.5 SOLUTION RESPIRATORY (INHALATION) at 19:39

## 2024-07-15 RX ADMIN — BUDESONIDE 0.5 MG: 0.5 INHALANT ORAL at 07:52

## 2024-07-15 RX ADMIN — METOPROLOL TARTRATE 25 MG: 25 TABLET, FILM COATED ORAL at 20:51

## 2024-07-15 RX ADMIN — ALBUTEROL SULFATE 2.5 MG: 2.5 SOLUTION RESPIRATORY (INHALATION) at 07:51

## 2024-07-15 RX ADMIN — PREDNISONE 20 MG: 20 TABLET ORAL at 09:48

## 2024-07-15 NOTE — PLAN OF CARE
Goal Outcome Evaluation:  Plan of Care Reviewed With: patient           Outcome Evaluation: Pt is a 73 yo female seen for PT elise this AM. Pt admitted with COPD exacerbation and hypoxia. Pt lives at home with spouse who is her caregiver. Of note, pt's spouse is also currently in the hospital. Pt reports she is able to complete stand pivot transfers to her wheelchair, usually with assist but can complete independently at times. Pt reports she does not ambulate and her spouse pushes her in the wheelchair around the house as needed. Today, pt demo generalized weakness, impaired balance, decreased activity tolerance and general functional mobility deficits. Pt completed supine>sit with sba and increased time. She stood with cga and completed stand pivot bed<>chair with cga. Pt not safe to d/c home alone, PT recommending d/c SNF with likely transition to LTC.      Anticipated Discharge Disposition (PT): skilled nursing facility, extended care facility

## 2024-07-15 NOTE — NURSING NOTE
Patient had high heart rate. Notified Dr Haji ordered EKG. New order for diltalizem given. Patient rate still in 140-160 range. Notified MD again through epic chat. Awaiting md reply for additional orders.

## 2024-07-15 NOTE — PLAN OF CARE
Problem: Adult Inpatient Plan of Care  Goal: Plan of Care Review  Outcome: Ongoing, Progressing  Goal: Patient-Specific Goal (Individualized)  Outcome: Ongoing, Progressing  Goal: Absence of Hospital-Acquired Illness or Injury  Outcome: Ongoing, Progressing  Intervention: Identify and Manage Fall Risk  Recent Flowsheet Documentation  Taken 7/15/2024 0231 by Laurie Butler RN  Safety Promotion/Fall Prevention:   activity supervised   assistive device/personal items within reach   clutter free environment maintained   fall prevention program maintained   nonskid shoes/slippers when out of bed   room organization consistent   safety round/check completed  Taken 7/15/2024 0000 by Laurie Butler RN  Safety Promotion/Fall Prevention:   activity supervised   assistive device/personal items within reach   clutter free environment maintained   fall prevention program maintained   nonskid shoes/slippers when out of bed   room organization consistent   safety round/check completed  Taken 7/14/2024 2200 by Laurie Butler RN  Safety Promotion/Fall Prevention:   activity supervised   assistive device/personal items within reach   clutter free environment maintained   fall prevention program maintained   nonskid shoes/slippers when out of bed   room organization consistent   safety round/check completed  Taken 7/14/2024 2015 by Laurie Butler RN  Safety Promotion/Fall Prevention:   activity supervised   assistive device/personal items within reach   clutter free environment maintained   fall prevention program maintained   nonskid shoes/slippers when out of bed   room organization consistent   safety round/check completed  Intervention: Prevent Skin Injury  Recent Flowsheet Documentation  Taken 7/15/2024 0231 by Laurie Butler, RN  Body Position: position changed independently  Skin Protection:   adhesive use limited   incontinence pads utilized   tubing/devices free from skin contact   transparent dressing  maintained  Taken 7/15/2024 0000 by Laurie Butler RN  Body Position: position changed independently  Taken 7/14/2024 2200 by Laurie Butler RN  Body Position: sitting up in bed  Taken 7/14/2024 2015 by Laurie Butler RN  Body Position: sitting up in bed  Skin Protection:   adhesive use limited   incontinence pads utilized   tubing/devices free from skin contact   transparent dressing maintained  Intervention: Prevent and Manage VTE (Venous Thromboembolism) Risk  Recent Flowsheet Documentation  Taken 7/15/2024 0231 by Laurie Butler RN  Activity Management: activity minimized  VTE Prevention/Management:   bilateral   sequential compression devices on  Range of Motion: active ROM (range of motion) encouraged  Taken 7/15/2024 0000 by Laurie Butler RN  Activity Management: activity minimized  Taken 7/14/2024 2200 by Laurie Butler RN  Activity Management: activity encouraged  Taken 7/14/2024 2015 by Laurie Butler RN  Activity Management: activity encouraged  VTE Prevention/Management:   bilateral   sequential compression devices on  Range of Motion: active ROM (range of motion) encouraged  Intervention: Prevent Infection  Recent Flowsheet Documentation  Taken 7/15/2024 0231 by Laurie Butler RN  Infection Prevention:   cohorting utilized   environmental surveillance performed   single patient room provided   rest/sleep promoted  Taken 7/15/2024 0000 by Laurie Butler RN  Infection Prevention:   cohorting utilized   environmental surveillance performed   rest/sleep promoted   single patient room provided  Taken 7/14/2024 2200 by Laurie Butler RN  Infection Prevention:   cohorting utilized   environmental surveillance performed   rest/sleep promoted   single patient room provided  Taken 7/14/2024 2015 by Laurie Butler RN  Infection Prevention:   cohorting utilized   environmental surveillance performed   single patient room provided   rest/sleep promoted  Goal: Optimal Comfort and  Wellbeing  Outcome: Ongoing, Progressing  Intervention: Provide Person-Centered Care  Recent Flowsheet Documentation  Taken 7/15/2024 0231 by Laurie Butler RN  Trust Relationship/Rapport:   care explained   choices provided   emotional support provided   empathic listening provided   questions answered   reassurance provided   thoughts/feelings acknowledged  Taken 7/14/2024 2015 by Laurie Butler RN  Trust Relationship/Rapport:   care explained   choices provided   emotional support provided   empathic listening provided   questions answered   reassurance provided   thoughts/feelings acknowledged  Goal: Readiness for Transition of Care  Outcome: Ongoing, Progressing     Problem: Skin Injury Risk Increased  Goal: Skin Health and Integrity  Outcome: Ongoing, Progressing  Intervention: Optimize Skin Protection  Recent Flowsheet Documentation  Taken 7/15/2024 0231 by Laurie Butler RN  Pressure Reduction Techniques:   frequent weight shift encouraged   heels elevated off bed   positioned off wounds   weight shift assistance provided  Head of Bed (HOB) Positioning: HOB elevated  Pressure Reduction Devices: heel offloading device utilized  Skin Protection:   adhesive use limited   incontinence pads utilized   tubing/devices free from skin contact   transparent dressing maintained  Taken 7/15/2024 0000 by Laurie Butler RN  Head of Bed (HOB) Positioning: HOB elevated  Taken 7/14/2024 2200 by Laurie Butler RN  Head of Bed (HOB) Positioning: HOB elevated  Taken 7/14/2024 2015 by Laurie Butler RN  Pressure Reduction Techniques:   frequent weight shift encouraged   weight shift assistance provided   heels elevated off bed   pressure points protected  Head of Bed (HOB) Positioning: HOB elevated  Pressure Reduction Devices: heel offloading device utilized  Skin Protection:   adhesive use limited   incontinence pads utilized   tubing/devices free from skin contact   transparent dressing maintained     Problem:  Hypertension Comorbidity  Goal: Blood Pressure in Desired Range  Outcome: Ongoing, Progressing  Intervention: Maintain Blood Pressure Management  Recent Flowsheet Documentation  Taken 7/15/2024 0231 by Laurie Butler RN  Medication Review/Management: medications reviewed  Taken 7/15/2024 0000 by Laurie Butler RN  Medication Review/Management: medications reviewed  Taken 7/14/2024 2200 by Laurie Butler RN  Medication Review/Management: medications reviewed  Taken 7/14/2024 2015 by Laurie Butler RN  Medication Review/Management: medications reviewed     Problem: Pain Chronic (Persistent) (Comorbidity Management)  Goal: Acceptable Pain Control and Functional Ability  Outcome: Ongoing, Progressing  Intervention: Manage Persistent Pain  Recent Flowsheet Documentation  Taken 7/15/2024 0231 by Laurie Butler RN  Sleep/Rest Enhancement:   awakenings minimized   consistent schedule promoted   relaxation techniques promoted   regular sleep/rest pattern promoted  Medication Review/Management: medications reviewed  Taken 7/15/2024 0000 by Laurie Butler RN  Medication Review/Management: medications reviewed  Taken 7/14/2024 2200 by Laurie Butler RN  Medication Review/Management: medications reviewed  Taken 7/14/2024 2015 by Laurie Butler RN  Sleep/Rest Enhancement:   awakenings minimized   consistent schedule promoted   room darkened   relaxation techniques promoted  Medication Review/Management: medications reviewed  Intervention: Optimize Psychosocial Wellbeing  Recent Flowsheet Documentation  Taken 7/15/2024 0231 by Laurie Butler RN  Diversional Activities: television  Family/Support System Care:   self-care encouraged   support provided  Taken 7/14/2024 2015 by Laurie Butler RN  Family/Support System Care:   self-care encouraged   support provided     Problem: Malnutrition  Goal: Improved Nutritional Intake  Outcome: Ongoing, Progressing     Problem: Fall Injury Risk  Goal: Absence of Fall and  Fall-Related Injury  Outcome: Ongoing, Progressing  Intervention: Identify and Manage Contributors  Recent Flowsheet Documentation  Taken 7/15/2024 0231 by Laurie Butler, RN  Medication Review/Management: medications reviewed  Taken 7/15/2024 0000 by Laurie Butler RN  Medication Review/Management: medications reviewed  Taken 7/14/2024 2200 by Laurie Butler, RN  Medication Review/Management: medications reviewed  Taken 7/14/2024 2015 by Laurie Butler, RN  Medication Review/Management: medications reviewed  Intervention: Promote Injury-Free Environment  Recent Flowsheet Documentation  Taken 7/15/2024 0231 by Laurie Butler, RN  Safety Promotion/Fall Prevention:   activity supervised   assistive device/personal items within reach   clutter free environment maintained   fall prevention program maintained   nonskid shoes/slippers when out of bed   room organization consistent   safety round/check completed  Taken 7/15/2024 0000 by Laurie Butler RN  Safety Promotion/Fall Prevention:   activity supervised   assistive device/personal items within reach   clutter free environment maintained   fall prevention program maintained   nonskid shoes/slippers when out of bed   room organization consistent   safety round/check completed  Taken 7/14/2024 2200 by Laurie Butler, RN  Safety Promotion/Fall Prevention:   activity supervised   assistive device/personal items within reach   clutter free environment maintained   fall prevention program maintained   nonskid shoes/slippers when out of bed   room organization consistent   safety round/check completed  Taken 7/14/2024 2015 by Laurie Butler, RN  Safety Promotion/Fall Prevention:   activity supervised   assistive device/personal items within reach   clutter free environment maintained   fall prevention program maintained   nonskid shoes/slippers when out of bed   room organization consistent   safety round/check completed   Goal Outcome Evaluation:

## 2024-07-15 NOTE — THERAPY EVALUATION
Patient Name: Asha Krishnan  : 1950    MRN: [id number]                              Today's Date: 7/15/2024       Admit Date: 2024    Visit Dx:     ICD-10-CM ICD-9-CM   1. Acute hypoxemic respiratory failure  J96.01 518.81   2. COPD with exacerbation  J44.1 491.21   3. Atrial fibrillation with rapid ventricular response  I48.91 427.31     Patient Active Problem List   Diagnosis    Chest pain, atypical    Chest pain, unspecified type    CAD (coronary artery disease)    Dysuria    Dyspnea    Elevated d-dimer    COPD (chronic obstructive pulmonary disease)    HTN (hypertension)    HLD (hyperlipidemia)    Altered mental status, unspecified    PAF (paroxysmal atrial fibrillation)    Pulmonary nodule 1 cm or greater in diameter    Anemia    Acute respiratory failure with hypoxia    Severe malnutrition     Past Medical History:   Diagnosis Date    Atrial fibrillation, unspecified type     Cataract     COPD (chronic obstructive pulmonary disease)     Hyperlipidemia     Hypertension      Past Surgical History:   Procedure Laterality Date    CARDIAC CATHETERIZATION N/A 2022    Procedure: Left Heart Cath;  Surgeon: Tashi De La Torre MD;  Location: [facility] INVASIVE LOCATION;  Service: Cardiovascular;  Laterality: N/A;    CARDIAC CATHETERIZATION N/A 2022    Procedure: Coronary angiography;  Surgeon: Tashi De La Torre MD;  Location: [facility] INVASIVE LOCATION;  Service: Cardiovascular;  Laterality: N/A;      General Information       Row Name 07/15/24 1108          Physical Therapy Time and Intention    Document Type evaluation  -CW     Mode of Treatment individual therapy;physical therapy  -CW       Row Name 07/15/24 1108          General Information    Patient Profile Reviewed yes  -CW     Prior Level of Function min assist:;transfer;all household mobility;bed mobility  spouse assists with all mobility, transfers to  at baseline - at times independently but at times needs assist per pt.  Spouse currently in [facility] as a pt also  -CW     Existing Precautions/Restrictions fall  -CW     Barriers to Rehab previous functional deficit;medically complex  -CW       Row Name 07/15/24 1108          Living Environment    People in Home spouse  -CW       Row Name 07/15/24 1108          Cognition    Orientation Status (Cognition) oriented x 3  -CW       Row Name 07/15/24 1108          Safety Issues, Functional Mobility    Impairments Affecting Function (Mobility) balance;endurance/activity tolerance;strength  -CW               User Key  (r) = Recorded By, (t) = Taken By, (c) = Cosigned By      Initials Name Provider Type    CW Heelne Pike PT Physical Therapist                   Mobility       Row Name 07/15/24 1110          Bed Mobility    Bed Mobility supine-sit;sit-supine  -CW     Supine-Sit [place] (Bed Mobility) verbal cues;standby assist  -CW     Sit-Supine [place] (Bed Mobility) minimum assist (75% patient effort);1 person assist;verbal cues  -CW     Assistive Device (Bed Mobility) head of bed elevated  -CW     Comment, (Bed Mobility) min A at BLE for return to supine  -CW       Row Name 07/15/24 1110          Bed-Chair Transfer    Bed-Chair [place] (Transfers) contact guard  -CW     Comment, (Bed-Chair Transfer) Stand pivot bed<>chair  -CW       Row Name 07/15/24 1110          Sit-Stand Transfer    Sit-Stand [place] (Transfers) contact guard  -CW     Comment, (Sit-Stand Transfer) STS x2  -CW               User Key  (r) = Recorded By, (t) = Taken By, (c) = Cosigned By      Initials Name Provider Type    Helene Cortes PT Physical Therapist                   Obj/Interventions       Row Name 07/15/24 1114          Range of Motion Comprehensive    General Range of Motion bilateral lower extremity ROM WFL  -CW     Comment, General Range of Motion B knees unable to achieve full extension, lacking approx 15 degrees  -CW       Row Name 07/15/24 1114          Strength Comprehensive  (MMT)    Comment, General Manual Muscle Testing (MMT) Assessment Generalized BLE weakness  -CW       Row Name 07/15/24 1114          Balance    Balance Assessment standing static balance;standing dynamic balance;sitting static balance  -CW     Static Sitting Balance standby assist  -CW     Position, Sitting Balance sitting edge of bed  -CW     Static Standing Balance contact guard  -CW     Dynamic Standing Balance contact guard  -CW               User Key  (r) = Recorded By, (t) = Taken By, (c) = Cosigned By      Initials Name Provider Type    CW Helene Pike, PT Physical Therapist                   Goals/Plan       Row Name 07/15/24 1121          Bed Mobility Goal 1 (PT)    Activity/Assistive Device (Bed Mobility Goal 1, PT) bed mobility activities, all  -CW     [place] Level/Cues Needed (Bed Mobility Goal 1, PT) modified independence  -CW     Time Frame (Bed Mobility Goal 1, PT) 2 weeks  -CW       Row Name 07/15/24 1121          Transfer Goal 1 (PT)    Activity/Assistive Device (Transfer Goal 1, PT) sit-to-stand/stand-to-sit;bed-to-chair/chair-to-bed  -CW     [place] Level/Cues Needed (Transfer Goal 1, PT) modified independence  -CW     Time Frame (Transfer Goal 1, PT) 2 weeks  -CW       Row Name 07/15/24 1121          Therapy Assessment/Plan (PT)    Planned Therapy Interventions (PT) balance training;bed mobility training;postural re-education;transfer training;gait training;ROM (range of motion);strengthening;patient/family education  -CW               User Key  (r) = Recorded By, (t) = Taken By, (c) = Cosigned By      Initials Name Provider Type    CW Helene Pike PT Physical Therapist                   Clinical Impression       Row Name 07/15/24 1115          Pain    Pretreatment Pain Rating 0/10 - no pain  -CW     Posttreatment Pain Rating 0/10 - no pain  -CW       Row Name 07/15/24 1115          Plan of Care Review    Plan of Care Reviewed With patient  -CW     Outcome Evaluation Pt is a  73 yo female seen for PT eval this AM. Pt admitted with COPD exacerbation and hypoxia. Pt lives at home with spouse who is her caregiver. Of note, pt's spouse is also currently in the hospital. Pt reports she is able to complete stand pivot transfers to her wheelchair, usually with assist but can complete independently at times. Pt reports she does not ambulate and her spouse pushes her in the wheelchair around the house as needed. Today, pt demo generalized weakness, impaired balance, decreased activity tolerance and general functional mobility deficits. Pt completed supine>sit with sba and increased time. She stood with cga and completed stand pivot bed<>chair with cga. Pt not safe to d/c home alone, PT recommending d/c SNF with likely transition to LTC.  -CW       Row Name 07/15/24 1115          Therapy Assessment/Plan (PT)    Rehab Potential (PT) good, to achieve stated therapy goals  -CW     Criteria for Skilled Interventions Met (PT) yes  -CW     Therapy Frequency (PT) 5 times/wk  -CW       Row Name 07/15/24 1115          Vital Signs    Pre SpO2 (%) 96  -CW     O2 Delivery Pre Treatment room air  -CW     Intra SpO2 (%) 95  -CW     O2 Delivery Intra Treatment room air  -CW     O2 Delivery Post Treatment room air  -CW       Row Name 07/15/24 1115          Positioning and Restraints    Pre-Treatment Position in bed  -CW     Post Treatment Position bed  -CW     In Bed notified nsg;call light within reach;encouraged to call for assist;exit alarm on;fowlers;heels elevated;SCD pump applied  -CW               User Key  (r) = Recorded By, (t) = Taken By, (c) = Cosigned By      Initials Name Provider Type    CW Helene Pike, PT Physical Therapist                   Outcome Measures       Row Name 07/15/24 1122          How much help from another person do you currently need...    Turning from your back to your side while in flat bed without using bedrails? 3  -CW     Moving from lying on back to sitting on the side  of a flat bed without bedrails? 3  -CW     Moving to and from a bed to a chair (including a wheelchair)? 2  -CW     Standing up from a chair using your arms (e.g., wheelchair, bedside chair)? 2  -CW     Climbing 3-5 steps with a railing? 1  -CW     To walk in hospital room? 1  -CW     AM-PAC 6 Clicks Score (PT) 12  -CW     Highest Level of Mobility Goal 4 --> Transfer to chair/commode  -CW       Row Name 07/15/24 1122          Functional Assessment    Outcome Measure Options AM-PAC 6 Clicks Basic Mobility (PT)  -CW               User Key  (r) = Recorded By, (t) = Taken By, (c) = Cosigned By      Initials Name Provider Type    CW Helene Pike PT Physical Therapist                                 Physical Therapy Education       Title: PT OT SLP Therapies (In Progress)       Topic: Physical Therapy (In Progress)       Point: Mobility training (Done)       Learning Progress Summary             Patient Acceptance, E, VU,NR by CW at 7/15/2024 1123                         Point: Home exercise program (Not Started)       Learner Progress:  Not documented in this visit.              Point: Body mechanics (Not Started)       Learner Progress:  Not documented in this visit.              Point: Precautions (Not Started)       Learner Progress:  Not documented in this visit.                              User Key       Initials Effective Dates Name Provider Type Discipline     12/13/22 -  Helene Pike PT Physical Therapist PT                  PT Recommendation and Plan  Planned Therapy Interventions (PT): balance training, bed mobility training, postural re-education, transfer training, gait training, ROM (range of motion), strengthening, patient/family education  Plan of Care Reviewed With: patient  Outcome Evaluation: Pt is a 75 yo female seen for PT eval this AM. Pt admitted with COPD exacerbation and hypoxia. Pt lives at home with spouse who is her caregiver. Of note, pt's spouse is also currently in the  hospital. Pt reports she is able to complete stand pivot transfers to her wheelchair, usually with assist but can complete independently at times. Pt reports she does not ambulate and her spouse pushes her in the wheelchair around the house as needed. Today, pt demo generalized weakness, impaired balance, decreased activity tolerance and general functional mobility deficits. Pt completed supine>sit with sba and increased time. She stood with cga and completed stand pivot bed<>chair with cga. Pt not safe to d/c home alone, PT recommending d/c SNF with likely transition to LTC.     Time Calculation:         PT Charges       Row Name 07/15/24 1106             Time Calculation    Start Time 1033  -CW      Stop Time 1051  -CW      Time Calculation (min) 18 min  -CW      PT Received On 07/15/24  -CW      PT - Next Appointment 07/16/24  -CW      PT Goal Re-Cert Due Date 07/29/24  -CW         Time Calculation- PT    Total Timed Code Minutes- PT 10 minute(s)  -CW         Timed Charges    66295 - PT Therapeutic Activity Minutes 10  -CW         Total Minutes    Timed Charges Total Minutes 10  -CW       Total Minutes 10  -CW                User Key  (r) = Recorded By, (t) = Taken By, (c) = Cosigned By      Initials Name Provider Type    CW Helene Pike, PT Physical Therapist                  Therapy Charges for Today       Code Description Service Date Service Provider Modifiers Qty    19171427738  PT EVAL MOD COMPLEXITY 3 7/15/2024 Helene Pike, PT GP 1    [id number] HC PT THERAPEUTIC ACT EA 15 MIN 7/15/2024 Helene Pike, PT GP 1            PT G-Codes  Outcome Measure Options: AM-PAC 6 Clicks Basic Mobility (PT)  AM-PAC 6 Clicks Score (PT): 12  PT Discharge Summary  Anticipated Discharge Disposition (PT): skilled nursing facility, extended care facility    Helene Pike PT  7/15/2024

## 2024-07-15 NOTE — PROGRESS NOTES
Name: Asha Krishnan ADMIT: 2024   : 1950  PCP: Capo Pedroza MD    MRN: [id number] LOS: 1 days   AGE/SEX: 74 y.o. female  ROOM: [facility]   Subjective   Chief Complaint   Patient presents with    Shortness of Breath     74-year-old female with COPD, coronary artery disease with stable angina, last heart catheterization was 2022.  At that time showed severe two-vessel coronary artery disease with 100% ostial stenosis consistent with total occlusion and distal RCA as well as an 80% ostial in-stent stenosis at that time they recommended medical management given complex stent anatomy and questionable compliance.  They recommended dual antiplatelet therapy and beta-blocker and nitro and high-dose statin.     Patient now comes the hospital because of shortness of air off and on for a while, tachycardia and found to be in atrial fibrillation with a heart rate of 108.  Was given Cardizem in the emergency room and heart rate has improved. Patient is given Solu-Medrol, DuoNebs in the emergency room and the patient has improved. D-dimer is elevated greater than 1 but the patient is currently refusing CT angio of the chest    7/15  Dyspnea is better and feels its at baseline  HR regular now  Gen weakness to work with therapy     ROS  No f/c  No n/v  No cp/palp  + soa/cough    Objective   Vital Signs  Temp:  [97.3 °F (36.3 °C)-98.3 °F (36.8 °C)] 97.4 °F (36.3 °C)  Heart Rate:  [66-81] 78  Resp:  [14-22] 16  BP: (105-144)/(44-75) 144/64  SpO2:  [96 %-100 %] 96 %  on  Flow (L/min):  [1-2.5] 1;   Device (Oxygen Therapy): room air  Body mass index is 15.12 kg/m².    Physical Exam  Constitutional:       General: She is not in acute distress.     Appearance: She is ill-appearing (chronically).   HENT:      Head: Normocephalic and atraumatic.   Eyes:      General: No scleral icterus.  Cardiovascular:      Rate and Rhythm: Regular rhythm.      Heart sounds: Normal heart sounds.   Pulmonary:      Effort:  "Pulmonary effort is normal. No respiratory distress (slight).      Breath sounds: No wheezing (slight).   Abdominal:      General: There is no distension.      Palpations: Abdomen is soft.   Musculoskeletal:      Cervical back: Neck supple.   Neurological:      Mental Status: She is alert.   Psychiatric:         Behavior: Behavior normal.     Very chronically ill  Looks older than stated age  Underweight      Results Review:       I reviewed the patient's new clinical results.  Results from last 7 days   Lab Units 07/15/24  0625 07/13/24  2101   WBC 10*3/mm3 15.51* 7.12   HEMOGLOBIN g/dL 11.2* 11.7*   PLATELETS 10*3/mm3 293 293     Results from last 7 days   Lab Units 07/15/24  0625 07/13/24  2101   SODIUM mmol/L 134* 141   POTASSIUM mmol/L 4.3 3.4*   CHLORIDE mmol/L 96* 100   CO2 mmol/L 27.7 28.0   BUN mg/dL 24* 20   CREATININE mg/dL 0.57 0.65   GLUCOSE mg/dL 99 75   Estimated Creatinine Clearance: 49.7 mL/min (by C-G formula based on SCr of 0.57 mg/dL).  Results from last 7 days   Lab Units 07/13/24  2101   ALBUMIN g/dL 3.9   BILIRUBIN mg/dL 0.7   ALK PHOS U/L 100   AST (SGOT) U/L 17   ALT (SGPT) U/L 10     Results from last 7 days   Lab Units 07/15/24  0625 07/13/24  2101   CALCIUM mg/dL 9.4 9.0   ALBUMIN g/dL  --  3.9   MAGNESIUM mg/dL 2.2  --      Results from last 7 days   Lab Units 07/13/24  2101   PROCALCITONIN ng/mL 0.04   LACTATE mmol/L 1.5       Coag   Results from last 7 days   Lab Units 07/13/24  2101   INR  1.08   APTT seconds 31.1     HbA1C   Lab Results   Component Value Date    HGBA1C 6.1 (H) 11/10/2022     Infection   Results from last 7 days   Lab Units 07/13/24  2101   PROCALCITONIN ng/mL 0.04     Radiology(recent) No radiology results for the last day  HS Troponin T   Date Value Ref Range Status   07/14/2024 21 (H) <14 ng/L Final   07/13/2024 22 (H) <14 ng/L Final     No components found for: \"TSH;2\"    albuterol, 2.5 mg, Nebulization, 4x Daily - RT  atorvastatin, 80 mg, Oral, " Nightly  budesonide, 0.5 mg, Nebulization, BID - RT  bumetanide, 2 mg, Oral, Daily  clopidogrel, 75 mg, Oral, Daily  isosorbide mononitrate, 30 mg, Oral, Q24H  losartan, 25 mg, Oral, Nightly  metoprolol tartrate, 25 mg, Oral, Q12H  thiamine, 100 mg, Oral, Daily  tiotropium bromide monohydrate, 2 puff, Inhalation, Daily - RT  vitamin B-12, 500 mcg, Oral, Daily       Diet: Cardiac; Healthy Heart (2-3 Na+); Texture: Mechanical Ground (NDD 2); Fluid Consistency: Thin (IDDSI 0)      Assessment & Plan      Active Hospital Problems    Diagnosis  POA    **Acute respiratory failure with hypoxia [J96.01]  Yes    Severe malnutrition [E43]  Yes    PAF (paroxysmal atrial fibrillation) [I48.0]  Yes    CAD (coronary artery disease) [I25.10]  Yes    Elevated d-dimer [R79.89]  Yes    COPD (chronic obstructive pulmonary disease) [J44.9]  Yes    Dyspnea [R06.00]  Yes    HTN (hypertension) [I10]  Yes      Resolved Hospital Problems   No resolved problems to display.     74-year-old female with COPD, coronary artery disease with stable angina, last heart catheterization was 11/11/2022.  At that time showed severe two-vessel coronary artery disease with 100% ostial stenosis consistent with total occlusion and distal RCA as well as an 80% ostial in-stent stenosis at that time they recommended medical management given complex stent anatomy and questionable compliance.  They recommended dual antiplatelet therapy and beta-blocker and nitro and high-dose statin.     Patient now comes the hospital because of shortness of air off and on for a while, tachycardia and found to be in atrial fibrillation with a heart rate of 108.  Was given Cardizem in the emergency room and heart rate has improved. Patient is given Solu-Medrol, DuoNebs in the emergency room and the patient has improved. D-dimer is elevated greater than 1 but the patient is currently refusing CT angio of the chest       Copd  -possible  exacerbation  -spiriva/pulmicort/aluterol  -Patient was given steroids in the emergency room  -Hold off on further IV steroids, will use inhaled steroids for now and give 2 days of po prednisone     Atrial fib with RVR with normalization of HR with cardizem 10 iv  -she states she has not been taking her home meds because her  usually helps but he is unable to help her with the meds  -Cardiology has evaluated  -Minimal troponin elevation of 22 and on repeat 21  -EKG no evidence of ischemia but does show atrial fibrillation  -Not on anticoagulation but is on antiplatelet therapy      Hypokalemia  -replace     Severe coronary artery disease with multiple prior stents  -Troponin stable as noted in EKG no ischemic change  -Cardiology evaluation  -Continue with plavix, aspirin given in the emergency room  -Continue with statin, Imdur and metoprolol    ELVI RN    Dispo- likely to SNF in the next 1-2d when bed available     Edd Haji MD  [facility]  07/15/24  07:49 EDT

## 2024-07-15 NOTE — CASE MANAGEMENT/SOCIAL WORK
Discharge Planning Assessment  [facility]     Patient Name: Asha Krishnan  MRN: [id number]  Today's Date: 7/15/2024    Admit Date: 7/13/2024    Plan: SNF to LTC   Discharge Needs Assessment       Row Name 07/15/24 1139       Living Environment    People in Home significant other    Current Living Arrangements hotel/motel    Potentially Unsafe Housing Conditions none    Primary Care Provided by spouse/significant other    Provides Primary Care For no one, unable/limited ability to care for self    Family Caregiver if Needed significant other    Family Caregiver Names Anna [phone]    Quality of Family Relationships supportive    Able to Return to Prior Arrangements no       Resource/Environmental Concerns    Resource/Environmental Concerns financial    Financial Concerns unemployed    Transportation Concerns no car       Transition Planning    Patient/Family Anticipates Transition to inpatient rehabilitation facility;long-term care facility    Patient/Family Anticipated Services at Transition skilled nursing    Transportation Anticipated health plan transportation       Discharge Needs Assessment    Equipment Currently Used at Home wheelchair, motorized;commode;bath bench    Concerns to be Addressed homelessness;discharge planning;basic needs    Anticipated Changes Related to Illness inability to care for self    Equipment Needed After Discharge none    Outpatient/Agency/Support Group Needs long term acute care facility;inpatient rehabilitation facility    Discharge Facility/Level of Care Needs rehabilitation facility                   Discharge Plan       Row Name 07/15/24 1144       Plan    Plan SNF to LTC    Patient/Family in Agreement with Plan yes    Plan Comments Met with pt in room. Introduced self, explained  role, verified face sheet. Pt stated that she plans to return home with her significant other at discharge. We talked at length and the pt currently lives in a motel. She stated  "that her significant other takes care of her because she is unable to care for herself. Her significant other is currently hospitalized as well and is not expecting to discharge for a few days. Pt states that her significant other has to go to the hospital a lot and she \"makes do when he's gone\". She is unable to obtain groceries or cook for herself. She is wheelchair bound but able to transfer from bed to wheelchair at her baseline. We discussed at length that it is not safe for her to discharge home alone when she is unable to care for herself. She stated that she has tried to find a LTC facility before but was unsuccessful. Pt gave CCP approval to look into SNF and LTC options. Referrals entered. Pt has a motorized WC, BSC, and shower chair at home. CCP to follow. SUNNY Rausch RN                  Continued Care and Services - Admitted Since 7/13/2024       Destination       Service Provider Request Status Selected Services Address Phone Fax Patient Preferred    [facility] Pending - Request Sent N/A [address] [facility] [address] [phone] --    [facility] Pending - Request Sent N/A [address] [facility] [address] [phone] --    [facility] Pending - [facility] Sent N/A [address] [facility] [address] [facility] [address] [phone] --                  Expected Discharge Date and Time       Expected Discharge Date Expected Discharge Time    Jul 15, 2024            Demographic Summary       Row Name 07/15/24 1139       General Information    Admission Type observation    Preferred Language English                   Functional Status    No documentation.                  Psychosocial    No documentation.                  Abuse/Neglect    No documentation.                  Legal    No documentation.                  Substance Abuse    No documentation.                  Patient Forms    No " documentation.                     Dale Boateng RN

## 2024-07-15 NOTE — PROGRESS NOTES
Pt developed Afib with RVR. Ordered IV dilt x 2 and EKG. RN to call Cardiology who had already evaluated patient this admission. Monitor closely.     Electronically signed by Edd Haji MD, 07/15/24, 7:16 PM EDT.

## 2024-07-15 NOTE — THERAPY EVALUATION
Acute Care - Speech Language Pathology   Swallow Initial Evaluation [facility]     Patient Name: Asha Krishnan  : 1950  MRN: [id number]  Today's Date: 7/15/2024               Admit Date: 2024    Visit Dx:     ICD-10-CM ICD-9-CM   1. Acute hypoxemic respiratory failure  J96.01 518.81   2. COPD with exacerbation  J44.1 491.21   3. Atrial fibrillation with rapid ventricular response  I48.91 427.31     Patient Active Problem List   Diagnosis    Chest pain, atypical    Chest pain, unspecified type    CAD (coronary artery disease)    Dysuria    Dyspnea    Elevated d-dimer    COPD (chronic obstructive pulmonary disease)    HTN (hypertension)    HLD (hyperlipidemia)    Altered mental status, unspecified    PAF (paroxysmal atrial fibrillation)    Pulmonary nodule 1 cm or greater in diameter    Anemia    Acute respiratory failure with hypoxia    Severe malnutrition     Past Medical History:   Diagnosis Date    Atrial fibrillation, unspecified type     Cataract     COPD (chronic obstructive pulmonary disease)     Hyperlipidemia     Hypertension      Past Surgical History:   Procedure Laterality Date    CARDIAC CATHETERIZATION N/A 2022    Procedure: Left Heart Cath;  Surgeon: Tashi De La Torre MD;  Location: [facility] INVASIVE LOCATION;  Service: Cardiovascular;  Laterality: N/A;    CARDIAC CATHETERIZATION N/A 2022    Procedure: Coronary angiography;  Surgeon: Tashi De La Torre MD;  Location: [facility] CATH INVASIVE LOCATION;  Service: Cardiovascular;  Laterality: N/A;       SLP Recommendation and Plan  SLP Swallowing Diagnosis: suspected pharyngeal dysphagia, suspected esophageal dysphagia, oral dysphagia (07/15/24 1158)  SLP Diet Recommendation: mechanical ground textures, thin liquids (continue current diet while awaiting further recs from VFSS.) (07/15/24 1158)  Recommended Precautions and Strategies: upright posture during/after eating, small bites of food and sips of liquid, general  aspiration precautions (07/15/24 1158)  SLP Rec. for Method of Medication Administration: as tolerated (07/15/24 1158)     Monitor for Signs of Aspiration: yes, notify SLP if any concerns (07/15/24 1158)  Recommended Diagnostics: VFSS (MBS) (07/15/24 1158)  Swallow Criteria for Skilled Therapeutic Interventions Met: demonstrates skilled criteria (07/15/24 1158)     Rehab Potential/Prognosis, Swallowing: re-evaluate goals as necessary (07/15/24 1158)  Therapy Frequency (Swallow): PRN (07/15/24 1158)  Predicted Duration Therapy Intervention (Days): until discharge (07/15/24 1158)  Oral Care Recommendations: Oral Care BID/PRN (07/15/24 1158)                                        Outcome Evaluation: Clinical swallow evaluation completed. Pt reports difficulties swallowing since 1994. Previous VFSS in 2017 found in [facility] noted mild pharyngoesophageal dysphagia and recommended soft/slick diet with thin liquids, no straws. Pt reports  helps her swallow at home; unable to further elaborate. Appears patient has had little to no PO intake during this hospitalization. Persistent throat clearing with ice chip, thin liquid via spoon/cup, nectar via spoon/cup. No overt s/s of aspiration with nectar via straw, however, pt reporting globus sensation and refused further trials. Recommend VFSS to further assess swallow function. Suspect esophageal component as well given previous VFSS results.      SWALLOW EVALUATION (Last 72 Hours)       SLP Adult Swallow Evaluation       Row Name 07/15/24 1158                   Rehab Evaluation    Document Type evaluation  -CR        Subjective Information complains of  difficulties breathing. Did not appear in distress, Sp02 96% on room air.  -CR        Patient Observations alert;cooperative  -CR        Patient Effort adequate  -CR        Symptoms Noted During/After Treatment none  -CR           General Information    Patient Profile Reviewed yes  -CR        Pertinent History Of  "Current Problem 75 y/o female admitted shortness of air, tachycardia, A-Fib. Hx includes COPD and radiation to neck and shoulder (per [facility] VFSS note). New orders received d/t \"Pt states difficulty swallowing food no issues with liquids, has no teeth, currently eats a soft diet at home.\"  -CR        Current Method of Nutrition mechanical ground textures;thin liquids  -CR        Precautions/Limitations, Vision WFL;for purposes of eval  -CR        Precautions/Limitations, Hearing WFL;for purposes of eval  -CR        Prior Level of Function-Swallowing soft to chew;thin liquids  with difficulties since 1994, per pt. 2017 VFSS noted in [facility] noting mild pharyngoesophageal dysphagia and recommending soft/slick diet with thin liquids, no straws.  -CR        Plans/Goals Discussed with patient;agreed upon  -CR        Barriers to Rehab medically complex  -CR           Pain    Additional Documentation Pain Scale: FACES Pre/Post-Treatment (Group)  -CR           Pain Scale: FACES Pre/Post-Treatment    Pain: FACES Scale, Pretreatment 2-->hurts little bit  -CR        Pain Location - Side/Orientation Left  -CR        Pain Location - neck  -CR           Oral Motor Structure and Function    Dentition Assessment edentulous, does not have dentures  -CR        Secretion Management WNL/WFL  -CR        Mucosal Quality moist, healthy  -CR           Oral Musculature and Cranial Nerve Assessment    Oral Motor General Assessment generalized oral motor weakness  -CR        Vocal Impairment, Detail. Cranial Nerve X (Vagus) other (see comments)  weak intensity  -CR           General Eating/Swallowing Observations    Respiratory Support Currently in Use room air  -CR           Clinical Swallow Eval    Clinical Swallow Evaluation Summary Clinical swallow evaluation completed. Pt reports difficulties swallowing since 1994. Previous VFSS in 2017 found in [facility] noted mild pharyngoesophageal dysphagia and recommended " soft/slick diet with thin liquids, no straws. Pt reports  helps her swallow at home; unable to further elaborate. Appears patient has had little to no PO intake during this hospitalization. Persistent throat clearing with ice chip, thin liquid via spoon/cup, nectar via spoon/cup. No overt s/s of aspiration with nectar via straw, however, pt reporting globus sensation and refused further trials. Recommend VFSS to further assess swallow function. Suspect esophageal component as well given previous VFSS results.  -CR           SLP Evaluation Clinical Impression    SLP Swallowing Diagnosis suspected pharyngeal dysphagia;suspected esophageal dysphagia;oral dysphagia  -CR        Functional Impact risk of aspiration/pneumonia  -CR        Rehab Potential/Prognosis, Swallowing re-evaluate goals as necessary  -CR        Swallow Criteria for Skilled Therapeutic Interventions Met demonstrates skilled criteria  -CR           Recommendations    Therapy Frequency (Swallow) PRN  -CR        Predicted Duration Therapy Intervention (Days) until discharge  -CR        SLP Diet Recommendation mechanical ground textures;thin liquids  continue current diet while awaiting further recs from VFSS.  -CR        Recommended Diagnostics VFSS (MBS)  -CR        Recommended Precautions and Strategies upright posture during/after eating;small bites of food and sips of liquid;general aspiration precautions  -CR        Oral Care Recommendations Oral Care BID/PRN  -CR        SLP Rec. for Method of Medication Administration as tolerated  -CR        Monitor for Signs of Aspiration yes;notify SLP if any concerns  -CR                  User Key  (r) = Recorded By, (t) = Taken By, (c) = Cosigned By      Initials Name Effective Dates    Trinidad Lenz, SLP 08/28/23 -                     EDUCATION  The patient has been educated in the following areas:   Dysphagia (Swallowing Impairment).                Time Calculation:    Time Calculation- SLP        Row Name 07/15/24 1215             Time Calculation- SLP    SLP Start Time 0750  -CR      SLP Received On 07/15/24  -CR         Untimed Charges    [id number] Eval Oral Pharyng Swallow Minutes 45  -CR         Total Minutes    Untimed Charges Total Minutes 45  -CR       Total Minutes 45  -CR                User Key  (r) = Recorded By, (t) = Taken By, (c) = Cosigned By      Initials Name Provider Type    Trinidad Lenz SLP Speech and Language Pathologist                    Therapy Charges for Today       Code Description Service Date Service Provider Modifiers Qty    [id number]  ST EVAL ORAL PHARYNG SWALLOW 3 7/15/2024 Trinidad Chicas SLP GN 1                 GORDY Millard  7/15/2024

## 2024-07-15 NOTE — DISCHARGE PLACEMENT REQUEST
"Asha Parra (74 y.o. Female)       Date of Birth   1950    Social Security Number       Address   [facility] [address] [place] [address]  [address] [address]    Home Phone   [phone]    MRN   [id number]       Congregation   None    Marital Status                               Admission Date   7/13/24    Admission Type   Emergency    Admitting Provider   Johnny Conde MD    Attending Provider   Edd Haji MD    Department, Room/Bed   [address], E567/1       Discharge Date       Discharge Disposition   Home or Self Care    Discharge Destination                                 Attending Provider: Edd Haji MD    Allergies: Latex, Tylenol [Acetaminophen]    Isolation: None   Infection: None   Code Status: CPR    Ht: 157.5 cm (62\")   Wt: 37.5 kg (82 lb 10.8 oz)    Admission Cmt: None   Principal Problem: Acute respiratory failure with hypoxia [J96.01]                   Active Insurance as of 7/13/2024       Primary Coverage       Payor Plan Insurance Group Employer/Plan Group    [facility] MEDICARE REPLACEMENT [facility] MEDICARE ADVANTAGE [id number]       Payor Plan Address Payor Plan Phone Number Payor Plan Fax Number Effective Dates    [address] [phone]  3/1/2022 - None Entered    [facility] [address]         Subscriber Name Subscriber Birth Date Member ID       ASHA PARRA 1950 [id number]                     Emergency Contacts        (Rel.) Home Phone Work Phone Mobile Phone    Anna Boggs (Significant Other) [phone] -- --              Emergency Contact Information       Name Relation Home Work Mobile    Anna Boggs Significant Other [phone]            "

## 2024-07-15 NOTE — PLAN OF CARE
Goal Outcome Evaluation:              Outcome Evaluation: Clinical swallow evaluation completed. Pt reports difficulties swallowing since 1994. Previous VFSS in 2017 found in [facility] noted mild pharyngoesophageal dysphagia and recommended soft/slick diet with thin liquids, no straws. Pt reports  helps her swallow at home; unable to further elaborate. Appears patient has had little to no PO intake during this hospitalization. Persistent throat clearing with ice chip, thin liquid via spoon/cup, nectar via spoon/cup. No overt s/s of aspiration with nectar via straw, however, pt reporting globus sensation and refused further trials. Recommend VFSS to further assess swallow function. Suspect esophageal component as well given previous VFSS results.                 SLP Swallowing Diagnosis: suspected pharyngeal dysphagia, suspected esophageal dysphagia, oral dysphagia (07/15/24 5530)

## 2024-07-16 LAB
ANION GAP SERPL CALCULATED.3IONS-SCNC: 9 MMOL/L (ref 5–15)
BUN SERPL-MCNC: 28 MG/DL (ref 8–23)
BUN/CREAT SERPL: 50 (ref 7–25)
CALCIUM SPEC-SCNC: 9.5 MG/DL (ref 8.6–10.5)
CHLORIDE SERPL-SCNC: 92 MMOL/L (ref 98–107)
CO2 SERPL-SCNC: 33 MMOL/L (ref 22–29)
CREAT SERPL-MCNC: 0.56 MG/DL (ref 0.57–1)
EGFRCR SERPLBLD CKD-EPI 2021: 94.7 ML/MIN/1.73
GLUCOSE SERPL-MCNC: 141 MG/DL (ref 65–99)
MAGNESIUM SERPL-MCNC: 1.9 MG/DL (ref 1.6–2.4)
POTASSIUM SERPL-SCNC: 4.2 MMOL/L (ref 3.5–5.2)
QT INTERVAL: 278 MS
QT INTERVAL: 290 MS
QT INTERVAL: 368 MS
QTC INTERVAL: 368 MS
QTC INTERVAL: 403 MS
QTC INTERVAL: 444 MS
SODIUM SERPL-SCNC: 134 MMOL/L (ref 136–145)

## 2024-07-16 PROCEDURE — 83735 ASSAY OF MAGNESIUM: CPT | Performed by: INTERNAL MEDICINE

## 2024-07-16 PROCEDURE — 94799 UNLISTED PULMONARY SVC/PX: CPT

## 2024-07-16 PROCEDURE — 92526 ORAL FUNCTION THERAPY: CPT

## 2024-07-16 PROCEDURE — 93005 ELECTROCARDIOGRAM TRACING: CPT | Performed by: INTERNAL MEDICINE

## 2024-07-16 PROCEDURE — 99232 SBSQ HOSP IP/OBS MODERATE 35: CPT | Performed by: NURSE PRACTITIONER

## 2024-07-16 PROCEDURE — 93010 ELECTROCARDIOGRAM REPORT: CPT | Performed by: INTERNAL MEDICINE

## 2024-07-16 PROCEDURE — 94760 N-INVAS EAR/PLS OXIMETRY 1: CPT

## 2024-07-16 PROCEDURE — 94664 DEMO&/EVAL PT USE INHALER: CPT

## 2024-07-16 PROCEDURE — 80048 BASIC METABOLIC PNL TOTAL CA: CPT | Performed by: INTERNAL MEDICINE

## 2024-07-16 PROCEDURE — 97530 THERAPEUTIC ACTIVITIES: CPT

## 2024-07-16 PROCEDURE — G0378 HOSPITAL OBSERVATION PER HR: HCPCS

## 2024-07-16 PROCEDURE — 94761 N-INVAS EAR/PLS OXIMETRY MLT: CPT

## 2024-07-16 RX ORDER — DILTIAZEM HYDROCHLORIDE 5 MG/ML
5 INJECTION INTRAVENOUS ONCE
Status: COMPLETED | OUTPATIENT
Start: 2024-07-16 | End: 2024-07-16

## 2024-07-16 RX ADMIN — ATORVASTATIN CALCIUM 80 MG: 80 TABLET, FILM COATED ORAL at 19:28

## 2024-07-16 RX ADMIN — BUDESONIDE 0.5 MG: 0.5 INHALANT ORAL at 19:23

## 2024-07-16 RX ADMIN — Medication 500 MCG: at 08:40

## 2024-07-16 RX ADMIN — BUDESONIDE 0.5 MG: 0.5 INHALANT ORAL at 08:01

## 2024-07-16 RX ADMIN — ALBUTEROL SULFATE 2.5 MG: 2.5 SOLUTION RESPIRATORY (INHALATION) at 11:21

## 2024-07-16 RX ADMIN — DILTIAZEM HYDROCHLORIDE 5 MG: 5 INJECTION, SOLUTION INTRAVENOUS at 08:18

## 2024-07-16 RX ADMIN — METOPROLOL TARTRATE 37.5 MG: 25 TABLET, FILM COATED ORAL at 19:28

## 2024-07-16 RX ADMIN — ALBUTEROL SULFATE 2.5 MG: 2.5 SOLUTION RESPIRATORY (INHALATION) at 07:59

## 2024-07-16 RX ADMIN — Medication 100 MG: at 08:40

## 2024-07-16 RX ADMIN — TIOTROPIUM BROMIDE INHALATION SPRAY 2 PUFF: 3.12 SPRAY, METERED RESPIRATORY (INHALATION) at 08:05

## 2024-07-16 RX ADMIN — CLOPIDOGREL BISULFATE 75 MG: 75 TABLET, FILM COATED ORAL at 08:40

## 2024-07-16 RX ADMIN — ALBUTEROL SULFATE 2.5 MG: 2.5 SOLUTION RESPIRATORY (INHALATION) at 19:22

## 2024-07-16 RX ADMIN — METOPROLOL TARTRATE 25 MG: 25 TABLET, FILM COATED ORAL at 07:46

## 2024-07-16 RX ADMIN — ALBUTEROL SULFATE 2.5 MG: 2.5 SOLUTION RESPIRATORY (INHALATION) at 15:23

## 2024-07-16 RX ADMIN — BUMETANIDE 2 MG: 2 TABLET ORAL at 08:40

## 2024-07-16 NOTE — NURSING NOTE
Patient back in afib RVR  this a.m. and asymptomatic she denies chest pain and BP is stable. Call placed to Cardiology this a.m spoke with june from the answering service notified that Krista is on call. Awaiting return call.

## 2024-07-16 NOTE — PROGRESS NOTES
"    Patient Name: Asha Krishnan  :1950  74 y.o.      Patient Care Team:  Capo Pedroza MD as PCP - General (Family Medicine)    Chief Complaint: follow up atrial fibrillation    Interval History: had AF with RVR last night. Treated with two doses of IV diltiazem. Back in to AF this morning. Got another dose of IV diltiazem.     She can tell she is in AF but isn't terribly bothered by it. At home she tries to just relax.     Now back in SR.     Frequent falls at home.        Objective   Vital Signs  Temp:  [97.2 °F (36.2 °C)-98.2 °F (36.8 °C)] 98.2 °F (36.8 °C)  Heart Rate:  [] 140  Resp:  [16-18] 16  BP: ()/(41-76) 113/54    Intake/Output Summary (Last 24 hours) at 2024 1002  Last data filed at 7/15/2024 1100  Gross per 24 hour   Intake --   Output 400 ml   Net -400 ml     Flowsheet Rows      Flowsheet Row First Filed Value   Admission Height 157.5 cm (62\") Documented at 2024   Admission Weight 40.8 kg (90 lb) Documented at 2024            Physical Exam:   General Appearance:    Frail, cachectic    Lungs:     Clear to auscultation.  Normal respiratory effort and rate.      Heart:    Regular rhythm and normal rate, normal S1 and S2, no murmurs, gallops or rubs.     Chest Wall:    No abnormalities observed   Abdomen:     Soft, nontender, positive bowel sounds.     Extremities:   no cyanosis, clubbing or edema.  No marked joint deformities.  Adequate musculoskeletal strength.       Results Review:    Results from last 7 days   Lab Units 24  0931   SODIUM mmol/L 134*   POTASSIUM mmol/L 4.2   CHLORIDE mmol/L 92*   CO2 mmol/L 33.0*   BUN mg/dL 28*   CREATININE mg/dL 0.56*   GLUCOSE mg/dL 141*   CALCIUM mg/dL 9.5     Results from last 7 days   Lab Units 24  0044 24  2101   HSTROP T ng/L 21* 22*     Results from last 7 days   Lab Units 07/15/24  0625   WBC 10*3/mm3 15.51*   HEMOGLOBIN g/dL 11.2*   HEMATOCRIT % 32.7*   PLATELETS 10*3/mm3 293 "     Results from last 7 days   Lab Units 07/13/24  2101   INR  1.08   APTT seconds 31.1     Results from last 7 days   Lab Units 07/16/24  0931   MAGNESIUM mg/dL 1.9                   Medication Review:   albuterol, 2.5 mg, Nebulization, 4x Daily - RT  atorvastatin, 80 mg, Oral, Nightly  budesonide, 0.5 mg, Nebulization, BID - RT  bumetanide, 2 mg, Oral, Daily  clopidogrel, 75 mg, Oral, Daily  isosorbide mononitrate, 30 mg, Oral, Q24H  losartan, 25 mg, Oral, Nightly  metoprolol tartrate, 5 mg, Intravenous, Q5 Min  metoprolol tartrate, 25 mg, Oral, Q12H  thiamine, 100 mg, Oral, Daily  tiotropium bromide monohydrate, 2 puff, Inhalation, Daily - RT  vitamin B-12, 500 mcg, Oral, Daily              Assessment & Plan   Acute hypoxic respiratory failure due to COPD with acute exacerbation  Paroxysmal atrial fibrillation with RVR , wasn't taking medications prior to admission. Easily back in to SR then had recurrent AF last night and this morning.   COPD   Coronary artery disease (PCI with Synergy stent to left circumflex and LAD extending to left main in January 2017), stable recent stress and echo. Denies angina.   Hypertension, losartan added this admission.   Hyperlipidemia  Cachexia with malnitrition  Severe dysphagia, meds crushed     Poor candidate for anticoagulation.     She continues to have paroxysms of atrial fibrillation. She was treated last night and this morning with IV diltiazem boluses.     I will try increasing her beta blocker slightly.     In general , I would not be aggressive in treating these unless she becomes terribly symptomatic.     EMORY Lopez  [facility]  07/16/24  10:02 EDT

## 2024-07-16 NOTE — NURSING NOTE
Patient back in Afib this afternoon, she remains asymptomatic and denies chest pain. Notified Fatoumata with cardiology, state we do not need another EKG. No new orders received she states we will just watch since patient is asymptomatic.

## 2024-07-16 NOTE — PLAN OF CARE
Goal Outcome Evaluation:  Plan of Care Reviewed With: patient           Outcome Evaluation: Pt participated with PT this Pm. Pt limited during session today as HR becomes very elevated with minimal activity. Pt completed supine>sit with Abner, HR initially <100 but jumped to 150-180 with activity, pt was immediately returned to supine and HR recovered quickly. Deferred further activity today. Continue to recommend SNF.      Anticipated Discharge Disposition (PT): skilled nursing facility, extended care facility

## 2024-07-16 NOTE — THERAPY TREATMENT NOTE
Patient Name: Asha Krishnan  : 1950    MRN: [id number]                              Today's Date: 2024       Admit Date: 2024    Visit Dx:     ICD-10-CM ICD-9-CM   1. Acute hypoxemic respiratory failure  J96.01 518.81   2. COPD with exacerbation  J44.1 491.21   3. Atrial fibrillation with rapid ventricular response  I48.91 427.31     Patient Active Problem List   Diagnosis    Chest pain, atypical    Chest pain, unspecified type    CAD (coronary artery disease)    Dysuria    Dyspnea    Elevated d-dimer    COPD (chronic obstructive pulmonary disease)    HTN (hypertension)    HLD (hyperlipidemia)    Altered mental status, unspecified    PAF (paroxysmal atrial fibrillation)    Pulmonary nodule 1 cm or greater in diameter    Anemia    Acute respiratory failure with hypoxia    Severe malnutrition     Past Medical History:   Diagnosis Date    Atrial fibrillation, unspecified type     Cataract     COPD (chronic obstructive pulmonary disease)     Hyperlipidemia     Hypertension      Past Surgical History:   Procedure Laterality Date    CARDIAC CATHETERIZATION N/A 2022    Procedure: Left Heart Cath;  Surgeon: Tashi De La Torre MD;  Location: [facility] CATH INVASIVE LOCATION;  Service: Cardiovascular;  Laterality: N/A;    CARDIAC CATHETERIZATION N/A 2022    Procedure: Coronary angiography;  Surgeon: Tashi De La Torre MD;  Location: [facility] CATH INVASIVE LOCATION;  Service: Cardiovascular;  Laterality: N/A;      General Information       Row Name 24 1523          Physical Therapy Time and Intention    Document Type therapy note (daily note)  -CW     Mode of Treatment physical therapy;individual therapy  -CW       Row Name 24 1523          General Information    Patient Profile Reviewed yes  -CW     Existing Precautions/Restrictions fall  -CW       Row Name 24 1523          Cognition    Orientation Status (Cognition) oriented x 3  -CW       Row Name 24 1523          Safety  Issues, Functional Mobility    Impairments Affecting Function (Mobility) balance;endurance/activity tolerance;strength  -CW               User Key  (r) = Recorded By, (t) = Taken By, (c) = Cosigned By      Initials Name Provider Type    Helene Cortes PT Physical Therapist                   Mobility       Row Name 07/16/24 1523          Bed Mobility    Bed Mobility supine-sit;sit-supine  -CW     Supine-Sit [place] (Bed Mobility) minimum assist (75% patient effort)  -CW     Sit-Supine [place] (Bed Mobility) minimum assist (75% patient effort)  -CW     Assistive Device (Bed Mobility) head of bed elevated;bed rails  -CW     Comment, (Bed Mobility) HR signficantly elevate to 160-180 with sitting EOB a few seconds only, returned to supine  -CW       Row Name 07/16/24 1523          Transfers    Comment, (Transfers) Unable to perform today due to elevated HR  -CW               User Key  (r) = Recorded By, (t) = Taken By, (c) = Cosigned By      Initials Name Provider Type    Helene Cortes PT Physical Therapist                   Obj/Interventions       Row Name 07/16/24 1529          Balance    Static Sitting Balance standby assist  -CW     Position, Sitting Balance sitting edge of bed  -CW               User Key  (r) = Recorded By, (t) = Taken By, (c) = Cosigned By      Initials Name Provider Type    Helene Cortes PT Physical Therapist                   Goals/Plan    No documentation.                  Clinical Impression       Row Name 07/16/24 1529          Pain    Pretreatment Pain Rating 0/10 - no pain  -CW       Row Name 07/16/24 1529          Plan of Care Review    Plan of Care Reviewed With patient  -CW     Outcome Evaluation Pt participated with PT this Pm. Pt limited during session today as HR becomes very elevated with minimal activity. Pt completed supine>sit with Abner, HR initially <100 but jumped to 150-180 with activity, pt was immediately returned to supine and HR recovered  quickly. Deferred further activity today. Continue to recommend SNF.  -CW       Row Name 07/16/24 1529          Vital Signs    Pretreatment Heart Rate (beats/min) 90  -CW     Intratreatment Heart Rate (beats/min) --  ranging 150-180  -CW     Posttreatment Heart Rate (beats/min) 95  -CW       Row Name 07/16/24 1529          Positioning and Restraints    Pre-Treatment Position in bed  -CW     Post Treatment Position bed  -CW     In Bed notified nsg;call light within reach;encouraged to call for assist;exit alarm on;fowlers;SCD pump applied;legs elevated  -CW               User Key  (r) = Recorded By, (t) = Taken By, (c) = Cosigned By      Initials Name Provider Type    Helene Cortes PT Physical Therapist                   Outcome Measures       Row Name 07/16/24 1533          How much help from another person do you currently need...    Turning from your back to your side while in flat bed without using bedrails? 3  -CW     Moving from lying on back to sitting on the side of a flat bed without bedrails? 3  -CW     Moving to and from a bed to a chair (including a wheelchair)? 3  -CW     Standing up from a chair using your arms (e.g., wheelchair, bedside chair)? 3  -CW     Climbing 3-5 steps with a railing? 1  -CW     To walk in hospital room? 2  -CW     AM-PAC 6 Clicks Score (PT) 15  -CW     Highest Level of Mobility Goal 4 --> Transfer to chair/commode  -CW       Row Name 07/16/24 1533          Functional Assessment    Outcome Measure Options AM-PAC 6 Clicks Basic Mobility (PT)  -CW               User Key  (r) = Recorded By, (t) = Taken By, (c) = Cosigned By      Initials Name Provider Type    Helene Cortes PT Physical Therapist                                 Physical Therapy Education       Title: PT OT SLP Therapies (In Progress)       Topic: Physical Therapy (In Progress)       Point: Mobility training (Done)       Learning Progress Summary             Patient Acceptance, E, VU,NR by YURI at  7/15/2024 1123                         Point: Home exercise program (Not Started)       Learner Progress:  Not documented in this visit.              Point: Body mechanics (Not Started)       Learner Progress:  Not documented in this visit.              Point: Precautions (Not Started)       Learner Progress:  Not documented in this visit.                              User Key       Initials Effective Dates Name Provider Type Discipline     12/13/22 -  Helene Pike PT Physical Therapist PT                  PT Recommendation and Plan  Planned Therapy Interventions (PT): balance training, bed mobility training, postural re-education, transfer training, gait training, ROM (range of motion), strengthening, patient/family education  Plan of Care Reviewed With: patient  Outcome Evaluation: Pt participated with PT this Pm. Pt limited during session today as HR becomes very elevated with minimal activity. Pt completed supine>sit with Abner, HR initially <100 but jumped to 150-180 with activity, pt was immediately returned to supine and HR recovered quickly. Deferred further activity today. Continue to recommend SNF.     Time Calculation:         PT Charges       Row Name 07/16/24 1533             Time Calculation    Start Time 1426  -CW      Stop Time 1437  -CW      Time Calculation (min) 11 min  -CW      PT Received On 07/16/24  -CW      PT - Next Appointment 07/17/24  -CW                User Key  (r) = Recorded By, (t) = Taken By, (c) = Cosigned By      Initials Name Provider Type    CW Helene Pike PT Physical Therapist                  Therapy Charges for Today       Code Description Service Date Service Provider Modifiers Qty    18712678659 HC PT EVAL MOD COMPLEXITY 3 7/15/2024 Helene Pike, PT GP 1    61658120577 HC PT THERAPEUTIC ACT EA 15 MIN 7/15/2024 Helene Pike, PT GP 1    25718663667 HC PT THERAPEUTIC ACT EA 15 MIN 7/16/2024 Helene Pike, PT GP 1            PT G-Codes  Outcome  Measure Options: AM-PAC 6 Clicks Basic Mobility (PT)  AM-PAC 6 Clicks Score (PT): 15  PT Discharge Summary  Anticipated Discharge Disposition (PT): skilled nursing facility, extended care facility    Helene Pike, GABY  7/16/2024

## 2024-07-16 NOTE — PROGRESS NOTES
Name: Asha Krishnan ADMIT: 2024   : 1950  PCP: Capo Pedroza MD    MRN: [id number] LOS: 1 days   AGE/SEX: 74 y.o. female  ROOM: [facility]   Subjective   Chief Complaint   Patient presents with    Shortness of Breath     74-year-old female with COPD, coronary artery disease with stable angina, last heart catheterization was 2022.  At that time showed severe two-vessel coronary artery disease with 100% ostial stenosis consistent with total occlusion and distal RCA as well as an 80% ostial in-stent stenosis at that time they recommended medical management given complex stent anatomy and questionable compliance.  They recommended dual antiplatelet therapy and beta-blocker and nitro and high-dose statin.     Patient now comes the hospital because of shortness of air off and on for a while, tachycardia and found to be in atrial fibrillation with a heart rate of 108.  Was given Cardizem in the emergency room and heart rate has improved. Patient is given Solu-Medrol, DuoNebs in the emergency room and the patient has improved. D-dimer is elevated greater than 1 but the patient is currently refusing CT angio of the chest      Issues with Afib with RVR overnight. Given IV dilt  Additional Afib with RVR today- gave additional IV dilt  Sleeping later in the morning       Objective   Vital Signs  Temp:  [97.2 °F (36.2 °C)-98.2 °F (36.8 °C)] 98.2 °F (36.8 °C)  Heart Rate:  [] 141  Resp:  [16-18] 16  BP: ()/(41-76) 106/76  SpO2:  [99 %-100 %] 100 %  on  Flow (L/min):  [1] 1;   Device (Oxygen Therapy): nasal cannula  Body mass index is 15.12 kg/m².    Physical Exam  Constitutional:       General: She is not in acute distress.     Appearance: She is ill-appearing (chronically).   HENT:      Head: Normocephalic and atraumatic.   Eyes:      General: No scleral icterus.  Cardiovascular:      Rate and Rhythm: Tachycardia present. Rhythm irregular.   Pulmonary:      Effort: Pulmonary  "effort is normal. No respiratory distress (slight).      Breath sounds: No wheezing (slight).   Abdominal:      General: There is no distension.      Palpations: Abdomen is soft.   Musculoskeletal:      Cervical back: Neck supple.   Psychiatric:         Behavior: Behavior normal.     Very chronically ill  Looks older than stated age  Underweight      Results Review:       I reviewed the patient's new clinical results.  Results from last 7 days   Lab Units 07/15/24  0625 07/13/24  2101   WBC 10*3/mm3 15.51* 7.12   HEMOGLOBIN g/dL 11.2* 11.7*   PLATELETS 10*3/mm3 293 293     Results from last 7 days   Lab Units 07/16/24  0931 07/15/24  0625 07/13/24  2101   SODIUM mmol/L 134* 134* 141   POTASSIUM mmol/L 4.2 4.3 3.4*   CHLORIDE mmol/L 92* 96* 100   CO2 mmol/L 33.0* 27.7 28.0   BUN mg/dL 28* 24* 20   CREATININE mg/dL 0.56* 0.57 0.65   GLUCOSE mg/dL 141* 99 75   Estimated Creatinine Clearance: 50.6 mL/min (A) (by C-G formula based on SCr of 0.56 mg/dL (L)).  Results from last 7 days   Lab Units 07/13/24  2101   ALBUMIN g/dL 3.9   BILIRUBIN mg/dL 0.7   ALK PHOS U/L 100   AST (SGOT) U/L 17   ALT (SGPT) U/L 10     Results from last 7 days   Lab Units 07/16/24  0931 07/15/24  0625 07/13/24  2101   CALCIUM mg/dL 9.5 9.4 9.0   ALBUMIN g/dL  --   --  3.9   MAGNESIUM mg/dL 1.9 2.2  --      Results from last 7 days   Lab Units 07/13/24  2101   PROCALCITONIN ng/mL 0.04   LACTATE mmol/L 1.5       Coag   Results from last 7 days   Lab Units 07/13/24  2101   INR  1.08   APTT seconds 31.1     HbA1C   Lab Results   Component Value Date    HGBA1C 6.1 (H) 11/10/2022     Infection   Results from last 7 days   Lab Units 07/13/24  2101   PROCALCITONIN ng/mL 0.04     Radiology(recent) No radiology results for the last day  HS Troponin T   Date Value Ref Range Status   07/14/2024 21 (H) <14 ng/L Final   07/13/2024 22 (H) <14 ng/L Final     No components found for: \"TSH;2\"    albuterol, 2.5 mg, Nebulization, 4x Daily - RT  atorvastatin, 80 mg, " Oral, Nightly  budesonide, 0.5 mg, Nebulization, BID - RT  bumetanide, 2 mg, Oral, Daily  clopidogrel, 75 mg, Oral, Daily  isosorbide mononitrate, 30 mg, Oral, Q24H  losartan, 25 mg, Oral, Nightly  metoprolol tartrate, 37.5 mg, Oral, Q12H  thiamine, 100 mg, Oral, Daily  tiotropium bromide monohydrate, 2 puff, Inhalation, Daily - RT  vitamin B-12, 500 mcg, Oral, Daily       NPO Diet NPO Type: Sips with Meds, Ice Chips      Assessment & Plan      Active Hospital Problems    Diagnosis  POA    **Acute respiratory failure with hypoxia [J96.01]  Yes    Severe malnutrition [E43]  Yes    PAF (paroxysmal atrial fibrillation) [I48.0]  Yes    CAD (coronary artery disease) [I25.10]  Yes    Elevated d-dimer [R79.89]  Yes    COPD (chronic obstructive pulmonary disease) [J44.9]  Yes    Dyspnea [R06.00]  Yes    HTN (hypertension) [I10]  Yes      Resolved Hospital Problems   No resolved problems to display.     74-year-old female with COPD, coronary artery disease with stable angina, last heart catheterization was 11/11/2022.  At that time showed severe two-vessel coronary artery disease with 100% ostial stenosis consistent with total occlusion and distal RCA as well as an 80% ostial in-stent stenosis at that time they recommended medical management given complex stent anatomy and questionable compliance.  They recommended dual antiplatelet therapy and beta-blocker and nitro and high-dose statin.     Patient now comes the hospital because of shortness of air off and on for a while, tachycardia and found to be in atrial fibrillation with a heart rate of 108.  Was given Cardizem in the emergency room and heart rate has improved. Patient is given Solu-Medrol, DuoNebs in the emergency room and the patient has improved. D-dimer is elevated greater than 1 but the patient is currently refusing CT angio of the chest       Copd  -possible exacerbation  -spiriva/pulmicort/aluterol  -Patient was given steroids in the emergency room  -Hold off  on further IV steroids, will use inhaled steroids for now and give 2 days of po prednisone     Atrial fib with RVR w  -Cardiology has evaluated  -Medication adjustments  -Not on anticoagulation but is on antiplatelet therapy      Hypokalemia  -replace as needed     Severe coronary artery disease with multiple prior stents  -Troponin stable as noted in EKG no ischemic change  -Cardiology evaluation  -Continue with plavix, aspirin given in the emergency room  -Continue with statin, Imdur and metoprolol    Dysphagia  ST Eval     ELVI RN    Dispo- likely to SNF in the next 1-2d when bed available. Long term prognosis is poor she is quite chronically ill     Edd Haji MD  [facility]  07/16/24  07:49 EDT

## 2024-07-16 NOTE — PLAN OF CARE
Problem: Adult Inpatient Plan of Care  Goal: Plan of Care Review  Outcome: Ongoing, Progressing  Goal: Patient-Specific Goal (Individualized)  Outcome: Ongoing, Progressing  Goal: Absence of Hospital-Acquired Illness or Injury  Outcome: Ongoing, Progressing  Intervention: Identify and Manage Fall Risk  Recent Flowsheet Documentation  Taken 7/16/2024 0400 by Laurie Butler RN  Safety Promotion/Fall Prevention:   activity supervised   assistive device/personal items within reach   clutter free environment maintained   fall prevention program maintained   nonskid shoes/slippers when out of bed   room organization consistent   safety round/check completed  Taken 7/16/2024 0230 by Laurie Butler, RN  Safety Promotion/Fall Prevention:   activity supervised   assistive device/personal items within reach   clutter free environment maintained   fall prevention program maintained   nonskid shoes/slippers when out of bed   room organization consistent   safety round/check completed  Taken 7/16/2024 0000 by Laurie Butler, RN  Safety Promotion/Fall Prevention:   activity supervised   assistive device/personal items within reach   clutter free environment maintained   fall prevention program maintained   nonskid shoes/slippers when out of bed   room organization consistent   safety round/check completed  Taken 7/15/2024 2200 by Laurie Butler, RN  Safety Promotion/Fall Prevention:   activity supervised   assistive device/personal items within reach   clutter free environment maintained   fall prevention program maintained   nonskid shoes/slippers when out of bed   room organization consistent   safety round/check completed  Taken 7/15/2024 2005 by Laurie Butler, RN  Safety Promotion/Fall Prevention:   activity supervised   assistive device/personal items within reach   clutter free environment maintained   fall prevention program maintained   nonskid shoes/slippers when out of bed   room organization consistent   safety  round/check completed  Intervention: Prevent Skin Injury  Recent Flowsheet Documentation  Taken 7/16/2024 0400 by Laurie Butler RN  Body Position:   tilted   right  Taken 7/16/2024 0230 by Laurie Butler RN  Body Position:   tilted   right  Skin Protection:   adhesive use limited   incontinence pads utilized   tubing/devices free from skin contact   transparent dressing maintained  Taken 7/16/2024 0000 by Laurie Butler RN  Body Position: supine, legs elevated  Taken 7/15/2024 2200 by Laurie Butler RN  Body Position:   turned   right   position changed independently  Taken 7/15/2024 2055 by Laurie Butler RN  Skin Protection:   adhesive use limited   incontinence pads utilized   tubing/devices free from skin contact   transparent dressing maintained  Taken 7/15/2024 2005 by Laurie Butler RN  Body Position:   position changed independently   weight shifting  Intervention: Prevent and Manage VTE (Venous Thromboembolism) Risk  Recent Flowsheet Documentation  Taken 7/16/2024 0400 by Laurie Butler RN  Activity Management: activity minimized  Taken 7/16/2024 0230 by Laurie Butler RN  Activity Management: activity minimized  VTE Prevention/Management:   bilateral   sequential compression devices on  Range of Motion: active ROM (range of motion) encouraged  Taken 7/16/2024 0000 by Laurie Butler RN  Activity Management: activity minimized  Taken 7/15/2024 2200 by Laurie Butler RN  Activity Management: activity minimized  Taken 7/15/2024 2055 by Laurie Butler RN  VTE Prevention/Management:   bilateral   sequential compression devices on  Range of Motion: active ROM (range of motion) encouraged  Taken 7/15/2024 2005 by Laurie Butler RN  Activity Management: activity minimized  Intervention: Prevent Infection  Recent Flowsheet Documentation  Taken 7/16/2024 0400 by Laurie Butler RN  Infection Prevention:   cohorting utilized   environmental surveillance performed   rest/sleep promoted    single patient room provided  Taken 7/16/2024 0230 by Laurie Butler RN  Infection Prevention:   cohorting utilized   environmental surveillance performed   rest/sleep promoted   single patient room provided  Taken 7/16/2024 0000 by Laurie Butler RN  Infection Prevention:   cohorting utilized   environmental surveillance performed   rest/sleep promoted   single patient room provided  Taken 7/15/2024 2200 by Laurie Butler RN  Infection Prevention:   cohorting utilized   environmental surveillance performed   rest/sleep promoted   single patient room provided  Taken 7/15/2024 2005 by Laurie Butler RN  Infection Prevention:   cohorting utilized   environmental surveillance performed   single patient room provided  Goal: Optimal Comfort and Wellbeing  Outcome: Ongoing, Progressing  Intervention: Provide Person-Centered Care  Recent Flowsheet Documentation  Taken 7/16/2024 0230 by Laurie Butler RN  Trust Relationship/Rapport:   care explained   choices provided   emotional support provided   empathic listening provided   questions answered   reassurance provided   thoughts/feelings acknowledged  Taken 7/15/2024 2055 by Laurie Butler RN  Trust Relationship/Rapport:   care explained   choices provided   emotional support provided   empathic listening provided   questions answered   reassurance provided   thoughts/feelings acknowledged  Goal: Readiness for Transition of Care  Outcome: Ongoing, Progressing     Problem: Skin Injury Risk Increased  Goal: Skin Health and Integrity  Outcome: Ongoing, Progressing  Intervention: Optimize Skin Protection  Recent Flowsheet Documentation  Taken 7/16/2024 0400 by Laurie Butler RN  Head of Bed (HOB) Positioning: HOB at 30-45 degrees  Taken 7/16/2024 0230 by Laurie Butler RN  Pressure Reduction Techniques:   frequent weight shift encouraged   weight shift assistance provided   heels elevated off bed   pressure points protected  Head of Bed (HOB) Positioning: HOB  at 30-45 degrees  Pressure Reduction Devices:   positioning supports utilized   heel offloading device utilized  Skin Protection:   adhesive use limited   incontinence pads utilized   tubing/devices free from skin contact   transparent dressing maintained  Taken 7/16/2024 0000 by Laurie Butler RN  Head of Bed ([facility]) Positioning: HOB at 30-45 degrees  Taken 7/15/2024 2200 by Laurie Butler RN  Head of Bed ([facility]) Positioning: HOB elevated  Taken 7/15/2024 2055 by Laurie Butler RN  Pressure Reduction Techniques:   frequent weight shift encouraged   weight shift assistance provided   heels elevated off bed   pressure points protected  Pressure Reduction Devices:   positioning supports utilized   heel offloading device utilized  Skin Protection:   adhesive use limited   incontinence pads utilized   tubing/devices free from skin contact   transparent dressing maintained  Taken 7/15/2024 2005 by Laruie Butler RN  Head of Bed ([facility]) Positioning: HOB elevated     Problem: Hypertension Comorbidity  Goal: Blood Pressure in Desired Range  Outcome: Ongoing, Progressing  Intervention: Maintain Blood Pressure Management  Recent Flowsheet Documentation  Taken 7/16/2024 0400 by Laurie Butler RN  Medication Review/Management: medications reviewed  Taken 7/16/2024 0230 by Laurie Butler RN  Medication Review/Management: medications reviewed  Taken 7/16/2024 0000 by Laurie Butler RN  Medication Review/Management: medications reviewed  Taken 7/15/2024 2200 by Laurie Butler RN  Medication Review/Management: medications reviewed  Taken 7/15/2024 2005 by Laurie Butler RN  Medication Review/Management: medications reviewed     Problem: Pain Chronic (Persistent) (Comorbidity Management)  Goal: Acceptable Pain Control and Functional Ability  Outcome: Ongoing, Progressing  Intervention: Manage Persistent Pain  Recent Flowsheet Documentation  Taken 7/16/2024 0400 by Laurie Butler RN  Medication Review/Management:  medications reviewed  Taken 7/16/2024 0230 by Laurie Butler RN  Sleep/Rest Enhancement:   awakenings minimized   consistent schedule promoted   relaxation techniques promoted   regular sleep/rest pattern promoted   room darkened  Medication Review/Management: medications reviewed  Taken 7/16/2024 0000 by Laurie Butler RN  Medication Review/Management: medications reviewed  Taken 7/15/2024 2200 by Laurie Butler RN  Medication Review/Management: medications reviewed  Taken 7/15/2024 2005 by Laurie Butler RN  Medication Review/Management: medications reviewed  Intervention: Optimize Psychosocial Wellbeing  Recent Flowsheet Documentation  Taken 7/16/2024 0230 by Laurie Butler RN  Supportive Measures:   active listening utilized   counseling provided   decision-making supported  Diversional Activities: television  Family/Support System Care:   self-care encouraged   support provided  Taken 7/15/2024 2055 by Laurie Butler RN  Supportive Measures:   active listening utilized   counseling provided   decision-making supported  Diversional Activities: television  Family/Support System Care:   self-care encouraged   support provided     Problem: Malnutrition  Goal: Improved Nutritional Intake  Outcome: Ongoing, Progressing     Problem: Fall Injury Risk  Goal: Absence of Fall and Fall-Related Injury  Outcome: Ongoing, Progressing  Intervention: Identify and Manage Contributors  Recent Flowsheet Documentation  Taken 7/16/2024 0400 by Laurie Butler RN  Medication Review/Management: medications reviewed  Taken 7/16/2024 0230 by Laurie Butler RN  Medication Review/Management: medications reviewed  Taken 7/16/2024 0000 by Laurie Butler RN  Medication Review/Management: medications reviewed  Taken 7/15/2024 2200 by Laurie Butler RN  Medication Review/Management: medications reviewed  Taken 7/15/2024 2005 by Laurie Butler RN  Medication Review/Management: medications reviewed  Intervention: Promote  Injury-Free Environment  Recent Flowsheet Documentation  Taken 7/16/2024 0400 by Laurie Butler, RN  Safety Promotion/Fall Prevention:   activity supervised   assistive device/personal items within reach   clutter free environment maintained   fall prevention program maintained   nonskid shoes/slippers when out of bed   room organization consistent   safety round/check completed  Taken 7/16/2024 0230 by Laurie Butler, RN  Safety Promotion/Fall Prevention:   activity supervised   assistive device/personal items within reach   clutter free environment maintained   fall prevention program maintained   nonskid shoes/slippers when out of bed   room organization consistent   safety round/check completed  Taken 7/16/2024 0000 by Laurie Butler, RN  Safety Promotion/Fall Prevention:   activity supervised   assistive device/personal items within reach   clutter free environment maintained   fall prevention program maintained   nonskid shoes/slippers when out of bed   room organization consistent   safety round/check completed  Taken 7/15/2024 2200 by Laurie Butler, RN  Safety Promotion/Fall Prevention:   activity supervised   assistive device/personal items within reach   clutter free environment maintained   fall prevention program maintained   nonskid shoes/slippers when out of bed   room organization consistent   safety round/check completed  Taken 7/15/2024 2005 by Laurie Butler, RN  Safety Promotion/Fall Prevention:   activity supervised   assistive device/personal items within reach   clutter free environment maintained   fall prevention program maintained   nonskid shoes/slippers when out of bed   room organization consistent   safety round/check completed   Goal Outcome Evaluation:

## 2024-07-16 NOTE — NURSING NOTE
Call back received from Krista with cardiology who states that they will need a reconsult on patient. Call placed to Dr. Haji awaiting return call.

## 2024-07-16 NOTE — PLAN OF CARE
Goal Outcome Evaluation:              Outcome Evaluation: VFSS canceled this date as patient developed A-Fib with RVR and is awaiting cardiology re-consult. RN reports pt coughing with breakfast tray. Swallow re-evaluation completed. Recommend NPO. Meds OK to be crushed in puree. Ice chips sparingly, after oral care. Will plan for VFSS next date if pt is appropriate.

## 2024-07-17 ENCOUNTER — APPOINTMENT (OUTPATIENT)
Dept: GENERAL RADIOLOGY | Facility: HOSPITAL | Age: 74
End: 2024-07-17
Payer: MEDICARE

## 2024-07-17 LAB
AMMONIA BLD-SCNC: 19 UMOL/L (ref 11–51)
ANION GAP SERPL CALCULATED.3IONS-SCNC: 11.3 MMOL/L (ref 5–15)
ARTERIAL PATENCY WRIST A: POSITIVE
ATMOSPHERIC PRESS: 746.9 MMHG
BASE EXCESS BLDA CALC-SCNC: 13.1 MMOL/L (ref 0–2)
BDY SITE: ABNORMAL
BUN SERPL-MCNC: 27 MG/DL (ref 8–23)
BUN/CREAT SERPL: 51.9 (ref 7–25)
CALCIUM SPEC-SCNC: 9.2 MG/DL (ref 8.6–10.5)
CHLORIDE SERPL-SCNC: 90 MMOL/L (ref 98–107)
CO2 BLDA-SCNC: >34.54 MMOL/L (ref 23–27)
CO2 SERPL-SCNC: 33.7 MMOL/L (ref 22–29)
CREAT SERPL-MCNC: 0.52 MG/DL (ref 0.57–1)
D DIMER PPP FEU-MCNC: 2.15 MCGFEU/ML (ref 0–0.76)
D-LACTATE SERPL-SCNC: 1.4 MMOL/L (ref 0.5–2)
DEPRECATED RDW RBC AUTO: 43 FL (ref 37–54)
EGFRCR SERPLBLD CKD-EPI 2021: 96.4 ML/MIN/1.73
ERYTHROCYTE [DISTWIDTH] IN BLOOD BY AUTOMATED COUNT: 13.4 % (ref 12.3–15.4)
GAS FLOW AIRWAY: 2 LPM
GLUCOSE SERPL-MCNC: 113 MG/DL (ref 65–99)
HCO3 BLDA-SCNC: 36.7 MMOL/L (ref 22–28)
HCT VFR BLD AUTO: 36.2 % (ref 34–46.6)
HEMODILUTION: NO
HGB BLD-MCNC: 12.1 G/DL (ref 12–15.9)
Lab: ABNORMAL
MAGNESIUM SERPL-MCNC: 2.1 MG/DL (ref 1.6–2.4)
MCH RBC QN AUTO: 29.7 PG (ref 26.6–33)
MCHC RBC AUTO-ENTMCNC: 33.4 G/DL (ref 31.5–35.7)
MCV RBC AUTO: 88.7 FL (ref 79–97)
MODALITY: ABNORMAL
NOTIFIED WHO: ABNORMAL
PCO2 BLDA: 41.3 MM HG (ref 35–45)
PH BLDA: 7.56 PH UNITS (ref 7.35–7.45)
PLATELET # BLD AUTO: 295 10*3/MM3 (ref 140–450)
PMV BLD AUTO: 9 FL (ref 6–12)
PO2 BLDA: 75.1 MM HG (ref 80–100)
POTASSIUM SERPL-SCNC: 4.1 MMOL/L (ref 3.5–5.2)
PROCALCITONIN SERPL-MCNC: 0.09 NG/ML (ref 0–0.25)
RBC # BLD AUTO: 4.08 10*6/MM3 (ref 3.77–5.28)
READ BACK: ABNORMAL
SAO2 % BLDCOA: 96.5 % (ref 92–98.5)
SODIUM SERPL-SCNC: 135 MMOL/L (ref 136–145)
TOTAL RATE: 22 BREATHS/MINUTE
TSH SERPL DL<=0.05 MIU/L-ACNC: 1.36 UIU/ML (ref 0.27–4.2)
WBC NRBC COR # BLD AUTO: 13.29 10*3/MM3 (ref 3.4–10.8)

## 2024-07-17 PROCEDURE — 83735 ASSAY OF MAGNESIUM: CPT | Performed by: INTERNAL MEDICINE

## 2024-07-17 PROCEDURE — 94799 UNLISTED PULMONARY SVC/PX: CPT

## 2024-07-17 PROCEDURE — 92611 MOTION FLUOROSCOPY/SWALLOW: CPT

## 2024-07-17 PROCEDURE — 82803 BLOOD GASES ANY COMBINATION: CPT | Performed by: HOSPITALIST

## 2024-07-17 PROCEDURE — 83605 ASSAY OF LACTIC ACID: CPT | Performed by: HOSPITALIST

## 2024-07-17 PROCEDURE — G0378 HOSPITAL OBSERVATION PER HR: HCPCS

## 2024-07-17 PROCEDURE — 85027 COMPLETE CBC AUTOMATED: CPT | Performed by: INTERNAL MEDICINE

## 2024-07-17 PROCEDURE — 82140 ASSAY OF AMMONIA: CPT | Performed by: HOSPITALIST

## 2024-07-17 PROCEDURE — 97110 THERAPEUTIC EXERCISES: CPT

## 2024-07-17 PROCEDURE — 85379 FIBRIN DEGRADATION QUANT: CPT | Performed by: HOSPITALIST

## 2024-07-17 PROCEDURE — 99232 SBSQ HOSP IP/OBS MODERATE 35: CPT | Performed by: NURSE PRACTITIONER

## 2024-07-17 PROCEDURE — 74230 X-RAY XM SWLNG FUNCJ C+: CPT

## 2024-07-17 PROCEDURE — 97530 THERAPEUTIC ACTIVITIES: CPT

## 2024-07-17 PROCEDURE — 84443 ASSAY THYROID STIM HORMONE: CPT | Performed by: HOSPITALIST

## 2024-07-17 PROCEDURE — 80048 BASIC METABOLIC PNL TOTAL CA: CPT | Performed by: INTERNAL MEDICINE

## 2024-07-17 PROCEDURE — 84145 PROCALCITONIN (PCT): CPT | Performed by: HOSPITALIST

## 2024-07-17 PROCEDURE — 94761 N-INVAS EAR/PLS OXIMETRY MLT: CPT

## 2024-07-17 PROCEDURE — 36600 WITHDRAWAL OF ARTERIAL BLOOD: CPT | Performed by: HOSPITALIST

## 2024-07-17 PROCEDURE — 94664 DEMO&/EVAL PT USE INHALER: CPT

## 2024-07-17 RX ADMIN — ATORVASTATIN CALCIUM 80 MG: 80 TABLET, FILM COATED ORAL at 21:15

## 2024-07-17 RX ADMIN — CLOPIDOGREL BISULFATE 75 MG: 75 TABLET, FILM COATED ORAL at 10:11

## 2024-07-17 RX ADMIN — BUDESONIDE 0.5 MG: 0.5 INHALANT ORAL at 07:56

## 2024-07-17 RX ADMIN — METOPROLOL TARTRATE 37.5 MG: 25 TABLET, FILM COATED ORAL at 21:15

## 2024-07-17 RX ADMIN — Medication 100 MG: at 10:11

## 2024-07-17 RX ADMIN — BARIUM SULFATE 50 ML: 400 SUSPENSION ORAL at 09:18

## 2024-07-17 RX ADMIN — BARIUM SULFATE 55 ML: 0.81 POWDER, FOR SUSPENSION ORAL at 09:18

## 2024-07-17 RX ADMIN — BARIUM SULFATE 4 ML: 980 POWDER, FOR SUSPENSION ORAL at 09:18

## 2024-07-17 RX ADMIN — ALBUTEROL SULFATE 2.5 MG: 2.5 SOLUTION RESPIRATORY (INHALATION) at 15:51

## 2024-07-17 RX ADMIN — LOSARTAN POTASSIUM 25 MG: 25 TABLET, FILM COATED ORAL at 21:15

## 2024-07-17 RX ADMIN — Medication 500 MCG: at 10:11

## 2024-07-17 RX ADMIN — METOPROLOL TARTRATE 37.5 MG: 25 TABLET, FILM COATED ORAL at 10:11

## 2024-07-17 RX ADMIN — BARIUM SULFATE 1 TEASPOON(S): 0.6 CREAM ORAL at 09:18

## 2024-07-17 RX ADMIN — BUDESONIDE 0.5 MG: 0.5 INHALANT ORAL at 20:33

## 2024-07-17 RX ADMIN — ISOSORBIDE MONONITRATE 30 MG: 30 TABLET, EXTENDED RELEASE ORAL at 10:11

## 2024-07-17 RX ADMIN — ALBUTEROL SULFATE 2.5 MG: 2.5 SOLUTION RESPIRATORY (INHALATION) at 11:59

## 2024-07-17 RX ADMIN — ALBUTEROL SULFATE 2.5 MG: 2.5 SOLUTION RESPIRATORY (INHALATION) at 07:52

## 2024-07-17 RX ADMIN — ALBUTEROL SULFATE 2.5 MG: 2.5 SOLUTION RESPIRATORY (INHALATION) at 20:32

## 2024-07-17 NOTE — DISCHARGE PLACEMENT REQUEST
"Asha Parra (74 y.o. Female)       Date of Birth   1950    Social Security Number       Address   [facility] [address] [place] [address]  [address] [address]    Home Phone   [phone]    MRN   [id number]       Sikh   None    Marital Status                               Admission Date   7/13/24    Admission Type   Emergency    Admitting Provider   Johnny Conde MD    Attending Provider   Johnny Conde MD    Department, Room/Bed   [address], [address]/1       Discharge Date       Discharge Disposition       Discharge Destination                                 Attending Provider: Johnny Conde MD    Allergies: Latex, Tylenol [Acetaminophen]    Isolation: None   Infection: None   Code Status: CPR    Ht: 157.5 cm (62\")   Wt: 37.5 kg (82 lb 10.8 oz)    Admission Cmt: None   Principal Problem: Acute respiratory failure with hypoxia [J96.01]                   Active Insurance as of 7/13/2024       Primary Coverage       Payor Plan Insurance Group Employer/Plan Group    [facility] MEDICARE REPLACEMENT [facility] MEDICARE ADVANTAGE [id number]       Payor Plan Address Payor Plan Phone Number Payor Plan Fax Number Effective Dates    [address] [phone]  3/1/2022 - None Entered    [facility] [id number]         Subscriber Name Subscriber Birth Date Member ID       ASHA PARRA 1950 [id number]                     Emergency Contacts        (Rel.) Home Phone Work Phone Mobile Phone    Anna Boggs (Significant Other) [phone] -- --              Emergency Contact Information       Name Relation Home Work Mobile    Anna Boggs Significant Other [phone]            "

## 2024-07-17 NOTE — PROGRESS NOTES
"    Patient Name: Asha Krishnan  :1950  74 y.o.      Patient Care Team:  Capo Pedroza MD as PCP - General (Family Medicine)    Chief Complaint: follow up atrial fibrillation    Interval History: taking meds crushed in puree but otherwise NPO.      Objective   Vital Signs  Temp:  [97.3 °F (36.3 °C)-97.5 °F (36.4 °C)] 97.5 °F (36.4 °C)  Heart Rate:  [] 77  Resp:  [12-20] 20  BP: ()/(45-90) 115/64    Intake/Output Summary (Last 24 hours) at 2024 1442  Last data filed at 2024 1700  Gross per 24 hour   Intake --   Output 300 ml   Net -300 ml     Flowsheet Rows      Flowsheet Row First Filed Value   Admission Height 157.5 cm (62\") Documented at 2024   Admission Weight 40.8 kg (90 lb) Documented at 2024            Physical Exam:   General Appearance:    Frail, cachectic    Lungs:     Clear to auscultation.  Normal respiratory effort and rate.      Heart:    Regular rhythm and normal rate, normal S1 and S2, no murmurs, gallops or rubs.     Chest Wall:    No abnormalities observed   Abdomen:     Soft, nontender, positive bowel sounds.     Extremities:   no cyanosis, clubbing or edema.  No marked joint deformities.  Adequate musculoskeletal strength.       Results Review:    Results from last 7 days   Lab Units 24  0727   SODIUM mmol/L 135*   POTASSIUM mmol/L 4.1   CHLORIDE mmol/L 90*   CO2 mmol/L 33.7*   BUN mg/dL 27*   CREATININE mg/dL 0.52*   GLUCOSE mg/dL 113*   CALCIUM mg/dL 9.2     Results from last 7 days   Lab Units 24  0044 24  210   HSTROP T ng/L 21* 22*     Results from last 7 days   Lab Units 24  0727   WBC 10*3/mm3 13.29*   HEMOGLOBIN g/dL 12.1   HEMATOCRIT % 36.2   PLATELETS 10*3/mm3 295     Results from last 7 days   Lab Units 24  2101   INR  1.08   APTT seconds 31.1     Results from last 7 days   Lab Units 24  0727   MAGNESIUM mg/dL 2.1                   Medication Review:   albuterol, 2.5 mg, Nebulization, " 4x Daily - RT  atorvastatin, 80 mg, Oral, Nightly  budesonide, 0.5 mg, Nebulization, BID - RT  clopidogrel, 75 mg, Oral, Daily  isosorbide mononitrate, 30 mg, Oral, Q24H  losartan, 25 mg, Oral, Nightly  metoprolol tartrate, 37.5 mg, Oral, Q12H  thiamine, 100 mg, Oral, Daily  tiotropium bromide monohydrate, 2 puff, Inhalation, Daily - RT  vitamin B-12, 500 mcg, Oral, Daily              Assessment & Plan   Acute hypoxic respiratory failure due to COPD with acute exacerbation  Paroxysmal atrial fibrillation with RVR , wasn't taking medications prior to admission. Easily back in to SR then had recurrent AF 7/15 and 7/16.  COPD   Coronary artery disease (PCI with Synergy stent to left circumflex and LAD extending to left main in January 2017), stable recent stress and echo. Denies angina.   Hypertension, losartan added this admission.   Hyperlipidemia  Cachexia with malnitrition  Severe dysphagia, meds crushed   2:1 heart block , with sleep. Asymptomatic.     Poor candidate for anticoagulation.     She may continue to have paroxysms of atrial fibrillation. Continue beta blocker. In general , I would not be aggressive in treating these unless she becomes terribly symptomatic.     Overall prognosis appears poor. Agree with palliative care evaluation.    Nothing further to add from a cardiac standpoint. Will see as needed.      EMORY Lopez  [facility]  07/17/24  14:42 EDT

## 2024-07-17 NOTE — PLAN OF CARE
"Goal Outcome Evaluation:              Outcome Evaluation:     Video swallow study completed. Limited exam. Pt reports that she \"cannot swallow anything.\" Limited acceptance of bolus trials (pt accepted one small bite of puree, one small sip of nectar thick liquid). Of note, pt appeared very tired during exam.    Summary / Impressions: Profound dysphagia. Minimal to no pharyngeal clearance. Tracheal aspiration after the swallow. Suboptimal expiratory reflex / throat clear. ?Acute on chronic dysphagia versus ?worsening chronic dysphagia. Suspect host response to aspiration may be reduced, given low BMI, perceived suboptimal cough, severe emphysema, recent cessation of smoking.     Suggest consider goals of care discussion.      Anticipated Discharge Disposition (SLP): unknown          SLP Swallowing Diagnosis: pharyngeal dysphagia, other (see comments), profound (feeding difficulties) (07/17/24 4229)             "

## 2024-07-17 NOTE — CONSULTS
Patient Identification:  Asha Krishnan  74 y.o.  female  1950  [id number]          LOS 1    Requesting physician: Dr. Conde    Reason for Consultation: COPD exacerbation and shortness of breath    History of Present Illness:     Consultation for shortness of breath.  Has COPD.  On 2 L supplemental oxygen with saturations 100% at time of visit.  Unable to arouse despite noxious stimuli.  Chart reviewed.  Diminished breath sounds but no wheezing or rhonchi.      Past Medical History:  Past Medical History:   Diagnosis Date    Atrial fibrillation, unspecified type     Cataract     COPD (chronic obstructive pulmonary disease)     Hyperlipidemia     Hypertension        Past Surgical History:  Past Surgical History:   Procedure Laterality Date    CARDIAC CATHETERIZATION N/A 11/11/2022    Procedure: Left Heart Cath;  Surgeon: Tashi De La Torre MD;  Location: [facility] INVASIVE LOCATION;  Service: Cardiovascular;  Laterality: N/A;    CARDIAC CATHETERIZATION N/A 11/11/2022    Procedure: Coronary angiography;  Surgeon: Tashi De La Torre MD;  Location: [facility] CATH INVASIVE LOCATION;  Service: Cardiovascular;  Laterality: N/A;        Home Meds:  Medications Prior to Admission   Medication Sig Dispense Refill Last Dose    atorvastatin (LIPITOR) 80 MG tablet Take 1 tablet by mouth Every Night. 90 tablet  Past Month    bumetanide (BUMEX) 2 MG tablet Take 1 tablet by mouth Daily.   Past Month    clopidogrel (PLAVIX) 75 MG tablet Take 1 tablet by mouth Daily. 30 tablet  Past Month    isosorbide mononitrate (IMDUR) 30 MG 24 hr tablet Take 1 tablet by mouth Daily. (Patient taking differently: Take 2 tablets by mouth Daily.)   Past Month    metoprolol tartrate (LOPRESSOR) 50 MG tablet Take 1 tablet by mouth Every 12 (Twelve) Hours.   Past Month    nitroglycerin (NITROSTAT) 0.4 MG SL tablet Place 1 tablet under the tongue Every 5 (Five) Minutes As Needed for Chest Pain (Only if SBP Greater Than 100). Take no more than  "3 doses in 15 minutes.  12 7/13/2024    thiamine (VITAMIN B1) 100 MG tablet Take 1 tablet by mouth Daily. 30 tablet 0 Past Month    tiotropium bromide monohydrate (SPIRIVA RESPIMAT) 2.5 MCG/ACT aerosol solution inhaler Inhale 2 puffs Daily. 4 g 0 Past Month    vitamin B-12 (CYANOCOBALAMIN) 1000 MCG tablet Take 0.5 tablets by mouth Daily. 15 tablet 0 Past Month         Allergies:  Allergies   Allergen Reactions    Latex Anaphylaxis     Pt states whole body swells including throat    Tylenol [Acetaminophen] Anaphylaxis     States whole body swells including throat       Social History:   Social History     Socioeconomic History    Marital status:    Tobacco Use    Smoking status: Every Day     Current packs/day: 2.00     Types: Cigarettes    Smokeless tobacco: Never   Vaping Use    Vaping status: Never Used   Substance and Sexual Activity    Alcohol use: Not Currently    Drug use: Not Currently    Sexual activity: Defer       Family History:  History reviewed. No pertinent family history.    Review of Systems:  Unobtainable due to lethargy    Objective:    PHYSICAL EXAM:    /76 (BP Location: Right arm, Patient Position: Lying)   Pulse 83   Temp 97.5 °F (36.4 °C) (Axillary)   Resp 20   Ht 157.5 cm (62\")   Wt 37.5 kg (82 lb 10.8 oz)   SpO2 100%   BMI 15.12 kg/m²  Body mass index is 15.12 kg/m². 100% 37.5 kg (82 lb 10.8 oz)    GENERAL APPEARANCE:   Well developed  Lethargic  Cachectic  No acute distress   EYES:    PERRL                                                                           Conjunctivae normal  Sclerae nonicteric.  HENT:   Atraumatic, normocephalic  External ears and nose normal  Moist mucous membranes and no ulcers  NECK:  Thyroid not enlarged  Trachea midline   RESPIRATORY:    Nonlabored breathing   Normal breath sounds  No rales. No wheezing  No dullness  CARDIOVASCULAR:    RRR  Normal S1, S2  No murmur  Lower extremity edema: none    GI:   Bowel sounds normal  Abdomen soft , " nondistended, nontender  No abdominal masses  MUSCULOSKELETAL:  Normal movement of extremities  No tenderness, no deformities  No clubbing or cyanosis   Skin:    No visible rashes  No palpable nodules  Cap refill normal.  No mottling.   PSYCHIATRIC:  Lethargic  NEUROLOGIC:  Cranial nerves II through XII grossly intact.  Sensation intact.      Lab Review:   Results from last 7 days   Lab Units 07/17/24  0727 07/15/24  0625 07/13/24  2101   WBC 10*3/mm3 13.29* 15.51* 7.12   HEMOGLOBIN g/dL 12.1 11.2* 11.7*   HEMATOCRIT % 36.2 32.7* 35.7   PLATELETS 10*3/mm3 295 293 293     Results from last 7 days   Lab Units 07/17/24  0727 07/16/24  0931 07/15/24  0625 07/13/24  2101   SODIUM mmol/L 135* 134* 134* 141   POTASSIUM mmol/L 4.1 4.2 4.3 3.4*   CHLORIDE mmol/L 90* 92* 96* 100   CO2 mmol/L 33.7* 33.0* 27.7 28.0   BUN mg/dL 27* 28* 24* 20   CREATININE mg/dL 0.52* 0.56* 0.57 0.65   CALCIUM mg/dL 9.2 9.5 9.4 9.0   BILIRUBIN mg/dL  --   --   --  0.7   ALK PHOS U/L  --   --   --  100   ALT (SGPT) U/L  --   --   --  10   AST (SGOT) U/L  --   --   --  17   GLUCOSE mg/dL 113* 141* 99 75     Results from last 7 days   Lab Units 07/17/24  1156   PH, ARTERIAL pH units 7.556*   PO2 ART mm Hg 75.1*   PCO2, ARTERIAL mm Hg 41.3   HCO3 ART mmol/L 36.7*         Lab 07/17/24  1211 07/13/24  2101   LACTATE 1.4 1.5   Procalitonin Results:      Lab 07/17/24  0727 07/13/24  2101   PROCALCITONIN 0.09 0.04                   Imaging reviewed  chest X-ray       Assessment:  COPD with mild exacerbation  Dysphagia  Atrial fibrillation with RVR  Severe CAD  Protein calorie malnutrition        Recommendations:  Fair air movement bilaterally without significant wheezing.  I do not think we need to add steroids.  Lethargy significant today.  ABG performed late this morning does not show hypercapnic failure.  Continue oxygen for goal saturation 90 to 94%.  Schedule as needed bronchodilators for COPD symptoms.  Swallow evaluation noted with tracheal  aspiration with nectar thick liquids.  No pharyngeal clearance and absence of initiation of pharyngeal swallow noted.  Given poor functional status palliative measures may be appropriate in the very least consideration of changing CODE STATUS.  Will consult palliative care to help with goals of care.    Thank you for allowing me to participate in the care of this patient.  I will continue to follow along with you.      Duong Mayer MD  [facility], [facility]  Pulmonary and Critical Care Medicine    7/17/2024  13:27 EDT

## 2024-07-17 NOTE — CONSULTS
"Nutrition Services    Patient Name:  Asha Krishnan  YOB: 1950  MRN: [id number]  Admit Date:  7/13/2024  Assessment Date:  07/17/24    Summary: Follow up note  VFSS completed. . Pt reports that she \"cannot swallow anything.\"   Pt NPO at this time.  Labs Na 135, chloride 90, glu 113, BUN 27, cr .52    Will follow and remain available as needed pending discussion of goals of care.      CLINICAL NUTRITION ASSESSMENT      Reason for Assessment Follow-up Protocol     Diagnosis/Problem   Acute respiratory failure, dysphagia   Medical/Surgical History Past Medical History:   Diagnosis Date    Atrial fibrillation, unspecified type     Cataract     COPD (chronic obstructive pulmonary disease)     Hyperlipidemia     Hypertension        Past Surgical History:   Procedure Laterality Date    CARDIAC CATHETERIZATION N/A 11/11/2022    Procedure: Left Heart Cath;  Surgeon: Tashi De La Torre MD;  Location: [facility] CATH INVASIVE LOCATION;  Service: Cardiovascular;  Laterality: N/A;    CARDIAC CATHETERIZATION N/A 11/11/2022    Procedure: Coronary angiography;  Surgeon: Tashi De La Torre MD;  Location: [facility] CATH INVASIVE LOCATION;  Service: Cardiovascular;  Laterality: N/A;        Anthropometrics        Current Height  Current Weight  BMI kg/m2 Height: 157.5 cm (62\")  Weight: 37.5 kg (82 lb 10.8 oz) (07/14/24 0202)  Body mass index is 15.12 kg/m².   Adjusted BMI (if applicable)    BMI Category Underweight (18.4 or below)   Ideal Body Weight (IBW) 110#   Usual Body Weight (UBW) 110#   Weight Trend Loss   Weight History Wt Readings from Last 30 Encounters:   07/14/24 0202 37.5 kg (82 lb 10.8 oz)   07/13/24 2156 40.8 kg (90 lb)   08/01/23 1035 49.9 kg (110 lb)   11/12/22 0826 54.4 kg (120 lb)   11/11/22 0222 54.4 kg (120 lb)   11/11/22 0117 54.4 kg (120 lb)        Estimated/Assessed Needs       Energy Requirements    Weight for Calculation 37.5kg   Method for Estimation  35-40 kcal/kg   EST Needs (kcal/day) 3097-0170 "       Protein Requirements    Weight for Calculation 37.5kg   EST Protein Needs (g/kg) 1.2 - 1.5 gm/kg   EST Daily Needs (g/day) 45-57       Fluid Requirements     Method for Estimation 1 mL/kcal    Estimated Needs (mL/day) 7775-6459      Labs       Pertinent Labs    Results from last 7 days   Lab Units 07/17/24  0727 07/16/24  0931 07/15/24  0625 07/13/24  2101   SODIUM mmol/L 135* 134* 134* 141   POTASSIUM mmol/L 4.1 4.2 4.3 3.4*   CHLORIDE mmol/L 90* 92* 96* 100   CO2 mmol/L 33.7* 33.0* 27.7 28.0   BUN mg/dL 27* 28* 24* 20   CREATININE mg/dL 0.52* 0.56* 0.57 0.65   CALCIUM mg/dL 9.2 9.5 9.4 9.0   BILIRUBIN mg/dL  --   --   --  0.7   ALK PHOS U/L  --   --   --  100   ALT (SGPT) U/L  --   --   --  10   AST (SGOT) U/L  --   --   --  17   GLUCOSE mg/dL 113* 141* 99 75     Results from last 7 days   Lab Units 07/17/24  0727 07/16/24  0931 07/15/24  0625 07/13/24  2101   MAGNESIUM mg/dL 2.1 1.9 2.2  --    HEMOGLOBIN g/dL 12.1  --  11.2* 11.7*   HEMATOCRIT % 36.2  --  32.7* 35.7   WBC 10*3/mm3 13.29*  --  15.51* 7.12   ALBUMIN g/dL  --   --   --  3.9     Results from last 7 days   Lab Units 07/17/24  0727 07/15/24  0625 07/13/24  2101   INR   --   --  1.08   APTT seconds  --   --  31.1   PLATELETS 10*3/mm3 295 293 293     COVID19   Date Value Ref Range Status   07/13/2024 Not Detected Not Detected - Ref. Range Final     Lab Results   Component Value Date    HGBA1C 6.1 (H) 11/10/2022          Medications           Scheduled Medications albuterol, 2.5 mg, Nebulization, 4x Daily - RT  atorvastatin, 80 mg, Oral, Nightly  budesonide, 0.5 mg, Nebulization, BID - RT  clopidogrel, 75 mg, Oral, Daily  isosorbide mononitrate, 30 mg, Oral, Q24H  losartan, 25 mg, Oral, Nightly  metoprolol tartrate, 37.5 mg, Oral, Q12H  thiamine, 100 mg, Oral, Daily  tiotropium bromide monohydrate, 2 puff, Inhalation, Daily - RT  vitamin B-12, 500 mcg, Oral, Daily       Infusions     PRN Medications   [COMPLETED] Insert Peripheral IV **AND** sodium  chloride     Physical Findings          General Findings alert, frail, generalized wasting, loss of muscle mass, loss of subcutaneous fat, oriented, on oxygen therapy   Oral/Mouth Cavity WDL, dental appliance   Edema  no edema   Gastrointestinal WDL, last bowel movement: 7/13   Skin  skin intact   Tubes/Drains/Lines none   NFPE See Malnutrition Severity Assessment, Date Completed: 7/14 HT     Malnutrition Severity Assessment      Patient meets criteria for : Severe Malnutrition            Current Nutrition Orders & Evaluation of Intake       Oral Nutrition     Food Allergies NKFA   Current PO Diet NPO Diet NPO Type: Sips with Meds, Ice Chips   Supplement n/a   PO Evaluation     % PO Intake npo    Factors Affecting Intake: chewing difficulty, decreased appetite, early satiety , swallow impairment   --  PES STATEMENT / NUTRITION DIAGNOSIS      Nutrition Dx Problem  Problem: Malnutrition (severe)  Etiology: Factors Affecting Nutrition - weakness, fatigue, dysphagia    Signs/Symptoms: Report of Minimal PO Intake, NFPE Results, BMI, Unintended Weight Change, Report/Observation, and SLP/Swallow Evaluation     NUTRITION INTERVENTION / PLAN OF CARE      Intervention Goal(s) Establish goals of care         RD Intervention/Action Await initiation/advancement of PO diet, Await initiation of EN/PN, Continue to monitor, and Care plan reviewed   --      Prescription/Orders:       PO Diet       Supplements       Enteral Nutrition       Parenteral Nutrition    New Prescription Ordered?    --      Monitor/Evaluation    Discharge Plan/Needs Pending clinical course   --    RD to follow per protocol.      Electronically signed by:  Nicci Brizuela RD  07/17/24 14:22 EDT

## 2024-07-17 NOTE — PLAN OF CARE
Problem: Adult Inpatient Plan of Care  Goal: Plan of Care Review  Outcome: Adequate for Care Transition  Goal: Patient-Specific Goal (Individualized)  Outcome: Adequate for Care Transition  Goal: Absence of Hospital-Acquired Illness or Injury  Outcome: Adequate for Care Transition  Intervention: Identify and Manage Fall Risk  Recent Flowsheet Documentation  Taken 7/17/2024 0215 by Laurie Butler RN  Safety Promotion/Fall Prevention:   activity supervised   assistive device/personal items within reach   clutter free environment maintained   fall prevention program maintained   nonskid shoes/slippers when out of bed   safety round/check completed   room organization consistent  Taken 7/17/2024 0000 by Laruie Butler RN  Safety Promotion/Fall Prevention:   activity supervised   assistive device/personal items within reach   clutter free environment maintained   fall prevention program maintained   nonskid shoes/slippers when out of bed   room organization consistent   safety round/check completed  Taken 7/16/2024 2200 by Laurie Butler RN  Safety Promotion/Fall Prevention:   activity supervised   assistive device/personal items within reach   clutter free environment maintained   fall prevention program maintained   nonskid shoes/slippers when out of bed   room organization consistent   safety round/check completed  Taken 7/16/2024 2010 by Laurie Butler RN  Safety Promotion/Fall Prevention:   activity supervised   assistive device/personal items within reach   clutter free environment maintained   fall prevention program maintained   nonskid shoes/slippers when out of bed   room organization consistent   safety round/check completed  Intervention: Prevent Skin Injury  Recent Flowsheet Documentation  Taken 7/17/2024 0215 by Laurie Butler RN  Body Position:   position changed independently   weight shifting   supine, legs elevated  Skin Protection:   adhesive use limited   incontinence pads utilized    tubing/devices free from skin contact   transparent dressing maintained  Taken 7/17/2024 0000 by Laurie Butler RN  Body Position:   tilted   right   legs elevated  Taken 7/16/2024 2200 by Laurie Butler RN  Body Position:   supine, legs elevated   tilted   right  Taken 7/16/2024 2010 by Laurie Butler RN  Body Position: supine, legs elevated  Taken 7/16/2024 1930 by Laurie Butler RN  Skin Protection:   adhesive use limited   incontinence pads utilized   tubing/devices free from skin contact   transparent dressing maintained  Intervention: Prevent and Manage VTE (Venous Thromboembolism) Risk  Recent Flowsheet Documentation  Taken 7/17/2024 0215 by Laurie Butler RN  Activity Management: activity minimized  Range of Motion: active ROM (range of motion) encouraged  Taken 7/17/2024 0000 by Laurie Butler RN  Activity Management: activity minimized  Taken 7/16/2024 2200 by Laurie Butler RN  Activity Management: activity minimized  Taken 7/16/2024 2010 by Laurie Butler RN  Activity Management: activity minimized  Taken 7/16/2024 1930 by Laurie Butler RN  VTE Prevention/Management:   bilateral   sequential compression devices on  Range of Motion: active ROM (range of motion) encouraged  Intervention: Prevent Infection  Recent Flowsheet Documentation  Taken 7/17/2024 0215 by Laurie Butler RN  Infection Prevention:   cohorting utilized   environmental surveillance performed   rest/sleep promoted   single patient room provided  Taken 7/17/2024 0000 by Laurie Butler RN  Infection Prevention:   cohorting utilized   environmental surveillance performed   hand hygiene promoted   rest/sleep promoted   single patient room provided  Taken 7/16/2024 2200 by Laurie Butler RN  Infection Prevention:   cohorting utilized   environmental surveillance performed   rest/sleep promoted   single patient room provided  Taken 7/16/2024 2010 by Laurie Butler RN  Infection Prevention:   cohorting utilized    environmental surveillance performed   rest/sleep promoted   single patient room provided  Goal: Optimal Comfort and Wellbeing  Outcome: Adequate for Care Transition  Intervention: Provide Person-Centered Care  Recent Flowsheet Documentation  Taken 7/17/2024 0215 by Laurie Butler RN  Trust Relationship/Rapport:   care explained   choices provided   emotional support provided   empathic listening provided   questions answered   reassurance provided   thoughts/feelings acknowledged  Taken 7/16/2024 1930 by Laurie Butler RN  Trust Relationship/Rapport:   care explained   choices provided   emotional support provided   empathic listening provided   questions answered   reassurance provided   thoughts/feelings acknowledged  Goal: Readiness for Transition of Care  Outcome: Adequate for Care Transition     Problem: Skin Injury Risk Increased  Goal: Skin Health and Integrity  Outcome: Adequate for Care Transition  Intervention: Optimize Skin Protection  Recent Flowsheet Documentation  Taken 7/17/2024 0215 by Laurie Butler RN  Pressure Reduction Techniques:   frequent weight shift encouraged   weight shift assistance provided   heels elevated off bed   positioned off wounds   pressure points protected  Head of Bed (HOB) Positioning: HOB at 60 degrees  Pressure Reduction Devices:   specialty bed utilized   heel offloading device utilized  Skin Protection:   adhesive use limited   incontinence pads utilized   tubing/devices free from skin contact   transparent dressing maintained  Taken 7/17/2024 0000 by Laurie Butler RN  Head of Bed (HOB) Positioning: HOB at 60-90 degrees  Taken 7/16/2024 2200 by Laurie Butler RN  Head of Bed (HOB) Positioning: HOB at 30-45 degrees  Taken 7/16/2024 2010 by Laurie Butler RN  Head of Bed (HOB) Positioning: HOB elevated  Taken 7/16/2024 1930 by Laurie Butler RN  Pressure Reduction Techniques:   frequent weight shift encouraged   weight shift assistance provided   heels  elevated off bed   positioned off wounds   pressure points protected  Pressure Reduction Devices:   specialty bed utilized   heel offloading device utilized   positioning supports utilized  Skin Protection:   adhesive use limited   incontinence pads utilized   tubing/devices free from skin contact   transparent dressing maintained     Problem: Hypertension Comorbidity  Goal: Blood Pressure in Desired Range  Outcome: Adequate for Care Transition  Intervention: Maintain Blood Pressure Management  Recent Flowsheet Documentation  Taken 7/17/2024 0215 by Laurie Butler RN  Medication Review/Management: medications reviewed  Taken 7/17/2024 0000 by Laurie Butler RN  Medication Review/Management: medications reviewed  Taken 7/16/2024 2200 by Laurie Butler RN  Medication Review/Management: medications reviewed  Taken 7/16/2024 2010 by Laurie Butler RN  Medication Review/Management: medications reviewed     Problem: Pain Chronic (Persistent) (Comorbidity Management)  Goal: Acceptable Pain Control and Functional Ability  Outcome: Adequate for Care Transition  Intervention: Manage Persistent Pain  Recent Flowsheet Documentation  Taken 7/17/2024 0215 by Laurie Butler RN  Sleep/Rest Enhancement:   awakenings minimized   consistent schedule promoted   relaxation techniques promoted   room darkened  Medication Review/Management: medications reviewed  Taken 7/17/2024 0000 by Laurie Butler RN  Medication Review/Management: medications reviewed  Taken 7/16/2024 2200 by Laurie Butler RN  Medication Review/Management: medications reviewed  Taken 7/16/2024 2010 by Laurie Butler RN  Medication Review/Management: medications reviewed  Taken 7/16/2024 1930 by Laurie Butler RN  Sleep/Rest Enhancement:   awakenings minimized   consistent schedule promoted   relaxation techniques promoted   regular sleep/rest pattern promoted   room darkened  Intervention: Optimize Psychosocial Wellbeing  Recent Flowsheet  Documentation  Taken 7/17/2024 0215 by Laurie Butler RN  Supportive Measures:   active listening utilized   counseling provided  Diversional Activities: television  Family/Support System Care:   self-care encouraged   support provided  Taken 7/16/2024 1930 by Laurie Butler RN  Supportive Measures:   active listening utilized   counseling provided   decision-making supported  Diversional Activities: television  Family/Support System Care:   self-care encouraged   support provided     Problem: Malnutrition  Goal: Improved Nutritional Intake  Outcome: Adequate for Care Transition     Problem: Fall Injury Risk  Goal: Absence of Fall and Fall-Related Injury  Outcome: Adequate for Care Transition  Intervention: Identify and Manage Contributors  Recent Flowsheet Documentation  Taken 7/17/2024 0215 by Laurie Butler RN  Medication Review/Management: medications reviewed  Taken 7/17/2024 0000 by Laurie Butler RN  Medication Review/Management: medications reviewed  Taken 7/16/2024 2200 by Laurie Butler RN  Medication Review/Management: medications reviewed  Taken 7/16/2024 2010 by Laurie Butler RN  Medication Review/Management: medications reviewed  Intervention: Promote Injury-Free Environment  Recent Flowsheet Documentation  Taken 7/17/2024 0215 by Laurie Butler RN  Safety Promotion/Fall Prevention:   activity supervised   assistive device/personal items within reach   clutter free environment maintained   fall prevention program maintained   nonskid shoes/slippers when out of bed   safety round/check completed   room organization consistent  Taken 7/17/2024 0000 by Laurie Butler RN  Safety Promotion/Fall Prevention:   activity supervised   assistive device/personal items within reach   clutter free environment maintained   fall prevention program maintained   nonskid shoes/slippers when out of bed   room organization consistent   safety round/check completed  Taken 7/16/2024 2200 by Laurie Butler  RN  Safety Promotion/Fall Prevention:   activity supervised   assistive device/personal items within reach   clutter free environment maintained   fall prevention program maintained   nonskid shoes/slippers when out of bed   room organization consistent   safety round/check completed  Taken 7/16/2024 2010 by Laurie Butler RN  Safety Promotion/Fall Prevention:   activity supervised   assistive device/personal items within reach   clutter free environment maintained   fall prevention program maintained   nonskid shoes/slippers when out of bed   room organization consistent   safety round/check completed   Goal Outcome Evaluation:

## 2024-07-17 NOTE — PLAN OF CARE
Goal Outcome Evaluation:  Plan of Care Reviewed With: patient        Progress: improving  Outcome Evaluation: Pt tolerated treatment fair this date. Required SBA for bed mobility, then CGA to stand. Pt required increased time d/t feeling light-headed, taking breaks between activity/exercises. Limited also d/t weakness and fatigue.      Anticipated Discharge Disposition (PT): home with 24/7 care, skilled nursing facility

## 2024-07-17 NOTE — THERAPY TREATMENT NOTE
Patient Name: Asha Krishnan  : 1950    MRN: [id number]                              Today's Date: 2024       Admit Date: 2024    Visit Dx:     ICD-10-CM ICD-9-CM   1. Acute hypoxemic respiratory failure  J96.01 518.81   2. COPD with exacerbation  J44.1 491.21   3. Atrial fibrillation with rapid ventricular response  I48.91 427.31     Patient Active Problem List   Diagnosis    Chest pain, atypical    Chest pain, unspecified type    CAD (coronary artery disease)    Dysuria    Dyspnea    Elevated d-dimer    COPD (chronic obstructive pulmonary disease)    HTN (hypertension)    HLD (hyperlipidemia)    Altered mental status, unspecified    PAF (paroxysmal atrial fibrillation)    Pulmonary nodule 1 cm or greater in diameter    Anemia    Acute respiratory failure with hypoxia    Severe malnutrition     Past Medical History:   Diagnosis Date    Atrial fibrillation, unspecified type     Cataract     COPD (chronic obstructive pulmonary disease)     Hyperlipidemia     Hypertension      Past Surgical History:   Procedure Laterality Date    CARDIAC CATHETERIZATION N/A 2022    Procedure: Left Heart Cath;  Surgeon: Tashi De La Torre MD;  Location: [facility] INVASIVE LOCATION;  Service: Cardiovascular;  Laterality: N/A;    CARDIAC CATHETERIZATION N/A 2022    Procedure: Coronary angiography;  Surgeon: Tashi De La Torre MD;  Location: [facility] INVASIVE LOCATION;  Service: Cardiovascular;  Laterality: N/A;      General Information       Row Name 24 1534          Physical Therapy Time and Intention    Document Type therapy note (daily note)  -     Mode of Treatment physical therapy  -       Row Name 24 1534          General Information    Existing Precautions/Restrictions fall;oxygen therapy device and L/min  -       Row Name 24 1534          Cognition    Orientation Status (Cognition) oriented x 3  -               User Key  (r) = Recorded By, (t) = Taken By, (c) =  Cosigned By      Initials Name Provider Type     Jennifer Barker PTA Physical Therapist Assistant                   Mobility       Row Name 07/17/24 1534          Bed Mobility    Bed Mobility supine-sit;sit-supine  -     Supine-Sit [place] (Bed Mobility) standby assist  -     Sit-Supine [place] (Bed Mobility) standby assist  -     Assistive Device (Bed Mobility) bed rails;head of bed elevated  -       Row Name 07/17/24 1534          Sit-Stand Transfer    Sit-Stand [place] (Transfers) contact guard  -     Assistive Device (Sit-Stand Transfers) walker, front-wheeled  -     Comment, (Sit-Stand Transfer) unable to take side steps d/t feeling light-headed and fatigued  -               User Key  (r) = Recorded By, (t) = Taken By, (c) = Cosigned By      Initials Name Provider Type    Jennifer Lovelace PTA Physical Therapist Assistant                   Obj/Interventions       Row Name 07/17/24 1537          Motor Skills    Therapeutic Exercise --  seated AP, LAQ, marches x10 reps  -               User Key  (r) = Recorded By, (t) = Taken By, (c) = Cosigned By      Initials Name Provider Type     Jennifer Barker PTA Physical Therapist Assistant                   Goals/Plan    No documentation.                  Clinical Impression       Row Name 07/17/24 1537          Pain    Pretreatment Pain Rating 0/10 - no pain  -     Posttreatment Pain Rating 0/10 - no pain  -       Row Name 07/17/24 1537          Positioning and Restraints    Pre-Treatment Position in bed  -     Post Treatment Position bed  -SM     In Bed supine;call light within reach;encouraged to call for assist;exit alarm on  -               User Key  (r) = Recorded By, (t) = Taken By, (c) = Cosigned By      Initials Name Provider Type    Jennifer Lovelace PTA Physical Therapist Assistant                   Outcome Measures       Row Name 07/17/24 1539          How much help from another person do you  currently need...    Turning from your back to your side while in flat bed without using bedrails? 3  -SM     Moving from lying on back to sitting on the side of a flat bed without bedrails? 3  -SM     Moving to and from a bed to a chair (including a wheelchair)? 2  -SM     Standing up from a chair using your arms (e.g., wheelchair, bedside chair)? 3  -SM     Climbing 3-5 steps with a railing? 1  -SM     To walk in hospital room? 1  -SM     AM-PAC 6 Clicks Score (PT) 13  -SM     Highest Level of Mobility Goal 4 --> Transfer to chair/commode  -       Row Name 07/17/24 1539          Functional Assessment    Outcome Measure Options AM-PAC 6 Clicks Basic Mobility (PT)  -               User Key  (r) = Recorded By, (t) = Taken By, (c) = Cosigned By      Initials Name Provider Type     Jennifer Barker PTA Physical Therapist Assistant                                 Physical Therapy Education       Title: PT OT SLP Therapies (Done)       Topic: Physical Therapy (Done)       Point: Mobility training (Done)       Learning Progress Summary             Patient Acceptance, E,TB,D, VU,NR by  at 7/17/2024 1540    Acceptance, E, VU,NR by  at 7/15/2024 1123                         Point: Home exercise program (Done)       Learning Progress Summary             Patient Acceptance, E,TB,D, VU,NR by  at 7/17/2024 1540                         Point: Body mechanics (Done)       Learning Progress Summary             Patient Acceptance, E,TB,D, VU,NR by  at 7/17/2024 1540                         Point: Precautions (Done)       Learning Progress Summary             Patient Acceptance, E,TB,D, VU,NR by  at 7/17/2024 1540                                         User Key       Initials Effective Dates Name Provider Type Discipline     03/07/18 -  Jennifer Barker PTA Physical Therapist Assistant PT    CW 12/13/22 -  Helene Pike PT Physical Therapist PT                  PT Recommendation and Plan     Plan of  Care Reviewed With: patient  Progress: improving  Outcome Evaluation: Pt tolerated treatment fair this date. Required SBA for bed mobility, then CGA to stand. Pt required increased time d/t feeling light-headed, taking breaks between activity/exercises. Limited also d/t weakness and fatigue.     Time Calculation:         PT Charges       Row Name 07/17/24 1549             Time Calculation    Start Time 1443  -      Stop Time 1508  -      Time Calculation (min) 25 min  -      PT Received On 07/17/24  -      PT - Next Appointment 07/18/24  -                User Key  (r) = Recorded By, (t) = Taken By, (c) = Cosigned By      Initials Name Provider Type     Jennifer Barker PTA Physical Therapist Assistant                  Therapy Charges for Today       Code Description Service Date Service Provider Modifiers Qty    39715105787 HC PT THER PROC EA 15 MIN 7/17/2024 Jennifer Barker PTA GP 1    98826899559 HC PT THERAPEUTIC ACT EA 15 MIN 7/17/2024 Jennifer Barker PTA GP 1            PT G-Codes  Outcome Measure Options: AM-PAC 6 Clicks Basic Mobility (PT)  AM-PAC 6 Clicks Score (PT): 13  PT Discharge Summary  Anticipated Discharge Disposition (PT): home with 24/7 care, skilled nursing facility    Jennifer Barker PTA  7/17/2024

## 2024-07-17 NOTE — PROGRESS NOTES
[facility]     LOS: 1 day     Subjective:    CC:Shortness of Breath    DIET:  Diet Order   Procedures    NPO Diet NPO Type: Sips with Meds, Ice Chips     Initially awake and alert and then back to sleep, possibly tired for swallow eval    Objective:    Vital Signs:  Temp:  [97.3 °F (36.3 °C)-97.5 °F (36.4 °C)] 97.5 °F (36.4 °C)  Heart Rate:  [] 73  Resp:  [12-18] 18  BP: ()/(45-90) 146/69    SpO2:  [98 %-100 %] 100 %  on  Flow (L/min):  [1-2] 1;   Device (Oxygen Therapy): nasal cannula  Body mass index is 15.12 kg/m².    Physical Exam  Constitutional:       Comments: Awakens and then quickly back to sleep   HENT:      Head: Normocephalic and atraumatic.   Cardiovascular:      Rate and Rhythm: Normal rate and regular rhythm.      Heart sounds: No murmur heard.     No friction rub.   Pulmonary:      Effort: Pulmonary effort is normal.      Comments: Decreased BS  Abdominal:      General: Bowel sounds are normal. There is no distension.      Palpations: Abdomen is soft.      Tenderness: There is no abdominal tenderness.   Skin:     General: Skin is warm and dry.         Results Review:    Glucose   Date Value Ref Range Status   07/17/2024 113 (H) 65 - 99 mg/dL Final   07/16/2024 141 (H) 65 - 99 mg/dL Final   07/15/2024 99 65 - 99 mg/dL Final     Results from last 7 days   Lab Units 07/17/24  0727   WBC 10*3/mm3 13.29*   HEMOGLOBIN g/dL 12.1   HEMATOCRIT % 36.2   PLATELETS 10*3/mm3 295     Results from last 7 days   Lab Units 07/17/24  0727 07/15/24  0625 07/13/24  2101   SODIUM mmol/L 135*   < > 141   POTASSIUM mmol/L 4.1   < > 3.4*   CHLORIDE mmol/L 90*   < > 100   CO2 mmol/L 33.7*   < > 28.0   BUN mg/dL 27*   < > 20   CREATININE mg/dL 0.52*   < > 0.65   CALCIUM mg/dL 9.2   < > 9.0   BILIRUBIN mg/dL  --   --  0.7   ALK PHOS U/L  --   --  100   ALT (SGPT) U/L  --   --  10   AST (SGOT) U/L  --   --  17   GLUCOSE mg/dL 113*   < > 75    < > = values in this interval not displayed.  "    Results from last 7 days   Lab Units 07/13/24  2101   INR  1.08   APTT seconds 31.1     Results from last 7 days   Lab Units 07/17/24  0727   MAGNESIUM mg/dL 2.1     Results from last 7 days   Lab Units 07/14/24  0044 07/13/24  2101   HSTROP T ng/L 21* 22*     Cultures:  No results found for: \"BLOODCX\", \"URINECX\", \"WOUNDCX\", \"MRSACX\", \"RESPCX\", \"STOOLCX\"    I have reviewed daily medications and changes in CPOE    Scheduled meds  albuterol, 2.5 mg, Nebulization, 4x Daily - RT  atorvastatin, 80 mg, Oral, Nightly  budesonide, 0.5 mg, Nebulization, BID - RT  clopidogrel, 75 mg, Oral, Daily  isosorbide mononitrate, 30 mg, Oral, Q24H  losartan, 25 mg, Oral, Nightly  metoprolol tartrate, 37.5 mg, Oral, Q12H  thiamine, 100 mg, Oral, Daily  tiotropium bromide monohydrate, 2 puff, Inhalation, Daily - RT  vitamin B-12, 500 mcg, Oral, Daily           PRN meds    [COMPLETED] Insert Peripheral IV **AND** sodium chloride        Acute respiratory failure with hypoxia    CAD (coronary artery disease)    Dyspnea    Elevated d-dimer    COPD (chronic obstructive pulmonary disease)    HTN (hypertension)    PAF (paroxysmal atrial fibrillation)    Severe malnutrition        Assessment/Plan:      74-year-old female with COPD, coronary artery disease with stable angina, last heart catheterization was 11/11/2022.  At that time showed severe two-vessel coronary artery disease with 100% ostial stenosis consistent with total occlusion and distal RCA as well as an 80% ostial in-stent stenosis at that time they recommended medical management given complex stent anatomy and questionable compliance.  They recommended dual antiplatelet therapy and beta-blocker and nitro and high-dose statin.     Patient now comes the hospital because of shortness of air off and on for a while, tachycardia and found to be in atrial fibrillation with a heart rate of 108.  Was given Cardizem in the emergency room and heart rate has improved. Patient is given " Solu-Medrol, DuoNebs in the emergency room and the patient has improved. D-dimer is elevated greater than 1 but the patient is currently refusing CT angio of the chest        Copd  -possible exacerbation, but chronically hyperinflated  -spiriva/pulmicort/aluterol  -Patient was given steroids in the emergency room and on 7/14 and 7/15. May need more    Dysphagia  -swallow eval reviewed     Atrial fib with RVR w  -Cardiology has evaluated  -Medication adjustments  -Not on anticoagulation but is on antiplatelet therapy (cardiology writes poor anticogulation candidate)   -HR is controlled     Hypokalemia  -replace as needed     Severe coronary artery disease with multiple prior stents  -Troponin stable as noted in EKG no ischemic change  -Cardiology evaluation  -Continue with plavix, aspirin given in the emergency room  -Continue with statin, Imdur and metoprolol    DW RN     Sedated today, getting abg and further blood work    Johnny Conde MD  07/17/24  11:31 EDT

## 2024-07-17 NOTE — MBS/VFSS/FEES
Acute Care - Speech Language Pathology   Swallow Initial Evaluation [facility]     Patient Name: Asha Krishnan  : 1950  MRN: [id number]  Today's Date: 2024               Admit Date: 2024    Visit Dx:     ICD-10-CM ICD-9-CM   1. Acute hypoxemic respiratory failure  J96.01 518.81   2. COPD with exacerbation  J44.1 491.21   3. Atrial fibrillation with rapid ventricular response  I48.91 427.31     Patient Active Problem List   Diagnosis    Chest pain, atypical    Chest pain, unspecified type    CAD (coronary artery disease)    Dysuria    Dyspnea    Elevated d-dimer    COPD (chronic obstructive pulmonary disease)    HTN (hypertension)    HLD (hyperlipidemia)    Altered mental status, unspecified    PAF (paroxysmal atrial fibrillation)    Pulmonary nodule 1 cm or greater in diameter    Anemia    Acute respiratory failure with hypoxia    Severe malnutrition     Past Medical History:   Diagnosis Date    Atrial fibrillation, unspecified type     Cataract     COPD (chronic obstructive pulmonary disease)     Hyperlipidemia     Hypertension      Past Surgical History:   Procedure Laterality Date    CARDIAC CATHETERIZATION N/A 2022    Procedure: Left Heart Cath;  Surgeon: Tashi De La Torre MD;  Location: [facility] INVASIVE LOCATION;  Service: Cardiovascular;  Laterality: N/A;    CARDIAC CATHETERIZATION N/A 2022    Procedure: Coronary angiography;  Surgeon: Tashi De La Torre MD;  Location: [facility] INVASIVE LOCATION;  Service: Cardiovascular;  Laterality: N/A;       SLP Recommendation and Plan  SLP Swallowing Diagnosis: pharyngeal dysphagia, other (see comments), profound (feeding difficulties) (24)  SLP Diet Recommendation: other (see comments), ice chips between meals after oral care, with supervision (Suggest consider GOC discussion.) (24)     SLP Rec. for Method of Medication Administration: meds via alternate route (24)     Monitor for Signs of  "Aspiration: notify SLP if any concerns (07/17/24 0955)     Swallow Criteria for Skilled Therapeutic Interventions Met: demonstrates skilled criteria (07/17/24 0955)  Anticipated Discharge Disposition (SLP): unknown (07/17/24 0955)  Rehab Potential/Prognosis, Swallowing: re-evaluate goals as necessary (07/17/24 0955)  Therapy Frequency (Swallow): PRN (07/17/24 0955)  Predicted Duration Therapy Intervention (Days): until discharge (07/17/24 0955)  Oral Care Recommendations: Oral Care BID/PRN, Before ice/water (07/17/24 0955)                                        Outcome Evaluation: Video swallow study completed. Limited exam. Pt reports that she \"cannot swallow anything.\" Limited acceptance of bolus trials (pt accepted one small bite of puree, one small sip of nectar thick liquid). Profound dysphagia. Limited to no pharyngeal clearance. Tracheal aspiration after the swallow. Suboptimal expiratory reflex / throat clear.      SWALLOW EVALUATION (Last 72 Hours)       SLP Adult Swallow Evaluation       Row Name 07/17/24 0955       Rehab Evaluation    Document Type evaluation  -BB    Subjective Information --    Patient Observations alert  -BB    Patient Effort poor  -BB    Symptoms Noted During/After Treatment none  -BB       General Information    Patient Profile Reviewed yes  -BB    Pertinent History Of Current Problem 73 y/o female admitted shortness of air, tachycardia, A-Fib, hypokalemia. Hx includes severe emphysema, and radiation to neck and shoulder (per CareEverywhere VFSS note), C4-C7 posterior laminectomy, prominent anterior cervical spine osteophytes. Pt informs me that she eats small amounts of soup and drinks small amounts of water at home; she reports coughing and choking with \"everything\" every time she eats  -BB    Current Method of Nutrition NPO  -BB    Precautions/Limitations, Vision WFL;for purposes of eval  -BB    Precautions/Limitations, Hearing WFL;for purposes of eval  -BB    Prior Level of " "Function-Swallowing thin liquids  ?liquidized vs ?  -BB    Plans/Goals Discussed with patient;agreed upon  -BB    Barriers to Rehab medically complex  -BB    Patient's Goals for Discharge patient did not state  -BB       Pain    Additional Documentation Pain Scale: FACES Pre/Post-Treatment (Group)  -BB       Pain Scale: FACES Pre/Post-Treatment    Pain: FACES Scale, Pretreatment 0-->no hurt  -BB    Posttreatment Pain Rating 0-->no hurt  -BB    Pain Location - Side/Orientation --    Pain Location - --       Oral Motor Structure and Function    Dentition Assessment --    Secretion Management --    Mucosal Quality --       Oral Musculature and Cranial Nerve Assessment    Oral Motor General Assessment generalized oral motor weakness  -BB    Vocal Impairment, Detail. Cranial Nerve X (Vagus) --       General Eating/Swallowing Observations    Respiratory Support Currently in Use nasal cannula  -BB       Clinical Swallow Eval    Clinical Swallow Evaluation Summary Cranial nerves V, VII, IX/X and XII appear grossly intact. Generalized oral motor perceived weakness. Xerostomia. Edentulous. Pt agreeable to 3oz sip of water via tsp prior to VFSS. Immediate cough.  -BB       MBS/VFSS    Utensils Used spoon  -BB    Consistencies Trialed pureed;nectar/syrup-thick liquids  -BB       MBS/VFSS Interpretation    VFSS Summary Limited exam. Pt reports that she \"can't swallow anything\" and declined VFSS bolus trials. After discussion, pt agreeable to two bolus trials. Acceptance of small bolus volume could potentially have negative impact on phayrngeal swallow initiation.     Oral phase: exam limited due to logistical reasons not related to impairment.     Pharyngeal phase most notable for swallow safety and efficiency considerations.   Safety: Tracheal aspiration after the swallow with nectar thick liquids. Suboptimal expiratory reflex and throat clear response.   Efficiency: Minimal to absent initiation of pharyngeal swallow. Minimal " to no pharyngeal clearance of puree and nectar thick liquids.  -BB       MBSImP Score    MBSImP Score Completed? Yes  -BB    Materials presented per Standard Protocol? No  -BB    How was standard protocol modified? Protocol modified:;Other (comment)  patient participation  -BB       Oral Impairment Domain    Component 1- Lip Closure --  unable to assess 2/2 logistical reasons  -BB    Component 2- Tongue Control During Bolus Hold --  unable to assess 2/2 logistical reasons  -BB    Component 3- Bolus Prep/Mastication --  unable to assess 2/2 logistical reasons  -BB    Component 4- Bolus Transport/Lingual Motion --  unable to assess 2/2 logistical reasons  -BB    Component 5- Oral Residue 2: Residue collection on oral structures  -BB    Component 6- Initiation of Pharyngeal Swallow 3: Bolus head in pyriforms  -BB       Pharyngeal Impairment Domain    Component 7- Soft Palate Elevation 0: No bolus between soft palate (SP)/pharyngeal wall (PW)  -BB    Component 8- Laryngeal Elevation 1: Partial superior movement of thyroid cartilage/partial approximation of arytenoids to epiglottic petiole  -BB    Component 9- Anterior Hyoid Excursion 1: Partial anterior movement  -BB    Component 10- Epiglottic Movement 2: No inversion  -BB    Component 11- Laryngeal Vestibular Closure- Height of Swallow 1: Incomplete- narrow column air/contrast in laryngeal vestibule  -BB    Component 12- Pharyngeal Stripping Wave 1: Present- diminished  -BB    Component 13- Pharyngeal Contraction (A/P View Only) --  unable to assess 2/2 logistical reasons  -BB    Component 14- PE Segment Opening 2: Minimal distension/minimal duration- marked obstruction of flow  -BB    Component 15- Tongue Base Retraction 3: Wide column of contrast or air between TB and PW  -BB    Component 16- Pharyngeal Residue 4: Minimal to no pharyngeal clearance  -BB    Component 16 Location Diffuse (>3 areas)  -BB       Esophageal Impairment Domain    Component 17- Esophageal  "Clearance (Upright Position) --  unable to assess 2/2 logistical reasons  -BB       SLP Communication to Radiology    Severity Level of Dysphagia profound dysphagia  per DIGEST score  -BB    Summary Statement Radiologist, Marissa Costa, present. Limited exam. Pt reports that she \"can't swallow anything\" and declined VFSS bolus trials. After discussion, pt agreeable to two bolus trials. Acceptance of small bolus volume could potentially have negative impact on phayrngeal swallow initiation. Oral phase: exam limited due to logistical reasons not related to impairment. Pharyngeal phase most notable for swallow safety and efficiency considerations. Safety: Tracheal aspiration after the swallow with nectar thick liquids. Suboptimal expiratory reflex and throat clear response. Efficiency: Minimal to absent initiation of pharyngeal swallow. Minimal to no pharyngeal clearance of puree and nectar thick liquids.  -BB       SLP Evaluation Clinical Impression    SLP Swallowing Diagnosis pharyngeal dysphagia;other (see comments);profound  feeding difficulties  -BB    Functional Impact risk of aspiration/pneumonia;risk of malnutrition;risk of dehydration  -BB    Rehab Potential/Prognosis, Swallowing re-evaluate goals as necessary  -BB    Swallow Criteria for Skilled Therapeutic Interventions Met demonstrates skilled criteria  -BB       Recommendations    Therapy Frequency (Swallow) PRN  -BB    Predicted Duration Therapy Intervention (Days) until discharge  -BB    SLP Diet Recommendation other (see comments);ice chips between meals after oral care, with supervision  Suggest consider GOC discussion.  -BB    Recommended Diagnostics --    Recommended Precautions and Strategies --    Oral Care Recommendations Oral Care BID/PRN;Before ice/water  -BB    SLP Rec. for Method of Medication Administration meds via alternate route  -BB    Monitor for Signs of Aspiration notify SLP if any concerns  -BB    Anticipated Discharge Disposition (SLP) " unknown  -BB              User Key  (r) = Recorded By, (t) = Taken By, (c) = Cosigned By      Initials Name Effective Dates    CR Trinidad Chicas SLP 08/28/23 -     BB Tashi Law SLP 02/19/23 -                     EDUCATION  The patient has been educated in the following areas:   Dysphagia (Swallowing Impairment).                Time Calculation:    Time Calculation- SLP       Row Name 07/17/24 1158             Time Calculation- SLP    SLP Start Time 0945  -BB      SLP Stop Time 1100  -BB      SLP Time Calculation (min) 75 min  -BB      SLP Received On 07/17/24  -BB         Untimed Charges    SLP Eval/Re-eval  ST [employer] Fluoro Eval Swallow - 11469  -BB      12744-PN Motion Fluoro Eval Swallow Minutes 75  -BB         Total Minutes    Untimed Charges Total Minutes 75  -BB       Total Minutes 75  -BB                User Key  (r) = Recorded By, (t) = Taken By, (c) = Cosigned By      Initials Name Provider Type    BB Tashi Law SLP Speech and Language Pathologist                    Therapy Charges for Today       Code Description Service Date Service Provider Modifiers Qty    [id number]  ST MOTION FLUORO EVAL SWALLOW 5 7/17/2024 Tashi Law, SLP GN 1                 GORDY Mistry  7/17/2024

## 2024-07-18 LAB
ANION GAP SERPL CALCULATED.3IONS-SCNC: 12.6 MMOL/L (ref 5–15)
BUN SERPL-MCNC: 41 MG/DL (ref 8–23)
BUN/CREAT SERPL: 75.9 (ref 7–25)
CALCIUM SPEC-SCNC: 9.4 MG/DL (ref 8.6–10.5)
CHLORIDE SERPL-SCNC: 91 MMOL/L (ref 98–107)
CO2 SERPL-SCNC: 33.4 MMOL/L (ref 22–29)
CREAT SERPL-MCNC: 0.54 MG/DL (ref 0.57–1)
EGFRCR SERPLBLD CKD-EPI 2021: 95.6 ML/MIN/1.73
GLUCOSE SERPL-MCNC: 127 MG/DL (ref 65–99)
POTASSIUM SERPL-SCNC: 3.9 MMOL/L (ref 3.5–5.2)
SODIUM SERPL-SCNC: 137 MMOL/L (ref 136–145)

## 2024-07-18 PROCEDURE — 25010000002 THIAMINE HCL 200 MG/2ML SOLUTION: Performed by: HOSPITALIST

## 2024-07-18 PROCEDURE — 94761 N-INVAS EAR/PLS OXIMETRY MLT: CPT

## 2024-07-18 PROCEDURE — 80048 BASIC METABOLIC PNL TOTAL CA: CPT | Performed by: INTERNAL MEDICINE

## 2024-07-18 PROCEDURE — 94664 DEMO&/EVAL PT USE INHALER: CPT

## 2024-07-18 PROCEDURE — 94799 UNLISTED PULMONARY SVC/PX: CPT

## 2024-07-18 RX ORDER — DEXTROSE MONOHYDRATE, SODIUM CHLORIDE, SODIUM LACTATE, POTASSIUM CHLORIDE, CALCIUM CHLORIDE 5; 600; 310; 179; 20 G/100ML; MG/100ML; MG/100ML; MG/100ML; MG/100ML
75 INJECTION, SOLUTION INTRAVENOUS CONTINUOUS
Status: DISPENSED | OUTPATIENT
Start: 2024-07-18 | End: 2024-07-21

## 2024-07-18 RX ORDER — THIAMINE HYDROCHLORIDE 100 MG/ML
100 INJECTION, SOLUTION INTRAMUSCULAR; INTRAVENOUS DAILY
Status: DISCONTINUED | OUTPATIENT
Start: 2024-07-18 | End: 2024-07-27

## 2024-07-18 RX ADMIN — CLOPIDOGREL BISULFATE 75 MG: 75 TABLET, FILM COATED ORAL at 09:33

## 2024-07-18 RX ADMIN — SODIUM CHLORIDE 5 MG/HR: 900 INJECTION, SOLUTION INTRAVENOUS at 17:40

## 2024-07-18 RX ADMIN — ATORVASTATIN CALCIUM 80 MG: 80 TABLET, FILM COATED ORAL at 20:23

## 2024-07-18 RX ADMIN — THIAMINE HYDROCHLORIDE 100 MG: 100 INJECTION, SOLUTION INTRAMUSCULAR; INTRAVENOUS at 16:31

## 2024-07-18 RX ADMIN — ALBUTEROL SULFATE 2.5 MG: 2.5 SOLUTION RESPIRATORY (INHALATION) at 15:33

## 2024-07-18 RX ADMIN — METOPROLOL TARTRATE 37.5 MG: 25 TABLET, FILM COATED ORAL at 09:33

## 2024-07-18 RX ADMIN — LOSARTAN POTASSIUM 25 MG: 25 TABLET, FILM COATED ORAL at 20:23

## 2024-07-18 RX ADMIN — ALBUTEROL SULFATE 2.5 MG: 2.5 SOLUTION RESPIRATORY (INHALATION) at 07:31

## 2024-07-18 RX ADMIN — DEXTROSE MONOHYDRATE, SODIUM CHLORIDE, SODIUM LACTATE, POTASSIUM CHLORIDE, CALCIUM CHLORIDE 75 ML/HR: 5; 600; 310; 179; 20 INJECTION, SOLUTION INTRAVENOUS at 16:29

## 2024-07-18 RX ADMIN — ALBUTEROL SULFATE 2.5 MG: 2.5 SOLUTION RESPIRATORY (INHALATION) at 11:05

## 2024-07-18 RX ADMIN — METOPROLOL TARTRATE 37.5 MG: 25 TABLET, FILM COATED ORAL at 20:24

## 2024-07-18 RX ADMIN — ISOSORBIDE MONONITRATE 30 MG: 30 TABLET, EXTENDED RELEASE ORAL at 09:33

## 2024-07-18 RX ADMIN — BUDESONIDE 0.5 MG: 0.5 INHALANT ORAL at 07:32

## 2024-07-18 NOTE — SIGNIFICANT NOTE
"   07/18/24 1524   OTHER   Discipline physical therapy assistant   Rehab Time/Intention   Session Not Performed patient/family declined, not feeling well  (pt declined, stating she just wanted to rest as she's \"disappointed I'm not going home today\"; PT will f/u tomorrow)   Recommendation   PT - Next Appointment 07/19/24       "

## 2024-07-18 NOTE — PLAN OF CARE
Goal Outcome Evaluation:        Problem: Adult Inpatient Plan of Care  Goal: Plan of Care Review  Outcome: Ongoing, Progressing      VSS. A&Ox4, forgetful at times. 2L NC. Lethargic this am but more arousable as the day went on. Went into afib RVR 160s, paged cardiology, received orders for cardizem gtt. Plan for repeat barium swallow tomorrow. D5LR with 20K at 75ml/hr. NPO except meds with pudding/applesauce. Palliative to discuss with significant other at bedside tomorrow regarding goals of care at 10am. pt Bed low, alarm on, call light within reach.

## 2024-07-18 NOTE — CASE MANAGEMENT/SOCIAL WORK
Continued Stay Note  [facility]     Patient Name: Asha Krishnan  MRN: [id number]  Today's Date: 7/18/2024    Admit Date: 7/13/2024    Plan: SNF   Discharge Plan       Row Name 07/18/24 0934       Plan    Plan SNF    Patient/Family in Agreement with Plan yes    Plan Comments Met with pt in room. She states that she wants to go to SNF and then she will go home with her significant other. She denies need for LTC as she states her significant other takes care of her needs that she cant take care of herself. Spoke with [facility] and they have accepted. Awaiting medical stability to start precert. CCP following. SUNNY Rausch RN                   Discharge Codes    No documentation.                 Expected Discharge Date and Time       Expected Discharge Date Expected Discharge Time    Jul 19, 2024               Dale Boateng RN

## 2024-07-18 NOTE — PLAN OF CARE
Goal Outcome Evaluation:  Plan of Care Reviewed With: patient        Progress: no change  Outcome Evaluation: vss, no complaints of pain. pt went into afib rvr and was asymptomatic, scheduled meds were given and pt converted back to NSR. turned pt q2. pt remains NPO with oral care given. pt rested well during shift. labs in am.

## 2024-07-18 NOTE — PROGRESS NOTES
[facility] INPATIENT PROGRESS NOTE         [address]    2024      PATIENT IDENTIFICATION:  Name: Asha Krishnan ADMIT: 2024   : 1950  PCP: Capo Pedroza MD    MRN: [id number] LOS: 1 days   AGE/SEX: 74 y.o. female  ROOM: [facility]                     LOS 1    Reason for visit: COPD exacerbation and shortness of breath       SUBJECTIVE:      Opens eyes to voice.  Nonverbal with me this morning at time of visit.  Diminished breath sounds on auscultation but no wheezing or rhonchi.  Sats 100% on 2 L supplemental oxygen. I am seeing the patient for the first time today.  All patient problems are new to me.      Objective   OBJECTIVE:    Vital Sign Min/Max for last 24 hours  Temp  Min: 97.1 °F (36.2 °C)  Max: 97.7 °F (36.5 °C)   BP  Min: 115/64  Max: 163/83   Pulse  Min: 73  Max: 88   Resp  Min: 16  Max: 20   SpO2  Min: 98 %  Max: 100 %   No data recorded   No data recorded    Vitals:    24 0700 24 0731 24 0732 24 0740   BP: 150/77      BP Location: Left arm      Patient Position: Lying      Pulse: 75 75 75 73   Resp: 16 16 16 16   Temp: 97.6 °F (36.4 °C)      TempSrc: Axillary      SpO2: 98% 98% 98% 100%   Weight:       Height:                24  2156 24  0202   Weight: 40.8 kg (90 lb) 37.5 kg (82 lb 10.8 oz)       Body mass index is 15.12 kg/m².                          Body mass index is 15.12 kg/m².    Intake/Output Summary (Last 24 hours) at 2024 0906  Last data filed at 2024 0617  Gross per 24 hour   Intake --   Output 200 ml   Net -200 ml         Exam:  GEN:  No distress, appears stated age  EYES:   PERRL, anicteric sclerae  ENT:    External ears/nose normal, OP clear  NECK:  No adenopathy, midline trachea  LUNGS: Normal chest on inspection, palpation and auscultation  CV:  Normal S1S2, without murmur  ABD:  Nontender, nondistended, no hepatosplenomegaly, +BS  EXT:  No edema.  No cyanosis or clubbing.  No mottling and  "normal cap refill.    Assessment     Scheduled meds:  albuterol, 2.5 mg, Nebulization, 4x Daily - RT  atorvastatin, 80 mg, Oral, Nightly  budesonide, 0.5 mg, Nebulization, BID - RT  clopidogrel, 75 mg, Oral, Daily  isosorbide mononitrate, 30 mg, Oral, Q24H  losartan, 25 mg, Oral, Nightly  metoprolol tartrate, 37.5 mg, Oral, Q12H  thiamine, 100 mg, Oral, Daily  tiotropium bromide monohydrate, 2 puff, Inhalation, Daily - RT  vitamin B-12, 500 mcg, Oral, Daily      IV meds:                         Data Review:  Results from last 7 days   Lab Units 07/18/24  0654 07/17/24  0727 07/16/24  0931 07/15/24  0625 07/13/24  2101   SODIUM mmol/L 137 135* 134* 134* 141   POTASSIUM mmol/L 3.9 4.1 4.2 4.3 3.4*   CHLORIDE mmol/L 91* 90* 92* 96* 100   CO2 mmol/L 33.4* 33.7* 33.0* 27.7 28.0   BUN mg/dL 41* 27* 28* 24* 20   CREATININE mg/dL 0.54* 0.52* 0.56* 0.57 0.65   GLUCOSE mg/dL 127* 113* 141* 99 75   CALCIUM mg/dL 9.4 9.2 9.5 9.4 9.0         Estimated Creatinine Clearance: 52.5 mL/min (A) (by C-G formula based on SCr of 0.54 mg/dL (L)).  Results from last 7 days   Lab Units 07/17/24  0727 07/15/24  0625 07/13/24 2101   WBC 10*3/mm3 13.29* 15.51* 7.12   HEMOGLOBIN g/dL 12.1 11.2* 11.7*   PLATELETS 10*3/mm3 295 293 293     Results from last 7 days   Lab Units 07/13/24  2101   INR  1.08     Results from last 7 days   Lab Units 07/13/24  2101   ALT (SGPT) U/L 10   AST (SGOT) U/L 17     Results from last 7 days   Lab Units 07/17/24  1156 07/13/24  2133   PH, ARTERIAL pH units 7.556* 7.429   PO2 ART mm Hg 75.1* 63.7*   PCO2, ARTERIAL mm Hg 41.3 43.3   HCO3 ART mmol/L 36.7* 28.7*     Results from last 7 days   Lab Units 07/17/24  1211 07/17/24  0727 07/13/24  2101   PROCALCITONIN ng/mL  --  0.09 0.04   LACTATE mmol/L 1.4  --  1.5         No results found for: \"HGBA1C\", \"POCGLU\"      Imaging reviewed  Video swallow imaging reviewed from 7/17    Most recent chest x-ray 7/13 reviewed        Microbiology reviewed            Active " Hospital Problems    Diagnosis  POA    **Acute respiratory failure with hypoxia [J96.01]  Yes    Severe malnutrition [E43]  Yes    PAF (paroxysmal atrial fibrillation) [I48.0]  Yes    CAD (coronary artery disease) [I25.10]  Yes    Elevated d-dimer [R79.89]  Yes    COPD (chronic obstructive pulmonary disease) [J44.9]  Yes    Dyspnea [R06.00]  Yes    HTN (hypertension) [I10]  Yes      Resolved Hospital Problems   No resolved problems to display.         ASSESSMENT:  COPD with mild exacerbation  Dysphagia  Atrial fibrillation with RVR  Severe CAD  Protein calorie malnutrition      PLAN:  Less lethargic than yesterday but still altered.  Continue oxygen for goal saturation 90 to 94%.  Schedule as needed bronchodilators for COPD symptoms.  Swallow evaluation noted with tracheal aspiration with nectar thick liquids.  No pharyngeal clearance and absence of initiation of pharyngeal swallow noted.  Given poor functional status palliative measures may be appropriate in the very least consideration of changing CODE STATUS.  Consulted palliative care to help with goals of care.  Currently full code.        Duong Mayer MD  Pulmonary and Critical Care Medicine  [facility], [facility]  7/18/2024    09:06 EDT

## 2024-07-18 NOTE — PROGRESS NOTES
[facility]     LOS: 2 days     Subjective:    CC:Shortness of Breath    DIET:  Diet Order   Procedures    NPO Diet NPO Type: Sips with Meds, Ice Chips     Normal mentation today, much better than yesterday    Objective:    Vital Signs:  Temp:  [97.1 °F (36.2 °C)-98 °F (36.7 °C)] 98 °F (36.7 °C)  Heart Rate:  [70-80] 74  Resp:  [16-18] 16  BP: (112-163)/(53-83) 129/53    SpO2:  [98 %-100 %] 98 %  on  Flow (L/min):  [2] 2;   Device (Oxygen Therapy): nasal cannula  Body mass index is 15.12 kg/m².    Physical Exam  Constitutional:       Comments: Awake and alert   HENT:      Head: Normocephalic and atraumatic.   Cardiovascular:      Rate and Rhythm: Tachycardia present. Rhythm irregular.      Heart sounds: No murmur heard.     No friction rub.   Pulmonary:      Effort: Pulmonary effort is normal.      Comments: Decreased BS  Abdominal:      General: Bowel sounds are normal. There is no distension.      Palpations: Abdomen is soft.      Tenderness: There is no abdominal tenderness.   Skin:     General: Skin is warm and dry.         Results Review:    Glucose   Date Value Ref Range Status   07/18/2024 127 (H) 65 - 99 mg/dL Final   07/17/2024 113 (H) 65 - 99 mg/dL Final   07/16/2024 141 (H) 65 - 99 mg/dL Final     Results from last 7 days   Lab Units 07/17/24  0727   WBC 10*3/mm3 13.29*   HEMOGLOBIN g/dL 12.1   HEMATOCRIT % 36.2   PLATELETS 10*3/mm3 295     Results from last 7 days   Lab Units 07/18/24  0654 07/15/24  0625 07/13/24  2101   SODIUM mmol/L 137   < > 141   POTASSIUM mmol/L 3.9   < > 3.4*   CHLORIDE mmol/L 91*   < > 100   CO2 mmol/L 33.4*   < > 28.0   BUN mg/dL 41*   < > 20   CREATININE mg/dL 0.54*   < > 0.65   CALCIUM mg/dL 9.4   < > 9.0   BILIRUBIN mg/dL  --   --  0.7   ALK PHOS U/L  --   --  100   ALT (SGPT) U/L  --   --  10   AST (SGOT) U/L  --   --  17   GLUCOSE mg/dL 127*   < > 75    < > = values in this interval not displayed.     Results from last 7 days   Lab Units  "07/13/24 2101   INR  1.08   APTT seconds 31.1     Results from last 7 days   Lab Units 07/17/24  0727   MAGNESIUM mg/dL 2.1     Results from last 7 days   Lab Units 07/14/24  0044 07/13/24 2101   HSTROP T ng/L 21* 22*     Cultures:  No results found for: \"BLOODCX\", \"URINECX\", \"WOUNDCX\", \"MRSACX\", \"RESPCX\", \"STOOLCX\"    I have reviewed daily medications and changes in CPOE    Scheduled meds  albuterol, 2.5 mg, Nebulization, 4x Daily - RT  atorvastatin, 80 mg, Oral, Nightly  budesonide, 0.5 mg, Nebulization, BID - RT  clopidogrel, 75 mg, Oral, Daily  isosorbide mononitrate, 30 mg, Oral, Q24H  losartan, 25 mg, Oral, Nightly  metoprolol tartrate, 37.5 mg, Oral, Q12H  thiamine, 100 mg, Intravenous, Daily  thiamine, 100 mg, Oral, Daily  tiotropium bromide monohydrate, 2 puff, Inhalation, Daily - RT  vitamin B-12, 500 mcg, Oral, Daily        dextrose 5% lactated Ringer's with KCl 20 mEq/L, 75 mL/hr      PRN meds    [COMPLETED] Insert Peripheral IV **AND** sodium chloride        Acute respiratory failure with hypoxia    CAD (coronary artery disease)    Dyspnea    Elevated d-dimer    COPD (chronic obstructive pulmonary disease)    HTN (hypertension)    PAF (paroxysmal atrial fibrillation)    Severe malnutrition        Assessment/Plan:      74-year-old female with COPD, coronary artery disease with stable angina, last heart catheterization was 11/11/2022.  At that time showed severe two-vessel coronary artery disease with 100% ostial stenosis consistent with total occlusion and distal RCA as well as an 80% ostial in-stent stenosis at that time they recommended medical management given complex stent anatomy and questionable compliance.  They recommended dual antiplatelet therapy and beta-blocker and nitro and high-dose statin.     Patient now comes the hospital because of shortness of air off and on for a while, tachycardia and found to be in atrial fibrillation with a heart rate of 108.  Was given Cardizem in the emergency " room and heart rate has improved. Patient is given Solu-Medrol, DuoNebs in the emergency room and the patient has improved. D-dimer is elevated greater than 1 but the patient is currently refusing CT angio of the chest        Copd  -possible exacerbation, but chronically hyperinflated  -spiriva/pulmicort/aluterol  -Patient was given steroids in the emergency room and on 7/14 and 7/15. May need more    Dysphagia  -speech recommends feeding tube and patient is agreeable     Atrial fib with RVR   -Patient upset about possibility of a feeding tube.  -Cardiology has evaluated and heart rate has been normal this afternoon is now up.  Hopefully will spontaneously resolved  -Continue with metoprolol tartrate 37.5 twice daily  -Not on anticoagulation but is on antiplatelet therapy (cardiology writes poor anticogulation candidate)     Hypokalemia  -replace as needed     Severe coronary artery disease with multiple prior stents  -Troponin stable as noted, and EKG no ischemic change  -Cardiology has seen  -Continue with plavix, aspirin given in the emergency room  -Continue with statin, Imdur and metoprolol    DW RN     Mental status is better  Abg was fairly unremarkable did show alkalosis    Johnny Conde MD  07/18/24  15:44 EDT

## 2024-07-18 NOTE — NURSING NOTE
Spoke with Fatoumata Vazquez reguarding pt's HR. Her po beta blocker was given. No new orders given at this time since pt is asymptomatic. Will see pt in am.

## 2024-07-19 PROCEDURE — 94761 N-INVAS EAR/PLS OXIMETRY MLT: CPT

## 2024-07-19 PROCEDURE — 99232 SBSQ HOSP IP/OBS MODERATE 35: CPT | Performed by: INTERNAL MEDICINE

## 2024-07-19 PROCEDURE — 94799 UNLISTED PULMONARY SVC/PX: CPT

## 2024-07-19 PROCEDURE — 94760 N-INVAS EAR/PLS OXIMETRY 1: CPT

## 2024-07-19 PROCEDURE — 94664 DEMO&/EVAL PT USE INHALER: CPT

## 2024-07-19 PROCEDURE — 25010000002 THIAMINE PER 100 MG: Performed by: HOSPITALIST

## 2024-07-19 PROCEDURE — 92526 ORAL FUNCTION THERAPY: CPT

## 2024-07-19 RX ADMIN — BUDESONIDE 0.5 MG: 0.5 INHALANT ORAL at 19:18

## 2024-07-19 RX ADMIN — ALBUTEROL SULFATE 2.5 MG: 2.5 SOLUTION RESPIRATORY (INHALATION) at 15:12

## 2024-07-19 RX ADMIN — ALBUTEROL SULFATE 2.5 MG: 2.5 SOLUTION RESPIRATORY (INHALATION) at 07:40

## 2024-07-19 RX ADMIN — TIOTROPIUM BROMIDE INHALATION SPRAY 2 PUFF: 3.12 SPRAY, METERED RESPIRATORY (INHALATION) at 07:48

## 2024-07-19 RX ADMIN — DEXTROSE MONOHYDRATE, SODIUM CHLORIDE, SODIUM LACTATE, POTASSIUM CHLORIDE, CALCIUM CHLORIDE 75 ML/HR: 5; 600; 310; 179; 20 INJECTION, SOLUTION INTRAVENOUS at 21:09

## 2024-07-19 RX ADMIN — DEXTROSE MONOHYDRATE, SODIUM CHLORIDE, SODIUM LACTATE, POTASSIUM CHLORIDE, CALCIUM CHLORIDE 75 ML/HR: 5; 600; 310; 179; 20 INJECTION, SOLUTION INTRAVENOUS at 04:30

## 2024-07-19 RX ADMIN — ALBUTEROL SULFATE 2.5 MG: 2.5 SOLUTION RESPIRATORY (INHALATION) at 10:55

## 2024-07-19 RX ADMIN — ALBUTEROL SULFATE 2.5 MG: 2.5 SOLUTION RESPIRATORY (INHALATION) at 19:18

## 2024-07-19 RX ADMIN — ATORVASTATIN CALCIUM 80 MG: 80 TABLET, FILM COATED ORAL at 21:06

## 2024-07-19 RX ADMIN — Medication 500 MCG: at 08:50

## 2024-07-19 RX ADMIN — BUDESONIDE 0.5 MG: 0.5 INHALANT ORAL at 07:44

## 2024-07-19 RX ADMIN — METOPROLOL TARTRATE 37.5 MG: 25 TABLET, FILM COATED ORAL at 08:50

## 2024-07-19 RX ADMIN — LOSARTAN POTASSIUM 25 MG: 25 TABLET, FILM COATED ORAL at 21:06

## 2024-07-19 RX ADMIN — THIAMINE HYDROCHLORIDE 100 MG: 100 INJECTION, SOLUTION INTRAMUSCULAR; INTRAVENOUS at 08:50

## 2024-07-19 RX ADMIN — ISOSORBIDE MONONITRATE 30 MG: 30 TABLET, EXTENDED RELEASE ORAL at 08:50

## 2024-07-19 RX ADMIN — METOPROLOL TARTRATE 37.5 MG: 25 TABLET, FILM COATED ORAL at 21:06

## 2024-07-19 RX ADMIN — CLOPIDOGREL BISULFATE 75 MG: 75 TABLET, FILM COATED ORAL at 08:50

## 2024-07-19 RX ADMIN — Medication 100 MG: at 08:50

## 2024-07-19 NOTE — PROGRESS NOTES
LOS: 3 days   Patient Care Team:  Capo Pedroza MD as PCP - General (Family Medicine)    Chief Complaint: Follow-up paroxysmal atrial fibrillation.    Interval History: Back in sinus rhythm.  No acute events.  Shortness of breath is still lingering.    Vital Signs:  Temp:  [97.7 °F (36.5 °C)-97.9 °F (36.6 °C)] 97.9 °F (36.6 °C)  Heart Rate:  [] 66  Resp:  [16-20] 18  BP: (108-131)/(50-71) 126/50    Intake/Output Summary (Last 24 hours) at 7/19/2024 1843  Last data filed at 7/19/2024 1720  Gross per 24 hour   Intake --   Output 400 ml   Net -400 ml       Physical Exam:   General Appearance:    Frail, cachectic.   Lungs:     Decreased breath sounds.    Heart:    Regular rhythm and normal rate.  No murmurs, gallops, or rubs.   Abdomen:     Soft, nontender, nondistended.    Extremities:   No clubbing, cyanosis, or edema.     Results Review:    Results from last 7 days   Lab Units 07/18/24  0654   SODIUM mmol/L 137   POTASSIUM mmol/L 3.9   CHLORIDE mmol/L 91*   CO2 mmol/L 33.4*   BUN mg/dL 41*   CREATININE mg/dL 0.54*   GLUCOSE mg/dL 127*   CALCIUM mg/dL 9.4     Results from last 7 days   Lab Units 07/14/24  0044 07/13/24  2101   HSTROP T ng/L 21* 22*     Results from last 7 days   Lab Units 07/17/24  0727   WBC 10*3/mm3 13.29*   HEMOGLOBIN g/dL 12.1   HEMATOCRIT % 36.2   PLATELETS 10*3/mm3 295     Results from last 7 days   Lab Units 07/13/24  2101   INR  1.08   APTT seconds 31.1         Results from last 7 days   Lab Units 07/17/24  0727   MAGNESIUM mg/dL 2.1           I reviewed the patient's new clinical results.        Assessment:  1.  COPD with acute exacerbation  2.  Paroxysmal atrial fibrillation with RVR  3.  Coronary artery disease with DAVID from the left circumflex and LAD extending into the left main in January 2017.   of the RCA by cath in 2022.    4.  Dysphagia, severe  5.  Cachexia and protein calorie malnutrition  6.  Hypertension  7.  2:1 AV block during sleep  (asymptomatic)    Plan:  -I strongly suspect that she is going to continue to have atrial fibrillation given her cachectic state and her pulmonary issues.  She is not a good candidate for amiodarone because of her pulmonary issues.    -Similarly, she is not a good candidate for systemic anticoagulation secondary to bleeding risk and potential falls.    -I will leave the Cardizem drip ordered in case she has rapid atrial fibrillation again, although again, I am not overly concerned about this in the scheme of things.  Continue metoprolol 37.5 mg twice daily.    -Continue Plavix, Imdur, and Lipitor for her coronary artery disease.  No angina.    -Overall prognosis seems to be poor.  Noted plans for palliative care to discuss goals of care.    Hema Aguiar MD  07/19/24  18:43 EDT

## 2024-07-19 NOTE — CONSULTS
"Visit with patient and partner at bedside. Patient wanted to complete advance directive and name partner as her health care surrogate. Patient stated multiple times that she wants a \"natural death.\" Patient and partner are aware that chaplains are available for continued care and support.    "

## 2024-07-19 NOTE — DOWNTIME EVENT NOTE
The EMR was down for 14 hours on 7/19/2024.    Mahsa San  was responsible for completing the paper charting during this time period.     The following information was re-entered into the system by Mahsa San RN: Flowsheet data, Intake and output, Orders, and MAR    The following information will remain in the paper chart: Nursing aassessment Downtime form and Paper MAAME San RN  7/19/2024

## 2024-07-19 NOTE — SIGNIFICANT NOTE
07/19/24 1659   OTHER   Discipline physical therapy assistant   Rehab Time/Intention   Session Not Performed patient/family declined, not feeling well  (pt declined, stating she wanted to rest - stating she is leaving tomorrow; PT will f/u on Monday)   Recommendation   PT - Next Appointment 07/22/24

## 2024-07-19 NOTE — PROGRESS NOTES
Name: Asha Krishnan ADMIT: 2024   : 1950  PCP: Capo Pedroza MD    MRN: [id number] LOS: 3 days   AGE/SEX: 74 y.o. female  ROOM: [facility]     Subjective   Subjective   No acute events. No new complaints. No family at bedside.    Objective   Objective   Vital Signs  Temp:  [97.7 °F (36.5 °C)-97.9 °F (36.6 °C)] 97.9 °F (36.6 °C)  Heart Rate:  [] 66  Resp:  [16-20] 18  BP: (108-131)/(50-80) 126/50  SpO2:  [99 %-100 %] 99 %  on  Flow (L/min):  [2] 2;   Device (Oxygen Therapy): nasal cannula  Body mass index is 15.12 kg/m².  Physical Exam  Vitals and nursing note reviewed.   Constitutional:       General: She is not in acute distress.     Appearance: She is cachectic. She is ill-appearing (chronically). She is not toxic-appearing or diaphoretic.   HENT:      Head: Normocephalic and atraumatic.      Nose: Nose normal.      Mouth/Throat:      Mouth: Mucous membranes are moist.      Pharynx: Oropharynx is clear.   Eyes:      Conjunctiva/sclera: Conjunctivae normal.      Pupils: Pupils are equal, round, and reactive to light.   Cardiovascular:      Rate and Rhythm: Normal rate and regular rhythm.      Pulses: Normal pulses.   Pulmonary:      Effort: Pulmonary effort is normal.      Breath sounds: Normal breath sounds.   Abdominal:      General: Bowel sounds are normal.      Palpations: Abdomen is soft.      Tenderness: There is no abdominal tenderness.   Musculoskeletal:         General: No swelling or tenderness.      Cervical back: Neck supple.   Skin:     General: Skin is warm and dry.      Capillary Refill: Capillary refill takes less than 2 seconds.   Neurological:      General: No focal deficit present.      Mental Status: She is alert.       Results Review     I reviewed the patient's new clinical results.  Results from last 7 days   Lab Units 24  0727 07/15/24  0625 24  2101   WBC 10*3/mm3 13.29* 15.51* 7.12   HEMOGLOBIN g/dL 12.1 11.2* 11.7*   PLATELETS 10*3/mm3 295 293  "293     Results from last 7 days   Lab Units 07/18/24  0654 07/17/24  0727 07/16/24  0931 07/15/24  0625   SODIUM mmol/L 137 135* 134* 134*   POTASSIUM mmol/L 3.9 4.1 4.2 4.3   CHLORIDE mmol/L 91* 90* 92* 96*   CO2 mmol/L 33.4* 33.7* 33.0* 27.7   BUN mg/dL 41* 27* 28* 24*   CREATININE mg/dL 0.54* 0.52* 0.56* 0.57   GLUCOSE mg/dL 127* 113* 141* 99   EGFR mL/min/1.73 95.6 96.4 94.7 94.3     Results from last 7 days   Lab Units 07/13/24  2101   ALBUMIN g/dL 3.9   BILIRUBIN mg/dL 0.7   ALK PHOS U/L 100   AST (SGOT) U/L 17   ALT (SGPT) U/L 10     Results from last 7 days   Lab Units 07/18/24  0654 07/17/24  0727 07/16/24  0931 07/15/24  0625 07/13/24  2101   CALCIUM mg/dL 9.4 9.2 9.5 9.4 9.0   ALBUMIN g/dL  --   --   --   --  3.9   MAGNESIUM mg/dL  --  2.1 1.9 2.2  --      Results from last 7 days   Lab Units 07/17/24  1211 07/17/24  0727 07/13/24  2101   PROCALCITONIN ng/mL  --  0.09 0.04   LACTATE mmol/L 1.4  --  1.5     No results found for: \"HGBA1C\", \"POCGLU\"    No radiology results for the last day    I have personally reviewed all medications:  Scheduled Medications  albuterol, 2.5 mg, Nebulization, 4x Daily - RT  atorvastatin, 80 mg, Oral, Nightly  budesonide, 0.5 mg, Nebulization, BID - RT  clopidogrel, 75 mg, Oral, Daily  isosorbide mononitrate, 30 mg, Oral, Q24H  losartan, 25 mg, Oral, Nightly  metoprolol tartrate, 37.5 mg, Oral, Q12H  thiamine, 100 mg, Intravenous, Daily  thiamine, 100 mg, Oral, Daily  tiotropium bromide monohydrate, 2 puff, Inhalation, Daily - RT  vitamin B-12, 500 mcg, Oral, Daily    Infusions  dextrose 5% lactated Ringer's with KCl 20 mEq/L, 75 mL/hr, Last Rate: 75 mL/hr (07/19/24 0430)  dilTIAZem, 5-15 mg/hr, Last Rate: Stopped (07/19/24 0250)    Diet  NPO Diet NPO Type: Sips with Meds, Ice Chips    I have personally reviewed:  [x]  Laboratory   [x]  Microbiology   [x]  Radiology   [x]  EKG/Telemetry  []  Cardiology/Vascular   []  Pathology    [x]  Records    Assessment/Plan     Active " Hospital Problems    Diagnosis  POA   • **Acute respiratory failure with hypoxia [J96.01]  Yes   • Severe malnutrition [E43]  Yes   • PAF (paroxysmal atrial fibrillation) [I48.0]  Yes   • CAD (coronary artery disease) [I25.10]  Yes   • Elevated d-dimer [R79.89]  Yes   • COPD (chronic obstructive pulmonary disease) [J44.9]  Yes   • Dyspnea [R06.00]  Yes   • HTN (hypertension) [I10]  Yes      Resolved Hospital Problems   No resolved problems to display.   COPD with Exacerbation  - seems to be resolving, continue on nebulized steroids and bronchodilators  - encourage pulmonary toilet and wean oxygen as tolerated  - appreciate pulmonology recs    Dysphagia  - SLP recommended feeding tube though she goes back and forth on whether or not she wants this    PAF/RVR  - HR much improved  - continue metoprolol  - not on AC chronically, poor candidate per cardiology    CAD  - has multiple prior stents  - no anginal symptoms  - continue on plavix and lipitor  - continue imdur and metoprolol    Poor prognosis, patient changed her code status to DNR today. Palliative care following.    SCDs for DVT prophylaxis.  DNR.  Discussed with patient and nursing staff.  Anticipate discharge home in 1-2 days.  Expected Discharge Date: 7/20/2024; Expected Discharge Time:       Flavio Isabel MD  [facility]  07/19/24  17:34 EDT

## 2024-07-19 NOTE — PROGRESS NOTES
[facility] INPATIENT PROGRESS NOTE         [address]    2024      PATIENT IDENTIFICATION:  Name: Asha Krishnan ADMIT: 2024   : 1950  PCP: Capo Pedroza MD    MRN: [id number] LOS: 3 days   AGE/SEX: 74 y.o. female  ROOM: [facility]                     LOS 3    Reason for visit: COPD exacerbation and shortness of breath       SUBJECTIVE:      Alert.  Answers questions appropriately.  Less confused today.  Discussed with nursing at bedside.      Objective   OBJECTIVE:    Vital Sign Min/Max for last 24 hours  Temp  Min: 97.7 °F (36.5 °C)  Max: 98 °F (36.7 °C)   BP  Min: 108/60  Max: 140/69   Pulse  Min: 70  Max: 141   Resp  Min: 16  Max: 16   SpO2  Min: 98 %  Max: 99 %   No data recorded   No data recorded    Vitals:    24 1900 24 2023 24 2156 24 0000   BP:  131/68  108/60   BP Location:    Left arm   Patient Position:    Lying   Pulse:  117 84 89   Resp:    16   Temp: 97.7 °F (36.5 °C)   97.9 °F (36.6 °C)   TempSrc: Oral   Oral   SpO2:    99%   Weight:       Height:                24  0202   Weight: 40.8 kg (90 lb) 37.5 kg (82 lb 10.8 oz)       Body mass index is 15.12 kg/m².                          Body mass index is 15.12 kg/m².    Intake/Output Summary (Last 24 hours) at 2024 0835  Last data filed at 2024 0500  Gross per 24 hour   Intake --   Output 600 ml   Net -600 ml         Exam:  GEN:  No distress, appears stated age  EYES:   PERRL, anicteric sclerae  ENT:    External ears/nose normal, OP clear  NECK:  No adenopathy, midline trachea  LUNGS: Normal chest on inspection, palpation and auscultation  CV:  Normal S1S2, without murmur  ABD:  Nontender, nondistended, no hepatosplenomegaly, +BS  EXT:  No edema.  No cyanosis or clubbing.  No mottling and normal cap refill.    Assessment     Scheduled meds:  albuterol, 2.5 mg, Nebulization, 4x Daily - RT  atorvastatin, 80 mg, Oral, Nightly  budesonide, 0.5 mg,  "Nebulization, BID - RT  clopidogrel, 75 mg, Oral, Daily  isosorbide mononitrate, 30 mg, Oral, Q24H  losartan, 25 mg, Oral, Nightly  metoprolol tartrate, 37.5 mg, Oral, Q12H  thiamine, 100 mg, Intravenous, Daily  thiamine, 100 mg, Oral, Daily  tiotropium bromide monohydrate, 2 puff, Inhalation, Daily - RT  vitamin B-12, 500 mcg, Oral, Daily      IV meds:                      dextrose 5% lactated Ringer's with KCl 20 mEq/L, 75 mL/hr, Last Rate: 75 mL/hr (07/19/24 0430)  dilTIAZem, 5-15 mg/hr, Last Rate: Stopped (07/19/24 0250)      Data Review:  Results from last 7 days   Lab Units 07/18/24  0654 07/17/24  0727 07/16/24  0931 07/15/24  0625 07/13/24  2101   SODIUM mmol/L 137 135* 134* 134* 141   POTASSIUM mmol/L 3.9 4.1 4.2 4.3 3.4*   CHLORIDE mmol/L 91* 90* 92* 96* 100   CO2 mmol/L 33.4* 33.7* 33.0* 27.7 28.0   BUN mg/dL 41* 27* 28* 24* 20   CREATININE mg/dL 0.54* 0.52* 0.56* 0.57 0.65   GLUCOSE mg/dL 127* 113* 141* 99 75   CALCIUM mg/dL 9.4 9.2 9.5 9.4 9.0         Estimated Creatinine Clearance: 52.5 mL/min (A) (by C-G formula based on SCr of 0.54 mg/dL (L)).  Results from last 7 days   Lab Units 07/17/24  0727 07/15/24  0625 07/13/24  2101   WBC 10*3/mm3 13.29* 15.51* 7.12   HEMOGLOBIN g/dL 12.1 11.2* 11.7*   PLATELETS 10*3/mm3 295 293 293     Results from last 7 days   Lab Units 07/13/24  2101   INR  1.08     Results from last 7 days   Lab Units 07/13/24  2101   ALT (SGPT) U/L 10   AST (SGOT) U/L 17     Results from last 7 days   Lab Units 07/17/24  1156 07/13/24  2133   PH, ARTERIAL pH units 7.556* 7.429   PO2 ART mm Hg 75.1* 63.7*   PCO2, ARTERIAL mm Hg 41.3 43.3   HCO3 ART mmol/L 36.7* 28.7*     Results from last 7 days   Lab Units 07/17/24  1211 07/17/24  0727 07/13/24  2101   PROCALCITONIN ng/mL  --  0.09 0.04   LACTATE mmol/L 1.4  --  1.5         No results found for: \"HGBA1C\", \"POCGLU\"      Imaging reviewed  Video swallow imaging reviewed from 7/17    Most recent chest x-ray 7/13 " reviewed        Microbiology reviewed            Active Hospital Problems    Diagnosis  POA    **Acute respiratory failure with hypoxia [J96.01]  Yes    Severe malnutrition [E43]  Yes    PAF (paroxysmal atrial fibrillation) [I48.0]  Yes    CAD (coronary artery disease) [I25.10]  Yes    Elevated d-dimer [R79.89]  Yes    COPD (chronic obstructive pulmonary disease) [J44.9]  Yes    Dyspnea [R06.00]  Yes    HTN (hypertension) [I10]  Yes      Resolved Hospital Problems   No resolved problems to display.         ASSESSMENT:  COPD with mild exacerbation  Dysphagia  Atrial fibrillation with RVR  Severe CAD  Protein calorie malnutrition      PLAN:  Less lethargic than yesterday.  More alert.  Continue oxygen for goal saturation 90 to 94%.  Schedule as needed bronchodilators for COPD symptoms.  Swallow evaluation noted with tracheal aspiration with nectar thick liquids.  No pharyngeal clearance and absence of initiation of pharyngeal swallow noted.  Given poor functional status palliative measures may be appropriate in the very least consideration of changing CODE STATUS.  Consulted palliative care to help with goals of care.  Currently full code. Awaiting their input.        Duong Mayer MD  Pulmonary and Critical Care Medicine  [facility], [facility]  7/19/2024    08:35 EDT

## 2024-07-19 NOTE — PLAN OF CARE
Problem: COPD (Chronic Obstructive Pulmonary Disease) Comorbidity  Goal: Maintenance of COPD Symptom Control  Outcome: Ongoing, Progressing  Intervention: Maintain COPD-Symptom Control  Recent Flowsheet Documentation  Taken 7/19/2024 1600 by Mahsa San RN  Medication Review/Management: medications reviewed  Taken 7/19/2024 1420 by Mahsa San RN  Medication Review/Management: medications reviewed     Problem: Skin Injury Risk Increased  Goal: Skin Health and Integrity  Outcome: Ongoing, Progressing     Problem: Fall Injury Risk  Goal: Absence of Fall and Fall-Related Injury  Outcome: Ongoing, Progressing  Intervention: Identify and Manage Contributors  Recent Flowsheet Documentation  Taken 7/19/2024 1600 by Mahsa San RN  Medication Review/Management: medications reviewed  Taken 7/19/2024 1420 by Mahsa San RN  Medication Review/Management: medications reviewed  Intervention: Promote Injury-Free Environment  Recent Flowsheet Documentation  Taken 7/19/2024 1600 by Mahsa San RN  Safety Promotion/Fall Prevention:   clutter free environment maintained   activity supervised  Taken 7/19/2024 1420 by Mahsa San RN  Safety Promotion/Fall Prevention:   clutter free environment maintained   activity supervised     Problem: Adult Inpatient Plan of Care  Goal: Plan of Care Review  Outcome: Ongoing, Progressing  Flowsheets (Taken 7/19/2024 1728)  Plan of Care Reviewed With: patient  Outcome Evaluation: VSS alert and oriented.  NPO Patient is resting well without complain of the pain   Goal Outcome Evaluation:  Plan of Care Reviewed With: patient           Outcome Evaluation: VSS alert and oriented.  NPO Patient is resting well without complain of the pain

## 2024-07-20 ENCOUNTER — APPOINTMENT (OUTPATIENT)
Dept: GENERAL RADIOLOGY | Facility: HOSPITAL | Age: 74
End: 2024-07-20
Payer: MEDICARE

## 2024-07-20 LAB
ANION GAP SERPL CALCULATED.3IONS-SCNC: 7.8 MMOL/L (ref 5–15)
BASOPHILS # BLD MANUAL: 0 10*3/MM3 (ref 0–0.2)
BASOPHILS NFR BLD MANUAL: 0 % (ref 0–1.5)
BUN SERPL-MCNC: 15 MG/DL (ref 8–23)
BUN/CREAT SERPL: 50 (ref 7–25)
CALCIUM SPEC-SCNC: 8.9 MG/DL (ref 8.6–10.5)
CHLORIDE SERPL-SCNC: 99 MMOL/L (ref 98–107)
CO2 SERPL-SCNC: 32.2 MMOL/L (ref 22–29)
CREAT SERPL-MCNC: 0.3 MG/DL (ref 0.57–1)
DEPRECATED RDW RBC AUTO: 44.2 FL (ref 37–54)
EGFRCR SERPLBLD CKD-EPI 2021: 110.1 ML/MIN/1.73
EOSINOPHIL # BLD MANUAL: 0 10*3/MM3 (ref 0–0.4)
EOSINOPHIL NFR BLD MANUAL: 0 % (ref 0.3–6.2)
ERYTHROCYTE [DISTWIDTH] IN BLOOD BY AUTOMATED COUNT: 13.1 % (ref 12.3–15.4)
GLUCOSE SERPL-MCNC: 111 MG/DL (ref 65–99)
HCT VFR BLD AUTO: 30.7 % (ref 34–46.6)
HGB BLD-MCNC: 10.1 G/DL (ref 12–15.9)
LYMPHOCYTES # BLD MANUAL: 0.56 10*3/MM3 (ref 0.7–3.1)
LYMPHOCYTES NFR BLD MANUAL: 11 % (ref 5–12)
MCH RBC QN AUTO: 30.1 PG (ref 26.6–33)
MCHC RBC AUTO-ENTMCNC: 32.9 G/DL (ref 31.5–35.7)
MCV RBC AUTO: 91.6 FL (ref 79–97)
MONOCYTES # BLD: 1.03 10*3/MM3 (ref 0.1–0.9)
NEUTROPHILS # BLD AUTO: 7.8 10*3/MM3 (ref 1.7–7)
NEUTROPHILS NFR BLD MANUAL: 83 % (ref 42.7–76)
NRBC BLD AUTO-RTO: 0 /100 WBC (ref 0–0.2)
PLAT MORPH BLD: NORMAL
PLATELET # BLD AUTO: 286 10*3/MM3 (ref 140–450)
PMV BLD AUTO: 9.4 FL (ref 6–12)
POTASSIUM SERPL-SCNC: 3.9 MMOL/L (ref 3.5–5.2)
RBC # BLD AUTO: 3.35 10*6/MM3 (ref 3.77–5.28)
RBC MORPH BLD: NORMAL
SODIUM SERPL-SCNC: 139 MMOL/L (ref 136–145)
VARIANT LYMPHS NFR BLD MANUAL: 6 % (ref 19.6–45.3)
WBC MORPH BLD: NORMAL
WBC NRBC COR # BLD AUTO: 9.4 10*3/MM3 (ref 3.4–10.8)

## 2024-07-20 PROCEDURE — 25010000002 KETOROLAC TROMETHAMINE PER 15 MG: Performed by: INTERNAL MEDICINE

## 2024-07-20 PROCEDURE — 80048 BASIC METABOLIC PNL TOTAL CA: CPT | Performed by: INTERNAL MEDICINE

## 2024-07-20 PROCEDURE — 94799 UNLISTED PULMONARY SVC/PX: CPT

## 2024-07-20 PROCEDURE — 25010000002 METHYLPREDNISOLONE PER 40 MG: Performed by: INTERNAL MEDICINE

## 2024-07-20 PROCEDURE — 85007 BL SMEAR W/DIFF WBC COUNT: CPT | Performed by: INTERNAL MEDICINE

## 2024-07-20 PROCEDURE — 94664 DEMO&/EVAL PT USE INHALER: CPT

## 2024-07-20 PROCEDURE — 25010000002 THIAMINE HCL 200 MG/2ML SOLUTION: Performed by: HOSPITALIST

## 2024-07-20 PROCEDURE — 99232 SBSQ HOSP IP/OBS MODERATE 35: CPT

## 2024-07-20 PROCEDURE — 71045 X-RAY EXAM CHEST 1 VIEW: CPT

## 2024-07-20 PROCEDURE — 94761 N-INVAS EAR/PLS OXIMETRY MLT: CPT

## 2024-07-20 PROCEDURE — 85025 COMPLETE CBC W/AUTO DIFF WBC: CPT | Performed by: INTERNAL MEDICINE

## 2024-07-20 RX ORDER — ALBUTEROL SULFATE 2.5 MG/3ML
2.5 SOLUTION RESPIRATORY (INHALATION) EVERY 6 HOURS PRN
Status: DISCONTINUED | OUTPATIENT
Start: 2024-07-20 | End: 2024-08-01 | Stop reason: HOSPADM

## 2024-07-20 RX ORDER — KETOROLAC TROMETHAMINE 15 MG/ML
15 INJECTION, SOLUTION INTRAMUSCULAR; INTRAVENOUS EVERY 6 HOURS PRN
Status: DISPENSED | OUTPATIENT
Start: 2024-07-20 | End: 2024-07-25

## 2024-07-20 RX ORDER — METHYLPREDNISOLONE SODIUM SUCCINATE 40 MG/ML
20 INJECTION, POWDER, LYOPHILIZED, FOR SOLUTION INTRAMUSCULAR; INTRAVENOUS EVERY 12 HOURS
Status: COMPLETED | OUTPATIENT
Start: 2024-07-20 | End: 2024-07-22

## 2024-07-20 RX ADMIN — METHYLPREDNISOLONE SODIUM SUCCINATE 20 MG: 40 INJECTION, POWDER, FOR SOLUTION INTRAMUSCULAR; INTRAVENOUS at 17:11

## 2024-07-20 RX ADMIN — KETOROLAC TROMETHAMINE 15 MG: 15 INJECTION, SOLUTION INTRAMUSCULAR; INTRAVENOUS at 19:03

## 2024-07-20 RX ADMIN — BUDESONIDE 0.5 MG: 0.5 INHALANT ORAL at 07:07

## 2024-07-20 RX ADMIN — TIOTROPIUM BROMIDE INHALATION SPRAY 2 PUFF: 3.12 SPRAY, METERED RESPIRATORY (INHALATION) at 07:03

## 2024-07-20 RX ADMIN — ALBUTEROL SULFATE 2.5 MG: 2.5 SOLUTION RESPIRATORY (INHALATION) at 07:04

## 2024-07-20 RX ADMIN — BUDESONIDE 0.5 MG: 0.5 INHALANT ORAL at 19:58

## 2024-07-20 RX ADMIN — DEXTROSE MONOHYDRATE, SODIUM CHLORIDE, SODIUM LACTATE, POTASSIUM CHLORIDE, CALCIUM CHLORIDE 75 ML/HR: 5; 600; 310; 179; 20 INJECTION, SOLUTION INTRAVENOUS at 10:34

## 2024-07-20 RX ADMIN — DEXTROSE MONOHYDRATE, SODIUM CHLORIDE, SODIUM LACTATE, POTASSIUM CHLORIDE, CALCIUM CHLORIDE 75 ML/HR: 5; 600; 310; 179; 20 INJECTION, SOLUTION INTRAVENOUS at 23:42

## 2024-07-20 RX ADMIN — THIAMINE HYDROCHLORIDE 100 MG: 100 INJECTION, SOLUTION INTRAMUSCULAR; INTRAVENOUS at 09:57

## 2024-07-20 RX ADMIN — ALBUTEROL SULFATE 2.5 MG: 2.5 SOLUTION RESPIRATORY (INHALATION) at 10:50

## 2024-07-20 NOTE — PLAN OF CARE
Problem: Adult Inpatient Plan of Care  Goal: Absence of Hospital-Acquired Illness or Injury  Intervention: Prevent Skin Injury  Recent Flowsheet Documentation  Taken 7/20/2024 1425 by Anna Marie Huffman RN  Body Position: supine  Skin Protection: adhesive use limited  Taken 7/20/2024 1004 by Anna Marie Huffman, RN  Body Position: turned  Skin Protection: adhesive use limited   Goal Outcome Evaluation:  Plan of Care Reviewed With: other (see comments)        Progress: improving  Outcome Evaluation: VSS, NPO, purewick in place, 2L NC, bedbound, unable to take oral medications during shift, call light within reach

## 2024-07-20 NOTE — PROGRESS NOTES
Name: Asha Krishnan ADMIT: 2024   : 1950  PCP: Capo Pedroza MD    MRN: [id number] LOS: 4 days   AGE/SEX: 74 y.o. female  ROOM: [facility]     Subjective   Subjective   No acute events. No new complaints. No family at bedside.    Objective   Objective   Vital Signs  Temp:  [97.2 °F (36.2 °C)-97.8 °F (36.6 °C)] 97.2 °F (36.2 °C)  Heart Rate:  [60-76] 67  Resp:  [16-18] 18  BP: (114-148)/(45-63) 148/60  SpO2:  [92 %-100 %] 100 %  on  Flow (L/min):  [2] 2;   Device (Oxygen Therapy): humidified;nasal cannula  Body mass index is 15.12 kg/m².  Physical Exam  Vitals and nursing note reviewed.   Constitutional:       General: She is not in acute distress.     Appearance: She is cachectic. She is ill-appearing (chronically). She is not toxic-appearing or diaphoretic.   HENT:      Head: Normocephalic and atraumatic.      Nose: Nose normal.      Mouth/Throat:      Mouth: Mucous membranes are moist.      Pharynx: Oropharynx is clear.   Eyes:      Conjunctiva/sclera: Conjunctivae normal.      Pupils: Pupils are equal, round, and reactive to light.   Cardiovascular:      Rate and Rhythm: Normal rate and regular rhythm.      Pulses: Normal pulses.   Pulmonary:      Effort: Pulmonary effort is normal.      Breath sounds: Normal breath sounds.   Abdominal:      General: Bowel sounds are normal.      Palpations: Abdomen is soft.      Tenderness: There is no abdominal tenderness.   Musculoskeletal:         General: No swelling or tenderness.      Cervical back: Neck supple.   Skin:     General: Skin is warm and dry.      Capillary Refill: Capillary refill takes less than 2 seconds.   Neurological:      General: No focal deficit present.      Mental Status: She is lethargic.       Results Review     I reviewed the patient's new clinical results.  Results from last 7 days   Lab Units 24  0439 24  0727 07/15/24  0625 24  2101   WBC 10*3/mm3 9.40 13.29* 15.51* 7.12   HEMOGLOBIN g/dL 10.1* 12.1  "11.2* 11.7*   PLATELETS 10*3/mm3 286 295 293 293     Results from last 7 days   Lab Units 07/20/24  0439 07/18/24  0654 07/17/24  0727 07/16/24  0931   SODIUM mmol/L 139 137 135* 134*   POTASSIUM mmol/L 3.9 3.9 4.1 4.2   CHLORIDE mmol/L 99 91* 90* 92*   CO2 mmol/L 32.2* 33.4* 33.7* 33.0*   BUN mg/dL 15 41* 27* 28*   CREATININE mg/dL 0.30* 0.54* 0.52* 0.56*   GLUCOSE mg/dL 111* 127* 113* 141*   EGFR mL/min/1.73 110.1 95.6 96.4 94.7     Results from last 7 days   Lab Units 07/13/24  2101   ALBUMIN g/dL 3.9   BILIRUBIN mg/dL 0.7   ALK PHOS U/L 100   AST (SGOT) U/L 17   ALT (SGPT) U/L 10     Results from last 7 days   Lab Units 07/20/24  0439 07/18/24  0654 07/17/24  0727 07/16/24  0931 07/15/24  0625 07/13/24  2101   CALCIUM mg/dL 8.9 9.4 9.2 9.5 9.4 9.0   ALBUMIN g/dL  --   --   --   --   --  3.9   MAGNESIUM mg/dL  --   --  2.1 1.9 2.2  --      Results from last 7 days   Lab Units 07/17/24  1211 07/17/24  0727 07/13/24  2101   PROCALCITONIN ng/mL  --  0.09 0.04   LACTATE mmol/L 1.4  --  1.5     No results found for: \"HGBA1C\", \"POCGLU\"    No radiology results for the last day    I have personally reviewed all medications:  Scheduled Medications  albuterol, 2.5 mg, Nebulization, 4x Daily - RT  atorvastatin, 80 mg, Oral, Nightly  budesonide, 0.5 mg, Nebulization, BID - RT  clopidogrel, 75 mg, Oral, Daily  isosorbide mononitrate, 30 mg, Oral, Q24H  losartan, 25 mg, Oral, Nightly  metoprolol tartrate, 37.5 mg, Oral, Q12H  thiamine, 100 mg, Intravenous, Daily  thiamine, 100 mg, Oral, Daily  tiotropium bromide monohydrate, 2 puff, Inhalation, Daily - RT  vitamin B-12, 500 mcg, Oral, Daily    Infusions  dextrose 5% lactated Ringer's with KCl 20 mEq/L, 75 mL/hr, Last Rate: 75 mL/hr (07/20/24 1034)  dilTIAZem, 5-15 mg/hr, Last Rate: Stopped (07/19/24 0250)    Diet  NPO Diet NPO Type: Sips with Meds, Ice Chips    I have personally reviewed:  [x]  Laboratory   []  Microbiology   []  Radiology   [x]  EKG/Telemetry  []  " Cardiology/Vascular   []  Pathology    []  Records    Assessment/Plan     Active Hospital Problems    Diagnosis  POA   • **Acute respiratory failure with hypoxia [J96.01]  Yes   • Severe malnutrition [E43]  Yes   • PAF (paroxysmal atrial fibrillation) [I48.0]  Yes   • CAD (coronary artery disease) [I25.10]  Yes   • Elevated d-dimer [R79.89]  Yes   • COPD (chronic obstructive pulmonary disease) [J44.9]  Yes   • Dyspnea [R06.00]  Yes   • HTN (hypertension) [I10]  Yes      Resolved Hospital Problems   No resolved problems to display.   COPD with Exacerbation  - seems to be resolving, continue on nebulized steroids and bronchodilators  - encourage pulmonary toilet and wean oxygen as tolerated  - appreciate pulmonology recs    Dysphagia  - SLP recommended feeding tube though she goes back and forth on whether or not she wants this    PAF/RVR  - HR much improved  - continue metoprolol  - not on AC chronically, poor candidate per cardiology    CAD  - has multiple prior stents  - no anginal symptoms  - continue on plavix and lipitor  - continue imdur and metoprolol    Poor prognosis, patient is DNR. Palliative care following.    SCDs for DVT prophylaxis.  DNR.  Discussed with patient and nursing staff.  Anticipate discharge home timing yet to be determined.  Expected Discharge Date: 7/20/2024; Expected Discharge Time:       Flavio Isabel MD  [facility]  07/20/24  12:40 EDT    Portions of this text have been copied and I have reviewed them. They are accurate as of 7/20/2024    Addendum:  Patient is now agreeable to artificial feeding. I have ordered a coretrak placement and nutrition consult for tube feeds. She may need a more permanent means of artificial nutrition depending on clinical course and goals of care.

## 2024-07-20 NOTE — PROGRESS NOTES
Dr. LEONIE Chapman    [address]        Patient ID:  Name:  Asha Krishnan  MRN:  [id number]  1950  74 y.o.  female            CC/Reason for visit: Dysphagia, malnutrition, acute respiratory failure, COPD    Interval hx: Patient did not do well on swallowing evaluation.  She denies any shortness of breath at rest.  On some oxygen.    ROS: Denies chest pain or abdominal pain    I reviewed old medical records.  Past medical history, social history and family history: Unchanged from admission H&P.      Vitals:  Vitals:    07/20/24 1050 07/20/24 1055 07/20/24 1125 07/20/24 1200   BP:   148/60 158/52   BP Location:   Right arm    Patient Position:   Lying    Pulse: 70 67     Resp: 18 18 18    Temp:   97.2 °F (36.2 °C)    TempSrc:   Oral    SpO2: 100% 100%     Weight:       Height:               Body mass index is 15.12 kg/m².    Intake/Output Summary (Last 24 hours) at 7/20/2024 1342  Last data filed at 7/20/2024 0300  Gross per 24 hour   Intake --   Output 800 ml   Net -800 ml       Exam:  GEN:  Appears malnourished and chronically ill  Alert, oriented x 3.  Moves all 4 extremities on command without focal deficits  LUNGS: Distant and diminished  breath sounds bilat, no use of accessory muscles  CV:  Normal S1S2, without murmur, no edema  ABD:  Non tender, no enlarged liver or masses      Scheduled meds:  albuterol, 2.5 mg, Nebulization, 4x Daily - RT  atorvastatin, 80 mg, Oral, Nightly  budesonide, 0.5 mg, Nebulization, BID - RT  clopidogrel, 75 mg, Oral, Daily  isosorbide mononitrate, 30 mg, Oral, Q24H  losartan, 25 mg, Oral, Nightly  metoprolol tartrate, 37.5 mg, Oral, Q12H  thiamine, 100 mg, Intravenous, Daily  thiamine, 100 mg, Oral, Daily  tiotropium bromide monohydrate, 2 puff, Inhalation, Daily - RT  vitamin B-12, 500 mcg, Oral, Daily      IV meds:                      dextrose 5% lactated Ringer's with KCl 20 mEq/L, 75 mL/hr, Last Rate: 75 mL/hr (07/20/24 1034)  dilTIAZem, 5-15 mg/hr,  Last Rate: Stopped (07/19/24 0250)        Data Review:   I reviewed the patient's medications and new clinical results.            Results from last 7 days   Lab Units 07/20/24  0439 07/18/24  0654 07/17/24  0727 07/16/24  0931 07/15/24  0625 07/13/24  2101   SODIUM mmol/L 139 137 135*   < > 134* 141   POTASSIUM mmol/L 3.9 3.9 4.1   < > 4.3 3.4*   CHLORIDE mmol/L 99 91* 90*   < > 96* 100   CO2 mmol/L 32.2* 33.4* 33.7*   < > 27.7 28.0   BUN mg/dL 15 41* 27*   < > 24* 20   CREATININE mg/dL 0.30* 0.54* 0.52*   < > 0.57 0.65   CALCIUM mg/dL 8.9 9.4 9.2   < > 9.4 9.0   BILIRUBIN mg/dL  --   --   --   --   --  0.7   ALK PHOS U/L  --   --   --   --   --  100   ALT (SGPT) U/L  --   --   --   --   --  10   AST (SGOT) U/L  --   --   --   --   --  17   GLUCOSE mg/dL 111* 127* 113*   < > 99 75   WBC 10*3/mm3 9.40  --  13.29*  --  15.51* 7.12   HEMOGLOBIN g/dL 10.1*  --  12.1  --  11.2* 11.7*   PLATELETS 10*3/mm3 286  --  295  --  293 293   INR   --   --   --   --   --  1.08   PROBNP pg/mL  --   --   --   --   --  1,789.0   PROCALCITONIN ng/mL  --   --  0.09  --   --  0.04    < > = values in this interval not displayed.               Results from last 7 days   Lab Units 07/17/24  1156 07/13/24  2133   PH, ARTERIAL pH units 7.556* 7.429   PCO2, ARTERIAL mm Hg 41.3 43.3   PO2 ART mm Hg 75.1* 63.7*   O2 SATURATION ART % 96.5 92.4   FLOW RATE lpm 2.0000  --    MODALITY  Cannula Room Air              ASSESSMENT:     Acute respiratory failure with hypoxia    CAD (coronary artery disease)    Dyspnea    Elevated d-dimer    COPD (chronic obstructive pulmonary disease)    HTN (hypertension)    PAF (paroxysmal atrial fibrillation)    Severe malnutrition        PLAN:  Patient and all medical problems need to be during this admission.  She has dysphagia.  Nothing by mouth for now.  Check chest x-ray.  Changed to as needed nebulized bronchodilators for COPD.  I do not hear any wheezing on exam today, diminished but no actual wheezing.   Scheduled DuoNeb beta agonist may actually aggravate her atrial fibrillation.  She has poor prognosis, severely malnourished.  Have to start thinking about alternative ways of feeding other than oral due to her failing swallow evaluation.  This patient has several chronic medical conditions, and now several acute medical illnesses.  Extensive amount of data was reviewed.  Images were directly visualized by me.  This patient warrants high complexity medical decision-making.      I reviewed the chart and other providers notes and reviewed labs.  Copied text in this note has been reviewed and is accurate as of today      Piotr Chapman MD  7/20/2024

## 2024-07-20 NOTE — PROGRESS NOTES
LOS: 4 days   Patient Care Team:  Capo Pedroza MD as PCP - General (Family Medicine)    Chief Complaint: follow up paroxysmal atrial fibrillation     Interval History: She remains in sinus rhythm. Her shortness of breath is improving. She is concerned about her swallowing.     Vital Signs:  Temp:  [97.2 °F (36.2 °C)-97.8 °F (36.6 °C)] 97.3 °F (36.3 °C)  Heart Rate:  [60-76] 63  Resp:  [16-18] 18  BP: (114-158)/(45-64) 155/64    Intake/Output Summary (Last 24 hours) at 7/20/2024 1543  Last data filed at 7/20/2024 0300  Gross per 24 hour   Intake --   Output 800 ml   Net -800 ml        Physical Exam  Vitals reviewed.   Constitutional:       Appearance: She is cachectic. She is ill-appearing.   HENT:      Head: Normocephalic.      Nose: Nose normal.   Eyes:      Extraocular Movements: Extraocular movements intact.      Pupils: Pupils are equal, round, and reactive to light.   Cardiovascular:      Rate and Rhythm: Normal rate and regular rhythm.      Pulses: Normal pulses.      Heart sounds: Normal heart sounds. Heart sounds not distant. No murmur heard.     No friction rub. No gallop. No S3 or S4 sounds.   Pulmonary:      Effort: Pulmonary effort is normal.      Breath sounds: Normal breath sounds.   Abdominal:      General: Abdomen is flat. Bowel sounds are normal.      Palpations: Abdomen is soft.      Tenderness: There is no abdominal tenderness.   Skin:     General: Skin is warm and dry.   Neurological:      General: No focal deficit present.      Mental Status: She is alert and oriented to person, place, and time. Mental status is at baseline.         Results Review:    Results from last 7 days   Lab Units 07/20/24  0439   SODIUM mmol/L 139   POTASSIUM mmol/L 3.9   CHLORIDE mmol/L 99   CO2 mmol/L 32.2*   BUN mg/dL 15   CREATININE mg/dL 0.30*   GLUCOSE mg/dL 111*   CALCIUM mg/dL 8.9     Results from last 7 days   Lab Units 07/14/24  0044 07/13/24  2101   HSTROP T ng/L 21* 22*     Results from last 7  days   Lab Units 07/20/24  0439   WBC 10*3/mm3 9.40   HEMOGLOBIN g/dL 10.1*   HEMATOCRIT % 30.7*   PLATELETS 10*3/mm3 286     Results from last 7 days   Lab Units 07/13/24  2101   INR  1.08   APTT seconds 31.1         Results from last 7 days   Lab Units 07/17/24  0727   MAGNESIUM mg/dL 2.1           I reviewed the patient's new clinical results.        Assessment & Plan:  COPD with acute exacerbation  Paroxysmal atrial fibrillation with RVR  She will likely continue to have atrial fibrillation given her cachectic state and underlying pulmonary issues  Given her pulmonary issues she is not a good candidate for amiodarone  She is not a good candidate for systemic anticoagulation secondary to bleeding risk and potential falls  Continue metoprolol 37.5 mg twice daily, her heart rate is well controlled today.   Coronary artery disease   DAVID from the left circumflex and LAD extending into the main left in January 2017.   of the RCA by cardiac cath in 2022  Continue Plavix, Imdur, and Lipitor  Dysphagia, severe  Cachexia and protein calorie malnutrition  Hypertension  2:1 AV block during sleep (asymptomatic)    She has tolerated the increase in metoprolol well. Heart rate is better controlled today. Cardiology will sign off, please call with any questions or concerns.     Linsey Stone, [facility]  07/20/24  15:43 EDT

## 2024-07-20 NOTE — PLAN OF CARE
Goal Outcome Evaluation:  Plan of Care Reviewed With: patient           Outcome Evaluation: VSS, NPO, purewick in place, 2L, bed alarm on, call light in reach

## 2024-07-21 LAB
ALBUMIN SERPL-MCNC: 3 G/DL (ref 3.5–5.2)
ALBUMIN/GLOB SERPL: 1 G/DL
ALP SERPL-CCNC: 83 U/L (ref 39–117)
ALT SERPL W P-5'-P-CCNC: 10 U/L (ref 1–33)
ANION GAP SERPL CALCULATED.3IONS-SCNC: 5 MMOL/L (ref 5–15)
ANION GAP SERPL CALCULATED.3IONS-SCNC: 8.6 MMOL/L (ref 5–15)
AST SERPL-CCNC: 10 U/L (ref 1–32)
BASOPHILS # BLD MANUAL: 0 10*3/MM3 (ref 0–0.2)
BASOPHILS NFR BLD MANUAL: 0 % (ref 0–1.5)
BILIRUB SERPL-MCNC: 0.6 MG/DL (ref 0–1.2)
BUN SERPL-MCNC: 13 MG/DL (ref 8–23)
BUN SERPL-MCNC: 13 MG/DL (ref 8–23)
BUN/CREAT SERPL: 39.4 (ref 7–25)
BUN/CREAT SERPL: 40.6 (ref 7–25)
CALCIUM SPEC-SCNC: 9 MG/DL (ref 8.6–10.5)
CALCIUM SPEC-SCNC: 9.1 MG/DL (ref 8.6–10.5)
CHLORIDE SERPL-SCNC: 96 MMOL/L (ref 98–107)
CHLORIDE SERPL-SCNC: 99 MMOL/L (ref 98–107)
CO2 SERPL-SCNC: 29.4 MMOL/L (ref 22–29)
CO2 SERPL-SCNC: 31 MMOL/L (ref 22–29)
CREAT SERPL-MCNC: 0.32 MG/DL (ref 0.57–1)
CREAT SERPL-MCNC: 0.33 MG/DL (ref 0.57–1)
DEPRECATED RDW RBC AUTO: 41.7 FL (ref 37–54)
EGFRCR SERPLBLD CKD-EPI 2021: 107.6 ML/MIN/1.73
EGFRCR SERPLBLD CKD-EPI 2021: 108.4 ML/MIN/1.73
EOSINOPHIL # BLD MANUAL: 0 10*3/MM3 (ref 0–0.4)
EOSINOPHIL NFR BLD MANUAL: 0 % (ref 0.3–6.2)
ERYTHROCYTE [DISTWIDTH] IN BLOOD BY AUTOMATED COUNT: 12.8 % (ref 12.3–15.4)
GEN 5 2HR TROPONIN T REFLEX: 28 NG/L
GLOBULIN UR ELPH-MCNC: 3.1 GM/DL
GLUCOSE BLDC GLUCOMTR-MCNC: 152 MG/DL (ref 70–130)
GLUCOSE BLDC GLUCOMTR-MCNC: 155 MG/DL (ref 70–130)
GLUCOSE BLDC GLUCOMTR-MCNC: 163 MG/DL (ref 70–130)
GLUCOSE SERPL-MCNC: 161 MG/DL (ref 65–99)
GLUCOSE SERPL-MCNC: 176 MG/DL (ref 65–99)
HCT VFR BLD AUTO: 33.3 % (ref 34–46.6)
HGB BLD-MCNC: 11 G/DL (ref 12–15.9)
LYMPHOCYTES # BLD MANUAL: 0.36 10*3/MM3 (ref 0.7–3.1)
LYMPHOCYTES NFR BLD MANUAL: 5 % (ref 5–12)
MAGNESIUM SERPL-MCNC: 1.6 MG/DL (ref 1.6–2.4)
MAGNESIUM SERPL-MCNC: 2 MG/DL (ref 1.6–2.4)
MCH RBC QN AUTO: 29.8 PG (ref 26.6–33)
MCHC RBC AUTO-ENTMCNC: 33 G/DL (ref 31.5–35.7)
MCV RBC AUTO: 90.2 FL (ref 79–97)
MONOCYTES # BLD: 0.45 10*3/MM3 (ref 0.1–0.9)
NEUTROPHILS # BLD AUTO: 8.19 10*3/MM3 (ref 1.7–7)
NEUTROPHILS NFR BLD MANUAL: 91 % (ref 42.7–76)
NRBC BLD AUTO-RTO: 0 /100 WBC (ref 0–0.2)
PHOSPHATE SERPL-MCNC: 3 MG/DL (ref 2.5–4.5)
PLAT MORPH BLD: NORMAL
PLATELET # BLD AUTO: 287 10*3/MM3 (ref 140–450)
PMV BLD AUTO: 9.6 FL (ref 6–12)
POTASSIUM SERPL-SCNC: 4.3 MMOL/L (ref 3.5–5.2)
POTASSIUM SERPL-SCNC: 4.3 MMOL/L (ref 3.5–5.2)
POTASSIUM SERPL-SCNC: 4.7 MMOL/L (ref 3.5–5.2)
PROCALCITONIN SERPL-MCNC: 0.02 NG/ML (ref 0–0.25)
PROT SERPL-MCNC: 6.1 G/DL (ref 6–8.5)
RBC # BLD AUTO: 3.69 10*6/MM3 (ref 3.77–5.28)
RBC MORPH BLD: NORMAL
SODIUM SERPL-SCNC: 134 MMOL/L (ref 136–145)
SODIUM SERPL-SCNC: 135 MMOL/L (ref 136–145)
TROPONIN T DELTA: 2 NG/L
TROPONIN T SERPL HS-MCNC: 26 NG/L
VARIANT LYMPHS NFR BLD MANUAL: 4 % (ref 19.6–45.3)
WBC MORPH BLD: NORMAL
WBC NRBC COR # BLD AUTO: 9 10*3/MM3 (ref 3.4–10.8)

## 2024-07-21 PROCEDURE — 93010 ELECTROCARDIOGRAM REPORT: CPT | Performed by: INTERNAL MEDICINE

## 2024-07-21 PROCEDURE — 84100 ASSAY OF PHOSPHORUS: CPT | Performed by: STUDENT IN AN ORGANIZED HEALTH CARE EDUCATION/TRAINING PROGRAM

## 2024-07-21 PROCEDURE — 85025 COMPLETE CBC W/AUTO DIFF WBC: CPT | Performed by: INTERNAL MEDICINE

## 2024-07-21 PROCEDURE — 25010000002 MAGNESIUM SULFATE PER 500 MG OF MAGNESIUM: Performed by: STUDENT IN AN ORGANIZED HEALTH CARE EDUCATION/TRAINING PROGRAM

## 2024-07-21 PROCEDURE — 84132 ASSAY OF SERUM POTASSIUM: CPT | Performed by: STUDENT IN AN ORGANIZED HEALTH CARE EDUCATION/TRAINING PROGRAM

## 2024-07-21 PROCEDURE — 94799 UNLISTED PULMONARY SVC/PX: CPT

## 2024-07-21 PROCEDURE — 93005 ELECTROCARDIOGRAM TRACING: CPT | Performed by: STUDENT IN AN ORGANIZED HEALTH CARE EDUCATION/TRAINING PROGRAM

## 2024-07-21 PROCEDURE — 84145 PROCALCITONIN (PCT): CPT | Performed by: INTERNAL MEDICINE

## 2024-07-21 PROCEDURE — 36600 WITHDRAWAL OF ARTERIAL BLOOD: CPT | Performed by: STUDENT IN AN ORGANIZED HEALTH CARE EDUCATION/TRAINING PROGRAM

## 2024-07-21 PROCEDURE — 83735 ASSAY OF MAGNESIUM: CPT | Performed by: STUDENT IN AN ORGANIZED HEALTH CARE EDUCATION/TRAINING PROGRAM

## 2024-07-21 PROCEDURE — 85007 BL SMEAR W/DIFF WBC COUNT: CPT | Performed by: INTERNAL MEDICINE

## 2024-07-21 PROCEDURE — 63710000001 INSULIN REGULAR HUMAN PER 5 UNITS: Performed by: STUDENT IN AN ORGANIZED HEALTH CARE EDUCATION/TRAINING PROGRAM

## 2024-07-21 PROCEDURE — 25010000002 THIAMINE HCL 200 MG/2ML SOLUTION: Performed by: HOSPITALIST

## 2024-07-21 PROCEDURE — 80053 COMPREHEN METABOLIC PANEL: CPT | Performed by: INTERNAL MEDICINE

## 2024-07-21 PROCEDURE — 94664 DEMO&/EVAL PT USE INHALER: CPT

## 2024-07-21 PROCEDURE — 25010000002 CALCIUM GLUCONATE PER 10 ML: Performed by: STUDENT IN AN ORGANIZED HEALTH CARE EDUCATION/TRAINING PROGRAM

## 2024-07-21 PROCEDURE — 84484 ASSAY OF TROPONIN QUANT: CPT | Performed by: STUDENT IN AN ORGANIZED HEALTH CARE EDUCATION/TRAINING PROGRAM

## 2024-07-21 PROCEDURE — 94761 N-INVAS EAR/PLS OXIMETRY MLT: CPT

## 2024-07-21 PROCEDURE — 82948 REAGENT STRIP/BLOOD GLUCOSE: CPT

## 2024-07-21 PROCEDURE — 82803 BLOOD GASES ANY COMBINATION: CPT | Performed by: STUDENT IN AN ORGANIZED HEALTH CARE EDUCATION/TRAINING PROGRAM

## 2024-07-21 PROCEDURE — 25010000002 METHYLPREDNISOLONE PER 40 MG: Performed by: INTERNAL MEDICINE

## 2024-07-21 RX ORDER — SODIUM CHLORIDE 0.9 % (FLUSH) 0.9 %
20 SYRINGE (ML) INJECTION AS NEEDED
Status: CANCELLED | OUTPATIENT
Start: 2024-07-21

## 2024-07-21 RX ORDER — NICOTINE POLACRILEX 4 MG
15 LOZENGE BUCCAL
Status: DISCONTINUED | OUTPATIENT
Start: 2024-07-21 | End: 2024-08-01 | Stop reason: HOSPADM

## 2024-07-21 RX ORDER — SODIUM CHLORIDE 0.9 % (FLUSH) 0.9 %
10 SYRINGE (ML) INJECTION EVERY 12 HOURS SCHEDULED
Status: CANCELLED | OUTPATIENT
Start: 2024-07-21

## 2024-07-21 RX ORDER — NITROGLYCERIN 0.4 MG/1
0.4 TABLET SUBLINGUAL
Status: DISCONTINUED | OUTPATIENT
Start: 2024-07-21 | End: 2024-08-01 | Stop reason: HOSPADM

## 2024-07-21 RX ORDER — DEXTROSE MONOHYDRATE 25 G/50ML
25 INJECTION, SOLUTION INTRAVENOUS
Status: DISCONTINUED | OUTPATIENT
Start: 2024-07-21 | End: 2024-08-01 | Stop reason: HOSPADM

## 2024-07-21 RX ORDER — IBUPROFEN 600 MG/1
1 TABLET ORAL
Status: DISCONTINUED | OUTPATIENT
Start: 2024-07-21 | End: 2024-08-01 | Stop reason: HOSPADM

## 2024-07-21 RX ORDER — SODIUM CHLORIDE 0.9 % (FLUSH) 0.9 %
10 SYRINGE (ML) INJECTION AS NEEDED
Status: CANCELLED | OUTPATIENT
Start: 2024-07-21

## 2024-07-21 RX ADMIN — BUDESONIDE 0.5 MG: 0.5 INHALANT ORAL at 19:37

## 2024-07-21 RX ADMIN — CALCIUM GLUCONATE: 98 INJECTION, SOLUTION INTRAVENOUS at 18:54

## 2024-07-21 RX ADMIN — BUDESONIDE 0.5 MG: 0.5 INHALANT ORAL at 08:17

## 2024-07-21 RX ADMIN — TIOTROPIUM BROMIDE INHALATION SPRAY 2 PUFF: 3.12 SPRAY, METERED RESPIRATORY (INHALATION) at 08:17

## 2024-07-21 RX ADMIN — INSULIN HUMAN 2 UNITS: 100 INJECTION, SOLUTION PARENTERAL at 15:40

## 2024-07-21 RX ADMIN — DEXTROSE MONOHYDRATE, SODIUM CHLORIDE, SODIUM LACTATE, POTASSIUM CHLORIDE, CALCIUM CHLORIDE 75 ML/HR: 5; 600; 310; 179; 20 INJECTION, SOLUTION INTRAVENOUS at 12:13

## 2024-07-21 RX ADMIN — THIAMINE HYDROCHLORIDE 100 MG: 100 INJECTION, SOLUTION INTRAMUSCULAR; INTRAVENOUS at 09:50

## 2024-07-21 RX ADMIN — METHYLPREDNISOLONE SODIUM SUCCINATE 20 MG: 40 INJECTION, POWDER, FOR SOLUTION INTRAMUSCULAR; INTRAVENOUS at 15:33

## 2024-07-21 RX ADMIN — METHYLPREDNISOLONE SODIUM SUCCINATE 20 MG: 40 INJECTION, POWDER, FOR SOLUTION INTRAMUSCULAR; INTRAVENOUS at 05:46

## 2024-07-21 NOTE — PROGRESS NOTES
Name: Asha Krishnan ADMIT: 2024   : 1950  PCP: Capo Pedroza MD    MRN: [id number] LOS: 5 days   AGE/SEX: 74 y.o. female  ROOM: [facility]     Subjective   Subjective   Patient was seen this morning initially and later this afternoon.  In the morning she was drowsy but arousable, was agreeable for core track.  Became less responsive this afternoon.  Core track was unable to be placed.    Review of Systems   UTO  Objective   Objective   Vital Signs  Temp:  [97 °F (36.1 °C)-97.8 °F (36.6 °C)] 97.6 °F (36.4 °C)  Heart Rate:  [56-71] 66  Resp:  [12-18] 14  BP: (137-169)/(57-88) 160/88  SpO2:  [98 %-100 %] 98 %  on  Flow (L/min):  [1.5-4] 1.5;   Device (Oxygen Therapy): humidified;nasal cannula  Body mass index is 15.12 kg/m².  Physical Exam    General: Laying in bed, drowsy, ill-appearing, cachectic  HEENT: Normocephalic, atraumatic  CV: Regular rate and rhythm, no murmurs rubs or gallops  Lungs: Diminished, no wheezing, nonlabored breathing  Abdomen: Soft, nontender, nondistended  Extremities: No significant peripheral edema , no cyanosis       Results Review     I reviewed the patient's new clinical results.  Results from last 7 days   Lab Units 24  0727 07/15/24  0625   WBC 10*3/mm3 9.00 9.40 13.29* 15.51*   HEMOGLOBIN g/dL 11.0* 10.1* 12.1 11.2*   PLATELETS 10*3/mm3 287 286 295 293     Results from last 7 days   Lab Units 24  04324  04324  0654 24  0727   SODIUM mmol/L 135* 139 137 135*   POTASSIUM mmol/L 4.7 3.9 3.9 4.1   CHLORIDE mmol/L 99 99 91* 90*   CO2 mmol/L 31.0* 32.2* 33.4* 33.7*   BUN mg/dL 13 15 41* 27*   CREATININE mg/dL 0.33* 0.30* 0.54* 0.52*   GLUCOSE mg/dL 161* 111* 127* 113*   Estimated Creatinine Clearance: 85.9 mL/min (A) (by C-G formula based on SCr of 0.33 mg/dL (L)).    Results from last 7 days   Lab Units 24  0439 24  0439 24  0654 24  0727 24  0931 07/15/24  0625   CALCIUM  "mg/dL 9.0 8.9 9.4 9.2 9.5 9.4   MAGNESIUM mg/dL 2.0  --   --  2.1 1.9 2.2   PHOSPHORUS mg/dL 3.0  --   --   --   --   --      Results from last 7 days   Lab Units 07/21/24  0439 07/17/24  1211 07/17/24  0727   PROCALCITONIN ng/mL 0.02  --  0.09   LACTATE mmol/L  --  1.4  --      COVID19   Date Value Ref Range Status   07/13/2024 Not Detected Not Detected - Ref. Range Final     No results found for: \"HGBA1C\", \"POCGLU\"        XR Chest 1 View  Narrative: XR CHEST 1 VW-     INDICATION: Clinical concern for pneumonia     COMPARISON: Chest radiographs dating back to 11/11/2022     Impression: New patchy inferior left lower lobe opacities, suspicious  for infiltrate. Recommend imaging follow-up to clearance. No pleural  effusion or pneumothorax. Normal size cardiomediastinal silhouette. No  focal osseous abnormality.     This report was finalized on 7/20/2024 3:24 PM by Dr. Flavio Rubio M.D on Workstation: [id number]       Scheduled Medications  atorvastatin, 80 mg, Oral, Nightly  budesonide, 0.5 mg, Nebulization, BID - RT  clopidogrel, 75 mg, Oral, Daily  insulin regular, 2-7 Units, Subcutaneous, Q6H  isosorbide mononitrate, 30 mg, Oral, Q24H  losartan, 25 mg, Oral, Nightly  methylPREDNISolone sodium succinate, 20 mg, Intravenous, Q12H  metoprolol tartrate, 37.5 mg, Oral, Q12H  thiamine, 100 mg, Intravenous, Daily  thiamine, 100 mg, Oral, Daily  tiotropium bromide monohydrate, 2 puff, Inhalation, Daily - RT  vitamin B-12, 500 mcg, Oral, Daily    Infusions  dextrose 5% lactated Ringer's with KCl 20 mEq/L, 75 mL/hr, Last Rate: 75 mL/hr (07/21/24 1213)  dilTIAZem, 5-15 mg/hr, Last Rate: Stopped (07/19/24 0250)  Pharmacy to Dose TPN,     Diet  NPO Diet NPO Type: Sips with Meds, Ice Chips    I have personally reviewed     [x]  Laboratory   [x]  Microbiology   [x]  Radiology   [x]  EKG/Telemetry  []  Cardiology/Vascular   []  Pathology    []  Records       Assessment/Plan     Active Hospital Problems    Diagnosis  POA   • " **Acute respiratory failure with hypoxia [J96.01]  Yes   • Severe malnutrition [E43]  Yes   • PAF (paroxysmal atrial fibrillation) [I48.0]  Yes   • CAD (coronary artery disease) [I25.10]  Yes   • Elevated d-dimer [R79.89]  Yes   • COPD (chronic obstructive pulmonary disease) [J44.9]  Yes   • Dyspnea [R06.00]  Yes   • HTN (hypertension) [I10]  Yes      Resolved Hospital Problems   No resolved problems to display.       COPD with Exacerbation  - seems to be resolving, continue on nebulized steroids and bronchodilators  - encourage pulmonary toilet and wean oxygen as tolerated  - appreciate pulmonology recs     Dysphagia  -Core track was attempted to be placed however was not successful through 7/21  -Patient is severely malnourished/cachectic.  TPN ordered, needs central line/PICC line for placement of PICC line, however patient unable to consent, and unable to reach spouse for consent as per report patient is positive and admitted to hospital, unknown location.  -Will initiate on TPN through peripheral line temporarily for 72 hours.  Pharmacy consulted.     PAF/RVR  - HR much improved  - continue metoprolol  - not on AC chronically, poor candidate per cardiology     CAD  - has multiple prior stents  - no anginal symptoms  - continue on plavix and lipitor  - continue imdur and metoprolol       SCDs for DVT prophylaxis.  DNR.  Palliative care appropriate, palliative care reconsulted  Discussed with patient and nursing staff.  Anticipate discharge home timing yet to be determined.  Expected Discharge Date:       is note has been reviewed and is accurate as of 07/21/24.         Dictated utilizing Dragon dictation        Radha Roman MD  [facility]  07/21/24  13:45 EDT

## 2024-07-21 NOTE — PLAN OF CARE
Goal Outcome Evaluation:  Plan of Care Reviewed With: (P) patient        Progress: (P) improving     NSR & SB. AO x4. 1.5L through NC. D5NS 75ml/hr w/ KCl 20 mEq/L. No pain. NPO. Plan for Cortrak placement. Bedbound. Bladder scan 615ml. Was able to get 350ml out w/ encouragement.

## 2024-07-21 NOTE — PROGRESS NOTES
ABG completed 14:46 07/21/24. Results aren't transmitting over, no critical values.           ABG Results:    pH: 7.49  PCo2: 45.2  PO2: 83.0  HCO3: 34.7    Pt was on RA, sats 91%. RR: 14

## 2024-07-21 NOTE — PROGRESS NOTES
Dr. LEONIE Chapman    [address]        Patient ID:  Name:  Asah Krishnan  MRN:  [id number]  1950  74 y.o.  female            CC/Reason for visit: Dysphagia, malnutrition, acute respiratory failure, COPD     Interval hx: Patient did not do well on swallowing evaluation.  She is quite sleepy today.  She does not speak much.  Says only a few words, was sleeping, does not appear to be in pain     ROS: Denies chest pain or abdominal pain    Vitals:  Vitals:    07/20/24 2352 07/21/24 0725 07/21/24 0817 07/21/24 1105   BP: 153/57 160/75  160/88   BP Location:  Left arm  Left arm   Patient Position:  Lying  Lying   Pulse: 63 66     Resp: 16 14 12 14   Temp: 97 °F (36.1 °C) 97.8 °F (36.6 °C)  97.6 °F (36.4 °C)   TempSrc: Oral Oral  Oral   SpO2: 100% 98%     Weight:       Height:               Body mass index is 15.12 kg/m².    Intake/Output Summary (Last 24 hours) at 7/21/2024 1311  Last data filed at 7/20/2024 2155  Gross per 24 hour   Intake --   Output 350 ml   Net -350 ml       Exam:  GEN:               Appears malnourished and chronically ill  She wakes up to verbal stimuli, oriented x 2, little sleepy.   LUNGS:           Distant and diminished  breath sounds bilat, no use of accessory muscles  CV:                  Normal S1S2, without murmur, no edema  ABD:                Non tender, no enlarged liver or masses      Scheduled meds:  atorvastatin, 80 mg, Oral, Nightly  budesonide, 0.5 mg, Nebulization, BID - RT  clopidogrel, 75 mg, Oral, Daily  insulin regular, 2-7 Units, Subcutaneous, Q6H  isosorbide mononitrate, 30 mg, Oral, Q24H  losartan, 25 mg, Oral, Nightly  methylPREDNISolone sodium succinate, 20 mg, Intravenous, Q12H  metoprolol tartrate, 37.5 mg, Oral, Q12H  thiamine, 100 mg, Intravenous, Daily  thiamine, 100 mg, Oral, Daily  tiotropium bromide monohydrate, 2 puff, Inhalation, Daily - RT  vitamin B-12, 500 mcg, Oral, Daily      IV meds:                      dextrose 5% lactated Ringer's  with KCl 20 mEq/L, 75 mL/hr, Last Rate: 75 mL/hr (07/21/24 1213)  dilTIAZem, 5-15 mg/hr, Last Rate: Stopped (07/19/24 0250)  Pharmacy to Dose TPN,         Data Review:   I reviewed the patient's medications and new clinical results.            Results from last 7 days   Lab Units 07/21/24  0439 07/20/24  0439 07/18/24  0654 07/17/24  0727   SODIUM mmol/L 135* 139 137 135*   POTASSIUM mmol/L 4.7 3.9 3.9 4.1   CHLORIDE mmol/L 99 99 91* 90*   CO2 mmol/L 31.0* 32.2* 33.4* 33.7*   BUN mg/dL 13 15 41* 27*   CREATININE mg/dL 0.33* 0.30* 0.54* 0.52*   CALCIUM mg/dL 9.0 8.9 9.4 9.2   GLUCOSE mg/dL 161* 111* 127* 113*   WBC 10*3/mm3 9.00 9.40  --  13.29*   HEMOGLOBIN g/dL 11.0* 10.1*  --  12.1   PLATELETS 10*3/mm3 287 286  --  295   PROCALCITONIN ng/mL 0.02  --   --  0.09                ASSESSMENT:     Acute respiratory failure with hypoxia    CAD (coronary artery disease)    Dyspnea    Elevated d-dimer    COPD (chronic obstructive pulmonary disease)    HTN (hypertension)    PAF (paroxysmal atrial fibrillation)    Severe malnutrition        PLAN:  Prevent feeding by mouth for now due to severe dysphagia.  Patient is cachectic, malnourished.  Hold off on scheduled beta agonist nebulizer treatments since these can aggravate atrial fibrillation.  Continue Symbicort for maintenance of COPD.  She is not wheezing on exam.          Piotr Chapman MD  7/21/2024

## 2024-07-21 NOTE — CONSULTS
"Nutrition Services    Patient Name:  Asha Krishnan  YOB: 1950  MRN: [id number]  Admit Date:  7/13/2024  Assessment Date:  07/21/24    Summary: Tube feed assessment/ cortrak placement/ TPN assessment    Attempted to place cortrak but was unable to. Pt not protecting her airway. Reports that \"she can't tuck her chin or swallow anything\". Notified RN.      ADDENDUM: TPN assessment consult    Recommend initiating TPN slowly 2/2 pt met criteria for severe starvation related malnutrition and has been NPO for 5 days. Pt at high risk for refeeding syndrome. Monitor and replace elytes as needed.      Plan/recs: 60mL/hr for a total of 1440 kcal  820 dextrose  55 g protein (220 kcal)  20% 200 mL lipids as TG allows (400 kcal)  Fluid and electrolytes per pharm.    Estimated/Assessed Needs       Energy Requirements    Weight for Calculation 37.5kg   Method for Estimation  35-40 kcal/kg   EST Needs (kcal/day) 8740-2952       Protein Requirements    Weight for Calculation 37.5kg   EST Protein Needs (g/kg) 1.2 - 1.5 gm/kg   EST Daily Needs (g/day) 45-57       Fluid Requirements     Method for Estimation 1 mL/kcal    Estimated Needs (mL/day) 9375-0842     CLINICAL NUTRITION ASSESSMENT      Reason for Assessment TPN Assessment, TF Assessment     Diagnosis/Problem   Acute respiratory failure, dysphagia   Medical/Surgical History Past Medical History:   Diagnosis Date    Atrial fibrillation, unspecified type     Cataract     COPD (chronic obstructive pulmonary disease)     Hyperlipidemia     Hypertension        Past Surgical History:   Procedure Laterality Date    CARDIAC CATHETERIZATION N/A 11/11/2022    Procedure: Left Heart Cath;  Surgeon: Tashi De La Torre MD;  Location: [facility] INVASIVE LOCATION;  Service: Cardiovascular;  Laterality: N/A;    CARDIAC CATHETERIZATION N/A 11/11/2022    Procedure: Coronary angiography;  Surgeon: Tashi De La Torre MD;  Location: [facility] INVASIVE LOCATION;  Service: " "Cardiovascular;  Laterality: N/A;        Anthropometrics        Current Height  Current Weight  BMI kg/m2 Height: 157.5 cm (62\")  Weight: 37.5 kg (82 lb 10.8 oz) (07/14/24 0202)  Body mass index is 15.12 kg/m².   Adjusted BMI (if applicable)    BMI Category Underweight (18.4 or below)   Ideal Body Weight (IBW) 110#   Usual Body Weight (UBW) 110#   Weight Trend Loss   Weight History Wt Readings from Last 30 Encounters:   07/14/24 0202 37.5 kg (82 lb 10.8 oz)   07/13/24 2156 40.8 kg (90 lb)   08/01/23 1035 49.9 kg (110 lb)   11/12/22 0826 54.4 kg (120 lb)   11/11/22 0222 54.4 kg (120 lb)   11/11/22 0117 54.4 kg (120 lb)         Labs       Pertinent Labs    Results from last 7 days   Lab Units 07/21/24 0439 07/20/24 0439 07/18/24  0654   SODIUM mmol/L 135* 139 137   POTASSIUM mmol/L 4.7 3.9 3.9   CHLORIDE mmol/L 99 99 91*   CO2 mmol/L 31.0* 32.2* 33.4*   BUN mg/dL 13 15 41*   CREATININE mg/dL 0.33* 0.30* 0.54*   CALCIUM mg/dL 9.0 8.9 9.4   GLUCOSE mg/dL 161* 111* 127*     Results from last 7 days   Lab Units 07/21/24  0439 07/20/24 0439 07/17/24  0727 07/16/24  0931 07/15/24  0625   MAGNESIUM mg/dL  --   --  2.1 1.9 2.2   HEMOGLOBIN g/dL 11.0*   < > 12.1  --  11.2*   HEMATOCRIT % 33.3*   < > 36.2  --  32.7*   WBC 10*3/mm3 9.00   < > 13.29*  --  15.51*    < > = values in this interval not displayed.     Results from last 7 days   Lab Units 07/21/24  0439 07/20/24  0439 07/17/24  0727 07/15/24  0625   PLATELETS 10*3/mm3 287 286 295 293     COVID19   Date Value Ref Range Status   07/13/2024 Not Detected Not Detected - Ref. Range Final     Lab Results   Component Value Date    HGBA1C 6.1 (H) 11/10/2022          Medications           Scheduled Medications atorvastatin, 80 mg, Oral, Nightly  budesonide, 0.5 mg, Nebulization, BID - RT  clopidogrel, 75 mg, Oral, Daily  isosorbide mononitrate, 30 mg, Oral, Q24H  losartan, 25 mg, Oral, Nightly  methylPREDNISolone sodium succinate, 20 mg, Intravenous, Q12H  metoprolol " tartrate, 37.5 mg, Oral, Q12H  thiamine, 100 mg, Intravenous, Daily  thiamine, 100 mg, Oral, Daily  tiotropium bromide monohydrate, 2 puff, Inhalation, Daily - RT  vitamin B-12, 500 mcg, Oral, Daily       Infusions dextrose 5% lactated Ringer's with KCl 20 mEq/L, 75 mL/hr, Last Rate: 75 mL/hr (07/20/24 2342)  dilTIAZem, 5-15 mg/hr, Last Rate: Stopped (07/19/24 0250)       PRN Medications   albuterol    ketorolac    [COMPLETED] Insert Peripheral IV **AND** sodium chloride     Physical Findings          General Findings alert, frail, generalized wasting, loss of muscle mass, loss of subcutaneous fat, oriented, on oxygen therapy   Oral/Mouth Cavity WDL, dental appliance   Edema  no edema   Gastrointestinal WDL, last bowel movement: 7/13   Skin  skin intact   Tubes/Drains/Lines none   NFPE See Malnutrition Severity Assessment, Date Completed: 7/14      Malnutrition Severity Assessment      Patient meets criteria for : Severe Malnutrition            Current Nutrition Orders & Evaluation of Intake       Oral Nutrition     Food Allergies NKFA   Current PO Diet NPO Diet NPO Type: Sips with Meds, Ice Chips   Supplement n/a   PO Evaluation     % PO Intake npo    Factors Affecting Intake: chewing difficulty, decreased appetite, early satiety , swallow impairment   --  PES STATEMENT / NUTRITION DIAGNOSIS      Nutrition Dx Problem  Problem: Malnutrition (severe)  Etiology: Factors Affecting Nutrition - weakness, fatigue, dysphagia    Signs/Symptoms: Report of Minimal PO Intake, NFPE Results, BMI, Unintended Weight Change, Report/Observation, and SLP/Swallow Evaluation     NUTRITION INTERVENTION / PLAN OF CARE      Intervention Goal(s) Establish goals of care         RD Intervention/Action Await initiation/advancement of PO diet, Await initiation of EN/PN, Continue to monitor, and Care plan reviewed   --      Prescription/Orders:       PO Diet       Supplements       Enteral Nutrition       Parenteral Nutrition    Parenteral  Prescription:     TPN Route Pending   TPN Rate (mL/hr) 60mL/hr   TPN Recommendation:        Dextrose (kcal) 820 kcal       Amino Acid (gm) 55 g (220 kcal)       Lipid Concentration 20%       Lipid Volume/Frequency  200 mL   Propofol Rate/Kcal    TPN Provision:   1440 kcal, 55 gm protein        Calories 100 % needs met        Protein  100 % needs met        Fluid Per pharm       New Prescription Ordered?    --      Monitor/Evaluation    Discharge Plan/Needs Pending clinical course   --    RD to follow per protocol.      Electronically signed by:  Leann Torres RD  07/21/24 11:20 EDT

## 2024-07-21 NOTE — PROGRESS NOTES
"[facility] Clinical Pharmacy Services: Total Parental Nutrition Initial Consult    Indication:  malnutrition  Route: peripheral  Type: standard    Relevant clinical data and objective history reviewed:  74 y.o. female 157.5 cm (62\") 37.5 kg (82 lb 10.8 oz)    Results from last 7 days   Lab Units 07/21/24  0439   SODIUM mmol/L 135*   POTASSIUM mmol/L 4.7   CHLORIDE mmol/L 99   CO2 mmol/L 31.0*   BUN mg/dL 13   CREATININE mg/dL 0.33*   CALCIUM mg/dL 9.0   GLUCOSE mg/dL 161*   MAGNESIUM mg/dL 2.0   PHOSPHORUS mg/dL 3.0        Estimated Creatinine Clearance: 85.9 mL/min (A) (by C-G formula based on SCr of 0.33 mg/dL (L)).    Active fluid orders: D5LR w/ 20 kcl  75 ml/hr    Dietary Orders (From admission, onward)       Start     Ordered    07/16/24 1154  NPO Diet NPO Type: Sips with Meds, Ice Chips  Diet Effective Now        Comments: Meds crushed in puree.   Question Answer Comment   NPO Type Sips with Meds    NPO Type Ice Chips        07/16/24 1154                  Assessment  Ideal Body Weight: 53.9 kg  Body Weight Used for Calculations: 53.9 kg  Daily Est. Tab: 1700 kCal/Day  Estimated Fluid Requirements: 1890 mL/day    Goal   Protein: 1.2 grams/kg/day (65 grams/day)   Dextrose: 1200 Kcal/day   Lipids: 100 ml of 20% lipid infusion 7 days/week    Plan    D/w Dr. Roman today. RN will place a 2nd peripheral line for other medications and we will use peripheral tpn for now (consent for central unable to be obtained).  Is aware that tpn duration may only be short-term for peripheral use.     Will start TPN at approximately half goal and will taper up as appropriate (if central is placed). Will initiate TPN with the following macros:   Protein/Dextrose/Lipids: 65/400/holding lipids until TG can be checked    Volume: 1800 ml (75 mL/hr over 24 hrs daily):      Based on the above labs, will add the following electrolytes/additives to the TPN.     -Potassium is 4.7 on D5LR 20 kcl/L at 75 ml/hr; ivf will stop at 1800. " Will not add any to tpn and will reassess in am.    Co2 is elevated at 31 and will not add any form of acetate.     Sodium Chloride: 40 mEq   Sodium Acetate: 0 mEq   Sodium Phosphate: 20 mEq   Potassium Chloride: 0 mEq   Potassium Acetate: 0 mEq   Potassium Phosphate: 0 mEq   Calcium Gluconate: 10 mEq   Magnesium Sulfate: 8 mEq   MVI for TPN   Trace Elements              Other Additives: none    Labs to be ordered: daily cmp and TG in am    Pharmacy will continue to follow.     Fish Caal [facility]  Clinical Pharmacist

## 2024-07-21 NOTE — PLAN OF CARE
Problem: Parenteral Nutrition  Goal: Effective Intravenous Nutrition Therapy Delivery  Outcome: Ongoing, Progressing   Goal Outcome Evaluation:   RD to follow

## 2024-07-22 PROBLEM — J96.01 ACUTE HYPOXEMIC RESPIRATORY FAILURE: Status: ACTIVE | Noted: 2024-07-13

## 2024-07-22 LAB
ANION GAP SERPL CALCULATED.3IONS-SCNC: 11.9 MMOL/L (ref 5–15)
ARTERIAL PATENCY WRIST A: ABNORMAL
ATMOSPHERIC PRESS: 748.7 MMHG
BASE EXCESS BLDA CALC-SCNC: 10.1 MMOL/L (ref 0–2)
BASOPHILS # BLD AUTO: 0.02 10*3/MM3 (ref 0–0.2)
BASOPHILS NFR BLD AUTO: 0.1 % (ref 0–1.5)
BDY SITE: ABNORMAL
BUN SERPL-MCNC: 15 MG/DL (ref 8–23)
BUN/CREAT SERPL: 46.9 (ref 7–25)
CA-I BLD-MCNC: 5.3 MG/DL (ref 4.6–5.4)
CA-I SERPL ISE-MCNC: 1.32 MMOL/L (ref 1.15–1.35)
CALCIUM SPEC-SCNC: 9.6 MG/DL (ref 8.6–10.5)
CHLORIDE SERPL-SCNC: 92 MMOL/L (ref 98–107)
CO2 BLDA-SCNC: >34.54 MMOL/L (ref 23–27)
CO2 SERPL-SCNC: 28.1 MMOL/L (ref 22–29)
CREAT SERPL-MCNC: 0.32 MG/DL (ref 0.57–1)
CRP SERPL-MCNC: 1.6 MG/DL (ref 0–0.5)
DEPRECATED RDW RBC AUTO: 39.4 FL (ref 37–54)
DEVICE COMMENT: ABNORMAL
EGFRCR SERPLBLD CKD-EPI 2021: 108.4 ML/MIN/1.73
EOSINOPHIL # BLD AUTO: 0 10*3/MM3 (ref 0–0.4)
EOSINOPHIL NFR BLD AUTO: 0 % (ref 0.3–6.2)
ERYTHROCYTE [DISTWIDTH] IN BLOOD BY AUTOMATED COUNT: 12.4 % (ref 12.3–15.4)
GLUCOSE BLDC GLUCOMTR-MCNC: 109 MG/DL (ref 70–130)
GLUCOSE BLDC GLUCOMTR-MCNC: 122 MG/DL (ref 70–130)
GLUCOSE BLDC GLUCOMTR-MCNC: 131 MG/DL (ref 70–130)
GLUCOSE BLDC GLUCOMTR-MCNC: 151 MG/DL (ref 70–130)
GLUCOSE BLDC GLUCOMTR-MCNC: 155 MG/DL (ref 70–130)
GLUCOSE SERPL-MCNC: 146 MG/DL (ref 65–99)
HCO3 BLDA-SCNC: 34.7 MMOL/L (ref 22–28)
HCT VFR BLD AUTO: 37.9 % (ref 34–46.6)
HEMODILUTION: NO
HGB BLD-MCNC: 12.8 G/DL (ref 12–15.9)
IMM GRANULOCYTES # BLD AUTO: 0.12 10*3/MM3 (ref 0–0.05)
IMM GRANULOCYTES NFR BLD AUTO: 0.8 % (ref 0–0.5)
INHALED O2 CONCENTRATION: 21 %
LYMPHOCYTES # BLD AUTO: 0.82 10*3/MM3 (ref 0.7–3.1)
LYMPHOCYTES NFR BLD AUTO: 5.4 % (ref 19.6–45.3)
MAGNESIUM SERPL-MCNC: 1.8 MG/DL (ref 1.6–2.4)
MCH RBC QN AUTO: 29.7 PG (ref 26.6–33)
MCHC RBC AUTO-ENTMCNC: 33.8 G/DL (ref 31.5–35.7)
MCV RBC AUTO: 87.9 FL (ref 79–97)
MODALITY: ABNORMAL
MONOCYTES # BLD AUTO: 0.27 10*3/MM3 (ref 0.1–0.9)
MONOCYTES NFR BLD AUTO: 1.8 % (ref 5–12)
NEUTROPHILS NFR BLD AUTO: 14.05 10*3/MM3 (ref 1.7–7)
NEUTROPHILS NFR BLD AUTO: 91.9 % (ref 42.7–76)
NRBC BLD AUTO-RTO: 0 /100 WBC (ref 0–0.2)
O2 A-A PPRESDIFF RESPIRATORY: 0.8 MMHG
PCO2 BLDA: 45.2 MM HG (ref 35–45)
PH BLDA: 7.49 PH UNITS (ref 7.35–7.45)
PHOSPHATE SERPL-MCNC: 2.7 MG/DL (ref 2.5–4.5)
PLATELET # BLD AUTO: 394 10*3/MM3 (ref 140–450)
PMV BLD AUTO: 9.7 FL (ref 6–12)
PO2 BLD: 395 MM[HG] (ref 0–500)
PO2 BLDA: 83 MM HG (ref 80–100)
POTASSIUM SERPL-SCNC: 4.5 MMOL/L (ref 3.5–5.2)
PREALB SERPL-MCNC: 10.5 MG/DL (ref 20–40)
RBC # BLD AUTO: 4.31 10*6/MM3 (ref 3.77–5.28)
SAO2 % BLDCOA: 96.9 % (ref 92–98.5)
SET MECH RESP RATE: 14
SODIUM SERPL-SCNC: 132 MMOL/L (ref 136–145)
WBC NRBC COR # BLD AUTO: 15.28 10*3/MM3 (ref 3.4–10.8)

## 2024-07-22 PROCEDURE — 83735 ASSAY OF MAGNESIUM: CPT | Performed by: STUDENT IN AN ORGANIZED HEALTH CARE EDUCATION/TRAINING PROGRAM

## 2024-07-22 PROCEDURE — 85025 COMPLETE CBC W/AUTO DIFF WBC: CPT | Performed by: INTERNAL MEDICINE

## 2024-07-22 PROCEDURE — 94760 N-INVAS EAR/PLS OXIMETRY 1: CPT

## 2024-07-22 PROCEDURE — 94664 DEMO&/EVAL PT USE INHALER: CPT

## 2024-07-22 PROCEDURE — 25010000002 MAGNESIUM SULFATE PER 500 MG OF MAGNESIUM: Performed by: STUDENT IN AN ORGANIZED HEALTH CARE EDUCATION/TRAINING PROGRAM

## 2024-07-22 PROCEDURE — 80048 BASIC METABOLIC PNL TOTAL CA: CPT | Performed by: STUDENT IN AN ORGANIZED HEALTH CARE EDUCATION/TRAINING PROGRAM

## 2024-07-22 PROCEDURE — 84100 ASSAY OF PHOSPHORUS: CPT | Performed by: STUDENT IN AN ORGANIZED HEALTH CARE EDUCATION/TRAINING PROGRAM

## 2024-07-22 PROCEDURE — 94761 N-INVAS EAR/PLS OXIMETRY MLT: CPT

## 2024-07-22 PROCEDURE — 25010000002 THIAMINE HCL 200 MG/2ML SOLUTION: Performed by: HOSPITALIST

## 2024-07-22 PROCEDURE — 94799 UNLISTED PULMONARY SVC/PX: CPT

## 2024-07-22 PROCEDURE — 63710000001 INSULIN REGULAR HUMAN PER 5 UNITS: Performed by: STUDENT IN AN ORGANIZED HEALTH CARE EDUCATION/TRAINING PROGRAM

## 2024-07-22 PROCEDURE — 82948 REAGENT STRIP/BLOOD GLUCOSE: CPT

## 2024-07-22 PROCEDURE — 86140 C-REACTIVE PROTEIN: CPT | Performed by: STUDENT IN AN ORGANIZED HEALTH CARE EDUCATION/TRAINING PROGRAM

## 2024-07-22 PROCEDURE — 82330 ASSAY OF CALCIUM: CPT | Performed by: STUDENT IN AN ORGANIZED HEALTH CARE EDUCATION/TRAINING PROGRAM

## 2024-07-22 PROCEDURE — 99222 1ST HOSP IP/OBS MODERATE 55: CPT

## 2024-07-22 PROCEDURE — 25010000002 METHYLPREDNISOLONE PER 40 MG: Performed by: INTERNAL MEDICINE

## 2024-07-22 PROCEDURE — 25010000002 CALCIUM GLUCONATE PER 10 ML: Performed by: STUDENT IN AN ORGANIZED HEALTH CARE EDUCATION/TRAINING PROGRAM

## 2024-07-22 PROCEDURE — 84134 ASSAY OF PREALBUMIN: CPT | Performed by: STUDENT IN AN ORGANIZED HEALTH CARE EDUCATION/TRAINING PROGRAM

## 2024-07-22 RX ADMIN — BUDESONIDE 0.5 MG: 0.5 INHALANT ORAL at 20:19

## 2024-07-22 RX ADMIN — CALCIUM GLUCONATE: 98 INJECTION, SOLUTION INTRAVENOUS at 18:10

## 2024-07-22 RX ADMIN — INSULIN HUMAN 2 UNITS: 100 INJECTION, SOLUTION PARENTERAL at 06:52

## 2024-07-22 RX ADMIN — BUDESONIDE 0.5 MG: 0.5 INHALANT ORAL at 08:30

## 2024-07-22 RX ADMIN — METHYLPREDNISOLONE SODIUM SUCCINATE 20 MG: 40 INJECTION, POWDER, FOR SOLUTION INTRAMUSCULAR; INTRAVENOUS at 02:11

## 2024-07-22 RX ADMIN — METOPROLOL TARTRATE 5 MG: 1 INJECTION, SOLUTION INTRAVENOUS at 08:55

## 2024-07-22 RX ADMIN — INSULIN HUMAN 2 UNITS: 100 INJECTION, SOLUTION PARENTERAL at 02:12

## 2024-07-22 RX ADMIN — TIOTROPIUM BROMIDE INHALATION SPRAY 2 PUFF: 3.12 SPRAY, METERED RESPIRATORY (INHALATION) at 08:30

## 2024-07-22 RX ADMIN — THIAMINE HYDROCHLORIDE 100 MG: 100 INJECTION, SOLUTION INTRAMUSCULAR; INTRAVENOUS at 10:15

## 2024-07-22 NOTE — CONSULTS
PC RN met with patient on 7/19/24. Changed CODE STATUS at that time. She was resistant to hospice services at that time. However, did confirm she wanted no further rehospitalization.  met with patient to complete advance directive, named her s/o, Kisha as HCS. ACP is in paper chart.

## 2024-07-22 NOTE — SIGNIFICANT NOTE
07/22/24 1505   OTHER   Discipline physical therapist   Rehab Time/Intention   Session Not Performed patient/family declined treatment;other (see comments)  (Pt politely declined PT today. Agreeable to be checked on following her procedure tomorrow. Notified RN)   Recommendation   PT - Next Appointment 07/23/24

## 2024-07-22 NOTE — PROGRESS NOTES
"  PROGRESS NOTE  Patient Name: Asha Krishnan  Age/Sex: 74 y.o. female  : 1950  MRN: [id number]    Date of Admission: 2024  Date of Encounter Visit: 24   LOS: 6 days   Patient Care Team:  Capo Pedroza MD as PCP - General (Family Medicine)    Chief Complaint: Severe dysphagia, failure to thrive    Hospital course: Patient is n.p.o., on TPN while waiting on the PEG tube placement which is scheduled for tomorrow.  Patient otherwise is feeling okay, no fever or chills, on nasal cannula on minimal supply at 1.5 L/min         REVIEW OF SYSTEMS:   CONSTITUTIONAL: no fever or chills  CARDIOVASCULAR: No chest pain, chest pressure or chest discomfort. No palpitations or edema.   RESPIRATORY: No shortness of breath, cough or sputum.   GASTROINTESTINAL: Cachectic, currently only on TPN.  No nausea or vomiting.   HEMATOLOGIC: No bleeding or bruising.     Ventilator/Non-Invasive Ventilation Settings (From admission, onward)      None              Vital Signs  Temp:  [97.2 °F (36.2 °C)-98.6 °F (37 °C)] 97.2 °F (36.2 °C)  Heart Rate:  [63-88] 72  Resp:  [16-18] 16  BP: (126-189)/() 146/61  SpO2:  [91 %-99 %] 99 %  on  Flow (L/min):  [1.5-3] 1.5 Device (Oxygen Therapy): humidified;nasal cannula    Intake/Output Summary (Last 24 hours) at 2024 1633  Last data filed at 2024 1500  Gross per 24 hour   Intake 913.75 ml   Output 1850 ml   Net -936.25 ml     Flowsheet Rows      Flowsheet Row First Filed Value   Admission Height 157.5 cm (62\") Documented at 2024   Admission Weight 40.8 kg (90 lb) Documented at 2024          Body mass index is 12.51 kg/m².      24  0202 24  0556 24  1100   Weight: 37.5 kg (82 lb 10.8 oz) 68.4 kg (150 lb 12.7 oz) 31 kg (68 lb 6.4 oz) (weight entered incorrectly)       Physical Exam:  GEN:  No acute distress, alert, cooperative, cachectic, muscle wasted  EYES:   Sclerae clear. No icterus. PERRL. Normal EOM  ENT:   External " ears/nose normal, no oral lesions, no thrush, mucous membranes moist  NECK:  Supple, midline trachea, no JVD  LUNGS: Normal chest on inspection, diminished but clear. Respirations regular, even and unlabored.   CV:  Regular rhythm and rate. Normal S1/S2. No murmurs, gallops, or rubs noted.  ABD:  Soft, nontender and nondistended. Normal bowel sounds. No guarding  EXT:  Moves all extremities well. No cyanosis. No redness. No edema.   Skin: Dry, intact, no bleeding    Results Review:    Results From Last 14 Days   Lab Units 07/22/24  0612 07/17/24  1211 07/13/24  2101   CRP mg/dL 1.60*  --   --    D DIMER QUANT MCGFEU/mL  --  2.15* 1.72*   LACTATE mmol/L  --  1.4 1.5     Results from last 7 days   Lab Units 07/22/24  0612 07/21/24  2240 07/21/24  2040 07/21/24  0439 07/20/24  0439 07/18/24  0654 07/17/24  0727 07/16/24  0931   SODIUM mmol/L 132* 134*  --  135* 139 137 135* 134*   POTASSIUM mmol/L 4.5 4.3 4.3 4.7 3.9 3.9 4.1 4.2   CHLORIDE mmol/L 92* 96*  --  99 99 91* 90* 92*   CO2 mmol/L 28.1 29.4*  --  31.0* 32.2* 33.4* 33.7* 33.0*   BUN mg/dL 15 13  --  13 15 41* 27* 28*   CREATININE mg/dL 0.32* 0.32*  --  0.33* 0.30* 0.54* 0.52* 0.56*   CALCIUM mg/dL 9.6 9.1  --  9.0 8.9 9.4 9.2 9.5   AST (SGOT) U/L  --  10  --   --   --   --   --   --    ALT (SGPT) U/L  --  10  --   --   --   --   --   --    ANION GAP mmol/L 11.9 8.6  --  5.0 7.8 12.6 11.3 9.0   ALBUMIN g/dL  --  3.0*  --   --   --   --   --   --      Results from last 7 days   Lab Units 07/21/24  2240 07/21/24  2040 07/17/24  1211   HSTROP T ng/L 28* 26*  --    D DIMER QUANT MCGFEU/mL  --   --  2.15*     Results from last 7 days   Lab Units 07/17/24  0727   TSH uIU/mL 1.360         Results from last 7 days   Lab Units 07/22/24  0611 07/21/24  0439 07/20/24  0439 07/17/24  0727   WBC 10*3/mm3 15.28* 9.00 9.40 13.29*   HEMOGLOBIN g/dL 12.8 11.0* 10.1* 12.1   HEMATOCRIT % 37.9 33.3* 30.7* 36.2   PLATELETS 10*3/mm3 394 287 286 295   MCV fL 87.9 90.2 91.6 88.7  "  NEUTROPHIL % % 91.9*  --   --   --    LYMPHOCYTE % % 5.4*  --   --   --    MONOCYTES % % 1.8*  --   --   --    EOSINOPHIL % % 0.0*  --   --   --    BASOPHIL % % 0.1  --   --   --    IMM GRAN % % 0.8*  --   --   --          Results from last 7 days   Lab Units 07/22/24  0612 07/21/24  2040 07/21/24  0439 07/17/24  0727 07/16/24  0931   MAGNESIUM mg/dL 1.8 1.6 2.0 2.1 1.9           Invalid input(s): \"LDLCALC\"  Results from last 7 days   Lab Units 07/21/24  1450 07/17/24  1156   PH, ARTERIAL pH units 7.493* 7.556*   PCO2, ARTERIAL mm Hg 45.2* 41.3   PO2 ART mm Hg 83.0 75.1*   HCO3 ART mmol/L 34.7* 36.7*         Glucose   Date/Time Value Ref Range Status   07/22/2024 1602 109 70 - 130 mg/dL Final   07/22/2024 1112 131 (H) 70 - 130 mg/dL Final   07/22/2024 0615 151 (H) 70 - 130 mg/dL Final   07/22/2024 0159 155 (H) 70 - 130 mg/dL Final   07/21/2024 2105 163 (H) 70 - 130 mg/dL Final   07/21/2024 1744 155 (H) 70 - 130 mg/dL Final   07/21/2024 1531 152 (H) 70 - 130 mg/dL Final     Results from last 7 days   Lab Units 07/21/24  0439 07/17/24  1211 07/17/24  0727   PROCALCITONIN ng/mL 0.02  --  0.09   LACTATE mmol/L  --  1.4  --                            Imaging:   Imaging Results (All)       Procedure Component Value Units Date/Time    XR Chest 1 View [[id number]] Collected: 07/20/24 1522     Updated: 07/20/24 1527    Narrative:      XR CHEST 1 VW-     INDICATION: Clinical concern for pneumonia     COMPARISON: Chest radiographs dating back to 11/11/2022       Impression:      New patchy inferior left lower lobe opacities, suspicious  for infiltrate. Recommend imaging follow-up to clearance. No pleural  effusion or pneumothorax. Normal size cardiomediastinal silhouette. No  focal osseous abnormality.     This report was finalized on 7/20/2024 3:24 PM by Dr. Flavio Rubio M.D on Workstation: [id number]       FL Video Swallow Single Contrast [[id number]] Collected: 07/17/24 1055     Updated: 07/17/24 1058    Narrative:      " "VIDEO SWALLOWING EXAMINATION BY SPEECH PATHOLOGY     Clinical: Dysphasia     Video swallowing examination performed under the direction of speech  pathology. Imaging reviewed by radiologist who concurs with the  findings.     Speech pathology summary: Radiologist, Marissa Costa, present. Limited  exam. Pt reports that she \"can't swallow anything\" and declined VFSS  bolus trials. After discussion, pt agreeable to two bolus trials.  Acceptance of small bolus volume could potentially have negative impact  on pharyngeal swallow initiation. Oral phase: exam limited due to  logistical reasons not related to impairment. Pharyngeal phase most  notable for swallow safety and efficiency considerations. Safety:  Tracheal aspiration after the swallow with nectar thick liquids.  Suboptimal expiratory reflex and throat clear response. Efficiency:  Minimal to absent initiation of pharyngeal swallow. Minimal to no  pharyngeal clearance of puree and nectar thick liquids.     By electronically signing this report, I, the supervising radiologist,  attest that I was not present for the procedure(s) but agree with the  final edited report.         FLUOROSCOPY TIME: 1 minute 19 seconds, 2067 images.     This report was finalized on 7/17/2024 10:55 AM by Dr. Darrell Sheriff M.D on Workstation: [id number]       XR Chest 1 View [[id number]] Collected: 07/13/24 2130     Updated: 07/13/24 2135    Narrative:      XR CHEST 1 VW-     HISTORY: Shortness of air.     COMPARISON: Chest radiograph 11/11/2022     FINDINGS: A single view of the chest was obtained.     Support Devices:  None.  Cardiac Silhouette/Mediastinum/Mary Beth: The cardiac silhouette is normal in  size. There are coronary artery stents. There is calcific aortic  atherosclerosis.  Lungs/Pleural Spaces: There is emphysema. There is no large pleural  effusion. There is no focal consolidation.  Chest Wall/Diaphragm/Upper Abdomen: There is degenerative disc disease.  There are old rib " fractures.        CONCLUSION(S):       1.  Emphysema. No focal consolidation or large pleural effusion.     This report was finalized on 7/13/2024 9:31 PM by Dr. Nette Vázquez M.D  on Workstation: [id number]               I reviewed the patient's new clinical results.  I personally viewed and interpreted the patient's imaging results:        Medication Review:   atorvastatin, 80 mg, Oral, Nightly  budesonide, 0.5 mg, Nebulization, BID - RT  clopidogrel, 75 mg, Oral, Daily  insulin regular, 2-7 Units, Subcutaneous, Q6H  isosorbide mononitrate, 30 mg, Oral, Q24H  losartan, 25 mg, Oral, Nightly  metoprolol tartrate, 37.5 mg, Oral, Q12H  thiamine, 100 mg, Intravenous, Daily  thiamine, 100 mg, Oral, Daily  tiotropium bromide monohydrate, 2 puff, Inhalation, Daily - RT  vitamin B-12, 500 mcg, Oral, Daily        Adult Peripheral or Midline 2-in-1 TPN, , Last Rate: 75 mL/hr at 07/21/24 1854  Adult Peripheral or Midline 2-in-1 TPN,   dilTIAZem, 5-15 mg/hr, Last Rate: Stopped (07/19/24 0250)  Pharmacy to Dose TPN,         ASSESSMENT:     Acute respiratory failure with hypoxia    CAD (coronary artery disease)    Dyspnea    Elevated d-dimer    COPD (chronic obstructive pulmonary disease)    HTN (hypertension)    PAF (paroxysmal atrial fibrillation)    Severe malnutrition    PLAN:  Agree with the PEG tube placement meanwhile supportive measures with the TPN  Continue to monitor for any aspiration and follow-up on the dysphagia  Will assess post PEG tube placement  Discussed with patient  Labs/Notes/films were independently reviewed and pertinent results are summarized above  The copied texts in this note were reviewed and they are accurate as of 07/22/24    Disposition: Per primary team    Lashell Bolivar MD  07/22/24  16:33 EDT          Dictated utilizing Dragon dictation

## 2024-07-22 NOTE — PROGRESS NOTES
"[facility] Clinical Pharmacy Services: Total Parental Nutrition Initial Consult    Indication:  malnutrition  Route: peripheral  Type: standard    Relevant clinical data and objective history reviewed:  74 y.o. female 157.5 cm (62\") 68.4 kg (150 lb 12.7 oz)    Results from last 7 days   Lab Units 07/22/24  0612 07/21/24  2240   SODIUM mmol/L 132* 134*   POTASSIUM mmol/L 4.5 4.3   CHLORIDE mmol/L 92* 96*   CO2 mmol/L 28.1 29.4*   BUN mg/dL 15 13   CREATININE mg/dL 0.32* 0.32*   CALCIUM mg/dL 9.6 9.1   ALBUMIN g/dL  --  3.0*   BILIRUBIN mg/dL  --  0.6   ALK PHOS U/L  --  83   ALT (SGPT) U/L  --  10   AST (SGOT) U/L  --  10   GLUCOSE mg/dL 146* 176*   MAGNESIUM mg/dL 1.8  --    PHOSPHORUS mg/dL 2.7  --    PREALBUMIN mg/dL 10.5*  --         Estimated Creatinine Clearance: 135.5 mL/min (A) (by C-G formula based on SCr of 0.32 mg/dL (L)).    Active fluid orders: None     Dietary Orders (From admission, onward)       Start     Ordered    07/16/24 1154  NPO Diet NPO Type: Sips with Meds, Ice Chips  Diet Effective Now        Comments: Meds crushed in puree.   Question Answer Comment   NPO Type Sips with Meds    NPO Type Ice Chips        07/16/24 1154                  Goal Macronutrients    Protein: 1.2 grams/kg/day (65 grams/day)   Dextrose: 1200 Kcal/day   Lipids: 100 ml of 20% lipid infusion 7 days/week    Assessment/Plan    Patient has consented to proceed with placement of EGD tube to address nutrition concerns long term with tube feeds. This is likely to occur tomorrow. In light of this, central access will not be obtained. Continue to maximize peripheral TPN contents to the extent allowable by osmolarity limits.    Macros: 65 grams protein (@goal)/ 500 kcals dextrose (limited 2/2 peripheral access). Will elect not to begin lipids as TPN is anticipated to conclude in a few days with placement of PEG imminent.   Electrolytes/additives: Increase NaCl content    Sodium Chloride: 60 mEq   Sodium Acetate: 0 mEq   Sodium " Phosphate: 20 mEq   Potassium Chloride: 0 mEq   Potassium Acetate: 0 mEq   Potassium Phosphate: 0 mEq   Calcium Gluconate: 10 mEq   Magnesium Sulfate: 8 mEq   MVI for TPN   Trace Elements              Other Additives: none    Labs to be ordered: Daily CMP, phos and mag while on TPN    Pharmacy will continue to follow.     Cheyenne Gowers, [facility]  Clinical Pharmacist

## 2024-07-22 NOTE — PROGRESS NOTES
Name: Asha Krishnan ADMIT: 2024   : 1950  PCP: Capo Pedroza MD    MRN: [id number] LOS: 6 days   AGE/SEX: 74 y.o. female  ROOM: [facility]     Subjective   Subjective   Patient laying in bed, significant other present in the room.  Patient much more alert, oriented x 3 this morning.  Initiated on TPN yesterday.  Denies shortness of breath, fevers, chills, vomiting.    Review of Systems   UTO  Objective   Objective   Vital Signs  Temp:  [97.5 °F (36.4 °C)-98.6 °F (37 °C)] 97.5 °F (36.4 °C)  Heart Rate:  [63-88] 65  Resp:  [16-18] 16  BP: (126-189)/() 137/62  SpO2:  [91 %-99 %] 96 %  on  Flow (L/min):  [1.5-3] 1.5;   Device (Oxygen Therapy): humidified;nasal cannula  Body mass index is 12.51 kg/m².  Physical Exam    General: More alert, oriented x 3, not in distress, ill-appearing, cachectic  HEENT: Normocephalic, atraumatic  CV: Regular rate and rhythm, no murmurs rubs or gallops  Lungs: Diminished, no wheezing, nonlabored breathing  Abdomen: Soft, nontender, nondistended  Extremities: No significant peripheral edema , no cyanosis       Results Review     I reviewed the patient's new clinical results.  Results from last 7 days   Lab Units 24  0611 24  04324  07   WBC 10*3/mm3 15.28* 9.00 9.40 13.29*   HEMOGLOBIN g/dL 12.8 11.0* 10.1* 12.1   PLATELETS 10*3/mm3 394 287 286 295     Results from last 7 days   Lab Units 24  0612 24  2240 24  043   SODIUM mmol/L 132* 134*  --  135* 139   POTASSIUM mmol/L 4.5 4.3 4.3 4.7 3.9   CHLORIDE mmol/L 92* 96*  --  99 99   CO2 mmol/L 28.1 29.4*  --  31.0* 32.2*   BUN mg/dL 15 13  --  13 15   CREATININE mg/dL 0.32* 0.32*  --  0.33* 0.30*   GLUCOSE mg/dL 146* 176*  --  161* 111*   Estimated Creatinine Clearance: 73.2 mL/min (A) (by C-G formula based on SCr of 0.32 mg/dL (L)).  Results from last 7 days   Lab Units 24  2240   ALBUMIN g/dL 3.0*   BILIRUBIN mg/dL 0.6    ALK PHOS U/L 83   AST (SGOT) U/L 10   ALT (SGPT) U/L 10     Results from last 7 days   Lab Units 07/22/24  0612 07/21/24  2240 07/21/24  2040 07/21/24  0439 07/20/24  0439 07/18/24  0654 07/17/24  0727   CALCIUM mg/dL 9.6 9.1  --  9.0 8.9   < > 9.2   ALBUMIN g/dL  --  3.0*  --   --   --   --   --    MAGNESIUM mg/dL 1.8  --  1.6 2.0  --   --  2.1   PHOSPHORUS mg/dL 2.7  --   --  3.0  --   --   --     < > = values in this interval not displayed.     Results from last 7 days   Lab Units 07/21/24  0439 07/17/24  1211 07/17/24  0727   PROCALCITONIN ng/mL 0.02  --  0.09   LACTATE mmol/L  --  1.4  --      COVID19   Date Value Ref Range Status   07/13/2024 Not Detected Not Detected - Ref. Range Final     Glucose   Date/Time Value Ref Range Status   07/22/2024 1112 131 (H) 70 - 130 mg/dL Final   07/22/2024 0615 151 (H) 70 - 130 mg/dL Final   07/22/2024 0159 155 (H) 70 - 130 mg/dL Final   07/21/2024 2105 163 (H) 70 - 130 mg/dL Final   07/21/2024 1744 155 (H) 70 - 130 mg/dL Final   07/21/2024 1531 152 (H) 70 - 130 mg/dL Final           XR Chest 1 View  Narrative: XR CHEST 1 VW-     INDICATION: Clinical concern for pneumonia     COMPARISON: Chest radiographs dating back to 11/11/2022     Impression: New patchy inferior left lower lobe opacities, suspicious  for infiltrate. Recommend imaging follow-up to clearance. No pleural  effusion or pneumothorax. Normal size cardiomediastinal silhouette. No  focal osseous abnormality.     This report was finalized on 7/20/2024 3:24 PM by Dr. Flavio Rubio M.D on Workstation: [id number]       Scheduled Medications  atorvastatin, 80 mg, Oral, Nightly  budesonide, 0.5 mg, Nebulization, BID - RT  clopidogrel, 75 mg, Oral, Daily  insulin regular, 2-7 Units, Subcutaneous, Q6H  isosorbide mononitrate, 30 mg, Oral, Q24H  losartan, 25 mg, Oral, Nightly  metoprolol tartrate, 5 mg, Intravenous, BID  [Held by provider] metoprolol tartrate, 37.5 mg, Oral, Q12H  thiamine, 100 mg, Intravenous,  Daily  thiamine, 100 mg, Oral, Daily  tiotropium bromide monohydrate, 2 puff, Inhalation, Daily - RT  vitamin B-12, 500 mcg, Oral, Daily    Infusions  Adult Peripheral or Midline 2-in-1 TPN, , Last Rate: 75 mL/hr at 07/21/24 1854  Adult Peripheral or Midline 2-in-1 TPN,   dilTIAZem, 5-15 mg/hr, Last Rate: Stopped (07/19/24 0250)  Pharmacy to Dose TPN,     Diet  NPO Diet NPO Type: Sips with Meds, Ice Chips  Adult Peripheral or Midline 2-in-1 TPN  Adult Peripheral or Midline 2-in-1 TPN    I have personally reviewed     [x]  Laboratory   [x]  Microbiology   [x]  Radiology   [x]  EKG/Telemetry  []  Cardiology/Vascular   []  Pathology    []  Records       Assessment/Plan     Active Hospital Problems    Diagnosis  POA   • **Acute respiratory failure with hypoxia [J96.01]  Yes   • Severe malnutrition [E43]  Yes   • Acute hypoxemic respiratory failure [J96.01]  Unknown   • PAF (paroxysmal atrial fibrillation) [I48.0]  Yes   • CAD (coronary artery disease) [I25.10]  Yes   • Elevated d-dimer [R79.89]  Yes   • COPD (chronic obstructive pulmonary disease) [J44.9]  Yes   • Dyspnea [R06.00]  Yes   • HTN (hypertension) [I10]  Yes      Resolved Hospital Problems   No resolved problems to display.       COPD with Exacerbation  - seems to be resolving, continue on nebulized steroids and bronchodilators  - encourage pulmonary toilet and wean oxygen as tolerated  - appreciate pulmonology recs     Dysphagia  -Core track was attempted to be placed however was not successful through 7/21  -Patient is severely malnourished/cachectic.  TPN ordered, needs central line/PICC line for placement of PICC line, however patient unable to consent, and unable to reach spouse for consent as per report patient is positive and admitted to hospital, unknown location.  -Initiated on TPN through 7/21  -Discussed goals of care with patient, explained that patient needs long-term nutrition as she is unlikely to have adequate p.o. nutrition intake even if her  dysphagia improves and she is cleared for p.o. intake.  Explained that TPN/tube feeds via core track are temporary.  Patient was interested in PEG tube placement, general surgery consulted for PEG tube evaluation.         PAF/RVR  - HR much improved  - continue metoprolol  - not on AC chronically, poor candidate per cardiology     CAD  - has multiple prior stents  - no anginal symptoms  - continue on plavix and lipitor  - continue imdur and metoprolol    Leukocytosis   Suspect Reactive secondary to TPN/Steroids   -Monitor        SCDs for DVT prophylaxis.  DNR.  Palliative care evaluated.  Discussed with patient and nursing staff.  Anticipate discharge home timing yet to be determined.  Expected Discharge Date:       is note has been reviewed and is accurate as of 07/22/24.         Dictated utilizing Dragon dictation        Radha Roman MD  [facility]  07/22/24  14:43 EDT

## 2024-07-22 NOTE — CONSULTS
General Surgery     Summary:     Asha Krisnhan is a 74 y.o. year old with dysphagia and severe malnutrition. General surgery consulted for PEG tube placement. Discussed with patient the risks ( including but not limited to bleeding, infection, damage to surrounding structures), benefits, and alternatives. Patient was alert and oriented and wishes to proceed with PEG tube placement.     Plan:   Esophagogastroduodenoscopy with Percutaneous endoscopic gastrostomy tube placement tomorrow with Dr. Koenig    Chief Complaint:    Dysphagia, severe malnutrition     History of Present Illness:     Asha Krishnan is a 74 y.o. year old with history of COPD, CAD with previous stent placement and currently on Plavix, breast cancer, GERD, HTN, HLD, and dysphagia who was admitted on 7/13 for acute respiratory failure with hypoxia and Afib RVR. Today her Afib and COPD exacerbation has improved, she is doing well on 2L NC. General surgery consulted for PEG tube placement. Patient has had over a year and a half of troubles with swallowing. She has failed her swallow studies with speech. Unsuccessful DHT attempt and is now receiving TPN per peripheral IV. She needs a more long term solution for her severe malnutrition. Patient was alert and oriented today and wishes to proceed with PEG placement. Patient denies any prior abdominal surgeries.     Past Medical History:   Past Medical History:   Diagnosis Date    Atrial fibrillation, unspecified type     Cataract     COPD (chronic obstructive pulmonary disease)     Hyperlipidemia     Hypertension         Past Surgical History:    Cardiac cath with stent placement x 2  Denies prior abdominal surgeries  Denies prior endoscopy     Family History:    History reviewed. No pertinent family history.      Social History:    Social History     Socioeconomic History    Marital status:    Tobacco Use    Smoking status: Every Day     Current packs/day: 2.00     Types: Cigarettes     "Smokeless tobacco: Never   Vaping Use    Vaping status: Never Used   Substance and Sexual Activity    Alcohol use: Not Currently    Drug use: Not Currently    Sexual activity: Defer       Allergies:   Allergies   Allergen Reactions    Latex Anaphylaxis     Pt states whole body swells including throat    Tylenol [Acetaminophen] Anaphylaxis     States whole body swells including throat       Medications:   Plavix  Lipitor  Bumex  Imdur  Metoprolol  Thiamine  Spiriva  Vitamin B 12    Radiology/Endoscopy:    VFSS on 7/17/24  Video swallowing examination performed under the direction of speech  pathology. Imaging reviewed by radiologist who concurs with the  findings.     Speech pathology summary: Radiologist, Marissa Costa, present. Limited  exam. Pt reports that she \"can't swallow anything\" and declined VFSS  bolus trials. After discussion, pt agreeable to two bolus trials.  Acceptance of small bolus volume could potentially have negative impact  on pharyngeal swallow initiation. Oral phase: exam limited due to  logistical reasons not related to impairment. Pharyngeal phase most  notable for swallow safety and efficiency considerations. Safety:  Tracheal aspiration after the swallow with nectar thick liquids.  Suboptimal expiratory reflex and throat clear response. Efficiency:  Minimal to absent initiation of pharyngeal swallow. Minimal to no  pharyngeal clearance of puree and nectar thick liquids.     By electronically signing this report, I, the supervising radiologist,  attest that I was not present for the procedure(s) but agree with the  final edited report.         FLUOROSCOPY TIME: 1 minute 19 seconds, 2067 images.     This report was finalized on 7/17/2024 10:55 AM by Dr. Darrell Sheriff M.D on Workstation: [id number]        Labs:    Results from last 7 days   Lab Units 07/22/24  0611 07/21/24  0439 07/20/24  0439 07/17/24  0727   WBC 10*3/mm3 15.28* 9.00 9.40 13.29*   HEMOGLOBIN g/dL 12.8 11.0* 10.1* 12.1 "   HEMATOCRIT % 37.9 33.3* 30.7* 36.2   PLATELETS 10*3/mm3 394 287 286 295     Results from last 7 days   Lab Units 07/22/24  0612 07/21/24  2240 07/21/24 2040 07/21/24  0439   SODIUM mmol/L 132* 134*  --  135*   POTASSIUM mmol/L 4.5 4.3 4.3 4.7   CHLORIDE mmol/L 92* 96*  --  99   CO2 mmol/L 28.1 29.4*  --  31.0*   BUN mg/dL 15 13  --  13   CREATININE mg/dL 0.32* 0.32*  --  0.33*   CALCIUM mg/dL 9.6 9.1  --  9.0   BILIRUBIN mg/dL  --  0.6  --   --    ALK PHOS U/L  --  83  --   --    ALT (SGPT) U/L  --  10  --   --    AST (SGOT) U/L  --  10  --   --    GLUCOSE mg/dL 146* 176*  --  161*     BMI 27.58  Wt 68.4 kg    Vitals  Temp 97.5  HR 71  RR 16  /65  SpO2 96 on 2L NC    Review of Systems   Constitutional:  Negative for chills and fever.   HENT:  Positive for dental problem and trouble swallowing. Negative for sore throat.    Respiratory:  Negative for chest tightness and shortness of breath.    Cardiovascular:  Negative for chest pain.   Gastrointestinal:  Negative for abdominal pain, nausea and vomiting.   Genitourinary:  Negative for dysuria.   Musculoskeletal:  Positive for neck pain.   Skin:  Negative for rash and wound.   Neurological:  Negative for dizziness and confusion.   Psychiatric/Behavioral:  The patient is not nervous/anxious.         Physical Exam  Constitutional:       General: She is not in acute distress.  HENT:      Head: Atraumatic.   Eyes:      Extraocular Movements: Extraocular movements intact.      Pupils: Pupils are equal, round, and reactive to light.   Neck:      Comments: Chronic pain - previous trauma related cervical fracture requiring halo traction  Cardiovascular:      Rate and Rhythm: Normal rate.      Pulses: Normal pulses.   Pulmonary:      Effort: Pulmonary effort is normal.   Abdominal:      General: Abdomen is flat. There is no distension.      Palpations: Abdomen is soft. There is no mass.      Tenderness: There is no abdominal tenderness. There is no guarding or rebound.       Hernia: No hernia is present.   Musculoskeletal:      Cervical back: Tenderness present.   Skin:     General: Skin is warm and dry.   Neurological:      General: No focal deficit present.      Mental Status: She is alert and oriented to person, place, and time.   Psychiatric:         Mood and Affect: Mood normal.         Behavior: Behavior normal.                 EMORY Shah   General and Endoscopic Surgery  [facility]    [address], [address]  [place], [place], [address]  P: [phone]  F: [phone]

## 2024-07-22 NOTE — SIGNIFICANT NOTE
VFSS on 7/17 revealed profound oropharyngeal dysphagia and goals of care discussion. RN reports plan for TPN and possible PEG. ST will sign off please reconsult as indicated.

## 2024-07-22 NOTE — PLAN OF CARE
Problem: Adult Inpatient Plan of Care  Goal: Plan of Care Review  Recent Flowsheet Documentation  Taken 7/22/2024 1846 by Rosmery Abad, RN  Progress: improving  Plan of Care Reviewed With: patient  Outcome Evaluation: VSS. patient denies pain, patient having urinary retention please see flow sheet for bladder scan, did not work with therapy and states to weak to get to bedside commode. gonzales placed per MD order for urinary retention significant other was at bedside this a.m and states he will be back. consent in chart for tomorrow surgery and needs to be signed by POA (significant other).Wound care consult placed, continue q2hr turns.  Goal: Absence of Hospital-Acquired Illness or Injury  Intervention: Identify and Manage Fall Risk  Recent Flowsheet Documentation  Taken 7/22/2024 1600 by Rosmery Abad, RN  Safety Promotion/Fall Prevention:   activity supervised   assistive device/personal items within reach   clutter free environment maintained   fall prevention program maintained   nonskid shoes/slippers when out of bed   safety round/check completed  Taken 7/22/2024 1400 by Rosmery Abad, RN  Safety Promotion/Fall Prevention:   activity supervised   assistive device/personal items within reach   clutter free environment maintained   fall prevention program maintained   nonskid shoes/slippers when out of bed   safety round/check completed  Taken 7/22/2024 1200 by Rosmery Abad, RN  Safety Promotion/Fall Prevention:   assistive device/personal items within reach   activity supervised   clutter free environment maintained   fall prevention program maintained   nonskid shoes/slippers when out of bed   safety round/check completed  Taken 7/22/2024 1000 by Rosmery Abad, RN  Safety Promotion/Fall Prevention:   assistive device/personal items within reach   activity supervised   clutter free environment maintained   fall prevention program maintained   nonskid shoes/slippers when out of bed   safety  round/check completed  Taken 7/22/2024 0800 by Rosmery Abad RN  Safety Promotion/Fall Prevention:   assistive device/personal items within reach   activity supervised   clutter free environment maintained   fall prevention program maintained   nonskid shoes/slippers when out of bed   safety round/check completed  Intervention: Prevent Skin Injury  Recent Flowsheet Documentation  Taken 7/22/2024 0800 by Rosmery Abad RN  Skin Protection: adhesive use limited  Intervention: Prevent and Manage VTE (Venous Thromboembolism) Risk  Recent Flowsheet Documentation  Taken 7/22/2024 1600 by Rosmery Abad RN  Activity Management: activity encouraged  Taken 7/22/2024 1400 by Rosmery Abad RN  Activity Management: activity encouraged  Taken 7/22/2024 1200 by Rosmery Abad RN  Activity Management: activity encouraged  Taken 7/22/2024 1000 by Rosmery Abad RN  Activity Management: activity encouraged  Taken 7/22/2024 0800 by Rosmery Abad RN  Activity Management: activity encouraged  Intervention: Prevent Infection  Recent Flowsheet Documentation  Taken 7/22/2024 1600 by Rosmery Abad RN  Infection Prevention: single patient room provided  Taken 7/22/2024 1400 by Rosmery Abad RN  Infection Prevention: single patient room provided  Taken 7/22/2024 1200 by Rosmery Abad RN  Infection Prevention: single patient room provided  Taken 7/22/2024 1000 by Rosmery Abad RN  Infection Prevention: single patient room provided  Taken 7/22/2024 0800 by Rosmery Abad RN  Infection Prevention: single patient room provided  Goal: Optimal Comfort and Wellbeing  Intervention: Provide Person-Centered Care  Recent Flowsheet Documentation  Taken 7/22/2024 1400 by Rosmery Abad RN  Trust Relationship/Rapport:   care explained   choices provided  Taken 7/22/2024 0800 by Rosmery Abad RN  Trust Relationship/Rapport:   care explained   choices provided   Goal Outcome Evaluation:  Plan of Care Reviewed With:  patient        Progress: improving  Outcome Evaluation: VSS. patient denies pain, patient having urinary retention please see flow sheet for bladder scan, did not work with therapy and states to weak to get to bedside commode. gonzales placed per MD order for urinary retention significant other was at bedside this a.m and states he will be back. consent in chart for tomorrow surgery and needs to be signed by POA (significant other).Wound care consult placed, continue q2hr turns.

## 2024-07-22 NOTE — CONSULTS
"Nutrition Services    Patient Name:  Asha Krishnan  YOB: 1950  MRN: [id number]  Admit Date:  7/13/2024  Assessment Date:  07/22/24    Summary: TPN assessment    TPN initiated yesterday and currently receiving 400 dextrose calories, 65 gm amino acids. Lipids haven't been started yet.  Spoke with pt and family member at bedside and plan is for a PEG to be placed tomorrow per Dr Koenig. Labs, meds, skin reviewed.  Na+ 132, prealb 10.5, crp 1.6, wbc 15.3    Plan/recs:   Continue PPN at 75mL/hr until EN established  Goal concentration: 820 dextrose, 55 g protein,   Hold off on lipids for now since getting PEG  Fluid and electrolytes per pharm.      CLINICAL NUTRITION ASSESSMENT      Reason for Assessment TPN Assessment, TF Assessment     Diagnosis/Problem   Acute respiratory failure, dysphagia (previous trauma related cervical fracture requiring halo traction)   Medical/Surgical History Past Medical History:   Diagnosis Date    Atrial fibrillation, unspecified type     Cataract     COPD (chronic obstructive pulmonary disease)     Hyperlipidemia     Hypertension        Past Surgical History:   Procedure Laterality Date    CARDIAC CATHETERIZATION N/A 11/11/2022    Procedure: Left Heart Cath;  Surgeon: Tashi De La Torre MD;  Location: [facility] CATH INVASIVE LOCATION;  Service: Cardiovascular;  Laterality: N/A;    CARDIAC CATHETERIZATION N/A 11/11/2022    Procedure: Coronary angiography;  Surgeon: Tashi De La Torre MD;  Location: [facility] CATH INVASIVE LOCATION;  Service: Cardiovascular;  Laterality: N/A;        Anthropometrics        Current Height  Current Weight  BMI kg/m2 Height: 157.5 cm (62\")  Weight: 31 kg (68 lb 6.4 oz) (weight entered incorrectly) (07/22/24 1100)  Body mass index is 12.51 kg/m².   Adjusted BMI (if applicable)    BMI Category Underweight (18.4 or below)   Ideal Body Weight (IBW) 110#   Usual Body Weight (UBW) 110#   Weight Trend Loss   Weight History Wt Readings from Last 30 " Encounters:   07/22/24 1100 31 kg (68 lb 6.4 oz)   07/22/24 0556 68.4 kg (150 lb 12.7 oz)   07/14/24 0202 37.5 kg (82 lb 10.8 oz)   07/13/24 2156 40.8 kg (90 lb)   08/01/23 1035 49.9 kg (110 lb)   11/12/22 0826 54.4 kg (120 lb)   11/11/22 0222 54.4 kg (120 lb)   11/11/22 0117 54.4 kg (120 lb)         Estimated/Assessed Needs       Energy Requirements    Weight for Calculation 37.5kg   Method for Estimation  35-40 kcal/kg   EST Needs (kcal/day) 7284-9246       Protein Requirements    Weight for Calculation 37.5kg   EST Protein Needs (g/kg) 1.5 -2.0 gm/kg   EST Daily Needs (g/day) 57-75       Fluid Requirements     Method for Estimation 1 mL/kcal    Estimated Needs (mL/day) 3034-5493     Labs       Pertinent Labs    Results from last 7 days   Lab Units 07/22/24 0612 07/21/24 2240 07/21/24 2040 07/21/24  0439   SODIUM mmol/L 132* 134*  --  135*   POTASSIUM mmol/L 4.5 4.3 4.3 4.7   CHLORIDE mmol/L 92* 96*  --  99   CO2 mmol/L 28.1 29.4*  --  31.0*   BUN mg/dL 15 13  --  13   CREATININE mg/dL 0.32* 0.32*  --  0.33*   CALCIUM mg/dL 9.6 9.1  --  9.0   BILIRUBIN mg/dL  --  0.6  --   --    ALK PHOS U/L  --  83  --   --    ALT (SGPT) U/L  --  10  --   --    AST (SGOT) U/L  --  10  --   --    GLUCOSE mg/dL 146* 176*  --  161*     Results from last 7 days   Lab Units 07/22/24 0612 07/22/24 0611 07/21/24 2240 07/21/24 2040 07/21/24  0439   MAGNESIUM mg/dL 1.8  --   --  1.6 2.0   PHOSPHORUS mg/dL 2.7  --   --   --  3.0   HEMOGLOBIN g/dL  --  12.8  --   --  11.0*   HEMATOCRIT %  --  37.9  --   --  33.3*   WBC 10*3/mm3  --  15.28*  --   --  9.00   ALBUMIN g/dL  --   --  3.0*  --   --    PREALBUMIN mg/dL 10.5*  --   --   --   --      Results from last 7 days   Lab Units 07/22/24  0611 07/21/24  0439 07/20/24  0439 07/17/24  0727   PLATELETS 10*3/mm3 394 287 286 295     COVID19   Date Value Ref Range Status   07/13/2024 Not Detected Not Detected - Ref. Range Final     Lab Results   Component Value Date    HGBA1C 6.1 (H)  11/10/2022          Medications           Scheduled Medications atorvastatin, 80 mg, Oral, Nightly  budesonide, 0.5 mg, Nebulization, BID - RT  clopidogrel, 75 mg, Oral, Daily  insulin regular, 2-7 Units, Subcutaneous, Q6H  isosorbide mononitrate, 30 mg, Oral, Q24H  losartan, 25 mg, Oral, Nightly  metoprolol tartrate, 5 mg, Intravenous, BID  [Held by provider] metoprolol tartrate, 37.5 mg, Oral, Q12H  thiamine, 100 mg, Intravenous, Daily  thiamine, 100 mg, Oral, Daily  tiotropium bromide monohydrate, 2 puff, Inhalation, Daily - RT  vitamin B-12, 500 mcg, Oral, Daily       Infusions Adult Peripheral or Midline 2-in-1 TPN, , Last Rate: 75 mL/hr at 07/21/24 1854  Adult Peripheral or Midline 2-in-1 TPN,   dilTIAZem, 5-15 mg/hr, Last Rate: Stopped (07/19/24 0250)  Pharmacy to Dose TPN,        PRN Medications   albuterol    dextrose    dextrose    glucagon (human recombinant)    ketorolac    nitroglycerin    Pharmacy to Dose TPN    [COMPLETED] Insert Peripheral IV **AND** sodium chloride     Physical Findings          General Findings alert, frail, generalized wasting, loss of muscle mass, loss of subcutaneous fat, oriented, on oxygen therapy   Oral/Mouth Cavity WDL, dental appliance   Edema  no edema   Gastrointestinal WDL, last bowel movement: 7/22   Skin  skin intact   Tubes/Drains/Lines none   NFPE See Malnutrition Severity Assessment, Date Completed: 7/14      Malnutrition Severity Assessment      Patient meets criteria for : Severe Malnutrition            Current Nutrition Orders & Evaluation of Intake       Oral Nutrition     Food Allergies NKFA   Current PO Diet NPO Diet NPO Type: Sips with Meds, Ice Chips  Adult Peripheral or Midline 2-in-1 TPN  Adult Peripheral or Midline 2-in-1 TPN   Supplement n/a   PO Evaluation     % PO Intake npo    Factors Affecting Intake: chewing difficulty, decreased appetite, early satiety , swallow impairment      Parenteral Nutrition      TPN Route Peripheral   TPN Rate/Volume 75  mL/hr, 1800 mL per day   Current TPN Order        Dextrose (kcal) 400       Amino Acid (gm) 65       Lipid Concentration other:       Lipid Volume/Frequency  other:   MVI Frequency  10mL daily   Trace Element Frequency  1mL daily   Total # Days on TPN 2   Propofol Rate/Kcal N/a     PES STATEMENT / NUTRITION DIAGNOSIS      Nutrition Dx Problem  Problem: Malnutrition (severe)  Etiology: Factors Affecting Nutrition - weakness, fatigue, dysphagia    Signs/Symptoms: Report of Minimal PO Intake, NFPE Results, BMI, Unintended Weight Change, Report/Observation, and SLP/Swallow Evaluation     NUTRITION INTERVENTION / PLAN OF CARE      Intervention Goal(s) Establish goals of care         RD Intervention/Action Await initiation/advancement of PO diet, Await initiation of EN/PN, Continue to monitor, and Care plan reviewed   --      Prescription/Orders:       PO Diet       Supplements       Enteral Nutrition       Parenteral Nutrition    Parenteral Prescription:     TPN Route Peripheral   TPN Rate (mL/hr) 75mL/hr   TPN Recommendation:        Dextrose (kcal) 820 kcal       Amino Acid (gm) 65 g        Lipid Concentration 20%       Lipid Volume/Frequency  200 mL   Propofol Rate/Kcal    TPN Provision:   1440 kcal, 65 gm protein        Calories 100 % needs met        Protein  100 % needs met        Fluid Per pharm       New Prescription Ordered?    --      Monitor/Evaluation    Discharge Plan/Needs Pending clinical course   --    RD to follow per protocol.      Electronically signed by:  Lucille Kaur RD  07/22/24 11:49 EDT

## 2024-07-23 LAB
ALBUMIN SERPL-MCNC: 2.9 G/DL (ref 3.5–5.2)
ALBUMIN/GLOB SERPL: 1 G/DL
ALP SERPL-CCNC: 78 U/L (ref 39–117)
ALT SERPL W P-5'-P-CCNC: 11 U/L (ref 1–33)
ANION GAP SERPL CALCULATED.3IONS-SCNC: 7.8 MMOL/L (ref 5–15)
AST SERPL-CCNC: 10 U/L (ref 1–32)
BASOPHILS # BLD AUTO: 0.01 10*3/MM3 (ref 0–0.2)
BASOPHILS NFR BLD AUTO: 0.1 % (ref 0–1.5)
BILIRUB SERPL-MCNC: 0.6 MG/DL (ref 0–1.2)
BUN SERPL-MCNC: 17 MG/DL (ref 8–23)
BUN/CREAT SERPL: 51.5 (ref 7–25)
CALCIUM SPEC-SCNC: 9 MG/DL (ref 8.6–10.5)
CHLORIDE SERPL-SCNC: 90 MMOL/L (ref 98–107)
CO2 SERPL-SCNC: 32.2 MMOL/L (ref 22–29)
CREAT SERPL-MCNC: 0.33 MG/DL (ref 0.57–1)
DEPRECATED RDW RBC AUTO: 42 FL (ref 37–54)
EGFRCR SERPLBLD CKD-EPI 2021: 107.6 ML/MIN/1.73
EOSINOPHIL # BLD AUTO: 0.01 10*3/MM3 (ref 0–0.4)
EOSINOPHIL NFR BLD AUTO: 0.1 % (ref 0.3–6.2)
ERYTHROCYTE [DISTWIDTH] IN BLOOD BY AUTOMATED COUNT: 12.9 % (ref 12.3–15.4)
GLOBULIN UR ELPH-MCNC: 2.9 GM/DL
GLUCOSE BLDC GLUCOMTR-MCNC: 145 MG/DL (ref 70–130)
GLUCOSE BLDC GLUCOMTR-MCNC: 152 MG/DL (ref 70–130)
GLUCOSE BLDC GLUCOMTR-MCNC: 173 MG/DL (ref 70–130)
GLUCOSE SERPL-MCNC: 166 MG/DL (ref 65–99)
HCT VFR BLD AUTO: 35.4 % (ref 34–46.6)
HGB BLD-MCNC: 11.8 G/DL (ref 12–15.9)
IMM GRANULOCYTES # BLD AUTO: 0.07 10*3/MM3 (ref 0–0.05)
IMM GRANULOCYTES NFR BLD AUTO: 0.6 % (ref 0–0.5)
LYMPHOCYTES # BLD AUTO: 0.79 10*3/MM3 (ref 0.7–3.1)
LYMPHOCYTES NFR BLD AUTO: 6.3 % (ref 19.6–45.3)
MAGNESIUM SERPL-MCNC: 1.8 MG/DL (ref 1.6–2.4)
MCH RBC QN AUTO: 29.9 PG (ref 26.6–33)
MCHC RBC AUTO-ENTMCNC: 33.3 G/DL (ref 31.5–35.7)
MCV RBC AUTO: 89.6 FL (ref 79–97)
MONOCYTES # BLD AUTO: 0.35 10*3/MM3 (ref 0.1–0.9)
MONOCYTES NFR BLD AUTO: 2.8 % (ref 5–12)
NEUTROPHILS NFR BLD AUTO: 11.24 10*3/MM3 (ref 1.7–7)
NEUTROPHILS NFR BLD AUTO: 90.1 % (ref 42.7–76)
NRBC BLD AUTO-RTO: 0 /100 WBC (ref 0–0.2)
PHOSPHATE SERPL-MCNC: 3.2 MG/DL (ref 2.5–4.5)
PLATELET # BLD AUTO: 373 10*3/MM3 (ref 140–450)
PMV BLD AUTO: 9.6 FL (ref 6–12)
POTASSIUM SERPL-SCNC: 3.2 MMOL/L (ref 3.5–5.2)
PROT SERPL-MCNC: 5.8 G/DL (ref 6–8.5)
QT INTERVAL: 388 MS
QT INTERVAL: 467 MS
QTC INTERVAL: 413 MS
QTC INTERVAL: 546 MS
RBC # BLD AUTO: 3.95 10*6/MM3 (ref 3.77–5.28)
SODIUM SERPL-SCNC: 130 MMOL/L (ref 136–145)
WBC NRBC COR # BLD AUTO: 12.47 10*3/MM3 (ref 3.4–10.8)

## 2024-07-23 PROCEDURE — 83735 ASSAY OF MAGNESIUM: CPT | Performed by: STUDENT IN AN ORGANIZED HEALTH CARE EDUCATION/TRAINING PROGRAM

## 2024-07-23 PROCEDURE — 93010 ELECTROCARDIOGRAM REPORT: CPT | Performed by: INTERNAL MEDICINE

## 2024-07-23 PROCEDURE — 25010000002 MAGNESIUM SULFATE PER 500 MG OF MAGNESIUM: Performed by: STUDENT IN AN ORGANIZED HEALTH CARE EDUCATION/TRAINING PROGRAM

## 2024-07-23 PROCEDURE — 94799 UNLISTED PULMONARY SVC/PX: CPT

## 2024-07-23 PROCEDURE — 94761 N-INVAS EAR/PLS OXIMETRY MLT: CPT

## 2024-07-23 PROCEDURE — 25010000002 POTASSIUM CHLORIDE PER 2 MEQ OF POTASSIUM: Performed by: STUDENT IN AN ORGANIZED HEALTH CARE EDUCATION/TRAINING PROGRAM

## 2024-07-23 PROCEDURE — 85025 COMPLETE CBC W/AUTO DIFF WBC: CPT | Performed by: INTERNAL MEDICINE

## 2024-07-23 PROCEDURE — 63710000001 INSULIN REGULAR HUMAN PER 5 UNITS: Performed by: STUDENT IN AN ORGANIZED HEALTH CARE EDUCATION/TRAINING PROGRAM

## 2024-07-23 PROCEDURE — 25010000002 CALCIUM GLUCONATE PER 10 ML: Performed by: STUDENT IN AN ORGANIZED HEALTH CARE EDUCATION/TRAINING PROGRAM

## 2024-07-23 PROCEDURE — 84100 ASSAY OF PHOSPHORUS: CPT | Performed by: STUDENT IN AN ORGANIZED HEALTH CARE EDUCATION/TRAINING PROGRAM

## 2024-07-23 PROCEDURE — 94664 DEMO&/EVAL PT USE INHALER: CPT

## 2024-07-23 PROCEDURE — 25010000002 POTASSIUM CHLORIDE 10 MEQ/100ML SOLUTION: Performed by: STUDENT IN AN ORGANIZED HEALTH CARE EDUCATION/TRAINING PROGRAM

## 2024-07-23 PROCEDURE — 93005 ELECTROCARDIOGRAM TRACING: CPT | Performed by: STUDENT IN AN ORGANIZED HEALTH CARE EDUCATION/TRAINING PROGRAM

## 2024-07-23 PROCEDURE — 82948 REAGENT STRIP/BLOOD GLUCOSE: CPT

## 2024-07-23 PROCEDURE — 80053 COMPREHEN METABOLIC PANEL: CPT | Performed by: STUDENT IN AN ORGANIZED HEALTH CARE EDUCATION/TRAINING PROGRAM

## 2024-07-23 PROCEDURE — 99232 SBSQ HOSP IP/OBS MODERATE 35: CPT | Performed by: NURSE PRACTITIONER

## 2024-07-23 PROCEDURE — 25010000002 THIAMINE HCL 200 MG/2ML SOLUTION: Performed by: HOSPITALIST

## 2024-07-23 RX ORDER — POTASSIUM CHLORIDE 7.45 MG/ML
10 INJECTION INTRAVENOUS
Status: COMPLETED | OUTPATIENT
Start: 2024-07-23 | End: 2024-07-24

## 2024-07-23 RX ORDER — CASTOR OIL AND BALSAM, PERU 788; 87 MG/G; MG/G
1 OINTMENT TOPICAL EVERY 12 HOURS SCHEDULED
Status: DISCONTINUED | OUTPATIENT
Start: 2024-07-23 | End: 2024-08-01 | Stop reason: HOSPADM

## 2024-07-23 RX ADMIN — METOPROLOL TARTRATE 5 MG: 1 INJECTION, SOLUTION INTRAVENOUS at 08:15

## 2024-07-23 RX ADMIN — POTASSIUM CHLORIDE 10 MEQ: 7.46 INJECTION, SOLUTION INTRAVENOUS at 23:01

## 2024-07-23 RX ADMIN — POTASSIUM CHLORIDE 10 MEQ: 7.46 INJECTION, SOLUTION INTRAVENOUS at 14:57

## 2024-07-23 RX ADMIN — THIAMINE HYDROCHLORIDE 100 MG: 100 INJECTION, SOLUTION INTRAMUSCULAR; INTRAVENOUS at 08:20

## 2024-07-23 RX ADMIN — METOPROLOL TARTRATE 5 MG: 1 INJECTION, SOLUTION INTRAVENOUS at 12:01

## 2024-07-23 RX ADMIN — METOPROLOL TARTRATE 5 MG: 1 INJECTION, SOLUTION INTRAVENOUS at 22:51

## 2024-07-23 RX ADMIN — BUDESONIDE 0.5 MG: 0.5 INHALANT ORAL at 19:23

## 2024-07-23 RX ADMIN — INSULIN HUMAN 2 UNITS: 100 INJECTION, SOLUTION PARENTERAL at 14:38

## 2024-07-23 RX ADMIN — CASTOR OIL AND BALSAM, PERU 1 APPLICATION: 788; 87 OINTMENT TOPICAL at 14:55

## 2024-07-23 RX ADMIN — CALCIUM GLUCONATE: 98 INJECTION, SOLUTION INTRAVENOUS at 17:48

## 2024-07-23 RX ADMIN — BUDESONIDE 0.5 MG: 0.5 INHALANT ORAL at 07:08

## 2024-07-23 RX ADMIN — POTASSIUM CHLORIDE 10 MEQ: 7.46 INJECTION, SOLUTION INTRAVENOUS at 16:58

## 2024-07-23 RX ADMIN — POTASSIUM CHLORIDE 10 MEQ: 7.46 INJECTION, SOLUTION INTRAVENOUS at 19:37

## 2024-07-23 RX ADMIN — INSULIN HUMAN 2 UNITS: 100 INJECTION, SOLUTION PARENTERAL at 17:45

## 2024-07-23 NOTE — CASE MANAGEMENT/SOCIAL WORK
Continued Stay Note  [facility]     Patient Name: Asha Krishnan  MRN: [id number]  Today's Date: 7/23/2024    Admit Date: 7/13/2024    Plan: Home with family   Discharge Plan       Row Name 07/23/24 1526       Plan    Plan Home with family    Patient/Family in Agreement with Plan yes    Plan Comments Met with pt in room. She stated that her plan is to go back home with her significant other at discharge. She will need transportation. She stated that he takes care of her and she does not want to go to rehab. If she gets a Gtube placed, she will need home infusions and home health. She does not have a preferred company for these services. She denies any other needs at this time. CCP following. SUNNY Rausch RN                   Discharge Codes    No documentation.                 Expected Discharge Date and Time       Expected Discharge Date Expected Discharge Time    Jul 26, 2024               Dale Boateng RN

## 2024-07-23 NOTE — PLAN OF CARE
Goal Outcome Evaluation:            A/O, 1.5LNC, VSS, gonzales cath, NPO, Q2 turn, BL scd's, PPN infusing, no c/o pain, plans for gtube placement later today, will continue to monitor

## 2024-07-23 NOTE — PROGRESS NOTES
Name: Asah Krishnan ADMIT: 2024   : 1950  PCP: Capo Pedroza MD    MRN: [id number] LOS: 7 days   AGE/SEX: 74 y.o. female  ROOM: [facility]     Subjective   Subjective   Patient seen in the room, significant other at bedside.  Both patient/significant does not want peg tube today.  Converted to A-fib RVR, heart rate 130s while in the room.    Review of Systems   As above  Objective   Objective   Vital Signs  Temp:  [97.2 °F (36.2 °C)-97.9 °F (36.6 °C)] 97.3 °F (36.3 °C)  Heart Rate:  [] 91  Resp:  [16-18] 18  BP: (100-159)/(48-73) 100/54  SpO2:  [87 %-99 %] 97 %  on  Flow (L/min):  [1.5] 1.5;   Device (Oxygen Therapy): nasal cannula  Body mass index is 12.51 kg/m².  Physical Exam    General: Laying in bed,  not in distress, ill-appearing, cachectic  HEENT: Normocephalic, atraumatic  CV: Tachycardic, Irregular rhythm  Lungs: Diminished, no wheezing, nonlabored breathing  Abdomen: Soft, nontender, nondistended  Extremities: No significant peripheral edema , no cyanosis       Results Review     I reviewed the patient's new clinical results.  Results from last 7 days   Lab Units 24  1254 24  0611 24  04324  0439   WBC 10*3/mm3 12.47* 15.28* 9.00 9.40   HEMOGLOBIN g/dL 11.8* 12.8 11.0* 10.1*   PLATELETS 10*3/mm3 373 394 287 286     Results from last 7 days   Lab Units 24  1254 24  0612 24  2240 24  2040 24  0439   SODIUM mmol/L 130* 132* 134*  --  135*   POTASSIUM mmol/L 3.2* 4.5 4.3 4.3 4.7   CHLORIDE mmol/L 90* 92* 96*  --  99   CO2 mmol/L 32.2* 28.1 29.4*  --  31.0*   BUN mg/dL 17 15 13  --  13   CREATININE mg/dL 0.33* 0.32* 0.32*  --  0.33*   GLUCOSE mg/dL 166* 146* 176*  --  161*   Estimated Creatinine Clearance: 71 mL/min (A) (by C-G formula based on SCr of 0.33 mg/dL (L)).  Results from last 7 days   Lab Units 24  1254 24  2240   ALBUMIN g/dL 2.9* 3.0*   BILIRUBIN mg/dL 0.6 0.6   ALK PHOS U/L 78 83   AST (SGOT) U/L  10 10   ALT (SGPT) U/L 11 10     Results from last 7 days   Lab Units 07/23/24  1254 07/22/24  0612 07/21/24  2240 07/21/24  2040 07/21/24  0439   CALCIUM mg/dL 9.0 9.6 9.1  --  9.0   ALBUMIN g/dL 2.9*  --  3.0*  --   --    MAGNESIUM mg/dL 1.8 1.8  --  1.6 2.0   PHOSPHORUS mg/dL 3.2 2.7  --   --  3.0     Results from last 7 days   Lab Units 07/21/24  0439 07/17/24  1211 07/17/24  0727   PROCALCITONIN ng/mL 0.02  --  0.09   LACTATE mmol/L  --  1.4  --      COVID19   Date Value Ref Range Status   07/13/2024 Not Detected Not Detected - Ref. Range Final     Glucose   Date/Time Value Ref Range Status   07/23/2024 1234 173 (H) 70 - 130 mg/dL Final   07/23/2024 0616 145 (H) 70 - 130 mg/dL Final   07/22/2024 2041 122 70 - 130 mg/dL Final   07/22/2024 1602 109 70 - 130 mg/dL Final   07/22/2024 1112 131 (H) 70 - 130 mg/dL Final   07/22/2024 0615 151 (H) 70 - 130 mg/dL Final   07/22/2024 0159 155 (H) 70 - 130 mg/dL Final           XR Chest 1 View  Narrative: XR CHEST 1 VW-     INDICATION: Clinical concern for pneumonia     COMPARISON: Chest radiographs dating back to 11/11/2022     Impression: New patchy inferior left lower lobe opacities, suspicious  for infiltrate. Recommend imaging follow-up to clearance. No pleural  effusion or pneumothorax. Normal size cardiomediastinal silhouette. No  focal osseous abnormality.     This report was finalized on 7/20/2024 3:24 PM by Dr. Flavio Rubio M.D on Workstation: [id number]       Scheduled Medications  atorvastatin, 80 mg, Oral, Nightly  budesonide, 0.5 mg, Nebulization, BID - RT  castor oil-balsam peru, 1 Application, Topical, Q12H  clopidogrel, 75 mg, Oral, Daily  insulin regular, 2-7 Units, Subcutaneous, Q6H  isosorbide mononitrate, 30 mg, Oral, Q24H  losartan, 25 mg, Oral, Nightly  metoprolol tartrate, 5 mg, Intravenous, Q8H  [Held by provider] metoprolol tartrate, 37.5 mg, Oral, Q12H  potassium chloride, 10 mEq, Intravenous, Q1H  thiamine, 100 mg, Intravenous,  Daily  thiamine, 100 mg, Oral, Daily  tiotropium bromide monohydrate, 2 puff, Inhalation, Daily - RT  vitamin B-12, 500 mcg, Oral, Daily    Infusions  Adult Peripheral or Midline 2-in-1 TPN, , Last Rate: 75 mL/hr at 07/23/24 0500  Adult Peripheral or Midline 2-in-1 TPN,   dilTIAZem, 5-15 mg/hr, Last Rate: Stopped (07/19/24 0250)  Pharmacy to Dose TPN,     Diet  Adult Peripheral or Midline 2-in-1 TPN  NPO Diet NPO Type: Strict NPO  Adult Peripheral or Midline 2-in-1 TPN    I have personally reviewed     [x]  Laboratory   [x]  Microbiology   [x]  Radiology   [x]  EKG/Telemetry  []  Cardiology/Vascular   []  Pathology    []  Records       Assessment/Plan     Active Hospital Problems    Diagnosis  POA   • **Acute respiratory failure with hypoxia [J96.01]  Yes   • Severe malnutrition [E43]  Yes   • Acute hypoxemic respiratory failure [J96.01]  Unknown   • PAF (paroxysmal atrial fibrillation) [I48.0]  Yes   • CAD (coronary artery disease) [I25.10]  Yes   • Elevated d-dimer [R79.89]  Yes   • COPD (chronic obstructive pulmonary disease) [J44.9]  Yes   • Dyspnea [R06.00]  Yes   • HTN (hypertension) [I10]  Yes      Resolved Hospital Problems   No resolved problems to display.       COPD with Exacerbation  -Status post steroids  -Continue breathing treatments  -Pulmo following     Dysphagia  -Core track was attempted to be placed however was not successful through 7/21  -Patient is severely malnourished/cachectic.  TPN ordered, needs central line/PICC line for placement of PICC line, however patient unable to consent, and unable to reach spouse for consent as per report patient is positive and admitted to hospital, unknown location.  -Initiated on TPN through 7/21  -General surgery following,    Hypokalemia  -Potassium 3.2.  IV replacement ordered          PAF/RVR  - not on AC chronically, poor candidate per cardiology  -Converted to A-fib RVR today, initiated on IV metoprolol, strict n.p.o. due to surgery.  -Heart rate  decreased for 30s/40 following IV metoprolol.  -Cardiology consult     CAD  - has multiple prior stents  - no anginal symptoms  - continue on plavix and lipitor  - continue imdur and metoprolol    Leukocytosis   Suspect Reactive secondary to TPN/Steroids   -Monitor        SCDs for DVT prophylaxis.  DNR.  Palliative care evaluated.  Discussed with patient and nursing staff.  Anticipate discharge home timing yet to be determined.  Expected Discharge Date:       is note has been reviewed and is accurate as of 07/23/24.         Dictated utilizing Dragon dictation        Radha Roman MD  [facility]  07/23/24  14:36 EDT

## 2024-07-23 NOTE — PROGRESS NOTES
"    Patient Name: Asha Krishnan  :1950  74 y.o.      Patient Care Team:  Capo Pedroza MD as PCP - General (Family Medicine)    Chief Complaint: follow up atrial fibrillation    Interval History: she is in AF. She was rapid this morning and got IV lopressor. Then had some bradycardia. Asymptomatic.     Objective   Vital Signs  Temp:  [97.2 °F (36.2 °C)-97.9 °F (36.6 °C)] 97.2 °F (36.2 °C)  Heart Rate:  [] 92  Resp:  [16-18] 18  BP: (100-159)/(48-73) 115/60    Intake/Output Summary (Last 24 hours) at 2024 1618  Last data filed at 2024 1457  Gross per 24 hour   Intake 912.5 ml   Output 1100 ml   Net -187.5 ml     Flowsheet Rows      Flowsheet Row First Filed Value   Admission Height 157.5 cm (62\") Documented at 2024   Admission Weight 40.8 kg (90 lb) Documented at 2024            Physical Exam:   General Appearance:    Frail, cachectic    Lungs:     Clear to auscultation.  Normal respiratory effort and rate.      Heart:    irregular rhythm and normal rate, normal S1 and S2, no murmurs, gallops or rubs.     Chest Wall:    No abnormalities observed   Abdomen:     Soft, nontender, positive bowel sounds.     Extremities:   no cyanosis, clubbing or edema.  No marked joint deformities.  Adequate musculoskeletal strength.       Results Review:    Results from last 7 days   Lab Units 24  1254   SODIUM mmol/L 130*   POTASSIUM mmol/L 3.2*   CHLORIDE mmol/L 90*   CO2 mmol/L 32.2*   BUN mg/dL 17   CREATININE mg/dL 0.33*   GLUCOSE mg/dL 166*   CALCIUM mg/dL 9.0     Results from last 7 days   Lab Units 24  2240 24  2040   HSTROP T ng/L 28* 26*     Results from last 7 days   Lab Units 24  1254   WBC 10*3/mm3 12.47*   HEMOGLOBIN g/dL 11.8*   HEMATOCRIT % 35.4   PLATELETS 10*3/mm3 373           Results from last 7 days   Lab Units 24  1254   MAGNESIUM mg/dL 1.8                   Medication Review:   atorvastatin, 80 mg, Oral, Nightly  budesonide, " 0.5 mg, Nebulization, BID - RT  castor oil-balsam peru, 1 Application, Topical, Q12H  clopidogrel, 75 mg, Oral, Daily  insulin regular, 2-7 Units, Subcutaneous, Q6H  isosorbide mononitrate, 30 mg, Oral, Q24H  losartan, 25 mg, Oral, Nightly  metoprolol tartrate, 5 mg, Intravenous, Q8H  [Held by provider] metoprolol tartrate, 37.5 mg, Oral, Q12H  potassium chloride, 10 mEq, Intravenous, Q1H  thiamine, 100 mg, Intravenous, Daily  thiamine, 100 mg, Oral, Daily  tiotropium bromide monohydrate, 2 puff, Inhalation, Daily - RT  vitamin B-12, 500 mcg, Oral, Daily         Adult Peripheral or Midline 2-in-1 TPN, , Last Rate: 75 mL/hr at 07/23/24 0500  Adult Peripheral or Midline 2-in-1 TPN,   dilTIAZem, 5-15 mg/hr, Last Rate: Stopped (07/19/24 0250)  Pharmacy to Dose TPN,         Assessment & Plan   Acute hypoxic respiratory failure due to COPD with acute exacerbation  Paroxysmal atrial fibrillation with RVR , wasn't taking medications prior to admission. Easily back in to SR then had recurrent AF 7/15 and 7/16.  COPD   Coronary artery disease (PCI with Synergy stent to left circumflex and LAD extending to left main in January 2017), stable recent stress and echo. Denies angina.   Hypertension, losartan added this admission.   Hyperlipidemia  Cachexia with malnitrition  Severe dysphagia, meds crushed   2:1 heart block , with sleep. Asymptomatic.     Poor candidate for anticoagulation.     She may continue to have paroxysms of atrial fibrillation. Currently in AF with variable HR. Continue beta blocker. She'll do better when she can go back on pills. In general , I would not be aggressive in treating tachycardia or bradycardia unless she becomes terribly symptomatic. And fortunately she does not really notice her AF.     Noted plans for PEG tomorrow.     EMORY Lopez  [facility]  07/23/24  16:18 EDT

## 2024-07-23 NOTE — PROGRESS NOTES
Enter Query Response Below      Query Response: Acute respiratory failure was supported, tachypnea in the ER             If applicable, please update the problem list.     Patient: Asha Krishnan        : 1950  Account: [id number]           Admit Date:         How to Respond to this query:       a. Click New Note     b. Answer query within the yellow box.                c. Update the Problem List, if applicable.      If you have any questions about this query contact me at: [email or web address]         74 year old female admitted  with COPD exacerbation, diagnosed with acute hypoxic respiratory failure. Tachypnea noted in ED. ABG on room air reported pO2 63.7, respiratory rate 16. Recorded O2 sats 91 - 98% on supplemental oxygen at 2 LPM.  No documented increased work of breathing.    Treatment: continuous pulse oximetry monitoring, supplemental oxygen at 1-2 LPM , solu-medrol, DUO nebs    After study, was acute respiratory failure clinically supported during this admission?    Acute respiratory failure was supported with additional clinical indicators:____________  Acute respiratory failure was not supported  Other- specify_____________  Unable to determine      By submitting this query, we are merely seeking further clarification of documentation to accurately reflect all conditions that you are monitoring, evaluating, treating or that extend the hospitalization or utilize additional resources of care. Please utilize your independent clinical judgment when addressing the question(s) above.     This query and your response, once completed, will be entered into the legal medical record.    Sincerely,  Gissell Williamson RN CCDS  Clinical Documentation Integrity Program

## 2024-07-23 NOTE — PROGRESS NOTES
General Surgery    Patient is refusing EGD with PEG tube placement today.  She states she has some secretions she would like to wait till tomorrow.  We discussed she does not have to have this procedure and she can continue oral intake if possible.  She states she does want to proceed, just not today.  I have changed her case to tomorrow.    Violet Koenig MD

## 2024-07-23 NOTE — PROGRESS NOTES
"[facility] Clinical Pharmacy Services: Total Parental Nutrition Initial Consult    Indication:  malnutrition  Route: peripheral  Type: standard    Relevant clinical data and objective history reviewed:  74 y.o. female 157.5 cm (62\") 31 kg (68 lb 6.4 oz)    Results from last 7 days   Lab Units 07/23/24  1254 07/22/24  0612   SODIUM mmol/L 130* 132*   POTASSIUM mmol/L 3.2* 4.5   CHLORIDE mmol/L 90* 92*   CO2 mmol/L 32.2* 28.1   BUN mg/dL 17 15   CREATININE mg/dL 0.33* 0.32*   CALCIUM mg/dL 9.0 9.6   ALBUMIN g/dL 2.9*  --    BILIRUBIN mg/dL 0.6  --    ALK PHOS U/L 78  --    ALT (SGPT) U/L 11  --    AST (SGOT) U/L 10  --    GLUCOSE mg/dL 166* 146*   MAGNESIUM mg/dL 1.8 1.8   PHOSPHORUS mg/dL 3.2 2.7   PREALBUMIN mg/dL  --  10.5*        Estimated Creatinine Clearance: 71 mL/min (A) (by C-G formula based on SCr of 0.33 mg/dL (L)).    Active fluid orders: None     Dietary Orders (From admission, onward)       Start     Ordered    07/23/24 1117  NPO Diet NPO Type: Strict NPO  Diet Effective Now        Comments: Per video swallow on 7/17 pt unable to clear anything out of throat,   Question:  NPO Type  Answer:  Strict NPO    07/23/24 1117                  Goal Macronutrients    Protein: 1.2 grams/kg/day (65 grams/day)   Dextrose: 1200 Kcal/day   Lipids: 100 ml of 20% lipid infusion 7 days/week    Assessment/Plan    PEG tube not placed today as planned due to patient suddenly wanting to wait until tomorrow. Still with no central access. Continue to maximize peripheral TPN contents to the extent allowable by osmolarity limits. Increased the rate today to allow for more electrolyte supplementation.    Macros: 65 grams protein (@goal)/ 600 kcals dextrose (limited 2/2 peripheral access). Will elect not to begin lipids as TPN is anticipated to conclude in a few days with placement of PEG imminent.   Electrolytes/additives: Increase NaCl content, potassium    Sodium Chloride: 80 mEq   Sodium Acetate: 0 mEq   Sodium Phosphate: " 20 mEq   Potassium Chloride: 50 mEq   Potassium Acetate: 0 mEq   Potassium Phosphate: 0 mEq   Calcium Gluconate: 10 mEq   Magnesium Sulfate: 8 mEq   MVI for TPN   Trace Elements              Other Additives: none    Labs to be ordered: Daily CMP, phos and mag while on TPN    Pharmacy will continue to follow.     Cheyenne Gowers, [facility]  Clinical Pharmacist

## 2024-07-23 NOTE — PROGRESS NOTES
"  PROGRESS NOTE  Patient Name: Asha Krishnan  Age/Sex: 74 y.o. female  : 1950  MRN: [id number]    Date of Admission: 2024  Date of Encounter Visit: 24   LOS: 7 days   Patient Care Team:  Capo Pedroza MD as PCP - General (Family Medicine)    Chief Complaint: Severe dysphagia, failure to thrive    Hospital course: Patient is n.p.o., on TPN while waiting on the PEG tube placement which is scheduled for tomorrow.  Patient otherwise is feeling okay, no fever or chills, on nasal cannula on minimal supply at 1.5 L/min  She refused her PEG tube placement this morning because she was having some cough and that was not really a contraindication but the patient was reluctant, and she was rescheduled for tomorrow.         REVIEW OF SYSTEMS:   CONSTITUTIONAL: no fever or chills  CARDIOVASCULAR: No chest pain, chest pressure or chest discomfort. No palpitations or edema.   RESPIRATORY: No shortness of breath, cough or sputum.   GASTROINTESTINAL: Cachectic, currently only on TPN.  No nausea or vomiting.   HEMATOLOGIC: No bleeding or bruising.     Ventilator/Non-Invasive Ventilation Settings (From admission, onward)      None              Vital Signs  Temp:  [97.3 °F (36.3 °C)-97.9 °F (36.6 °C)] 97.3 °F (36.3 °C)  Heart Rate:  [] 91  Resp:  [16-18] 18  BP: (100-159)/(48-73) 100/54  SpO2:  [87 %-98 %] 97 %  on  Flow (L/min):  [1.5] 1.5 Device (Oxygen Therapy): nasal cannula    Intake/Output Summary (Last 24 hours) at 2024 1501  Last data filed at 2024 1457  Gross per 24 hour   Intake 912.5 ml   Output 1100 ml   Net -187.5 ml     Flowsheet Rows      Flowsheet Row First Filed Value   Admission Height 157.5 cm (62\") Documented at 2024   Admission Weight 40.8 kg (90 lb) Documented at 2024          Body mass index is 12.51 kg/m².      24  0202 24  0556 24  1100   Weight: 37.5 kg (82 lb 10.8 oz) 68.4 kg (150 lb 12.7 oz) 31 kg (68 lb 6.4 oz) (weight " entered incorrectly)       Physical Exam:  GEN:  No acute distress, alert, cooperative, cachectic, muscle wasted  EYES:   Sclerae clear. No icterus. PERRL. Normal EOM  ENT:   External ears/nose normal, no oral lesions, no thrush, mucous membranes moist  NECK:  Supple, midline trachea, no JVD  LUNGS: Normal chest on inspection, diminished but clear. Respirations regular, even and unlabored.   CV:  Regular rhythm and rate. Normal S1/S2. No murmurs, gallops, or rubs noted.  ABD:  Soft, nontender and nondistended. Normal bowel sounds. No guarding  EXT:  Moves all extremities well. No cyanosis. No redness. No edema.   Skin: Dry, intact, no bleeding    Results Review:    Results From Last 14 Days   Lab Units 07/22/24  0612 07/17/24  1211 07/13/24  2101   CRP mg/dL 1.60*  --   --    D DIMER QUANT MCGFEU/mL  --  2.15* 1.72*   LACTATE mmol/L  --  1.4 1.5     Results from last 7 days   Lab Units 07/23/24  1254 07/22/24  0612 07/21/24  2240 07/21/24  2040 07/21/24  0439 07/20/24  0439 07/18/24  0654 07/17/24  0727   SODIUM mmol/L 130* 132* 134*  --  135* 139 137 135*   POTASSIUM mmol/L 3.2* 4.5 4.3 4.3 4.7 3.9 3.9 4.1   CHLORIDE mmol/L 90* 92* 96*  --  99 99 91* 90*   CO2 mmol/L 32.2* 28.1 29.4*  --  31.0* 32.2* 33.4* 33.7*   BUN mg/dL 17 15 13  --  13 15 41* 27*   CREATININE mg/dL 0.33* 0.32* 0.32*  --  0.33* 0.30* 0.54* 0.52*   CALCIUM mg/dL 9.0 9.6 9.1  --  9.0 8.9 9.4 9.2   AST (SGOT) U/L 10  --  10  --   --   --   --   --    ALT (SGPT) U/L 11  --  10  --   --   --   --   --    ANION GAP mmol/L 7.8 11.9 8.6  --  5.0 7.8 12.6 11.3   ALBUMIN g/dL 2.9*  --  3.0*  --   --   --   --   --      Results from last 7 days   Lab Units 07/21/24  2240 07/21/24  2040 07/17/24  1211   HSTROP T ng/L 28* 26*  --    D DIMER QUANT MCGFEU/mL  --   --  2.15*     Results from last 7 days   Lab Units 07/17/24  0727   TSH uIU/mL 1.360         Results from last 7 days   Lab Units 07/23/24  1254 07/22/24  0611 07/21/24  0439 07/20/24  0439  "07/17/24  0727   WBC 10*3/mm3 12.47* 15.28* 9.00 9.40 13.29*   HEMOGLOBIN g/dL 11.8* 12.8 11.0* 10.1* 12.1   HEMATOCRIT % 35.4 37.9 33.3* 30.7* 36.2   PLATELETS 10*3/mm3 373 394 287 286 295   MCV fL 89.6 87.9 90.2 91.6 88.7   NEUTROPHIL % % 90.1* 91.9*  --   --   --    LYMPHOCYTE % % 6.3* 5.4*  --   --   --    MONOCYTES % % 2.8* 1.8*  --   --   --    EOSINOPHIL % % 0.1* 0.0*  --   --   --    BASOPHIL % % 0.1 0.1  --   --   --    IMM GRAN % % 0.6* 0.8*  --   --   --          Results from last 7 days   Lab Units 07/23/24  1254 07/22/24  0612 07/21/24  2040 07/21/24  0439 07/17/24  0727   MAGNESIUM mg/dL 1.8 1.8 1.6 2.0 2.1           Invalid input(s): \"LDLCALC\"  Results from last 7 days   Lab Units 07/21/24  1450 07/17/24  1156   PH, ARTERIAL pH units 7.493* 7.556*   PCO2, ARTERIAL mm Hg 45.2* 41.3   PO2 ART mm Hg 83.0 75.1*   HCO3 ART mmol/L 34.7* 36.7*         Glucose   Date/Time Value Ref Range Status   07/23/2024 1234 173 (H) 70 - 130 mg/dL Final   07/23/2024 0616 145 (H) 70 - 130 mg/dL Final   07/22/2024 2041 122 70 - 130 mg/dL Final   07/22/2024 1602 109 70 - 130 mg/dL Final   07/22/2024 1112 131 (H) 70 - 130 mg/dL Final   07/22/2024 0615 151 (H) 70 - 130 mg/dL Final   07/22/2024 0159 155 (H) 70 - 130 mg/dL Final   07/21/2024 2105 163 (H) 70 - 130 mg/dL Final     Results from last 7 days   Lab Units 07/21/24  0439 07/17/24  1211 07/17/24  0727   PROCALCITONIN ng/mL 0.02  --  0.09   LACTATE mmol/L  --  1.4  --                            Imaging:   Imaging Results (All)       Procedure Component Value Units Date/Time    XR Chest 1 View [[id number]] Collected: 07/20/24 1522     Updated: 07/20/24 1527    Narrative:      XR CHEST 1 VW-     INDICATION: Clinical concern for pneumonia     COMPARISON: Chest radiographs dating back to 11/11/2022       Impression:      New patchy inferior left lower lobe opacities, suspicious  for infiltrate. Recommend imaging follow-up to clearance. No pleural  effusion or pneumothorax. " "Normal size cardiomediastinal silhouette. No  focal osseous abnormality.     This report was finalized on 7/20/2024 3:24 PM by Dr. Flavio Rubio M.D on Workstation: [id number]       FL Video Swallow Single Contrast [[id number]] Collected: 07/17/24 1055     Updated: 07/17/24 1058    Narrative:      VIDEO SWALLOWING EXAMINATION BY SPEECH PATHOLOGY     Clinical: Dysphasia     Video swallowing examination performed under the direction of speech  pathology. Imaging reviewed by radiologist who concurs with the  findings.     Speech pathology summary: Radiologist, Marissa Costa, present. Limited  exam. Pt reports that she \"can't swallow anything\" and declined VFSS  bolus trials. After discussion, pt agreeable to two bolus trials.  Acceptance of small bolus volume could potentially have negative impact  on pharyngeal swallow initiation. Oral phase: exam limited due to  logistical reasons not related to impairment. Pharyngeal phase most  notable for swallow safety and efficiency considerations. Safety:  Tracheal aspiration after the swallow with nectar thick liquids.  Suboptimal expiratory reflex and throat clear response. Efficiency:  Minimal to absent initiation of pharyngeal swallow. Minimal to no  pharyngeal clearance of puree and nectar thick liquids.     By electronically signing this report, I, the supervising radiologist,  attest that I was not present for the procedure(s) but agree with the  final edited report.         FLUOROSCOPY TIME: 1 minute 19 seconds, 2067 images.     This report was finalized on 7/17/2024 10:55 AM by Dr. Darrell Sheriff M.D on Workstation: [id number]       XR Chest 1 View [[id number]] Collected: 07/13/24 2130     Updated: 07/13/24 2135    Narrative:      XR CHEST 1 VW-     HISTORY: Shortness of air.     COMPARISON: Chest radiograph 11/11/2022     FINDINGS: A single view of the chest was obtained.     Support Devices:  None.  Cardiac Silhouette/Mediastinum/Mary Beth: The cardiac silhouette is normal " in  size. There are coronary artery stents. There is calcific aortic  atherosclerosis.  Lungs/Pleural Spaces: There is emphysema. There is no large pleural  effusion. There is no focal consolidation.  Chest Wall/Diaphragm/Upper Abdomen: There is degenerative disc disease.  There are old rib fractures.        CONCLUSION(S):       1.  Emphysema. No focal consolidation or large pleural effusion.     This report was finalized on 7/13/2024 9:31 PM by Dr. Nette Vázquez M.D  on Workstation: [id number]               I reviewed the patient's new clinical results.  I personally viewed and interpreted the patient's imaging results:        Medication Review:   atorvastatin, 80 mg, Oral, Nightly  budesonide, 0.5 mg, Nebulization, BID - RT  castor oil-balsam peru, 1 Application, Topical, Q12H  clopidogrel, 75 mg, Oral, Daily  insulin regular, 2-7 Units, Subcutaneous, Q6H  isosorbide mononitrate, 30 mg, Oral, Q24H  losartan, 25 mg, Oral, Nightly  metoprolol tartrate, 5 mg, Intravenous, Q8H  [Held by provider] metoprolol tartrate, 37.5 mg, Oral, Q12H  potassium chloride, 10 mEq, Intravenous, Q1H  thiamine, 100 mg, Intravenous, Daily  thiamine, 100 mg, Oral, Daily  tiotropium bromide monohydrate, 2 puff, Inhalation, Daily - RT  vitamin B-12, 500 mcg, Oral, Daily        Adult Peripheral or Midline 2-in-1 TPN, , Last Rate: 75 mL/hr at 07/23/24 0500  Adult Peripheral or Midline 2-in-1 TPN,   dilTIAZem, 5-15 mg/hr, Last Rate: Stopped (07/19/24 0250)  Pharmacy to Dose TPN,         ASSESSMENT:     Acute respiratory failure with hypoxia    CAD (coronary artery disease)    Dyspnea    Elevated d-dimer    COPD (chronic obstructive pulmonary disease)    HTN (hypertension)    PAF (paroxysmal atrial fibrillation)    Severe malnutrition    PLAN:  Agree with the PEG tube placement meanwhile supportive measures with the TPN  Patient is advised to avoid any further unnecessary delay given her severe malnutrition and the superiority of the enteric  feeding over the TPN  She had no significant decline in her respiratory status compared to yesterday and no respiratory contraindication for the PEG tube surgery  Discussed with the patient and the family and they further encouraged the patient to avoid any further delay tomorrow.      Labs/Notes/films were independently reviewed and pertinent results are summarized above  The copied texts in this note were reviewed and they are accurate as of 07/23/24    Disposition: Per primary team    Lashell Bolivar MD  07/23/24  15:01 EDT          Dictated utilizing Dragon dictation

## 2024-07-23 NOTE — PLAN OF CARE
Goal Outcome Evaluation:  Plan of Care Reviewed With: patient, significant other        Progress: declining  Pt alert and oriented to self, place, situation, confused to time at times. Atrial Fib/SR/ST intermittently, HR was in 140's this am and IV metoprolol was ordered which was effective. After giving the second dose of metoprolol this afternoon pt HR got as low as 35 and intermittently 40's. Dr. Roman notified and cardiology consulted, stat EKG and labs pending. Pt was suppose to have PEG placed today but pt and her significant other (who was going to sign the consent) refused surgery today and they both said they would be agreeable to have PEG placed tomorrow. Changed diet to Strict NPO based on speech recommendation from video swallow, Dr. Roman notified. Pt potassium was low, 3.2 and pt currently receiving IV potassium runs. She will need 4 runs but had to decrease rate to 50 ml/hr r/t pain in right arm. Currently on second run. Pt is receiving PPN through peripheral IV in left arm until PEG tube placement, bag and tubing was changed today around 1745. Pt also continues on 1.5 L NC and is on a specialty bed.

## 2024-07-23 NOTE — NURSING NOTE
07/23/24 1101   Wound sacral spine   No placement date or time found.   Present on Original Admission: No  Location: sacral spine   Pressure Injury Stage DTPI   Dressing Appearance dry;intact   Base non-blanchable;purple   Periwound intact;blanchable;pink   Edges irregular   Wound Length (cm) 2 cm   Wound Width (cm) 1 cm   Wound Surface Area (cm^2) 2 cm^2   Drainage Amount none     WOCN Consult: Coccyx pressure injury DTI. Heels blanchable bilateral. She has heel boots but wants to take a break. Heels offloaded with pillows. Needs assistance with repositioning.   She is currently on low air loss mattress. She weighs 63 lbs and is severely malnourished. Recommendations placed in epic.

## 2024-07-24 ENCOUNTER — ANESTHESIA (OUTPATIENT)
Dept: GASTROENTEROLOGY | Facility: HOSPITAL | Age: 74
End: 2024-07-24
Payer: MEDICARE

## 2024-07-24 ENCOUNTER — ANESTHESIA EVENT (OUTPATIENT)
Dept: GASTROENTEROLOGY | Facility: HOSPITAL | Age: 74
End: 2024-07-24
Payer: MEDICARE

## 2024-07-24 ENCOUNTER — HOME HEALTH ADMISSION (OUTPATIENT)
Dept: HOME HEALTH SERVICES | Facility: HOME HEALTHCARE | Age: 74
End: 2024-07-24
Payer: MEDICARE

## 2024-07-24 LAB
ALBUMIN SERPL-MCNC: 2.9 G/DL (ref 3.5–5.2)
ALBUMIN/GLOB SERPL: 1 G/DL
ALP SERPL-CCNC: 73 U/L (ref 39–117)
ALT SERPL W P-5'-P-CCNC: 12 U/L (ref 1–33)
ANION GAP SERPL CALCULATED.3IONS-SCNC: 6.9 MMOL/L (ref 5–15)
AST SERPL-CCNC: 11 U/L (ref 1–32)
BASOPHILS # BLD AUTO: 0.01 10*3/MM3 (ref 0–0.2)
BASOPHILS NFR BLD AUTO: 0.1 % (ref 0–1.5)
BILIRUB SERPL-MCNC: 0.5 MG/DL (ref 0–1.2)
BUN SERPL-MCNC: 15 MG/DL (ref 8–23)
BUN/CREAT SERPL: 60 (ref 7–25)
CALCIUM SPEC-SCNC: 8.5 MG/DL (ref 8.6–10.5)
CHLORIDE SERPL-SCNC: 97 MMOL/L (ref 98–107)
CO2 SERPL-SCNC: 28.1 MMOL/L (ref 22–29)
CREAT SERPL-MCNC: 0.25 MG/DL (ref 0.57–1)
DEPRECATED RDW RBC AUTO: 43.5 FL (ref 37–54)
EGFRCR SERPLBLD CKD-EPI 2021: 115 ML/MIN/1.73
EOSINOPHIL # BLD AUTO: 0.07 10*3/MM3 (ref 0–0.4)
EOSINOPHIL NFR BLD AUTO: 0.6 % (ref 0.3–6.2)
ERYTHROCYTE [DISTWIDTH] IN BLOOD BY AUTOMATED COUNT: 13.2 % (ref 12.3–15.4)
GLOBULIN UR ELPH-MCNC: 3 GM/DL
GLUCOSE BLDC GLUCOMTR-MCNC: 130 MG/DL (ref 70–130)
GLUCOSE BLDC GLUCOMTR-MCNC: 143 MG/DL (ref 70–130)
GLUCOSE BLDC GLUCOMTR-MCNC: 173 MG/DL (ref 70–130)
GLUCOSE BLDC GLUCOMTR-MCNC: 200 MG/DL (ref 70–130)
GLUCOSE SERPL-MCNC: 159 MG/DL (ref 65–99)
HCT VFR BLD AUTO: 36.1 % (ref 34–46.6)
HGB BLD-MCNC: 12.1 G/DL (ref 12–15.9)
IMM GRANULOCYTES # BLD AUTO: 0.06 10*3/MM3 (ref 0–0.05)
IMM GRANULOCYTES NFR BLD AUTO: 0.5 % (ref 0–0.5)
LYMPHOCYTES # BLD AUTO: 0.95 10*3/MM3 (ref 0.7–3.1)
LYMPHOCYTES NFR BLD AUTO: 7.9 % (ref 19.6–45.3)
MAGNESIUM SERPL-MCNC: 1.8 MG/DL (ref 1.6–2.4)
MCH RBC QN AUTO: 30.3 PG (ref 26.6–33)
MCHC RBC AUTO-ENTMCNC: 33.5 G/DL (ref 31.5–35.7)
MCV RBC AUTO: 90.3 FL (ref 79–97)
MONOCYTES # BLD AUTO: 0.48 10*3/MM3 (ref 0.1–0.9)
MONOCYTES NFR BLD AUTO: 4 % (ref 5–12)
NEUTROPHILS NFR BLD AUTO: 10.45 10*3/MM3 (ref 1.7–7)
NEUTROPHILS NFR BLD AUTO: 86.9 % (ref 42.7–76)
NRBC BLD AUTO-RTO: 0 /100 WBC (ref 0–0.2)
PHOSPHATE SERPL-MCNC: 2.5 MG/DL (ref 2.5–4.5)
PLATELET # BLD AUTO: 336 10*3/MM3 (ref 140–450)
PMV BLD AUTO: 9.6 FL (ref 6–12)
POTASSIUM SERPL-SCNC: 3.9 MMOL/L (ref 3.5–5.2)
PROT SERPL-MCNC: 5.9 G/DL (ref 6–8.5)
RBC # BLD AUTO: 4 10*6/MM3 (ref 3.77–5.28)
SODIUM SERPL-SCNC: 132 MMOL/L (ref 136–145)
WBC NRBC COR # BLD AUTO: 12.02 10*3/MM3 (ref 3.4–10.8)

## 2024-07-24 PROCEDURE — 94760 N-INVAS EAR/PLS OXIMETRY 1: CPT

## 2024-07-24 PROCEDURE — 83735 ASSAY OF MAGNESIUM: CPT | Performed by: STUDENT IN AN ORGANIZED HEALTH CARE EDUCATION/TRAINING PROGRAM

## 2024-07-24 PROCEDURE — 43246 EGD PLACE GASTROSTOMY TUBE: CPT | Performed by: STUDENT IN AN ORGANIZED HEALTH CARE EDUCATION/TRAINING PROGRAM

## 2024-07-24 PROCEDURE — 25010000002 POTASSIUM CHLORIDE PER 2 MEQ OF POTASSIUM: Performed by: STUDENT IN AN ORGANIZED HEALTH CARE EDUCATION/TRAINING PROGRAM

## 2024-07-24 PROCEDURE — 99232 SBSQ HOSP IP/OBS MODERATE 35: CPT | Performed by: NURSE PRACTITIONER

## 2024-07-24 PROCEDURE — 63710000001 INSULIN REGULAR HUMAN PER 5 UNITS: Performed by: STUDENT IN AN ORGANIZED HEALTH CARE EDUCATION/TRAINING PROGRAM

## 2024-07-24 PROCEDURE — 25010000002 CALCIUM GLUCONATE PER 10 ML: Performed by: STUDENT IN AN ORGANIZED HEALTH CARE EDUCATION/TRAINING PROGRAM

## 2024-07-24 PROCEDURE — 82948 REAGENT STRIP/BLOOD GLUCOSE: CPT

## 2024-07-24 PROCEDURE — 3E0G76Z INTRODUCTION OF NUTRITIONAL SUBSTANCE INTO UPPER GI, VIA NATURAL OR ARTIFICIAL OPENING: ICD-10-PCS | Performed by: INTERNAL MEDICINE

## 2024-07-24 PROCEDURE — 94664 DEMO&/EVAL PT USE INHALER: CPT

## 2024-07-24 PROCEDURE — 25010000002 THIAMINE HCL 200 MG/2ML SOLUTION: Performed by: HOSPITALIST

## 2024-07-24 PROCEDURE — 25810000003 LACTATED RINGERS PER 1000 ML: Performed by: NURSE ANESTHETIST, CERTIFIED REGISTERED

## 2024-07-24 PROCEDURE — 25010000002 KETOROLAC TROMETHAMINE PER 15 MG: Performed by: INTERNAL MEDICINE

## 2024-07-24 PROCEDURE — 25010000002 MAGNESIUM SULFATE PER 500 MG OF MAGNESIUM: Performed by: STUDENT IN AN ORGANIZED HEALTH CARE EDUCATION/TRAINING PROGRAM

## 2024-07-24 PROCEDURE — 0DH63UZ INSERTION OF FEEDING DEVICE INTO STOMACH, PERCUTANEOUS APPROACH: ICD-10-PCS | Performed by: STUDENT IN AN ORGANIZED HEALTH CARE EDUCATION/TRAINING PROGRAM

## 2024-07-24 PROCEDURE — 94761 N-INVAS EAR/PLS OXIMETRY MLT: CPT

## 2024-07-24 PROCEDURE — 25810000003 SODIUM CHLORIDE 0.9 % SOLUTION: Performed by: STUDENT IN AN ORGANIZED HEALTH CARE EDUCATION/TRAINING PROGRAM

## 2024-07-24 PROCEDURE — 85025 COMPLETE CBC W/AUTO DIFF WBC: CPT | Performed by: INTERNAL MEDICINE

## 2024-07-24 PROCEDURE — 80053 COMPREHEN METABOLIC PANEL: CPT | Performed by: STUDENT IN AN ORGANIZED HEALTH CARE EDUCATION/TRAINING PROGRAM

## 2024-07-24 PROCEDURE — 25010000002 PROPOFOL 10 MG/ML EMULSION: Performed by: NURSE ANESTHETIST, CERTIFIED REGISTERED

## 2024-07-24 PROCEDURE — 94799 UNLISTED PULMONARY SVC/PX: CPT

## 2024-07-24 PROCEDURE — 84100 ASSAY OF PHOSPHORUS: CPT | Performed by: STUDENT IN AN ORGANIZED HEALTH CARE EDUCATION/TRAINING PROGRAM

## 2024-07-24 RX ORDER — SODIUM CHLORIDE 9 MG/ML
30 INJECTION, SOLUTION INTRAVENOUS CONTINUOUS PRN
Status: DISCONTINUED | OUTPATIENT
Start: 2024-07-24 | End: 2024-07-25

## 2024-07-24 RX ORDER — LIDOCAINE HYDROCHLORIDE 20 MG/ML
INJECTION, SOLUTION INFILTRATION; PERINEURAL AS NEEDED
Status: DISCONTINUED | OUTPATIENT
Start: 2024-07-24 | End: 2024-07-24 | Stop reason: SURG

## 2024-07-24 RX ORDER — PROPOFOL 10 MG/ML
VIAL (ML) INTRAVENOUS AS NEEDED
Status: DISCONTINUED | OUTPATIENT
Start: 2024-07-24 | End: 2024-07-24 | Stop reason: SURG

## 2024-07-24 RX ORDER — SODIUM CHLORIDE, SODIUM LACTATE, POTASSIUM CHLORIDE, CALCIUM CHLORIDE 600; 310; 30; 20 MG/100ML; MG/100ML; MG/100ML; MG/100ML
INJECTION, SOLUTION INTRAVENOUS CONTINUOUS PRN
Status: DISCONTINUED | OUTPATIENT
Start: 2024-07-24 | End: 2024-07-24 | Stop reason: SURG

## 2024-07-24 RX ADMIN — KETOROLAC TROMETHAMINE 15 MG: 15 INJECTION, SOLUTION INTRAMUSCULAR; INTRAVENOUS at 16:44

## 2024-07-24 RX ADMIN — CALCIUM GLUCONATE: 98 INJECTION, SOLUTION INTRAVENOUS at 17:34

## 2024-07-24 RX ADMIN — BUDESONIDE 0.5 MG: 0.5 INHALANT ORAL at 19:40

## 2024-07-24 RX ADMIN — ALBUTEROL SULFATE 2.5 MG: 2.5 SOLUTION RESPIRATORY (INHALATION) at 01:05

## 2024-07-24 RX ADMIN — SODIUM CHLORIDE, POTASSIUM CHLORIDE, SODIUM LACTATE AND CALCIUM CHLORIDE: 600; 310; 30; 20 INJECTION, SOLUTION INTRAVENOUS at 13:37

## 2024-07-24 RX ADMIN — METOPROLOL TARTRATE 5 MG: 1 INJECTION, SOLUTION INTRAVENOUS at 22:05

## 2024-07-24 RX ADMIN — INSULIN HUMAN 2 UNITS: 100 INJECTION, SOLUTION PARENTERAL at 08:27

## 2024-07-24 RX ADMIN — CASTOR OIL AND BALSAM, PERU 1 APPLICATION: 788; 87 OINTMENT TOPICAL at 08:28

## 2024-07-24 RX ADMIN — METOPROLOL TARTRATE 5 MG: 1 INJECTION, SOLUTION INTRAVENOUS at 07:42

## 2024-07-24 RX ADMIN — METOPROLOL TARTRATE 5 MG: 1 INJECTION, SOLUTION INTRAVENOUS at 15:48

## 2024-07-24 RX ADMIN — TIOTROPIUM BROMIDE INHALATION SPRAY 2 PUFF: 3.12 SPRAY, METERED RESPIRATORY (INHALATION) at 06:50

## 2024-07-24 RX ADMIN — Medication 10 ML: at 08:28

## 2024-07-24 RX ADMIN — BUDESONIDE 0.5 MG: 0.5 INHALANT ORAL at 06:49

## 2024-07-24 RX ADMIN — PROPOFOL 40 MG: 10 INJECTION, EMULSION INTRAVENOUS at 13:39

## 2024-07-24 RX ADMIN — PROPOFOL 20 MG: 10 INJECTION, EMULSION INTRAVENOUS at 13:44

## 2024-07-24 RX ADMIN — CASTOR OIL AND BALSAM, PERU 1 APPLICATION: 788; 87 OINTMENT TOPICAL at 22:06

## 2024-07-24 RX ADMIN — PROPOFOL 50 MG: 10 INJECTION, EMULSION INTRAVENOUS at 13:41

## 2024-07-24 RX ADMIN — THIAMINE HYDROCHLORIDE 100 MG: 100 INJECTION, SOLUTION INTRAMUSCULAR; INTRAVENOUS at 08:28

## 2024-07-24 RX ADMIN — LIDOCAINE HYDROCHLORIDE 30 MG: 20 INJECTION, SOLUTION INFILTRATION; PERINEURAL at 13:41

## 2024-07-24 RX ADMIN — SODIUM CHLORIDE 30 ML/HR: 9 INJECTION, SOLUTION INTRAVENOUS at 13:02

## 2024-07-24 NOTE — OP NOTE
OPERATIVE REPORT    DATE: 7/24/2024    SURGEON: Violet Koenig MD     OPERATION PERFORMED:   EGD with percutaneous endoscopic gastrostomy tube placement    PREOPERATIVE DIAGNOSIS: dysphagia    POSTOPERATIVE DIAGNOSIS: dysphagia    ANESTHESIA: MAC    SPECIMEN: none    DRAINS: none     BLOOD LOSS: minimal      INDICATION FOR OPERATION: Mrs. Asha Krishnan is a 74 y.o. lady with dysphagia and weight loss. All risks (including bleeding, infection, damage to surrounding structures), benefits and alternatives were explained to the patient who agreed and wished to proceed. Informed consent was signed.     OPERATIVE COURSE: Sedation was administered by anesthesia.  The patient was positioned in reverse Trendelenburg position.  An endoscope was inserted through the oropharynx, down the esophagus, and into the stomach without issue. There were no signs of abnormalities of the stomach.  The duodenum was entered and the first portion was evaluated with no abnormalities.  The scope was retracted back into the stomach and the stomach was maximally insufflated.  The area on the abdominal wall 2 fingerbreadths off the left subcostal margin near the midline was identified.  There was good one-to-one motion.  There was transillumination.  1% lidocaine with epinephrine was injected into this area on the skin and subcutaneous tissues.  A scalpel was used to make a 1 cm incision.  A needle was placed through this incision into the stomach under direct visualization.  A wire was placed through the this, which was snared through the endoscope.  The endoscope was removed from the mouth.  With Seldinger technique, the PEG tube was passed over the wire into the stomach.  The wire was then withdrawn, and the bumper was placed over the PEG tube.  The location of the bumper at the skin was noted to be at 2 cm centimeters.  The 2 clamps were placed on the PEG tube and the PEG tube was sutured in place with a 2-0 silk suture.  An abdominal  binder was placed at the end of the procedure. Patient tolerated the procedure well.      Violet Koenig MD   General and Endoscopic Surgery  [facility]    [address], [address]  [place], [place], [address]  P: [phone]  F: [phone]

## 2024-07-24 NOTE — PROGRESS NOTES
"  PROGRESS NOTE  Patient Name: Asha Krishnan  Age/Sex: 74 y.o. female  : 1950  MRN: [id number]    Date of Admission: 2024  Date of Encounter Visit: 24   LOS: 8 days   Patient Care Team:  Capo Pedroza MD as PCP - General (Family Medicine)    Chief Complaint: Severe dysphagia, failure to thrive    Hospital course: Patient is n.p.o., on TPN with the plan for a PEG tube placement later today  She is still on minimal oxygen flow otherwise with no changes         REVIEW OF SYSTEMS:   CONSTITUTIONAL: no fever or chills  CARDIOVASCULAR: No chest pain, chest pressure or chest discomfort. No palpitations or edema.   RESPIRATORY: No shortness of breath, cough or sputum.   GASTROINTESTINAL: Cachectic, currently only on TPN.  No nausea or vomiting.   HEMATOLOGIC: No bleeding or bruising.     Ventilator/Non-Invasive Ventilation Settings (From admission, onward)      None              Vital Signs  Temp:  [97.2 °F (36.2 °C)-97.7 °F (36.5 °C)] 97.7 °F (36.5 °C)  Heart Rate:  [] 70  Resp:  [16-18] 16  BP: (111-129)/(52-78) 129/52  SpO2:  [94 %-97 %] 94 %  on  Flow (L/min):  [1.5] 1.5 Device (Oxygen Therapy): nasal cannula    Intake/Output Summary (Last 24 hours) at 2024 1315  Last data filed at 2024 0957  Gross per 24 hour   Intake 200 ml   Output 1750 ml   Net -1550 ml     Flowsheet Rows      Flowsheet Row First Filed Value   Admission Height 157.5 cm (62\") Documented at 2024   Admission Weight 40.8 kg (90 lb) Documented at 2024          Body mass index is 12.36 kg/m².      24  1100 24  0600 24  1213   Weight: 31 kg (68 lb 6.4 oz) (weight entered incorrectly) 23.7 kg (52 lb 3.2 oz) 30.7 kg (67 lb 9.6 oz)       Physical Exam:  GEN:  No acute distress, alert, cooperative, cachectic, muscle wasted  EYES:   Sclerae clear. No icterus. PERRL. Normal EOM  ENT:   External ears/nose normal, no oral lesions, no thrush, mucous membranes moist  NECK:  " Supple, midline trachea, no JVD  LUNGS: Normal chest on inspection, diminished but clear. Respirations regular, even and unlabored.   CV:  Regular rhythm and rate. Normal S1/S2. No murmurs, gallops, or rubs noted.  ABD:  Soft, nontender and nondistended. Normal bowel sounds. No guarding  EXT:  Moves all extremities well. No cyanosis. No redness. No edema.   Skin: Dry, intact, no bleeding    Results Review:    Results From Last 14 Days   Lab Units 07/22/24  0612 07/17/24  1211 07/13/24  2101   CRP mg/dL 1.60*  --   --    D DIMER QUANT MCGFEU/mL  --  2.15* 1.72*   LACTATE mmol/L  --  1.4 1.5     Results from last 7 days   Lab Units 07/24/24  0338 07/23/24  1254 07/22/24  0612 07/21/24  2240 07/21/24  2040 07/21/24  0439 07/20/24  0439 07/18/24  0654   SODIUM mmol/L 132* 130* 132* 134*  --  135* 139 137   POTASSIUM mmol/L 3.9 3.2* 4.5 4.3 4.3 4.7 3.9 3.9   CHLORIDE mmol/L 97* 90* 92* 96*  --  99 99 91*   CO2 mmol/L 28.1 32.2* 28.1 29.4*  --  31.0* 32.2* 33.4*   BUN mg/dL 15 17 15 13  --  13 15 41*   CREATININE mg/dL 0.25* 0.33* 0.32* 0.32*  --  0.33* 0.30* 0.54*   CALCIUM mg/dL 8.5* 9.0 9.6 9.1  --  9.0 8.9 9.4   AST (SGOT) U/L 11 10  --  10  --   --   --   --    ALT (SGPT) U/L 12 11  --  10  --   --   --   --    ANION GAP mmol/L 6.9 7.8 11.9 8.6  --  5.0 7.8 12.6   ALBUMIN g/dL 2.9* 2.9*  --  3.0*  --   --   --   --      Results from last 7 days   Lab Units 07/21/24  2240 07/21/24  2040   HSTROP T ng/L 28* 26*               Results from last 7 days   Lab Units 07/24/24  0338 07/23/24  1254 07/22/24  0611 07/21/24  0439 07/20/24  0439   WBC 10*3/mm3 12.02* 12.47* 15.28* 9.00 9.40   HEMOGLOBIN g/dL 12.1 11.8* 12.8 11.0* 10.1*   HEMATOCRIT % 36.1 35.4 37.9 33.3* 30.7*   PLATELETS 10*3/mm3 336 373 394 287 286   MCV fL 90.3 89.6 87.9 90.2 91.6   NEUTROPHIL % % 86.9* 90.1* 91.9*  --   --    LYMPHOCYTE % % 7.9* 6.3* 5.4*  --   --    MONOCYTES % % 4.0* 2.8* 1.8*  --   --    EOSINOPHIL % % 0.6 0.1* 0.0*  --   --    BASOPHIL %  "% 0.1 0.1 0.1  --   --    IMM GRAN % % 0.5 0.6* 0.8*  --   --          Results from last 7 days   Lab Units 07/24/24  0338 07/23/24  1254 07/22/24  0612 07/21/24  2040 07/21/24  0439   MAGNESIUM mg/dL 1.8 1.8 1.8 1.6 2.0           Invalid input(s): \"LDLCALC\"  Results from last 7 days   Lab Units 07/21/24  1450   PH, ARTERIAL pH units 7.493*   PCO2, ARTERIAL mm Hg 45.2*   PO2 ART mm Hg 83.0   HCO3 ART mmol/L 34.7*         Glucose   Date/Time Value Ref Range Status   07/24/2024 1156 200 (H) 70 - 130 mg/dL Final   07/24/2024 0639 173 (H) 70 - 130 mg/dL Final   07/24/2024 0034 130 70 - 130 mg/dL Final   07/23/2024 1531 152 (H) 70 - 130 mg/dL Final   07/23/2024 1234 173 (H) 70 - 130 mg/dL Final   07/23/2024 0616 145 (H) 70 - 130 mg/dL Final   07/22/2024 2041 122 70 - 130 mg/dL Final   07/22/2024 1602 109 70 - 130 mg/dL Final     Results from last 7 days   Lab Units 07/21/24  0439   PROCALCITONIN ng/mL 0.02                           Imaging:   Imaging Results (All)       Procedure Component Value Units Date/Time    XR Chest 1 View [[id number]] Collected: 07/20/24 1522     Updated: 07/20/24 1527    Narrative:      XR CHEST 1 VW-     INDICATION: Clinical concern for pneumonia     COMPARISON: Chest radiographs dating back to 11/11/2022       Impression:      New patchy inferior left lower lobe opacities, suspicious  for infiltrate. Recommend imaging follow-up to clearance. No pleural  effusion or pneumothorax. Normal size cardiomediastinal silhouette. No  focal osseous abnormality.     This report was finalized on 7/20/2024 3:24 PM by Dr. Flavio Rubio M.D on Workstation: [id number]       FL Video Swallow Single Contrast [[id number]] Collected: 07/17/24 1055     Updated: 07/17/24 1058    Narrative:      VIDEO SWALLOWING EXAMINATION BY SPEECH PATHOLOGY     Clinical: Dysphasia     Video swallowing examination performed under the direction of speech  pathology. Imaging reviewed by radiologist who concurs with the  findings.   " "  Speech pathology summary: Radiologist, Marissa Costa, present. Limited  exam. Pt reports that she \"can't swallow anything\" and declined VFSS  bolus trials. After discussion, pt agreeable to two bolus trials.  Acceptance of small bolus volume could potentially have negative impact  on pharyngeal swallow initiation. Oral phase: exam limited due to  logistical reasons not related to impairment. Pharyngeal phase most  notable for swallow safety and efficiency considerations. Safety:  Tracheal aspiration after the swallow with nectar thick liquids.  Suboptimal expiratory reflex and throat clear response. Efficiency:  Minimal to absent initiation of pharyngeal swallow. Minimal to no  pharyngeal clearance of puree and nectar thick liquids.     By electronically signing this report, I, the supervising radiologist,  attest that I was not present for the procedure(s) but agree with the  final edited report.         FLUOROSCOPY TIME: 1 minute 19 seconds, 2067 images.     This report was finalized on 7/17/2024 10:55 AM by Dr. Darrell Sheriff M.D on Workstation: [id number]       XR Chest 1 View [[id number]] Collected: 07/13/24 2130     Updated: 07/13/24 2135    Narrative:      XR CHEST 1 VW-     HISTORY: Shortness of air.     COMPARISON: Chest radiograph 11/11/2022     FINDINGS: A single view of the chest was obtained.     Support Devices:  None.  Cardiac Silhouette/Mediastinum/Mary Beth: The cardiac silhouette is normal in  size. There are coronary artery stents. There is calcific aortic  atherosclerosis.  Lungs/Pleural Spaces: There is emphysema. There is no large pleural  effusion. There is no focal consolidation.  Chest Wall/Diaphragm/Upper Abdomen: There is degenerative disc disease.  There are old rib fractures.        CONCLUSION(S):       1.  Emphysema. No focal consolidation or large pleural effusion.     This report was finalized on 7/13/2024 9:31 PM by Dr. Nette Vázquez M.D  on Workstation: [id number]               I " reviewed the patient's new clinical results.  I personally viewed and interpreted the patient's imaging results:        Medication Review:   atorvastatin, 80 mg, Oral, Nightly  budesonide, 0.5 mg, Nebulization, BID - RT  castor oil-balsam peru, 1 Application, Topical, Q12H  ceFAZolin 2000 mg IVPB in 100 mL NS (MBP), 2,000 mg, Intravenous, Once  clopidogrel, 75 mg, Oral, Daily  insulin regular, 2-7 Units, Subcutaneous, Q6H  isosorbide mononitrate, 30 mg, Oral, Q24H  losartan, 25 mg, Oral, Nightly  metoprolol tartrate, 5 mg, Intravenous, Q8H  [Held by provider] metoprolol tartrate, 37.5 mg, Oral, Q12H  thiamine, 100 mg, Intravenous, Daily  tiotropium bromide monohydrate, 2 puff, Inhalation, Daily - RT  vitamin B-12, 500 mcg, Oral, Daily        Adult Peripheral or Midline 2-in-1 TPN, , Last Rate: 100 mL/hr at 07/24/24 0831  Adult Peripheral or Midline 2-in-1 TPN,   Pharmacy to Dose TPN,   sodium chloride, 30 mL/hr, Last Rate: 30 mL/hr (07/24/24 1302)        ASSESSMENT:     Acute respiratory failure with hypoxia    CAD (coronary artery disease)    Dyspnea    Elevated d-dimer    COPD (chronic obstructive pulmonary disease)    HTN (hypertension)    PAF (paroxysmal atrial fibrillation)    Severe malnutrition    PLAN:  Agree with the PEG tube placement meanwhile supportive measures with the TPN  PEG tube will be placed later today  Will stop TPN once patient is able to tolerate the tube feeding at goal    Labs/Notes/films were independently reviewed and pertinent results are summarized above  The copied texts in this note were reviewed and they are accurate as of 07/24/24    Disposition: Per primary team    Lashell Bolivar MD  07/24/24  13:15 EDT          Dictated utilizing Dragon dictation

## 2024-07-24 NOTE — PLAN OF CARE
"Goal Outcome Evaluation:  Plan of Care Reviewed With: patient        Progress: declining  Outcome Evaluation: Pt tx'd from [facility] around 2150. AOx3 - confused on situation. Potassium replaced per protocol via K runs. Pt arrived with F/C for retention - placed on 5E. PPN infusing through IV in L FA. Optifoam applied to bony prominences. Q 2 turns and heels elevated with protective boots. SCDs on. PRN bthing tx for SOA. IV metoprolol given as ordered - HR does drop to low 30s(sinus hanh) but returns to 80s-90s(a. fib/flutter), BP stable during these episodes. NPO strict continues - planning for PEG placement today with Dr. Koenig - asked spouse for consent signature and stated \"we'll sign it in the AM\". Q 6 accuchecks. LALM continues. Monitoring AM labs. S/O at bedside and interacting with patient frequently. Patient sleeping on an off this shift. Safety maintained      0742: consent signed and in chart                                "

## 2024-07-24 NOTE — PLAN OF CARE
Goal Outcome Evaluation:  Plan of Care Reviewed With: patient, significant other        Progress: no change  Outcome Evaluation: Oriented but confused to situation at times.  Significant other at bedside.  G tube placed today.  Medicated for pain prn.  Converted to NSR.  Receiving PPN.

## 2024-07-24 NOTE — ANESTHESIA PREPROCEDURE EVALUATION
Anesthesia Evaluation     NPO Solid Status: > 8 hours             Airway   Mallampati: III  TM distance: <3 FB  Neck ROM: limited  Small opening and No difficulty expected  Dental    (+) edentulous    Pulmonary    (+) COPD, asthma,shortness of breath  Cardiovascular     Patient on routine beta blocker    (+) hypertension, CAD, cardiac stents more than 12 months ago , dysrhythmias Atrial Fib, Paroxysmal Atrial Fib, hyperlipidemia    ROS comment: Many stents on left side inc left main with collaterals    Neuro/Psych  GI/Hepatic/Renal/Endo      ROS Comment: malnourished    Musculoskeletal     Abdominal    Substance History      OB/GYN          Other                    Anesthesia Plan    ASA 4     MAC     intravenous induction     Anesthetic plan, risks, benefits, and alternatives have been provided, discussed and informed consent has been obtained with: patient.    CODE STATUS:    Medical Intervention Limits: No intubation (DNI); No cardioversion  Code Status (Patient has no pulse and is not breathing): No CPR (Do Not Attempt to Resuscitate)  Medical Interventions (Patient has pulse or is breathing): Limited Support

## 2024-07-24 NOTE — PROGRESS NOTES
"[facility] Clinical Pharmacy Services: Total Parental Nutrition Initial Consult    Indication:  malnutrition  Route: peripheral  Type: standard    Relevant clinical data and objective history reviewed:  74 y.o. female 157.5 cm (62\") 30.7 kg (67 lb 9.6 oz)    Results from last 7 days   Lab Units 07/24/24  0338 07/23/24  1254 07/22/24  0612   SODIUM mmol/L 132*   < > 132*   POTASSIUM mmol/L 3.9   < > 4.5   CHLORIDE mmol/L 97*   < > 92*   CO2 mmol/L 28.1   < > 28.1   BUN mg/dL 15   < > 15   CREATININE mg/dL 0.25*   < > 0.32*   CALCIUM mg/dL 8.5*   < > 9.6   ALBUMIN g/dL 2.9*   < >  --    BILIRUBIN mg/dL 0.5   < >  --    ALK PHOS U/L 73   < >  --    ALT (SGPT) U/L 12   < >  --    AST (SGOT) U/L 11   < >  --    GLUCOSE mg/dL 159*   < > 146*   MAGNESIUM mg/dL 1.8   < > 1.8   PHOSPHORUS mg/dL 2.5   < > 2.7   PREALBUMIN mg/dL  --   --  10.5*    < > = values in this interval not displayed.        Estimated Creatinine Clearance: 92.8 mL/min (A) (by C-G formula based on SCr of 0.25 mg/dL (L)).    Active fluid orders: None     Dietary Orders (From admission, onward)       Start     Ordered    07/23/24 1117  NPO Diet NPO Type: Strict NPO  Diet Effective Now        Comments: Per video swallow on 7/17 pt unable to clear anything out of throat,   Question:  NPO Type  Answer:  Strict NPO    07/23/24 1117                  Goal Macronutrients    Protein: 1.5-2.0 grams/kg/day (45-60 grams/day)   Dextrose: 750 Kcal/day   Lipids: 100 ml of 20% lipid infusion 7 days/week    Assessment/Plan  Dietary rec's reviewed, adjusted macros based on updated weight.  Pt weight confirmed as 30.7kg. Dietary rec's made with 37.5kg weight  Protein goal 1.5-2.0 g/kg/day will adjust protein to 45g (1.5g/kg/day)  Lipids at 100ml/day (not started yet)  Glucose goal 750kcal  At goal PPN with lipids would achieve 35-40kcal/kg rec = 1050-1200kcal (~150 from protein + ~100kcal/fat + 750 kcal glucose = ~1000kcal )  Fluids usually 30-40ml/kg/day so max 1200ml, " previous TPN at 100ml/hr = 2400ml. Will plan to decrease rate to 75ml/hr = 1800ml and watch for fluid overload.     PEG tube planned today. Per surgery plan to continue TPN at least one more day. They are unsure they will even be able to place PEG? Still with no central access. Continue to maximize peripheral TPN contents to the extent allowable by osmolarity limits. Increased the rate today to allow for more electrolyte supplementation.    Macros: 45 grams protein (@goal)/ 550 kcals dextrose (limited 2/2 peripheral access). Will elect not to begin lipids as TPN is anticipated to conclude in a few days with placement of PEG imminent.   Electrolytes/additives: Continue same as 7/23.   Sodium Chloride: 80 mEq   Sodium Acetate: 0 mEq   Sodium Phosphate: 20 mEq   Potassium Chloride: 50 mEq   Potassium Acetate: 0 mEq   Potassium Phosphate: 0 mEq   Calcium Gluconate: 10 mEq   Magnesium Sulfate: 8 mEq   MVI for TPN   Trace Elements              Other Additives: none    Labs to be ordered: Daily CMP, phos and mag while on TPN    Pharmacy will continue to follow.     Jennifer Springer, PharmD  Clinical Pharmacist

## 2024-07-24 NOTE — PROGRESS NOTES
Name: Asha Krishnan ADMIT: 2024   : 1950  PCP: Capo Pedroza MD    MRN: [id number] LOS: 8 days   AGE/SEX: 74 y.o. female  ROOM: [facility]     Subjective   Subjective   Patient seen this morning, spouse present in the room.  Consent for PEG tube placement signed this morning.  Patient lethargic but arousable, denies complaints when asked.    Review of Systems   As above  Objective   Objective   Vital Signs  Temp:  [97.2 °F (36.2 °C)-97.7 °F (36.5 °C)] 97.7 °F (36.5 °C)  Heart Rate:  [] 52  Resp:  [16-18] 16  BP: (100-125)/(48-78) 119/63  SpO2:  [92 %-98 %] 97 %  on  Flow (L/min):  [1.5] 1.5;   Device (Oxygen Therapy): nasal cannula  Body mass index is 9.55 kg/m².  Physical Exam    General: Laying in bed, lethargic, ill-appearing, cachectic  HEENT: Normocephalic, atraumatic  CV: Irregular rhythm, normal rate  Lungs: Diminished, no wheezing, nonlabored breathing  Abdomen: Soft, nontender, nondistended  Extremities: No significant peripheral edema , no cyanosis       Results Review     I reviewed the patient's new clinical results.  Results from last 7 days   Lab Units 24  0338 24  1254 24  0611 24  0439   WBC 10*3/mm3 12.02* 12.47* 15.28* 9.00   HEMOGLOBIN g/dL 12.1 11.8* 12.8 11.0*   PLATELETS 10*3/mm3 336 373 394 287     Results from last 7 days   Lab Units 24  0338 24  1254 24  0612 24  2240   SODIUM mmol/L 132* 130* 132* 134*   POTASSIUM mmol/L 3.9 3.2* 4.5 4.3   CHLORIDE mmol/L 97* 90* 92* 96*   CO2 mmol/L 28.1 32.2* 28.1 29.4*   BUN mg/dL 15 17 15 13   CREATININE mg/dL 0.25* 0.33* 0.32* 0.32*   GLUCOSE mg/dL 159* 166* 146* 176*   Estimated Creatinine Clearance: 71.6 mL/min (A) (by C-G formula based on SCr of 0.25 mg/dL (L)).  Results from last 7 days   Lab Units 24  0338 24  1254 24  2240   ALBUMIN g/dL 2.9* 2.9* 3.0*   BILIRUBIN mg/dL 0.5 0.6 0.6   ALK PHOS U/L 73 78 83   AST (SGOT) U/L 11 10 10   ALT (SGPT) U/L  12 11 10     Results from last 7 days   Lab Units 07/24/24  0338 07/23/24  1254 07/22/24  0612 07/21/24  2240 07/21/24  2040 07/21/24  0439   CALCIUM mg/dL 8.5* 9.0 9.6 9.1  --  9.0   ALBUMIN g/dL 2.9* 2.9*  --  3.0*  --   --    MAGNESIUM mg/dL 1.8 1.8 1.8  --  1.6 2.0   PHOSPHORUS mg/dL 2.5 3.2 2.7  --   --  3.0     Results from last 7 days   Lab Units 07/21/24  0439 07/17/24  1211   PROCALCITONIN ng/mL 0.02  --    LACTATE mmol/L  --  1.4     COVID19   Date Value Ref Range Status   07/13/2024 Not Detected Not Detected - Ref. Range Final     Glucose   Date/Time Value Ref Range Status   07/24/2024 0639 173 (H) 70 - 130 mg/dL Final   07/24/2024 0034 130 70 - 130 mg/dL Final   07/23/2024 1531 152 (H) 70 - 130 mg/dL Final   07/23/2024 1234 173 (H) 70 - 130 mg/dL Final   07/23/2024 0616 145 (H) 70 - 130 mg/dL Final   07/22/2024 2041 122 70 - 130 mg/dL Final   07/22/2024 1602 109 70 - 130 mg/dL Final           XR Chest 1 View  Narrative: XR CHEST 1 VW-     INDICATION: Clinical concern for pneumonia     COMPARISON: Chest radiographs dating back to 11/11/2022     Impression: New patchy inferior left lower lobe opacities, suspicious  for infiltrate. Recommend imaging follow-up to clearance. No pleural  effusion or pneumothorax. Normal size cardiomediastinal silhouette. No  focal osseous abnormality.     This report was finalized on 7/20/2024 3:24 PM by Dr. Flavio Rubio M.D on Workstation: [id number]       Scheduled Medications  atorvastatin, 80 mg, Oral, Nightly  budesonide, 0.5 mg, Nebulization, BID - RT  castor oil-balsam peru, 1 Application, Topical, Q12H  clopidogrel, 75 mg, Oral, Daily  insulin regular, 2-7 Units, Subcutaneous, Q6H  isosorbide mononitrate, 30 mg, Oral, Q24H  losartan, 25 mg, Oral, Nightly  metoprolol tartrate, 5 mg, Intravenous, Q8H  [Held by provider] metoprolol tartrate, 37.5 mg, Oral, Q12H  thiamine, 100 mg, Intravenous, Daily  tiotropium bromide monohydrate, 2 puff, Inhalation, Daily -  RT  vitamin B-12, 500 mcg, Oral, Daily    Infusions  Adult Peripheral or Midline 2-in-1 TPN, , Last Rate: 100 mL/hr at 07/24/24 0831  Pharmacy to Dose TPN,     Diet  NPO Diet NPO Type: Strict NPO  Adult Peripheral or Midline 2-in-1 TPN    I have personally reviewed     [x]  Laboratory   []  Microbiology   []  Radiology   [x]  EKG/Telemetry  []  Cardiology/Vascular   []  Pathology    []  Records       Assessment/Plan     Active Hospital Problems    Diagnosis  POA   • **Acute respiratory failure with hypoxia [J96.01]  Yes   • Severe malnutrition [E43]  Yes   • Acute hypoxemic respiratory failure [J96.01]  Unknown   • PAF (paroxysmal atrial fibrillation) [I48.0]  Yes   • CAD (coronary artery disease) [I25.10]  Yes   • Elevated d-dimer [R79.89]  Yes   • COPD (chronic obstructive pulmonary disease) [J44.9]  Yes   • Dyspnea [R06.00]  Yes   • HTN (hypertension) [I10]  Yes      Resolved Hospital Problems   No resolved problems to display.       COPD with Exacerbation  -Status post steroids  -Continue breathing treatments  -Pulmo following     Dysphagia  -Core track was attempted to be placed however was not successful through 7/21  -Patient is severely malnourished/cachectic.  TPN ordered, needs central line/PICC line for placement of PICC line, however patient unable to consent, and unable to reach spouse for consent as per report patient is positive and admitted to hospital, unknown location.  -Initiated on TPN through 7/21  -General surgery following, plan for PEG tube placement today    Hypokalemia  -WNL         PAF/RVR  - not on AC chronically, poor candidate per cardiology  -On IV metoprolol  -Heart rate improved  -Cardiology evaluated      CAD  - has multiple prior stents  - no anginal symptoms  - continue on plavix and lipitor  - continue imdur and metoprolol    Leukocytosis   -Suspect Reactive secondary to TPN/Steroids   -Monitor   -Improved       SCDs for DVT prophylaxis.  DNR.  Palliative care  evaluated.  Discussed with patient and nursing staff.  Anticipate discharge home timing yet to be determined pending PEG tube placement.  Expected Discharge Date:       is note has been reviewed and is accurate as of 07/24/24.         Dictated utilizing Dragon dictation        Radha Roman MD  [facility]  07/24/24  10:24 EDT

## 2024-07-24 NOTE — PROGRESS NOTES
"Nutrition Services    Patient Name:  Asha Krishnan  YOB: 1950  MRN: [id number]  Admit Date:  7/13/2024  Assessment Date:  07/24/24    Summary: TPN Follow Up  Pt continues on PPN as PEG tube not placed yesterday. PEG tube is tentatively scheduled to be placed today. Pt receiving 65/600 AA/Dex at 75 ml/hr per H note yesterday. PharmD requesting RD recommend new TPN goal as pt weight appears to have changed. Please re-consult once PEG in place and TF approved by surgery.     Plan/recs:   Continue PPN at 75mL/hr until EN established  Goal concentration: 820 dextrose, 55 g protein,   Hold off on lipids for now since getting PEG  Fluid and electrolytes per pharm.    CLINICAL NUTRITION ASSESSMENT      Reason for Assessment TPN Assessment, TF Assessment     Diagnosis/Problem   Acute respiratory failure, dysphagia (previous trauma related cervical fracture requiring halo traction)   Medical/Surgical History Past Medical History:   Diagnosis Date    Asthma     Atrial fibrillation, unspecified type     Cataract     COPD (chronic obstructive pulmonary disease)     Hyperlipidemia     Hypertension        Past Surgical History:   Procedure Laterality Date    CARDIAC CATHETERIZATION N/A 11/11/2022    Procedure: Left Heart Cath;  Surgeon: Tashi De La Torre MD;  Location:  INVASIVE LOCATION;  Service: Cardiovascular;  Laterality: N/A;    CARDIAC CATHETERIZATION N/A 11/11/2022    Procedure: Coronary angiography;  Surgeon: Tashi De La Torre MD;  Location: [facility] CATH INVASIVE LOCATION;  Service: Cardiovascular;  Laterality: N/A;        Anthropometrics        Current Height  Current Weight  BMI kg/m2 Height: 157.5 cm (62\")  Weight: 30.7 kg (67 lb 9.6 oz) (07/24/24 1213)  Body mass index is 12.36 kg/m².   Adjusted BMI (if applicable)    BMI Category Underweight (18.4 or below)   Ideal Body Weight (IBW) 110#   Usual Body Weight (UBW) 110#   Weight Trend Loss   Weight History Wt Readings from Last 30 " Encounters:   07/24/24 1213 30.7 kg (67 lb 9.6 oz)   07/24/24 0600 23.7 kg (52 lb 3.2 oz)   07/22/24 1100 31 kg (68 lb 6.4 oz)   07/22/24 0556 68.4 kg (150 lb 12.7 oz)   07/14/24 0202 37.5 kg (82 lb 10.8 oz)   07/13/24 2156 40.8 kg (90 lb)   08/01/23 1035 49.9 kg (110 lb)   11/12/22 0826 54.4 kg (120 lb)   11/11/22 0222 54.4 kg (120 lb)   11/11/22 0117 54.4 kg (120 lb)         Estimated/Assessed Needs       Energy Requirements    Weight for Calculation 31 kg   Method for Estimation  35-40 kcal/kg   EST Needs (kcal/day) 5757-7255       Protein Requirements    Weight for Calculation 31 kg   EST Protein Needs (g/kg) 1.5 -2.0 gm/kg   EST Daily Needs (g/day) 47-62       Fluid Requirements     Method for Estimation 1 mL/kcal    Estimated Needs (mL/day) 9599-1246     Labs       Pertinent Labs    Results from last 7 days   Lab Units 07/24/24  0338 07/23/24  1254 07/22/24  0612 07/21/24  2240   SODIUM mmol/L 132* 130* 132* 134*   POTASSIUM mmol/L 3.9 3.2* 4.5 4.3   CHLORIDE mmol/L 97* 90* 92* 96*   CO2 mmol/L 28.1 32.2* 28.1 29.4*   BUN mg/dL 15 17 15 13   CREATININE mg/dL 0.25* 0.33* 0.32* 0.32*   CALCIUM mg/dL 8.5* 9.0 9.6 9.1   BILIRUBIN mg/dL 0.5 0.6  --  0.6   ALK PHOS U/L 73 78  --  83   ALT (SGPT) U/L 12 11  --  10   AST (SGOT) U/L 11 10  --  10   GLUCOSE mg/dL 159* 166* 146* 176*     Results from last 7 days   Lab Units 07/24/24  0338 07/23/24  1254 07/22/24  0612   MAGNESIUM mg/dL 1.8 1.8 1.8   PHOSPHORUS mg/dL 2.5 3.2 2.7   HEMOGLOBIN g/dL 12.1 11.8*  --    HEMATOCRIT % 36.1 35.4  --    WBC 10*3/mm3 12.02* 12.47*  --    ALBUMIN g/dL 2.9* 2.9*  --    PREALBUMIN mg/dL  --   --  10.5*     Results from last 7 days   Lab Units 07/24/24  0338 07/23/24  1254 07/22/24  0611 07/21/24  0439 07/20/24  0439   PLATELETS 10*3/mm3 336 373 394 287 286     COVID19   Date Value Ref Range Status   07/13/2024 Not Detected Not Detected - Ref. Range Final     Lab Results   Component Value Date    HGBA1C 6.1 (H) 11/10/2022           Medications           Scheduled Medications atorvastatin, 80 mg, Oral, Nightly  budesonide, 0.5 mg, Nebulization, BID - RT  castor oil-balsam peru, 1 Application, Topical, Q12H  ceFAZolin 2000 mg IVPB in 100 mL NS (MBP), 2,000 mg, Intravenous, Once  clopidogrel, 75 mg, Oral, Daily  insulin regular, 2-7 Units, Subcutaneous, Q6H  isosorbide mononitrate, 30 mg, Oral, Q24H  losartan, 25 mg, Oral, Nightly  metoprolol tartrate, 5 mg, Intravenous, Q8H  [Held by provider] metoprolol tartrate, 37.5 mg, Oral, Q12H  thiamine, 100 mg, Intravenous, Daily  tiotropium bromide monohydrate, 2 puff, Inhalation, Daily - RT  vitamin B-12, 500 mcg, Oral, Daily       Infusions Adult Peripheral or Midline 2-in-1 TPN, , Last Rate: 100 mL/hr at 07/24/24 0831  Adult Peripheral or Midline 2-in-1 TPN,   Pharmacy to Dose TPN,   sodium chloride, 30 mL/hr, Last Rate: 30 mL/hr (07/24/24 1302)       PRN Medications   albuterol    dextrose    dextrose    glucagon (human recombinant)    ketorolac    nitroglycerin    Pharmacy to Dose TPN    [COMPLETED] Insert Peripheral IV **AND** sodium chloride    sodium chloride     Physical Findings          General Findings alert, frail, generalized wasting, loss of muscle mass, loss of subcutaneous fat, oriented, on oxygen therapy   Oral/Mouth Cavity WDL, dental appliance   Edema  no edema   Gastrointestinal WDL, last bowel movement: 7/23   Skin  skin intact   Tubes/Drains/Lines none   NFPE See Malnutrition Severity Assessment, Date Completed: 7/14      Malnutrition Severity Assessment      Patient meets criteria for : Severe Malnutrition            Current Nutrition Orders & Evaluation of Intake       Oral Nutrition     Food Allergies NKFA   Current PO Diet NPO Diet NPO Type: Strict NPO  Adult Peripheral or Midline 2-in-1 TPN  Adult Peripheral or Midline 2-in-1 TPN   Supplement n/a   PO Evaluation     % PO Intake npo    Factors Affecting Intake: chewing difficulty, decreased appetite, early satiety ,  swallow impairment      Parenteral Nutrition      TPN Route Peripheral   TPN Rate/Volume 75 mL/hr, 1800 mL per day   Current TPN Order        Dextrose (kcal) 600       Amino Acid (gm) 65       Lipid Concentration other:       Lipid Volume/Frequency  other:   MVI Frequency     Trace Element Frequency     Total # Days on TPN 4   Propofol Rate/Kcal N/a     PES STATEMENT / NUTRITION DIAGNOSIS      Nutrition Dx Problem  Problem: Malnutrition (severe)  Etiology: Factors Affecting Nutrition - weakness, fatigue, dysphagia    Signs/Symptoms: Report of Minimal PO Intake, NFPE Results, BMI, Unintended Weight Change, Report/Observation, and SLP/Swallow Evaluation     NUTRITION INTERVENTION / PLAN OF CARE      Intervention Goal(s) Establish goals of care         RD Intervention/Action Await initiation/advancement of PO diet, Await initiation of EN/PN, Continue to monitor, and Care plan reviewed   --      Prescription/Orders:       PO Diet       Supplements       Enteral Nutrition       Parenteral Nutrition    Parenteral Prescription:     TPN Route Peripheral   TPN Rate (mL/hr) 75mL/hr   TPN Recommendation:        Dextrose (kcal) 820 kcal       Amino Acid (gm) 65 g        Lipid Concentration 20%       Lipid Volume/Frequency  200 mL   Propofol Rate/Kcal    TPN Provision:   1440 kcal, 65 gm protein        Calories 100 % needs met        Protein  100 % needs met        Fluid Per pharm       New Prescription Ordered?    --      Monitor/Evaluation    Discharge Plan/Needs Pending clinical course   --    RD to follow per protocol.      Electronically signed by:  Sami Biswas  07/24/24 13:08 EDT

## 2024-07-24 NOTE — PROGRESS NOTES
"    Patient Name: Asha Krishnan  :1950  74 y.o.      Patient Care Team:  Capo Pedroza MD as PCP - General (Family Medicine)    Chief Complaint: follow up atrial fibrillation    Interval History:     Back in to SR. HR stable. On IV lopressor. For PEG today. Resting in bed. Sleeping soundly. Does arouse. On TPN.        Objective   Vital Signs  Temp:  [97.2 °F (36.2 °C)-97.7 °F (36.5 °C)] 97.7 °F (36.5 °C)  Heart Rate:  [] 52  Resp:  [16-18] 16  BP: (111-125)/(60-78) 119/63    Intake/Output Summary (Last 24 hours) at 2024 1153  Last data filed at 2024 0957  Gross per 24 hour   Intake 200 ml   Output 1750 ml   Net -1550 ml     Flowsheet Rows      Flowsheet Row First Filed Value   Admission Height 157.5 cm (62\") Documented at 2024   Admission Weight 40.8 kg (90 lb) Documented at 2024            Physical Exam:   General Appearance:    Frail, cachectic    Lungs:     Clear to auscultation.  Normal respiratory effort and rate.      Heart:    irregular rhythm and normal rate, normal S1 and S2, no murmurs, gallops or rubs.     Chest Wall:    No abnormalities observed   Abdomen:     Soft, nontender, positive bowel sounds.     Extremities:   no cyanosis, clubbing or edema.  No marked joint deformities.  Adequate musculoskeletal strength.       Results Review:    Results from last 7 days   Lab Units 24  0338   SODIUM mmol/L 132*   POTASSIUM mmol/L 3.9   CHLORIDE mmol/L 97*   CO2 mmol/L 28.1   BUN mg/dL 15   CREATININE mg/dL 0.25*   GLUCOSE mg/dL 159*   CALCIUM mg/dL 8.5*     Results from last 7 days   Lab Units 24  2240 24  2040   HSTROP T ng/L 28* 26*     Results from last 7 days   Lab Units 24  0338   WBC 10*3/mm3 12.02*   HEMOGLOBIN g/dL 12.1   HEMATOCRIT % 36.1   PLATELETS 10*3/mm3 336           Results from last 7 days   Lab Units 24  0338   MAGNESIUM mg/dL 1.8                   Medication Review:   atorvastatin, 80 mg, Oral, " Nightly  budesonide, 0.5 mg, Nebulization, BID - RT  castor oil-balsam peru, 1 Application, Topical, Q12H  clopidogrel, 75 mg, Oral, Daily  insulin regular, 2-7 Units, Subcutaneous, Q6H  isosorbide mononitrate, 30 mg, Oral, Q24H  losartan, 25 mg, Oral, Nightly  metoprolol tartrate, 5 mg, Intravenous, Q8H  [Held by provider] metoprolol tartrate, 37.5 mg, Oral, Q12H  thiamine, 100 mg, Intravenous, Daily  tiotropium bromide monohydrate, 2 puff, Inhalation, Daily - RT  vitamin B-12, 500 mcg, Oral, Daily         Adult Peripheral or Midline 2-in-1 TPN, , Last Rate: 100 mL/hr at 07/24/24 0831  Pharmacy to Dose TPN,         Assessment & Plan   Acute hypoxic respiratory failure due to COPD with acute exacerbation  Paroxysmal atrial fibrillation with RVR , wasn't taking medications prior to admission. Easily back in to SR then had recurrent AF 7/15 and 7/16.  COPD   Coronary artery disease (PCI with Synergy stent to left circumflex and LAD extending to left main in January 2017), stable recent stress and echo. Denies angina.   Hypertension, losartan added this admission.   Hyperlipidemia  Cachexia with malnitrition  Severe dysphagia, meds crushed   2:1 heart block , with sleep. Asymptomatic.     Poor candidate for anticoagulation.     She may continue to have paroxysms of atrial fibrillation. Currently in SR . Continue IV beta blocker until she can go back on pill.     In general , I would not be aggressive in treating tachycardia or bradycardia unless she becomes terribly symptomatic. And fortunately she does not really notice her AF.     Noted plans for PEG today. Transition to metoprolol 25 mg BID crushed per tube when able. BP was too low for higher dosing and several doses were held.       EMORY Lopez  [facility]  07/24/24  11:53 EDT

## 2024-07-24 NOTE — SIGNIFICANT NOTE
07/24/24 1255   OTHER   Discipline physical therapy assistant   Rehab Time/Intention   Session Not Performed patient unavailable for treatment  (Pt off floor for peg tube. Will follow up with pt tomorrow.)   Recommendation   PT - Next Appointment 07/25/24

## 2024-07-24 NOTE — ANESTHESIA POSTPROCEDURE EVALUATION
Patient: Asha Krishnan    Procedure Summary       Date: 07/24/24 Room / Location: [facility] ENDOSCOPY 7 /  [facility]    Anesthesia Start: 1335 Anesthesia Stop: 1404    Procedure: ESOPHAGOGASTRODUODENOSCOPY WITH PERCUTANEOUS ENDOSCOPIC GASTROSTOMY TUBE INSERTION (Esophagus) Diagnosis:       Severe malnutrition      (Severe malnutrition [E43])    Surgeons: Violet Koenig MD Provider: Maira Aleman MD    Anesthesia Type: MAC ASA Status: 4            Anesthesia Type: MAC    Vitals  Vitals Value Taken Time   /50 07/24/24 1359   Temp     Pulse 66 07/24/24 1359   Resp 20 07/24/24 1359   SpO2 96 % 07/24/24 1359           Post Anesthesia Care and Evaluation    Patient location during evaluation: bedside  Patient participation: complete - patient participated  Level of consciousness: awake  Pain management: adequate    Airway patency: patent  Anesthetic complications: No anesthetic complications    Cardiovascular status: acceptable  Respiratory status: acceptable  Hydration status: acceptable

## 2024-07-25 LAB
ALBUMIN SERPL-MCNC: 2.7 G/DL (ref 3.5–5.2)
ALBUMIN/GLOB SERPL: 0.9 G/DL
ALP SERPL-CCNC: 76 U/L (ref 39–117)
ALT SERPL W P-5'-P-CCNC: 11 U/L (ref 1–33)
ANION GAP SERPL CALCULATED.3IONS-SCNC: 7.4 MMOL/L (ref 5–15)
AST SERPL-CCNC: 10 U/L (ref 1–32)
BASOPHILS # BLD AUTO: 0.03 10*3/MM3 (ref 0–0.2)
BASOPHILS NFR BLD AUTO: 0.2 % (ref 0–1.5)
BILIRUB SERPL-MCNC: 0.6 MG/DL (ref 0–1.2)
BUN SERPL-MCNC: 14 MG/DL (ref 8–23)
BUN/CREAT SERPL: 51.9 (ref 7–25)
CALCIUM SPEC-SCNC: 8.2 MG/DL (ref 8.6–10.5)
CHLORIDE SERPL-SCNC: 100 MMOL/L (ref 98–107)
CO2 SERPL-SCNC: 25.6 MMOL/L (ref 22–29)
CREAT SERPL-MCNC: 0.27 MG/DL (ref 0.57–1)
DEPRECATED RDW RBC AUTO: 44.2 FL (ref 37–54)
EGFRCR SERPLBLD CKD-EPI 2021: 112.9 ML/MIN/1.73
EOSINOPHIL # BLD AUTO: 0.13 10*3/MM3 (ref 0–0.4)
EOSINOPHIL NFR BLD AUTO: 0.7 % (ref 0.3–6.2)
ERYTHROCYTE [DISTWIDTH] IN BLOOD BY AUTOMATED COUNT: 13.1 % (ref 12.3–15.4)
GLOBULIN UR ELPH-MCNC: 2.9 GM/DL
GLUCOSE BLDC GLUCOMTR-MCNC: 128 MG/DL (ref 70–130)
GLUCOSE BLDC GLUCOMTR-MCNC: 134 MG/DL (ref 70–130)
GLUCOSE BLDC GLUCOMTR-MCNC: 144 MG/DL (ref 70–130)
GLUCOSE BLDC GLUCOMTR-MCNC: 145 MG/DL (ref 70–130)
GLUCOSE SERPL-MCNC: 137 MG/DL (ref 65–99)
HCT VFR BLD AUTO: 33 % (ref 34–46.6)
HGB BLD-MCNC: 10.7 G/DL (ref 12–15.9)
IMM GRANULOCYTES # BLD AUTO: 0.14 10*3/MM3 (ref 0–0.05)
IMM GRANULOCYTES NFR BLD AUTO: 0.8 % (ref 0–0.5)
LYMPHOCYTES # BLD AUTO: 0.75 10*3/MM3 (ref 0.7–3.1)
LYMPHOCYTES NFR BLD AUTO: 4.2 % (ref 19.6–45.3)
MAGNESIUM SERPL-MCNC: 1.8 MG/DL (ref 1.6–2.4)
MCH RBC QN AUTO: 29.3 PG (ref 26.6–33)
MCHC RBC AUTO-ENTMCNC: 32.4 G/DL (ref 31.5–35.7)
MCV RBC AUTO: 90.4 FL (ref 79–97)
MONOCYTES # BLD AUTO: 0.39 10*3/MM3 (ref 0.1–0.9)
MONOCYTES NFR BLD AUTO: 2.2 % (ref 5–12)
NEUTROPHILS NFR BLD AUTO: 16.48 10*3/MM3 (ref 1.7–7)
NEUTROPHILS NFR BLD AUTO: 91.9 % (ref 42.7–76)
NRBC BLD AUTO-RTO: 0 /100 WBC (ref 0–0.2)
PHOSPHATE SERPL-MCNC: 2.3 MG/DL (ref 2.5–4.5)
PLATELET # BLD AUTO: 323 10*3/MM3 (ref 140–450)
PMV BLD AUTO: 9.8 FL (ref 6–12)
POTASSIUM SERPL-SCNC: 4.1 MMOL/L (ref 3.5–5.2)
PROT SERPL-MCNC: 5.6 G/DL (ref 6–8.5)
RBC # BLD AUTO: 3.65 10*6/MM3 (ref 3.77–5.28)
SODIUM SERPL-SCNC: 133 MMOL/L (ref 136–145)
WBC NRBC COR # BLD AUTO: 17.92 10*3/MM3 (ref 3.4–10.8)

## 2024-07-25 PROCEDURE — 84100 ASSAY OF PHOSPHORUS: CPT | Performed by: STUDENT IN AN ORGANIZED HEALTH CARE EDUCATION/TRAINING PROGRAM

## 2024-07-25 PROCEDURE — 94761 N-INVAS EAR/PLS OXIMETRY MLT: CPT

## 2024-07-25 PROCEDURE — 25010000002 THIAMINE HCL 200 MG/2ML SOLUTION: Performed by: HOSPITALIST

## 2024-07-25 PROCEDURE — 99232 SBSQ HOSP IP/OBS MODERATE 35: CPT | Performed by: STUDENT IN AN ORGANIZED HEALTH CARE EDUCATION/TRAINING PROGRAM

## 2024-07-25 PROCEDURE — 80053 COMPREHEN METABOLIC PANEL: CPT | Performed by: STUDENT IN AN ORGANIZED HEALTH CARE EDUCATION/TRAINING PROGRAM

## 2024-07-25 PROCEDURE — 85025 COMPLETE CBC W/AUTO DIFF WBC: CPT | Performed by: INTERNAL MEDICINE

## 2024-07-25 PROCEDURE — 94799 UNLISTED PULMONARY SVC/PX: CPT

## 2024-07-25 PROCEDURE — 97110 THERAPEUTIC EXERCISES: CPT

## 2024-07-25 PROCEDURE — 25810000003 SODIUM CHLORIDE 0.9 % SOLUTION: Performed by: STUDENT IN AN ORGANIZED HEALTH CARE EDUCATION/TRAINING PROGRAM

## 2024-07-25 PROCEDURE — 25810000003 LACTATED RINGERS PER 1000 ML: Performed by: STUDENT IN AN ORGANIZED HEALTH CARE EDUCATION/TRAINING PROGRAM

## 2024-07-25 PROCEDURE — 94664 DEMO&/EVAL PT USE INHALER: CPT

## 2024-07-25 PROCEDURE — 82948 REAGENT STRIP/BLOOD GLUCOSE: CPT

## 2024-07-25 PROCEDURE — 83735 ASSAY OF MAGNESIUM: CPT | Performed by: STUDENT IN AN ORGANIZED HEALTH CARE EDUCATION/TRAINING PROGRAM

## 2024-07-25 PROCEDURE — 94760 N-INVAS EAR/PLS OXIMETRY 1: CPT

## 2024-07-25 PROCEDURE — 25010000002 KETOROLAC TROMETHAMINE PER 15 MG: Performed by: INTERNAL MEDICINE

## 2024-07-25 PROCEDURE — 99232 SBSQ HOSP IP/OBS MODERATE 35: CPT | Performed by: INTERNAL MEDICINE

## 2024-07-25 RX ORDER — SODIUM CHLORIDE, SODIUM LACTATE, POTASSIUM CHLORIDE, CALCIUM CHLORIDE 600; 310; 30; 20 MG/100ML; MG/100ML; MG/100ML; MG/100ML
50 INJECTION, SOLUTION INTRAVENOUS CONTINUOUS
Status: DISCONTINUED | OUTPATIENT
Start: 2024-07-25 | End: 2024-07-26

## 2024-07-25 RX ORDER — SODIUM CHLORIDE, SODIUM LACTATE, POTASSIUM CHLORIDE, CALCIUM CHLORIDE 600; 310; 30; 20 MG/100ML; MG/100ML; MG/100ML; MG/100ML
75 INJECTION, SOLUTION INTRAVENOUS CONTINUOUS
Status: DISCONTINUED | OUTPATIENT
Start: 2024-07-25 | End: 2024-07-25

## 2024-07-25 RX ORDER — ATORVASTATIN CALCIUM 80 MG/1
80 TABLET, FILM COATED ORAL NIGHTLY
Status: DISCONTINUED | OUTPATIENT
Start: 2024-07-25 | End: 2024-08-01 | Stop reason: HOSPADM

## 2024-07-25 RX ORDER — UREA 10 %
500 LOTION (ML) TOPICAL DAILY
Status: DISCONTINUED | OUTPATIENT
Start: 2024-07-25 | End: 2024-08-01 | Stop reason: HOSPADM

## 2024-07-25 RX ORDER — LOSARTAN POTASSIUM 25 MG/1
25 TABLET ORAL NIGHTLY
Status: DISCONTINUED | OUTPATIENT
Start: 2024-07-25 | End: 2024-08-01 | Stop reason: HOSPADM

## 2024-07-25 RX ORDER — CLOPIDOGREL BISULFATE 75 MG/1
75 TABLET ORAL DAILY
Status: DISCONTINUED | OUTPATIENT
Start: 2024-07-25 | End: 2024-08-01 | Stop reason: HOSPADM

## 2024-07-25 RX ORDER — ISOSORBIDE DINITRATE 10 MG/1
10 TABLET ORAL
Status: DISCONTINUED | OUTPATIENT
Start: 2024-07-25 | End: 2024-08-01 | Stop reason: HOSPADM

## 2024-07-25 RX ADMIN — CLOPIDOGREL BISULFATE 75 MG: 75 TABLET, FILM COATED ORAL at 09:29

## 2024-07-25 RX ADMIN — CASTOR OIL AND BALSAM, PERU 1 APPLICATION: 788; 87 OINTMENT TOPICAL at 09:29

## 2024-07-25 RX ADMIN — SODIUM PHOSPHATE, MONOBASIC, MONOHYDRATE AND SODIUM PHOSPHATE, DIBASIC, ANHYDROUS 15 MMOL: 276; 142 INJECTION, SOLUTION INTRAVENOUS at 09:27

## 2024-07-25 RX ADMIN — SODIUM CHLORIDE, POTASSIUM CHLORIDE, SODIUM LACTATE AND CALCIUM CHLORIDE 50 ML/HR: 600; 310; 30; 20 INJECTION, SOLUTION INTRAVENOUS at 20:50

## 2024-07-25 RX ADMIN — ISOSORBIDE DINITRATE 10 MG: 10 TABLET ORAL at 11:42

## 2024-07-25 RX ADMIN — CASTOR OIL AND BALSAM, PERU 1 APPLICATION: 788; 87 OINTMENT TOPICAL at 23:04

## 2024-07-25 RX ADMIN — ATORVASTATIN CALCIUM 80 MG: 80 TABLET, FILM COATED ORAL at 21:05

## 2024-07-25 RX ADMIN — METOPROLOL TARTRATE 37.5 MG: 25 TABLET, FILM COATED ORAL at 09:30

## 2024-07-25 RX ADMIN — Medication 10 ML: at 09:29

## 2024-07-25 RX ADMIN — METOPROLOL TARTRATE 5 MG: 1 INJECTION, SOLUTION INTRAVENOUS at 05:07

## 2024-07-25 RX ADMIN — BUDESONIDE 0.5 MG: 0.5 INHALANT ORAL at 19:12

## 2024-07-25 RX ADMIN — DIBASIC SODIUM PHOSPHATE, MONOBASIC POTASSIUM PHOSPHATE AND MONOBASIC SODIUM PHOSPHATE 1 TABLET: 852; 155; 130 TABLET ORAL at 11:42

## 2024-07-25 RX ADMIN — BUDESONIDE 0.5 MG: 0.5 INHALANT ORAL at 06:55

## 2024-07-25 RX ADMIN — KETOROLAC TROMETHAMINE 15 MG: 15 INJECTION, SOLUTION INTRAMUSCULAR; INTRAVENOUS at 04:37

## 2024-07-25 RX ADMIN — METOPROLOL TARTRATE 25 MG: 25 TABLET, FILM COATED ORAL at 21:05

## 2024-07-25 RX ADMIN — KETOROLAC TROMETHAMINE 15 MG: 15 INJECTION, SOLUTION INTRAMUSCULAR; INTRAVENOUS at 11:42

## 2024-07-25 RX ADMIN — Medication 500 MCG: at 09:29

## 2024-07-25 RX ADMIN — THIAMINE HYDROCHLORIDE 100 MG: 100 INJECTION, SOLUTION INTRAMUSCULAR; INTRAVENOUS at 09:29

## 2024-07-25 RX ADMIN — TIOTROPIUM BROMIDE INHALATION SPRAY 2 PUFF: 3.12 SPRAY, METERED RESPIRATORY (INHALATION) at 06:56

## 2024-07-25 RX ADMIN — DIBASIC SODIUM PHOSPHATE, MONOBASIC POTASSIUM PHOSPHATE AND MONOBASIC SODIUM PHOSPHATE 2 TABLET: 852; 155; 130 TABLET ORAL at 21:05

## 2024-07-25 NOTE — THERAPY TREATMENT NOTE
Patient Name: Asha Krishnan  : 1950    MRN: [id number]                              Today's Date: 2024       Admit Date: 2024    Visit Dx:     ICD-10-CM ICD-9-CM   1. Acute hypoxemic respiratory failure  J96.01 518.81   2. COPD with exacerbation  J44.1 491.21   3. Atrial fibrillation with rapid ventricular response  I48.91 427.31   4. Severe malnutrition  E43 261     Patient Active Problem List   Diagnosis    Chest pain, atypical    Chest pain, unspecified type    CAD (coronary artery disease)    Dysuria    Dyspnea    Elevated d-dimer    COPD (chronic obstructive pulmonary disease)    HTN (hypertension)    HLD (hyperlipidemia)    Altered mental status, unspecified    PAF (paroxysmal atrial fibrillation)    Pulmonary nodule 1 cm or greater in diameter    Anemia    Acute respiratory failure with hypoxia    Severe malnutrition    Acute hypoxemic respiratory failure     Past Medical History:   Diagnosis Date    Asthma     Atrial fibrillation, unspecified type     Cataract     COPD (chronic obstructive pulmonary disease)     Hyperlipidemia     Hypertension      Past Surgical History:   Procedure Laterality Date    CARDIAC CATHETERIZATION N/A 2022    Procedure: Left Heart Cath;  Surgeon: Tashi De La Torre MD;  Location: [facility] CATH INVASIVE LOCATION;  Service: Cardiovascular;  Laterality: N/A;    CARDIAC CATHETERIZATION N/A 2022    Procedure: Coronary angiography;  Surgeon: Tashi De La Torre MD;  Location: [facility] CATH INVASIVE LOCATION;  Service: Cardiovascular;  Laterality: N/A;    CERVICAL LAMINECTOMY      1995    FEMUR FRACTURE SURGERY Left           General Information       Row Name 24 1528          Physical Therapy Time and Intention    Document Type therapy note (daily note)  -MS     Mode of Treatment physical therapy  -MS       Row Name 24 1528          General Information    Existing Precautions/Restrictions fall;oxygen therapy device and L/min  -MS       Row  Name 07/25/24 1528          Cognition    Orientation Status (Cognition) oriented x 3  -MS       Row Name 07/25/24 1528          Safety Issues, Functional Mobility    Safety Issues Affecting Function (Mobility) judgment;insight into deficits/self-awareness  -MS     Impairments Affecting Function (Mobility) balance;endurance/activity tolerance;strength  -MS     Comment, Safety Issues/Impairments (Mobility) Non skid socks donned.  -MS               User Key  (r) = Recorded By, (t) = Taken By, (c) = Cosigned By      Initials Name Provider Type    Kaitlyn Wan, PT Physical Therapist                   Mobility       Row Name 07/25/24 1528          Bed Mobility    Bed Mobility supine-sit;sit-supine  -MS     Supine-Sit [place] (Bed Mobility) minimum assist (75% patient effort);verbal cues;nonverbal cues (demo/gesture)  -MS     Sit-Supine [place] (Bed Mobility) minimum assist (75% patient effort);verbal cues;nonverbal cues (demo/gesture)  -MS     Assistive Device (Bed Mobility) bed rails;head of bed elevated  -MS     Comment, (Bed Mobility) SBA-CGA for sitting balance. Fatigued quickly and c/o dizziness. Patient declined further activity.  -MS               User Key  (r) = Recorded By, (t) = Taken By, (c) = Cosigned By      Initials Name Provider Type    Kaitlyn Wan, PT Physical Therapist                   Obj/Interventions       Row Name 07/25/24 1529          Motor Skills    Therapeutic Exercise --  Seated LAQs and APs x 10 reps  -MS       Row Name 07/25/24 1529          Balance    Static Sitting Balance standby assist  -MS     Position, Sitting Balance sitting edge of bed  -MS               User Key  (r) = Recorded By, (t) = Taken By, (c) = Cosigned By      Initials Name Provider Type    Kaitlyn Wan, PT Physical Therapist                   Goals/Plan    No documentation.                  Clinical Impression       Row Name 07/25/24 1529          Pain    Pretreatment Pain Rating 0/10 -  no pain  -MS     Posttreatment Pain Rating 0/10 - no pain  -MS       Row Name 07/25/24 1529          Plan of Care Review    Plan of Care Reviewed With patient;significant other  -MS     Outcome Evaluation Patient resting in bed upon PT arrival with S.O. at bedside. Patient agreeable to PT and required Abner to achieve EOB. Patient fatigued quickly and reported dizziness when sitting EOB. Patient also voiced frustration with recommendations for SNF with transition to LTC. Decreased insight into deficits noted during conversation. PT will continue to follow and advance as able.  -MS       Row Name 07/25/24 1529          Positioning and Restraints    Pre-Treatment Position in bed  -MS     Post Treatment Position bed  -MS     In Bed notified nsg;fowlers;call light within reach;encouraged to call for assist;exit alarm on  -MS               User Key  (r) = Recorded By, (t) = Taken By, (c) = Cosigned By      Initials Name Provider Type    Kaitlyn Wan YUNIEL, PT Physical Therapist                   Outcome Measures       Row Name 07/25/24 1532 07/25/24 1142       How much help from another person do you currently need...    Turning from your back to your side while in flat bed without using bedrails? 3  -MS 2  -JH    Moving from lying on back to sitting on the side of a flat bed without bedrails? 2  -MS 1  -JH    Moving to and from a bed to a chair (including a wheelchair)? 1  -MS 1  -JH    Standing up from a chair using your arms (e.g., wheelchair, bedside chair)? 1  -MS 1  -JH    Climbing 3-5 steps with a railing? 1  -MS 1  -JH    To walk in hospital room? 1  -MS 1  -JH    AM-PAC 6 Clicks Score (PT) 9  -MS 7  -JH    Highest Level of Mobility Goal 3 --> Sit at edge of bed  -MS 2 --> Bed activities/dependent transfer  -JH      Row Name 07/25/24 0930          How much help from another person do you currently need...    Turning from your back to your side while in flat bed without using bedrails? 2  -KS     Moving from  lying on back to sitting on the side of a flat bed without bedrails? 1  -KS     Moving to and from a bed to a chair (including a wheelchair)? 1  -KS     Standing up from a chair using your arms (e.g., wheelchair, bedside chair)? 1  -KS     Climbing 3-5 steps with a railing? 1  -KS     To walk in hospital room? 1  -KS     AM-PAC 6 Clicks Score (PT) 7  -KS     Highest Level of Mobility Goal 2 --> Bed activities/dependent transfer  -KS               User Key  (r) = Recorded By, (t) = Taken By, (c) = Cosigned By      Initials Name Provider Type    Radha Dye, RN Registered Nurse    Kaitlyn Wan, PT Physical Therapist    Sunshine Salinas RN Registered Nurse                                 Physical Therapy Education       Title: PT OT SLP Therapies (In Progress)       Topic: Physical Therapy (In Progress)       Point: Mobility training (In Progress)       Learning Progress Summary             Patient Acceptance, E,TB, NR by MS at 7/25/2024 1532    Acceptance, E,TB,D, VU,NR by  at 7/17/2024 1540    Acceptance, E, VU,NR by  at 7/15/2024 1123                         Point: Home exercise program (In Progress)       Learning Progress Summary             Patient Acceptance, E,TB, NR by MS at 7/25/2024 1532    Acceptance, E,TB,D, VU,NR by  at 7/17/2024 1540                         Point: Body mechanics (In Progress)       Learning Progress Summary             Patient Acceptance, E,TB, NR by MS at 7/25/2024 1532    Acceptance, E,TB,D, VU,NR by  at 7/17/2024 1540                         Point: Precautions (In Progress)       Learning Progress Summary             Patient Acceptance, E,TB, NR by MS at 7/25/2024 1532    Acceptance, E,TB,D, VU,NR by  at 7/17/2024 1540                                         User Key       Initials Effective Dates Name Provider Type Discipline     03/07/18 -  Jennifer Barker, PTA Physical Therapist Assistant PT    MS 06/16/21 -  Kaitlyn Torres PT Physical Therapist  PT    CW 12/13/22 -  Helene Pike, PT Physical Therapist PT                  PT Recommendation and Plan     Plan of Care Reviewed With: patient, significant other  Outcome Evaluation: Patient resting in bed upon PT arrival with S.O. at bedside. Patient agreeable to PT and required Abner to achieve EOB. Patient fatigued quickly and reported dizziness when sitting EOB. Patient also voiced frustration with recommendations for SNF with transition to LTC. Decreased insight into deficits noted during conversation. PT will continue to follow and advance as able.     Time Calculation:         PT Charges       Row Name 07/25/24 1527             Time Calculation    Start Time 1344  -MS      Stop Time 1400  -MS      Time Calculation (min) 16 min  -MS      PT Received On 07/25/24  -MS      PT - Next Appointment 07/26/24  -MS                User Key  (r) = Recorded By, (t) = Taken By, (c) = Cosigned By      Initials Name Provider Type    Kaitlyn Wan, PT Physical Therapist                  Therapy Charges for Today       Code Description Service Date Service Provider Modifiers Qty    52559392447 HC PT THER PROC EA 15 MIN 7/25/2024 Kaitlyn Torres, PT GP 1            PT G-Codes  Outcome Measure Options: AM-PAC 6 Clicks Basic Mobility (PT)  AM-PAC 6 Clicks Score (PT): 9  PT Discharge Summary  Anticipated Discharge Disposition (PT): skilled nursing facility, extended care facility    Kaitlyn Torres PT  7/25/2024

## 2024-07-25 NOTE — PLAN OF CARE
Goal Outcome Evaluation:   Patient resting in bed. PRN pain medication given (see eMAR). NSR throughout the night. PEG tube placed yesterday. Clamped overnight. No orders for tube feeds at this time. Multiple loose Bms overnight. Hall catheter in place for urine retention. Significant other at bedside. All questions answered.

## 2024-07-25 NOTE — PROGRESS NOTES
LOS: 9 days   Patient Care Team:  Capo Pedroza MD as PCP - General (Family Medicine)    Chief Complaint: Follow-up paroxysmal atrial fibrillation.    Interval History: Sinus rhythm.  Underwent G-tube placement yesterday.  No chest pain.    Vital Signs:  Temp:  [97.8 °F (36.6 °C)-98.4 °F (36.9 °C)] 97.8 °F (36.6 °C)  Heart Rate:  [59-76] 68  Resp:  [14-21] 17  BP: (112-176)/(40-56) 112/40    Intake/Output Summary (Last 24 hours) at 7/25/2024 1416  Last data filed at 7/25/2024 1343  Gross per 24 hour   Intake 10 ml   Output 825 ml   Net -815 ml       Physical Exam:   General Appearance:    Frail, cachectic.   Lungs:     Decreased breath sounds.    Heart:    Regular rhythm and normal rate.  No murmurs, gallops, or rubs.   Abdomen:     Soft, nontender, nondistended.    Extremities:   No clubbing, cyanosis, or edema.     Results Review:    Results from last 7 days   Lab Units 07/25/24  0329   SODIUM mmol/L 133*   POTASSIUM mmol/L 4.1   CHLORIDE mmol/L 100   CO2 mmol/L 25.6   BUN mg/dL 14   CREATININE mg/dL 0.27*   GLUCOSE mg/dL 137*   CALCIUM mg/dL 8.2*     Results from last 7 days   Lab Units 07/21/24  2240 07/21/24  2040   HSTROP T ng/L 28* 26*     Results from last 7 days   Lab Units 07/25/24  0329   WBC 10*3/mm3 17.92*   HEMOGLOBIN g/dL 10.7*   HEMATOCRIT % 33.0*   PLATELETS 10*3/mm3 323               Results from last 7 days   Lab Units 07/25/24  0329   MAGNESIUM mg/dL 1.8           I reviewed the patient's new clinical results.        Assessment:  1.  COPD with acute exacerbation  2.  Paroxysmal atrial fibrillation with RVR  3.  Coronary artery disease with DAVID from the left circumflex and LAD extending into the left main in January 2017.   of the RCA by cath in 2022.    4.  Dysphagia, severe - status post G-tube placement on 7/25/2024  5.  Cachexia and protein calorie malnutrition  6.  Hypertension  7.  2:1 AV block during sleep (asymptomatic)    Plan:  -G-tube placed yesterday.  Decrease  metoprolol from 37.5 mg twice daily to 25 mg twice daily given that she has had mild bradycardia and hypotension at times as well.    -I strongly suspect that she is going to continue to have atrial fibrillation given her cachectic state and her pulmonary issues.  She is not a good candidate for amiodarone because of her pulmonary issues.    -Similarly, she is not a good candidate for systemic anticoagulation secondary to bleeding risk and potential falls.    -Continue Plavix, Imdur, and Lipitor for her coronary artery disease.  No angina.    Hema Aguiar MD  07/25/24  14:16 EDT

## 2024-07-25 NOTE — PROGRESS NOTES
"General Surgery Progress Note    Summary:  Mrs. Asha Krishnan is a 74 y.o. year old lady POD1 status post PEG tube placement.  Okay to start tube feeds today.  Will follow dietary recommendations, but would start at a very slow rate and advance slowly due to her high risk for refeeding syndrome.  Bumper at 2-1/2 cm.  Okay to use.    Interval Events: No acute events overnight.  States she feels like she has phlegm in her throat.  Feeding tube was at 2-1/2 cm at the skin and clamped.    Vitals: /56 (BP Location: Right arm, Patient Position: Lying)   Pulse 59   Temp 98 °F (36.7 °C) (Oral)   Resp 14   Ht 157.5 cm (62\")   Wt 25.3 kg (55 lb 12.8 oz)   SpO2 100%   BMI 10.21 kg/m²     GENERAL: alert, frail appearing, and in no distress, BMI 10.2  HEENT: normocephalic, atraumatic, moist mucous membranes, clear sclerae   CHEST: no increased work of breathing, symmetric air entry  CARDIAC: regular rate and rhythm    ABDOMEN: PEG tube in place at 2-1/2 cm.  No erythema.  EXTREMITIES: no cyanosis, clubbing, or edema   SKIN: Warm and moist, no rashes    Labs:  Results from last 7 days   Lab Units 07/25/24  0329 07/24/24  0338 07/23/24  1254 07/22/24  0611 07/21/24  0439 07/20/24  0439   WBC 10*3/mm3 17.92* 12.02* 12.47*   < > 9.00 9.40   HEMOGLOBIN g/dL 10.7* 12.1 11.8*   < > 11.0* 10.1*   HEMATOCRIT % 33.0* 36.1 35.4   < > 33.3* 30.7*   PLATELETS 10*3/mm3 323 336 373   < > 287 286   MONOCYTES % %  --   --   --   --  5.0 11.0   EOSINOPHIL % %  --   --   --   --  0.0* 0.0*    < > = values in this interval not displayed.     Results from last 7 days   Lab Units 07/25/24  0329 07/24/24  0338 07/23/24  1254   SODIUM mmol/L 133* 132* 130*   POTASSIUM mmol/L 4.1 3.9 3.2*   CHLORIDE mmol/L 100 97* 90*   CO2 mmol/L 25.6 28.1 32.2*   BUN mg/dL 14 15 17   CREATININE mg/dL 0.27* 0.25* 0.33*   CALCIUM mg/dL 8.2* 8.5* 9.0   BILIRUBIN mg/dL 0.6 0.5 0.6   ALK PHOS U/L 76 73 78   ALT (SGPT) U/L 11 12 11   AST (SGOT) U/L 10 11 10 "   GLUCOSE mg/dL 137* 159* 166*           CLINT OROZCO MD  General and Endoscopic Surgery  [facility]    [address], [address]  [place], [place], [address]  P: [phone]  F: [phone]

## 2024-07-25 NOTE — CONSULTS
"Nutrition Services    Patient Name:  Asha Krishnan  YOB: 1950  MRN: [id number]  Admit Date:  7/13/2024  Assessment Date:  07/25/24    Summary: Consult for TF assessment  Pt had PEG placed yesterday. Surgery okay with initiating tube feeds. Pt continues on PPN with 45 AA and 550 Dex at 75 ml/hr. Pt denied any n/v or stomach pain.     Plan/recs:   Initiate Nutren 1.5 at 10 ml/hr and do not advance until okay with surgery. Once okay with surgery, advance to Goal rate of 35 ml/hr. Goal rate to provide 1260 kcals/day, 57 g/protein, and 641 ml free water. Flush 30 ml free water q 4 hrs.   Continue PPN at 75mL/hr until EN tolerated at goal rate.   PPN goal concentration per pharmacy: 750 dextrose, 45-60 AA, 20% 100 ml lipids 7 days/week  Fluid and electrolytes per pharm.    CLINICAL NUTRITION ASSESSMENT      Reason for Assessment TF Assessment     Diagnosis/Problem   Acute respiratory failure, dysphagia (previous trauma related cervical fracture requiring halo traction)   Medical/Surgical History Past Medical History:   Diagnosis Date    Asthma     Atrial fibrillation, unspecified type     Cataract     COPD (chronic obstructive pulmonary disease)     Hyperlipidemia     Hypertension        Past Surgical History:   Procedure Laterality Date    CARDIAC CATHETERIZATION N/A 11/11/2022    Procedure: Left Heart Cath;  Surgeon: Tashi De La Torre MD;  Location: [facility] INVASIVE LOCATION;  Service: Cardiovascular;  Laterality: N/A;    CARDIAC CATHETERIZATION N/A 11/11/2022    Procedure: Coronary angiography;  Surgeon: Tashi De La Torre MD;  Location: [facility] INVASIVE LOCATION;  Service: Cardiovascular;  Laterality: N/A;    CERVICAL LAMINECTOMY      1995    FEMUR FRACTURE SURGERY Left     2008        Anthropometrics        Current Height  Current Weight  BMI kg/m2 Height: 157.5 cm (62\")  Weight: 25.3 kg (55 lb 12.8 oz) (07/25/24 0655)  Body mass index is 10.21 kg/m².   Adjusted BMI (if applicable)    BMI " Category Underweight (18.4 or below)   Ideal Body Weight (IBW) 110#   Usual Body Weight (UBW) 110#   Weight Trend Loss   Weight History Wt Readings from Last 30 Encounters:   07/25/24 0655 25.3 kg (55 lb 12.8 oz)   07/24/24 1213 30.7 kg (67 lb 9.6 oz)   07/24/24 0600 23.7 kg (52 lb 3.2 oz)   07/22/24 1100 31 kg (68 lb 6.4 oz)   07/22/24 0556 68.4 kg (150 lb 12.7 oz)   07/14/24 0202 37.5 kg (82 lb 10.8 oz)   07/13/24 2156 40.8 kg (90 lb)   08/01/23 1035 49.9 kg (110 lb)   11/12/22 0826 54.4 kg (120 lb)   11/11/22 0222 54.4 kg (120 lb)   11/11/22 0117 54.4 kg (120 lb)         Estimated/Assessed Needs       Energy Requirements    Weight for Calculation 31 kg   Method for Estimation  35-40 kcal/kg   EST Needs (kcal/day) 1390-1679       Protein Requirements    Weight for Calculation 31 kg   EST Protein Needs (g/kg) 1.5 -2.0 gm/kg   EST Daily Needs (g/day) 47-62       Fluid Requirements     Method for Estimation 1 mL/kcal    Estimated Needs (mL/day) 2457-2605     Labs       Pertinent Labs    Results from last 7 days   Lab Units 07/25/24  0329 07/24/24  0338 07/23/24  1254   SODIUM mmol/L 133* 132* 130*   POTASSIUM mmol/L 4.1 3.9 3.2*   CHLORIDE mmol/L 100 97* 90*   CO2 mmol/L 25.6 28.1 32.2*   BUN mg/dL 14 15 17   CREATININE mg/dL 0.27* 0.25* 0.33*   CALCIUM mg/dL 8.2* 8.5* 9.0   BILIRUBIN mg/dL 0.6 0.5 0.6   ALK PHOS U/L 76 73 78   ALT (SGPT) U/L 11 12 11   AST (SGOT) U/L 10 11 10   GLUCOSE mg/dL 137* 159* 166*     Results from last 7 days   Lab Units 07/25/24  0329 07/24/24  0338 07/23/24  1254 07/22/24  0612   MAGNESIUM mg/dL 1.8 1.8 1.8 1.8   PHOSPHORUS mg/dL 2.3* 2.5 3.2 2.7   HEMOGLOBIN g/dL 10.7* 12.1 11.8*  --    HEMATOCRIT % 33.0* 36.1 35.4  --    WBC 10*3/mm3 17.92* 12.02* 12.47*  --    ALBUMIN g/dL 2.7* 2.9* 2.9*  --    PREALBUMIN mg/dL  --   --   --  10.5*     Results from last 7 days   Lab Units 07/25/24  0329 07/24/24  0338 07/23/24  1254 07/22/24  0611 07/21/24  0439   PLATELETS 10*3/mm3 323 336 373 394 967      COVID19   Date Value Ref Range Status   07/13/2024 Not Detected Not Detected - Ref. Range Final     Lab Results   Component Value Date    HGBA1C 6.1 (H) 11/10/2022          Medications           Scheduled Medications atorvastatin, 80 mg, Per G Tube, Nightly  budesonide, 0.5 mg, Nebulization, BID - RT  castor oil-balsam peru, 1 Application, Topical, Q12H  clopidogrel, 75 mg, Per G Tube, Daily  insulin regular, 2-7 Units, Subcutaneous, Q6H  isosorbide dinitrate, 10 mg, Per G Tube, TID - Nitrates  losartan, 25 mg, Per G Tube, Nightly  metoprolol tartrate, 37.5 mg, Per G Tube, Q12H  sodium phosphate, 15 mmol, Intravenous, Once  thiamine, 100 mg, Intravenous, Daily  tiotropium bromide monohydrate, 2 puff, Inhalation, Daily - RT  vitamin B-12, 500 mcg, Per G Tube, Daily       Infusions       PRN Medications   albuterol    dextrose    dextrose    glucagon (human recombinant)    ketorolac    nitroglycerin    polyethylene Glycol 400    [COMPLETED] Insert Peripheral IV **AND** sodium chloride     Physical Findings          General Findings alert, frail, generalized wasting, loss of muscle mass, loss of subcutaneous fat, oriented, on oxygen therapy   Oral/Mouth Cavity WDL, dental appliance   Edema  no edema   Gastrointestinal WDL, last bowel movement: 7/24   Skin  skin intact   Tubes/Drains/Lines PEG   NFPE See Malnutrition Severity Assessment, Date Completed: 7/14      Malnutrition Severity Assessment      Patient meets criteria for : Severe Malnutrition            Current Nutrition Orders & Evaluation of Intake       Oral Nutrition     Food Allergies NKFA   Current PO Diet NPO Diet NPO Type: Strict NPO   Supplement n/a   PO Evaluation     % PO Intake npo    Factors Affecting Intake: chewing difficulty, decreased appetite, early satiety , swallow impairment      Parenteral Nutrition      TPN Route Peripheral   TPN Rate/Volume 75 mL/hr, 1800 mL per day   Current TPN Order        Dextrose (kcal) 550       Amino Acid (gm)  45       Lipid Concentration other:       Lipid Volume/Frequency  other:   MVI Frequency     Trace Element Frequency     Total # Days on TPN 5   Propofol Rate/Kcal N/a     PES STATEMENT / NUTRITION DIAGNOSIS      Nutrition Dx Problem  Problem: Malnutrition (severe)  Etiology: Factors Affecting Nutrition - weakness, fatigue, dysphagia    Signs/Symptoms: Report of Minimal PO Intake, NFPE Results, BMI, Unintended Weight Change, Report/Observation, and SLP/Swallow Evaluation     NUTRITION INTERVENTION / PLAN OF CARE      Intervention Goal(s) Establish goals of care         RD Intervention/Action Await initiation/advancement of PO diet, Await initiation of EN/PN, Continue to monitor, and Care plan reviewed   --      Prescription/Orders:       PO Diet       Supplements       Enteral Nutrition    Enteral Prescription:     Enteral Route PEG    TF Delivery Method Continuous    Enteral Product Nutren 1.5    Modular None    Propofol Rate/Kcal     TF Start Rate  10 mL/hr    TF Goal Rate  35 mL/hr    Free Water Flush 30 mL Q 4 hr    Provision at Goal:          Calories 1260 kcal, meets 100% needs         Protein  57 gm protein, meets 100% needs         Fluid (mL) 641 mL free water + 180 mL in flushes         Parenteral Nutrition    Parenteral Prescription:     TPN Route Peripheral   TPN Rate (mL/hr) 75mL/hr   TPN Recommendation:        Dextrose (kcal) 750 kcal       Amino Acid (gm) 45-60 g        Lipid Concentration 20%       Lipid Volume/Frequency  100 mL 7 days/week   Propofol Rate/Kcal    TPN Provision:  2879-6072 kcal, 45-60 gm protein        Calories 100 % needs met        Protein  100 % needs met        Fluid Per pharm       New Prescription Ordered?    --      Monitor/Evaluation    Discharge Plan/Needs Pending clinical course   --    RD to follow per protocol.      Electronically signed by:  Sami Biswas  07/25/24 08:33 EDT

## 2024-07-25 NOTE — PLAN OF CARE
Goal Outcome Evaluation:  Plan of Care Reviewed With: patient, significant other           Outcome Evaluation: Patient resting in bed upon PT arrival with S.O. at bedside. Patient agreeable to PT and required Abner to achieve EOB. Patient fatigued quickly and reported dizziness when sitting EOB. Patient also voiced frustration with recommendations for SNF. Decreased insight into deficits and poor judgment noted during conversation. PT will continue to follow and advance as able.    Anticipated Discharge Disposition (PT): skilled nursing facility, extended care facility

## 2024-07-25 NOTE — CASE MANAGEMENT/SOCIAL WORK
Continued Stay Note  [facility]     Patient Name: Asha Krishnan  MRN: [id number]  Today's Date: 7/25/2024    Admit Date: 7/13/2024    Plan: SNF. Needs pre cert. Pt agreeable.   Discharge Plan       Row Name 07/25/24 1300       Plan    Plan SNF. Needs pre cert. Pt agreeable.    Roadmap to Recovery Yes    Patient/Family in Agreement with Plan yes    Provided Post Acute Provider List? Yes    Post Acute Provider List Nursing Home    Provided Post Acute Provider Quality & Resource List? Yes    Post Acute Provider Quality and Resource List Nursing Home    Delivered To Patient;Support Person    Support Person Sig other    Method of Delivery In person    Plan Comments Met with pt and sig other at bedside to discuss need for SNF at D/C. Discussed that care team states she needs SNF at D/C due to TF management and Severe Malnutrition. Pt & sig other agreeable to go to SNF. Pt needs 24/7 care especially with tube feed. Helio García RN-BC                   Discharge Codes    No documentation.                 Expected Discharge Date and Time       Expected Discharge Date Expected Discharge Time    Jul 29, 2024               Helio García RN

## 2024-07-25 NOTE — PROGRESS NOTES
Name: Asha Krishnan ADMIT: 2024   : 1950  PCP: Capo Pedroza MD    MRN: [id number] LOS: 9 days   AGE/SEX: 74 y.o. female  ROOM: [facility]     Subjective   Subjective   Status post PEG tube placement yesterday.  Complains of mild abdominal pain.  No acute events overnight.  Tube feeds to be initiated this morning.  Converted back to sinus rhythm      Review of Systems   As above  Objective   Objective   Vital Signs  Temp:  [98 °F (36.7 °C)-98.4 °F (36.9 °C)] 98 °F (36.7 °C)  Heart Rate:  [59-78] 59  Resp:  [14-21] 14  BP: (115-176)/(44-56) 170/56  SpO2:  [92 %-100 %] 100 %  on  Flow (L/min):  [1.5-6] 2;   Device (Oxygen Therapy): room air  Body mass index is 10.21 kg/m².  Physical Exam    General: Alert, laying in bed,  ill-appearing, cachectic, frail  HEENT: Normocephalic, atraumatic  CV: RRR, no murmurs  Lungs: Diminished, no wheezing, nonlabored breathing  Abdomen: Soft, nontender, nondistended  Extremities: No significant peripheral edema , no cyanosis       Results Review     I reviewed the patient's new clinical results.  Results from last 7 days   Lab Units 24  03224  0338 24  1254 24  0611   WBC 10*3/mm3 17.92* 12.02* 12.47* 15.28*   HEMOGLOBIN g/dL 10.7* 12.1 11.8* 12.8   PLATELETS 10*3/mm3 323 336 373 394     Results from last 7 days   Lab Units 24  03224  0338 24  1254 24  0612   SODIUM mmol/L 133* 132* 130* 132*   POTASSIUM mmol/L 4.1 3.9 3.2* 4.5   CHLORIDE mmol/L 100 97* 90* 92*   CO2 mmol/L 25.6 28.1 32.2* 28.1   BUN mg/dL 14 15 17 15   CREATININE mg/dL 0.27* 0.25* 0.33* 0.32*   GLUCOSE mg/dL 137* 159* 166* 146*   Estimated Creatinine Clearance: 70.8 mL/min (A) (by C-G formula based on SCr of 0.27 mg/dL (L)).  Results from last 7 days   Lab Units 24  0329 24  0338 24  1254 24  2240   ALBUMIN g/dL 2.7* 2.9* 2.9* 3.0*   BILIRUBIN mg/dL 0.6 0.5 0.6 0.6   ALK PHOS U/L 76 73 78 83   AST (SGOT) U/L 10 11  10 10   ALT (SGPT) U/L 11 12 11 10     Results from last 7 days   Lab Units 07/25/24  0329 07/24/24  0338 07/23/24  1254 07/22/24  0612 07/21/24  2240   CALCIUM mg/dL 8.2* 8.5* 9.0 9.6 9.1   ALBUMIN g/dL 2.7* 2.9* 2.9*  --  3.0*   MAGNESIUM mg/dL 1.8 1.8 1.8 1.8  --    PHOSPHORUS mg/dL 2.3* 2.5 3.2 2.7  --      Results from last 7 days   Lab Units 07/21/24  0439   PROCALCITONIN ng/mL 0.02     COVID19   Date Value Ref Range Status   07/13/2024 Not Detected Not Detected - Ref. Range Final     Glucose   Date/Time Value Ref Range Status   07/25/2024 0613 144 (H) 70 - 130 mg/dL Final   07/25/2024 0022 128 70 - 130 mg/dL Final   07/24/2024 1809 143 (H) 70 - 130 mg/dL Final   07/24/2024 1156 200 (H) 70 - 130 mg/dL Final   07/24/2024 0639 173 (H) 70 - 130 mg/dL Final   07/24/2024 0034 130 70 - 130 mg/dL Final   07/23/2024 1531 152 (H) 70 - 130 mg/dL Final           XR Chest 1 View  Narrative: XR CHEST 1 VW-     INDICATION: Clinical concern for pneumonia     COMPARISON: Chest radiographs dating back to 11/11/2022     Impression: New patchy inferior left lower lobe opacities, suspicious  for infiltrate. Recommend imaging follow-up to clearance. No pleural  effusion or pneumothorax. Normal size cardiomediastinal silhouette. No  focal osseous abnormality.     This report was finalized on 7/20/2024 3:24 PM by Dr. Flavio Rubio M.D on Workstation: [id number]       Scheduled Medications  atorvastatin, 80 mg, Per G Tube, Nightly  budesonide, 0.5 mg, Nebulization, BID - RT  castor oil-balsam peru, 1 Application, Topical, Q12H  clopidogrel, 75 mg, Per G Tube, Daily  insulin regular, 2-7 Units, Subcutaneous, Q6H  isosorbide dinitrate, 10 mg, Per G Tube, TID - Nitrates  losartan, 25 mg, Per G Tube, Nightly  metoprolol tartrate, 37.5 mg, Per G Tube, Q12H  sodium phosphate, 15 mmol, Intravenous, Once  thiamine, 100 mg, Intravenous, Daily  tiotropium bromide monohydrate, 2 puff, Inhalation, Daily - RT  vitamin B-12, 500 mcg, Per G Tube,  Daily    Infusions     Diet  NPO Diet NPO Type: Strict NPO    I have personally reviewed     [x]  Laboratory   []  Microbiology   []  Radiology   []  EKG/Telemetry  []  Cardiology/Vascular   []  Pathology    []  Records       Assessment/Plan     Active Hospital Problems    Diagnosis  POA   • **Acute respiratory failure with hypoxia [J96.01]  Yes   • Severe malnutrition [E43]  Yes   • Acute hypoxemic respiratory failure [J96.01]  Unknown   • PAF (paroxysmal atrial fibrillation) [I48.0]  Yes   • CAD (coronary artery disease) [I25.10]  Yes   • Elevated d-dimer [R79.89]  Yes   • COPD (chronic obstructive pulmonary disease) [J44.9]  Yes   • Dyspnea [R06.00]  Yes   • HTN (hypertension) [I10]  Yes      Resolved Hospital Problems   No resolved problems to display.       COPD with Exacerbation  -Status post steroids  -Continue breathing treatments  -Pulmo following     Dysphagia  -Core track was attempted to be placed however was not successful through 7/21  -Patient is severely malnourished/cachectic.  TPN ordered, needs central line/PICC line for placement of PICC line, however patient unable to consent, and unable to reach spouse for consent as per report patient is positive and admitted to hospital, unknown location.  -Initiated on TPN through 7/21-discontinue TPN 0 7/25  -General surgery following, status post PEG tube placement 07/24  -Tube placed initiated 07/24  -Patient is very high risk for refeeding syndrome.  Potassium normal today, phosphorus 2.3.  Will order low-dose scheduled K-Phos Neutral      Hypokalemia  -WNL         PAF/RVR  - not on AC chronically, poor candidate per cardiology  -DC IV metoprolol, transition back to p.o. metoprolol via PEG tube. 07/25  -In sinus rhythm.  7/25    CAD  - has multiple prior stents  - no anginal symptoms  - continue on plavix and lipitor  - continue imdur and metoprolol    Leukocytosis   -Suspect Reactive secondary to TPN/Steroids/surgery  -Increase today after PEG tube  placement yesterday, monitor    Anemia  -Hemoglobin 10.7.  Iron panel, B12 folate ordered         SCDs for DVT prophylaxis.  DNR.  Palliative care evaluated.  Discussed with patientf.  Anticipate discharge home timing yet to be determined      is note has been reviewed and is accurate as of 07/25/24.         Dictated utilizing Dragon dictation        Radha Roman MD  [facility]  07/25/24  08:57 EDT

## 2024-07-25 NOTE — PAYOR COMM NOTE
"PATIENT CAME IN . CHANGED TO OBSERVATION ON . CHANGED BACK TO INPT ON  WHEN AUTH WAS SENT. PLEASE REVIEW FOR INPT FROM .   AUTH # [id number]       Asha Parra (74 y.o. Female)       Date of Birth   1950    Social Security Number       Address   [address]  ROOM [address] [place] [address]    Home Phone   [phone]    MRN   [id number]       Christian   None    Marital Status                               Admission Date   24    Admission Type   Emergency    Admitting Provider   Johnny Conde MD    Attending Provider   Radha Roman MD    Department, Room/Bed   [address], E456/1       Discharge Date       Discharge Disposition       Discharge Destination                                 Attending Provider: Radha Roman MD    Allergies: Latex, Tylenol [Acetaminophen], Ibuprofen    Isolation: None   Infection: None   Code Status: No CPR    Ht: 157.5 cm (62\")   Wt: 25.3 kg (55 lb 12.8 oz)    Admission Cmt: None   Principal Problem: Acute respiratory failure with hypoxia [J96.01]                   Active Insurance as of 2024       Primary Coverage       Payor Plan Insurance Group Employer/Plan Group    ANTHEM MEDICARE REPLACEMENT ANTHEM MEDICARE ADVANTAGE [id number]       Payor Plan Address Payor Plan Phone Number Payor Plan Fax Number Effective Dates    [address] [phone]  3/1/2022 - None Entered    [facility] [address]         Subscriber Name Subscriber Birth Date Member ID       ASHA PARRA 1950 [id number]                     Emergency Contacts        (Rel.) Home Phone Work Phone Mobile Phone    Anna Boggs (Significant Other) [phone] -- --                 History & Physical        Johnny Conde MD at 24 2304          [facility]   HISTORY AND PHYSICAL    Patient Name: Asha Parra  : 1950  MRN: [id number]  Primary Care " Physician:  Capo Pedroza MD  Date of admission: 7/13/2024    Subjective  Subjective     Chief Complaint: soa    History of Present Illness  74-year-old female with COPD, coronary artery disease with stable angina, last heart catheterization was 11/11/2022.  At that time showed severe two-vessel coronary artery disease with 100% ostial stenosis consistent with total occlusion and distal RCA as well as an 80% ostial in-stent stenosis at that time they recommended medical management given complex stent anatomy and questionable compliance.  They recommended dual antiplatelet therapy and beta-blocker and nitro and high-dose statin.    Patient now comes the hospital because of shortness of air off and on for a while, tachycardia and found to be in atrial fibrillation with a heart rate of 108.  Was given Cardizem in the emergency room and heart rate has improved.    Patient is given Solu-Medrol, DuoNebs in the emergency room and the patient has improved.    Chest x-ray shows marked hyperinflation    D-dimer is elevated greater than 1 but the patient is currently refusing CT angio of the chest        Review of Systems     Personal History     Past Medical History:   Diagnosis Date   • Cataract    • Hyperlipidemia        Past Surgical History:   Procedure Laterality Date   • CARDIAC CATHETERIZATION N/A 11/11/2022    Procedure: Left Heart Cath;  Surgeon: Tashi De La Torre MD;  Location: [facility] CATH INVASIVE LOCATION;  Service: Cardiovascular;  Laterality: N/A;   • CARDIAC CATHETERIZATION N/A 11/11/2022    Procedure: Coronary angiography;  Surgeon: Tashi De La Torre MD;  Location:  [facility] CATH INVASIVE LOCATION;  Service: Cardiovascular;  Laterality: N/A;       Family History: family history is not on file. Otherwise pertinent FHx was reviewed and not pertinent to current issue.    Social History:  reports that she has been smoking cigarettes. She does not have any smokeless tobacco history on file. She reports  that she does not currently use alcohol. Drug use questions deferred to the physician.    Home Medications:  atorvastatin, bumetanide, clopidogrel, isosorbide mononitrate, metoprolol tartrate, nitroglycerin, thiamine, tiotropium bromide monohydrate, and vitamin B-12    Allergies:  Allergies   Allergen Reactions   • Latex Anaphylaxis     Pt states whole body swells including throat   • Tylenol [Acetaminophen] Anaphylaxis     States whole body swells including throat       Objective   Objective     Vitals:   Temp:  [97.8 °F (36.6 °C)] 97.8 °F (36.6 °C)  Heart Rate:  [] 88  Resp:  [18-20] 20  BP: (155-160)/(63-78) 160/63  Flow (L/min):  [2] 2    Physical Exam  Constitutional:       Appearance: She is well-developed.   HENT:      Head: Normocephalic and atraumatic.      Mouth/Throat:      Pharynx: No oropharyngeal exudate.   Eyes:      Pupils: Pupils are equal, round, and reactive to light.   Neck:      Thyroid: No thyromegaly.      Trachea: No tracheal deviation.   Cardiovascular:      Rate and Rhythm: Normal rate. Rhythm irregular.      Heart sounds: No murmur heard.     No friction rub. No gallop.   Pulmonary:      Effort: Pulmonary effort is normal.      Breath sounds: Rhonchi present. No wheezing.   Abdominal:      General: There is no distension.      Palpations: Abdomen is soft.      Tenderness: There is no abdominal tenderness.   Musculoskeletal:         General: No deformity. Normal range of motion.      Cervical back: Normal range of motion and neck supple.   Skin:     General: Skin is warm and dry.      Coloration: Skin is not pale.      Findings: No erythema.   Neurological:      Mental Status: She is alert.   Psychiatric:         Mood and Affect: Mood normal.         Behavior: Behavior normal.         Result Review   Result Review:  I have personally reviewed the results from the time of this admission to 7/13/2024 23:04 EDT and agree with these findings:  []  Laboratory list / accordion  [x]   Microbiology  [x]  Radiology  [x]  EKG/Telemetry   [x]  Cardiology/Vascular   [x]  Pathology  [x]  Old records  []  Other:  Most notable findings include: xray is hyperinflated      Assessment & Plan  Assessment / Plan     Brief Patient Summary:  Asha Krishnan is a 74 y.o. female who soa     Active Hospital Problems:  Active Hospital Problems    Diagnosis    • **Acute respiratory failure with hypoxia      Plan:   Acute hypoxemic respiratory failure  -improved, mild soa but seems to be close to baseline  -Oxygen saturations 93% on 2 L    Copd  -possible exacerbation  -spiriva/pulmicort/aluterol  -Patient was given steroids in the emergency room  -Hold off on further IV and oral steroids, will use inhaled steroids for now    Atrial fib with RVR with normalization of HR with cardizem 10 iv  -she states she has not been taking her home meds because her  usually helps but he is unable to help her with the meds  -Restart the patient's metoprolol to slightly lower dose  -Will ask cardiology to the patient consultation  -Minimal troponin elevation of 22 and on repeat 21  -EKG no evidence of ischemia but does show atrial fibrillation  -Not on anticoagulation but is on antiplatelet therapy     Hypokalemia  -replace    Severe coronary artery disease with multiple prior stents  -Troponin stable as noted in EKG no ischemic change  -Cardiology evaluation  -Continue with plavix, aspirin given in the emergency room  -Continue with statin, Imdur and metoprolol    D-dimer is elevated greater than 1 but the patient is currently refusing CT angio of the chest     VTE Prophylaxis:  Mechanical VTE prophylaxis orders are present.        CODE STATUS:       Admission Status:  I believe this patient meets observation status.    Patient seen and examined on 7/13    Johnny Conde MD    Electronically signed by Johnny Conde MD at 07/14/24 3096

## 2024-07-25 NOTE — PLAN OF CARE
Goal Outcome Evaluation:            Patient c/o abd pain, PRN toradol given. Tube feeds started and PPN stopped. IVF started per MD order this eveing and isordil and losartan put on hold. Sat at edge of bed with PT. Turn [id number]. Possible rehab at discharge.     Spoke with Dr. Roman, per nutrition note, patient should continue on PPN until reached goal rate on tube feeds. He will place new order for another bag of PPN, IVF not started.

## 2024-07-26 LAB
ALBUMIN SERPL-MCNC: 2.6 G/DL (ref 3.5–5.2)
ALBUMIN/GLOB SERPL: 0.9 G/DL
ALP SERPL-CCNC: 103 U/L (ref 39–117)
ALT SERPL W P-5'-P-CCNC: 19 U/L (ref 1–33)
ANION GAP SERPL CALCULATED.3IONS-SCNC: 9 MMOL/L (ref 5–15)
AST SERPL-CCNC: 24 U/L (ref 1–32)
BASOPHILS # BLD AUTO: 0.05 10*3/MM3 (ref 0–0.2)
BASOPHILS NFR BLD AUTO: 0.3 % (ref 0–1.5)
BILIRUB SERPL-MCNC: 0.7 MG/DL (ref 0–1.2)
BUN SERPL-MCNC: 14 MG/DL (ref 8–23)
BUN/CREAT SERPL: 66.7 (ref 7–25)
CALCIUM SPEC-SCNC: 8.5 MG/DL (ref 8.6–10.5)
CHLORIDE SERPL-SCNC: 101 MMOL/L (ref 98–107)
CO2 SERPL-SCNC: 26 MMOL/L (ref 22–29)
CREAT SERPL-MCNC: 0.21 MG/DL (ref 0.57–1)
DEPRECATED RDW RBC AUTO: 40.7 FL (ref 37–54)
EGFRCR SERPLBLD CKD-EPI 2021: 120 ML/MIN/1.73
EOSINOPHIL # BLD AUTO: 0.16 10*3/MM3 (ref 0–0.4)
EOSINOPHIL NFR BLD AUTO: 1 % (ref 0.3–6.2)
ERYTHROCYTE [DISTWIDTH] IN BLOOD BY AUTOMATED COUNT: 12.8 % (ref 12.3–15.4)
FERRITIN SERPL-MCNC: 613 NG/ML (ref 13–150)
GLOBULIN UR ELPH-MCNC: 3 GM/DL
GLUCOSE BLDC GLUCOMTR-MCNC: 100 MG/DL (ref 70–130)
GLUCOSE BLDC GLUCOMTR-MCNC: 100 MG/DL (ref 70–130)
GLUCOSE BLDC GLUCOMTR-MCNC: 118 MG/DL (ref 70–130)
GLUCOSE SERPL-MCNC: 75 MG/DL (ref 65–99)
HCT VFR BLD AUTO: 31.2 % (ref 34–46.6)
HGB BLD-MCNC: 10.5 G/DL (ref 12–15.9)
IMM GRANULOCYTES # BLD AUTO: 0.1 10*3/MM3 (ref 0–0.05)
IMM GRANULOCYTES NFR BLD AUTO: 0.6 % (ref 0–0.5)
IRON 24H UR-MRATE: 11 MCG/DL (ref 37–145)
IRON SATN MFR SERPL: 6 % (ref 20–50)
LYMPHOCYTES # BLD AUTO: 0.91 10*3/MM3 (ref 0.7–3.1)
LYMPHOCYTES NFR BLD AUTO: 5.5 % (ref 19.6–45.3)
MAGNESIUM SERPL-MCNC: 1.9 MG/DL (ref 1.6–2.4)
MCH RBC QN AUTO: 29.4 PG (ref 26.6–33)
MCHC RBC AUTO-ENTMCNC: 33.7 G/DL (ref 31.5–35.7)
MCV RBC AUTO: 87.4 FL (ref 79–97)
MONOCYTES # BLD AUTO: 0.58 10*3/MM3 (ref 0.1–0.9)
MONOCYTES NFR BLD AUTO: 3.5 % (ref 5–12)
NEUTROPHILS NFR BLD AUTO: 14.66 10*3/MM3 (ref 1.7–7)
NEUTROPHILS NFR BLD AUTO: 89.1 % (ref 42.7–76)
NRBC BLD AUTO-RTO: 0 /100 WBC (ref 0–0.2)
PHOSPHATE SERPL-MCNC: 2.2 MG/DL (ref 2.5–4.5)
PHOSPHATE SERPL-MCNC: 3.4 MG/DL (ref 2.5–4.5)
PLATELET # BLD AUTO: 314 10*3/MM3 (ref 140–450)
PMV BLD AUTO: 10.3 FL (ref 6–12)
POTASSIUM SERPL-SCNC: 3.4 MMOL/L (ref 3.5–5.2)
POTASSIUM SERPL-SCNC: 3.8 MMOL/L (ref 3.5–5.2)
PROT SERPL-MCNC: 5.6 G/DL (ref 6–8.5)
QT INTERVAL: 339 MS
QTC INTERVAL: 347 MS
RBC # BLD AUTO: 3.57 10*6/MM3 (ref 3.77–5.28)
SODIUM SERPL-SCNC: 136 MMOL/L (ref 136–145)
TIBC SERPL-MCNC: 182 MCG/DL (ref 298–536)
TRANSFERRIN SERPL-MCNC: 122 MG/DL (ref 200–360)
WBC NRBC COR # BLD AUTO: 16.46 10*3/MM3 (ref 3.4–10.8)

## 2024-07-26 PROCEDURE — 83735 ASSAY OF MAGNESIUM: CPT | Performed by: STUDENT IN AN ORGANIZED HEALTH CARE EDUCATION/TRAINING PROGRAM

## 2024-07-26 PROCEDURE — 99231 SBSQ HOSP IP/OBS SF/LOW 25: CPT

## 2024-07-26 PROCEDURE — 99232 SBSQ HOSP IP/OBS MODERATE 35: CPT | Performed by: NURSE PRACTITIONER

## 2024-07-26 PROCEDURE — 84100 ASSAY OF PHOSPHORUS: CPT | Performed by: STUDENT IN AN ORGANIZED HEALTH CARE EDUCATION/TRAINING PROGRAM

## 2024-07-26 PROCEDURE — 25010000002 THIAMINE PER 100 MG: Performed by: HOSPITALIST

## 2024-07-26 PROCEDURE — 94799 UNLISTED PULMONARY SVC/PX: CPT

## 2024-07-26 PROCEDURE — 82728 ASSAY OF FERRITIN: CPT | Performed by: STUDENT IN AN ORGANIZED HEALTH CARE EDUCATION/TRAINING PROGRAM

## 2024-07-26 PROCEDURE — 94761 N-INVAS EAR/PLS OXIMETRY MLT: CPT

## 2024-07-26 PROCEDURE — 25010000002 MAGNESIUM SULFATE PER 500 MG OF MAGNESIUM: Performed by: STUDENT IN AN ORGANIZED HEALTH CARE EDUCATION/TRAINING PROGRAM

## 2024-07-26 PROCEDURE — 85025 COMPLETE CBC W/AUTO DIFF WBC: CPT | Performed by: INTERNAL MEDICINE

## 2024-07-26 PROCEDURE — 25010000002 HYALURONIDASE (HUMAN) 150 UNIT/ML SOLUTION 1 ML VIAL: Performed by: STUDENT IN AN ORGANIZED HEALTH CARE EDUCATION/TRAINING PROGRAM

## 2024-07-26 PROCEDURE — 93010 ELECTROCARDIOGRAM REPORT: CPT | Performed by: INTERNAL MEDICINE

## 2024-07-26 PROCEDURE — 80053 COMPREHEN METABOLIC PANEL: CPT | Performed by: STUDENT IN AN ORGANIZED HEALTH CARE EDUCATION/TRAINING PROGRAM

## 2024-07-26 PROCEDURE — 93005 ELECTROCARDIOGRAM TRACING: CPT | Performed by: INTERNAL MEDICINE

## 2024-07-26 PROCEDURE — 84132 ASSAY OF SERUM POTASSIUM: CPT | Performed by: STUDENT IN AN ORGANIZED HEALTH CARE EDUCATION/TRAINING PROGRAM

## 2024-07-26 PROCEDURE — 82948 REAGENT STRIP/BLOOD GLUCOSE: CPT

## 2024-07-26 PROCEDURE — 83540 ASSAY OF IRON: CPT | Performed by: STUDENT IN AN ORGANIZED HEALTH CARE EDUCATION/TRAINING PROGRAM

## 2024-07-26 PROCEDURE — 84466 ASSAY OF TRANSFERRIN: CPT | Performed by: STUDENT IN AN ORGANIZED HEALTH CARE EDUCATION/TRAINING PROGRAM

## 2024-07-26 PROCEDURE — 25010000002 CALCIUM GLUCONATE PER 10 ML: Performed by: STUDENT IN AN ORGANIZED HEALTH CARE EDUCATION/TRAINING PROGRAM

## 2024-07-26 PROCEDURE — 25010000002 POTASSIUM CHLORIDE PER 2 MEQ OF POTASSIUM: Performed by: STUDENT IN AN ORGANIZED HEALTH CARE EDUCATION/TRAINING PROGRAM

## 2024-07-26 PROCEDURE — 25810000003 DEXTROSE 5% IN LACTATED RINGERS PER 1000 ML: Performed by: STUDENT IN AN ORGANIZED HEALTH CARE EDUCATION/TRAINING PROGRAM

## 2024-07-26 PROCEDURE — 94664 DEMO&/EVAL PT USE INHALER: CPT

## 2024-07-26 PROCEDURE — 97110 THERAPEUTIC EXERCISES: CPT

## 2024-07-26 PROCEDURE — 25810000003 SODIUM CHLORIDE 0.9 % SOLUTION: Performed by: STUDENT IN AN ORGANIZED HEALTH CARE EDUCATION/TRAINING PROGRAM

## 2024-07-26 RX ORDER — FENTANYL/ROPIVACAINE/NS/PF 2-625MCG/1
15 PLASTIC BAG, INJECTION (ML) EPIDURAL
Status: COMPLETED | OUTPATIENT
Start: 2024-07-26 | End: 2024-07-26

## 2024-07-26 RX ORDER — DEXTROSE, SODIUM CHLORIDE, SODIUM LACTATE, POTASSIUM CHLORIDE, AND CALCIUM CHLORIDE 5; .6; .31; .03; .02 G/100ML; G/100ML; G/100ML; G/100ML; G/100ML
50 INJECTION, SOLUTION INTRAVENOUS CONTINUOUS
Status: DISCONTINUED | OUTPATIENT
Start: 2024-07-26 | End: 2024-07-26

## 2024-07-26 RX ORDER — IPRATROPIUM BROMIDE AND ALBUTEROL SULFATE 2.5; .5 MG/3ML; MG/3ML
3 SOLUTION RESPIRATORY (INHALATION) EVERY 6 HOURS PRN
Status: DISCONTINUED | OUTPATIENT
Start: 2024-07-26 | End: 2024-08-01 | Stop reason: HOSPADM

## 2024-07-26 RX ADMIN — THIAMINE HYDROCHLORIDE 100 MG: 100 INJECTION, SOLUTION INTRAMUSCULAR; INTRAVENOUS at 09:54

## 2024-07-26 RX ADMIN — POTASSIUM PHOSPHATE, MONOBASIC POTASSIUM PHOSPHATE, DIBASIC 15 MMOL: 224; 236 INJECTION, SOLUTION, CONCENTRATE INTRAVENOUS at 09:55

## 2024-07-26 RX ADMIN — Medication 10 ML: at 09:55

## 2024-07-26 RX ADMIN — Medication 10 ML: at 18:49

## 2024-07-26 RX ADMIN — HYALURONIDASE (HUMAN RECOMBINANT) 150 UNITS: 150 INJECTION, SOLUTION SUBCUTANEOUS at 23:10

## 2024-07-26 RX ADMIN — SODIUM CHLORIDE, SODIUM LACTATE, POTASSIUM CHLORIDE, CALCIUM CHLORIDE AND DEXTROSE MONOHYDRATE 50 ML/HR: 5; 600; 310; 30; 20 INJECTION, SOLUTION INTRAVENOUS at 09:54

## 2024-07-26 RX ADMIN — Medication 500 MCG: at 09:55

## 2024-07-26 RX ADMIN — CASTOR OIL AND BALSAM, PERU 1 APPLICATION: 788; 87 OINTMENT TOPICAL at 20:59

## 2024-07-26 RX ADMIN — CASTOR OIL AND BALSAM, PERU 1 APPLICATION: 788; 87 OINTMENT TOPICAL at 09:56

## 2024-07-26 RX ADMIN — METOPROLOL TARTRATE 25 MG: 25 TABLET, FILM COATED ORAL at 20:59

## 2024-07-26 RX ADMIN — ATORVASTATIN CALCIUM 80 MG: 80 TABLET, FILM COATED ORAL at 20:59

## 2024-07-26 RX ADMIN — POTASSIUM PHOSPHATE, MONOBASIC POTASSIUM PHOSPHATE, DIBASIC 15 MMOL: 224; 236 INJECTION, SOLUTION, CONCENTRATE INTRAVENOUS at 12:59

## 2024-07-26 RX ADMIN — METOPROLOL TARTRATE 25 MG: 25 TABLET, FILM COATED ORAL at 09:54

## 2024-07-26 RX ADMIN — DIBASIC SODIUM PHOSPHATE, MONOBASIC POTASSIUM PHOSPHATE AND MONOBASIC SODIUM PHOSPHATE 2 TABLET: 852; 155; 130 TABLET ORAL at 20:59

## 2024-07-26 RX ADMIN — DIBASIC SODIUM PHOSPHATE, MONOBASIC POTASSIUM PHOSPHATE AND MONOBASIC SODIUM PHOSPHATE 2 TABLET: 852; 155; 130 TABLET ORAL at 09:55

## 2024-07-26 RX ADMIN — CLOPIDOGREL BISULFATE 75 MG: 75 TABLET, FILM COATED ORAL at 09:55

## 2024-07-26 RX ADMIN — CALCIUM GLUCONATE: 98 INJECTION, SOLUTION INTRAVENOUS at 18:49

## 2024-07-26 RX ADMIN — IPRATROPIUM BROMIDE AND ALBUTEROL SULFATE 3 ML: .5; 3 SOLUTION RESPIRATORY (INHALATION) at 16:40

## 2024-07-26 RX ADMIN — BUDESONIDE 0.5 MG: 0.5 INHALANT ORAL at 08:18

## 2024-07-26 NOTE — PLAN OF CARE
Goal Outcome Evaluation:  Plan of Care Reviewed With: patient        Progress: declining  Outcome Evaluation: Patient is only agreeable to ther ex in bed this afternoon. She completed UE and LE ther ex. VC and demo for proper technique. Pt denies pain.  Will progress as pt tolerates.      Anticipated Discharge Disposition (PT): skilled nursing facility, extended care facility

## 2024-07-26 NOTE — CASE MANAGEMENT/SOCIAL WORK
Continued Stay Note  [facility]     Patient Name: Asha Krishnan  MRN: [id number]  Today's Date: 7/26/2024    Admit Date: 7/13/2024    Plan: SNF. Needs pre cert. Pt agreeable.   Discharge Plan       Row Name 07/26/24 1330       Plan    Plan Comments Per APS Online Status Search (DARY Krishnan, ID # [id number]), has been accepted. The report submitted DOES meet acceptance criteria for further assessment and has been assigned to a /staff. CCP to follow. CD, CSW.                   Discharge Codes    No documentation.                 Expected Discharge Date and Time       Expected Discharge Date Expected Discharge Time    Jul 29, 2024

## 2024-07-26 NOTE — PROGRESS NOTES
Name: Asha Krishnan ADMIT: 2024   : 1950  PCP: Capo Pedroza MD    MRN: [id number] LOS: 10 days   AGE/SEX: 74 y.o. female  ROOM: [facility]     Subjective   Subjective   Patient seen this morning, significant other at bedside.  No acute events overnight.  Had multiple BMs overnight.  Initiated on tube feeds at 10 cc an hour, tolerating.    Review of Systems   As above  Objective   Objective   Vital Signs  Temp:  [97.8 °F (36.6 °C)-98.6 °F (37 °C)] 97.9 °F (36.6 °C)  Heart Rate:  [56-72] 66  Resp:  [16-18] 16  BP: (112-157)/(40-51) 123/40  SpO2:  [97 %-100 %] 100 %  on  Flow (L/min):  [2] 2;   Device (Oxygen Therapy): room air  Body mass index is 10.64 kg/m².  Physical Exam    General: Alert, laying in bed,  ill-appearing, cachectic, frail  HEENT: Normocephalic, atraumatic  CV: RRR, no murmurs  Lungs: CTA anteriorly, no wheezing  Abdomen: Soft, nontender, nondistended  Extremities: No significant peripheral edema , no cyanosis       Results Review     I reviewed the patient's new clinical results.  Results from last 7 days   Lab Units 24  0355 24  0329 24  0338 24  1254   WBC 10*3/mm3 16.46* 17.92* 12.02* 12.47*   HEMOGLOBIN g/dL 10.5* 10.7* 12.1 11.8*   PLATELETS 10*3/mm3 314 323 336 373     Results from last 7 days   Lab Units 24  0355 24  0329 24  0338 24  1254   SODIUM mmol/L 136 133* 132* 130*   POTASSIUM mmol/L 3.4* 4.1 3.9 3.2*   CHLORIDE mmol/L 101 100 97* 90*   CO2 mmol/L 26.0 25.6 28.1 32.2*   BUN mg/dL 14 14 15 17   CREATININE mg/dL 0.21* 0.27* 0.25* 0.33*   GLUCOSE mg/dL 75 137* 159* 166*   Estimated Creatinine Clearance: 95 mL/min (A) (by C-G formula based on SCr of 0.21 mg/dL (L)).  Results from last 7 days   Lab Units 24  0355 24  0329 24  0338 24  1254   ALBUMIN g/dL 2.6* 2.7* 2.9* 2.9*   BILIRUBIN mg/dL 0.7 0.6 0.5 0.6   ALK PHOS U/L 103 76 73 78   AST (SGOT) U/L 24 10 11 10   ALT (SGPT) U/L 19 11 12 11      Results from last 7 days   Lab Units 07/26/24  0355 07/25/24  0329 07/24/24  0338 07/23/24  1254   CALCIUM mg/dL 8.5* 8.2* 8.5* 9.0   ALBUMIN g/dL 2.6* 2.7* 2.9* 2.9*   MAGNESIUM mg/dL 1.9 1.8 1.8 1.8   PHOSPHORUS mg/dL 2.2* 2.3* 2.5 3.2     Results from last 7 days   Lab Units 07/21/24  0439   PROCALCITONIN ng/mL 0.02     COVID19   Date Value Ref Range Status   07/13/2024 Not Detected Not Detected - Ref. Range Final     Glucose   Date/Time Value Ref Range Status   07/26/2024 0449 100 70 - 130 mg/dL Final   07/25/2024 1808 134 (H) 70 - 130 mg/dL Final   07/25/2024 1204 145 (H) 70 - 130 mg/dL Final   07/25/2024 0613 144 (H) 70 - 130 mg/dL Final   07/25/2024 0022 128 70 - 130 mg/dL Final   07/24/2024 1809 143 (H) 70 - 130 mg/dL Final   07/24/2024 1156 200 (H) 70 - 130 mg/dL Final           XR Chest 1 View  Narrative: XR CHEST 1 VW-     INDICATION: Clinical concern for pneumonia     COMPARISON: Chest radiographs dating back to 11/11/2022     Impression: New patchy inferior left lower lobe opacities, suspicious  for infiltrate. Recommend imaging follow-up to clearance. No pleural  effusion or pneumothorax. Normal size cardiomediastinal silhouette. No  focal osseous abnormality.     This report was finalized on 7/20/2024 3:24 PM by Dr. Flavio Rubio M.D on Workstation: [id number]       Scheduled Medications  atorvastatin, 80 mg, Per G Tube, Nightly  budesonide, 0.5 mg, Nebulization, BID - RT  castor oil-balsam peru, 1 Application, Topical, Q12H  clopidogrel, 75 mg, Per G Tube, Daily  insulin regular, 2-7 Units, Subcutaneous, Q6H  [Held by provider] isosorbide dinitrate, 10 mg, Per G Tube, TID - Nitrates  [Held by provider] losartan, 25 mg, Per G Tube, Nightly  metoprolol tartrate, 25 mg, Per G Tube, Q12H  phosphorus, 500 mg, Per G Tube, BID  potassium phosphate, 15 mmol, Intravenous, Q3H  thiamine, 100 mg, Intravenous, Daily  tiotropium bromide monohydrate, 2 puff, Inhalation, Daily - RT  vitamin B-12, 500 mcg, Per  G Tube, Daily    Infusions  Adult Peripheral or Midline 2-in-1 TPN,   dextrose 5 % and lactated Ringer's, 50 mL/hr, Last Rate: 50 mL/hr (07/26/24 0954)  Pharmacy to Dose TPN,       Diet  NPO Diet NPO Type: Tube Feeding  Adult Peripheral or Midline 2-in-1 TPN    I have personally reviewed     [x]  Laboratory   []  Microbiology   []  Radiology   []  EKG/Telemetry  []  Cardiology/Vascular   []  Pathology    []  Records       Assessment/Plan     Active Hospital Problems    Diagnosis  POA    **Acute respiratory failure with hypoxia [J96.01]  Yes    Severe malnutrition [E43]  Yes    Acute hypoxemic respiratory failure [J96.01]  Unknown    PAF (paroxysmal atrial fibrillation) [I48.0]  Yes    CAD (coronary artery disease) [I25.10]  Yes    Elevated d-dimer [R79.89]  Yes    COPD (chronic obstructive pulmonary disease) [J44.9]  Yes    Dyspnea [R06.00]  Yes    HTN (hypertension) [I10]  Yes      Resolved Hospital Problems   No resolved problems to display.       COPD with Exacerbation  -Status post steroids  -Continue breathing treatments  -Pulmo following     Dysphagia  -Core track was attempted to be placed however was not successful through 7/21  -Patient is severely malnourished/cachectic.  TPN ordered, needs central line/PICC line for placement of PICC line, however patient unable to consent, and unable to reach spouse for consent as per report patient is positive and admitted to hospital, unknown location.  -Initiated on TPN through 7/21-discontinue TPN 0 7/25  -General surgery following, status post PEG tube placement 07/24  -Tube placed initiated 07/25-advance tube feeds as tolerated.  -Patient is very high risk for refeeding syndrome, advancing tube feeds gradually  -Continue PPN until tube feeds at goal,  -Phosphorus 2.4, potassium 3.4, IV replacement ordered           PAF/RVR  - not on AC chronically, poor candidate per cardiology  -DC IV metoprolol, transition back to p.o. metoprolol via PEG tube. 07/25  -In sinus  rhythm.      CAD  - has multiple prior stents  - no anginal symptoms  - continue on plavix and lipitor  - continue imdur and metoprolol    Leukocytosis   -Suspect Reactive secondary to TPN/Steroids/surgery  -Improved today.  Monitor.    Anemia  -Hemoglobin 10.5  -Iron panel consistent with anemia of chronic disease   -B12 folate pending  -Monitor daily          SCDs for DVT prophylaxis.  DNR.  Palliative care evaluated.  Discussed with patient.  Anticipate discharge SNF, likely early next week       is note has been reviewed and is accurate as of 07/26/24.         Dictated utilizing Dragon dictation        Radha Roman MD  [facility]  07/26/24  11:17 EDT

## 2024-07-26 NOTE — PROGRESS NOTES
Acute Care General Surgery Progress Note    Patient: Asha Krishnan  YOB: 1950  MRN: [id number]      Assessment  Asha Krishnan is a 74 y.o. female who is POD 2 PEG tube placement. Tube feeds have been started and she is tolerating well at 10cc/hr.  AVSS.     Plan  -Continue advancing tube feeds slowly for she is at high risk for refeeding syndrome.  - replace potassium and phos per protocol  -General surgery will sign off but remain available if needed    Subjective  Denies nausea or vomiting. Denies pain. Having loose stools.     Objective  Vitals  Temp 97.5  HR 61  RR 16  /40  SpO2 100       Physical Exam  Constitutional: alert, oriented, in no acute distress  Respiratory: Normal work of breathing, Symmetric excursion  Cardiovascular: Well pefused, no jugular venous distention evident   Abdominal: soft, non-distended, non-tender, PEG tube patent and intact with no surrounding erythema or drainage  Skin: warm, dry      Laboratory Results  Results from last 7 days   Lab Units 07/26/24  0355 07/25/24  0329 07/24/24  0338 07/23/24  1254 07/22/24  0611 07/21/24  0439 07/20/24  0439   WBC 10*3/mm3 16.46* 17.92* 12.02* 12.47* 15.28* 9.00 9.40   HEMOGLOBIN g/dL 10.5* 10.7* 12.1 11.8* 12.8 11.0* 10.1*   HEMATOCRIT % 31.2* 33.0* 36.1 35.4 37.9 33.3* 30.7*   PLATELETS 10*3/mm3 314 323 336 373 394 287 286     Results from last 7 days   Lab Units 07/26/24  0355 07/25/24  0329 07/24/24  0338   SODIUM mmol/L 136 133* 132*   POTASSIUM mmol/L 3.4* 4.1 3.9   CHLORIDE mmol/L 101 100 97*   CO2 mmol/L 26.0 25.6 28.1   BUN mg/dL 14 14 15   CREATININE mg/dL 0.21* 0.27* 0.25*   CALCIUM mg/dL 8.5* 8.2* 8.5*   BILIRUBIN mg/dL 0.7 0.6 0.5   ALK PHOS U/L 103 76 73   ALT (SGPT) U/L 19 11 12   AST (SGOT) U/L 24 10 11   GLUCOSE mg/dL 75 137* 159*     I have personally reviewed 7/26        EMORY Shah  Acute Care General Surgery  [facility]    [address], [address]  [place], [place],  [address]  P: [phone]  F: [phone]

## 2024-07-26 NOTE — PROGRESS NOTES
"    Patient Name: Asha Krishnan  :1950  74 y.o.      Patient Care Team:  Capo Pedroza MD as PCP - General (Family Medicine)    Chief Complaint: follow up atrial fibrillation    Interval History:     Reports loose bowel movement. In SR/SB.         Objective   Vital Signs  Temp:  [97.8 °F (36.6 °C)-98.6 °F (37 °C)] 97.9 °F (36.6 °C)  Heart Rate:  [56-72] 66  Resp:  [16-18] 16  BP: (112-157)/(40-51) 123/40    Intake/Output Summary (Last 24 hours) at 2024 1230  Last data filed at 2024 1117  Gross per 24 hour   Intake 1018 ml   Output 700 ml   Net 318 ml     Flowsheet Rows      Flowsheet Row First Filed Value   Admission Height 157.5 cm (62\") Documented at 2024   Admission Weight 40.8 kg (90 lb) Documented at 2024            Physical Exam:   General Appearance:    Frail, cachectic    Lungs:     Clear to auscultation.  Normal respiratory effort and rate.      Heart:    irregular rhythm and normal rate, normal S1 and S2, no murmurs, gallops or rubs.     Chest Wall:    No abnormalities observed   Abdomen:     Soft, nontender, positive bowel sounds.     Extremities:   no cyanosis, clubbing or edema.  No marked joint deformities.  Adequate musculoskeletal strength.       Results Review:    Results from last 7 days   Lab Units 24  0355   SODIUM mmol/L 136   POTASSIUM mmol/L 3.4*   CHLORIDE mmol/L 101   CO2 mmol/L 26.0   BUN mg/dL 14   CREATININE mg/dL 0.21*   GLUCOSE mg/dL 75   CALCIUM mg/dL 8.5*     Results from last 7 days   Lab Units 24  2240 24  2040   HSTROP T ng/L 28* 26*     Results from last 7 days   Lab Units 24  0355   WBC 10*3/mm3 16.46*   HEMOGLOBIN g/dL 10.5*   HEMATOCRIT % 31.2*   PLATELETS 10*3/mm3 314           Results from last 7 days   Lab Units 24  0355   MAGNESIUM mg/dL 1.9                   Medication Review:   atorvastatin, 80 mg, Per G Tube, Nightly  budesonide, 0.5 mg, Nebulization, BID - RT  castor oil-balsam peru, 1 " Application, Topical, Q12H  clopidogrel, 75 mg, Per G Tube, Daily  insulin regular, 2-7 Units, Subcutaneous, Q6H  [Held by provider] isosorbide dinitrate, 10 mg, Per G Tube, TID - Nitrates  [Held by provider] losartan, 25 mg, Per G Tube, Nightly  metoprolol tartrate, 25 mg, Per G Tube, Q12H  phosphorus, 500 mg, Per G Tube, BID  potassium phosphate, 15 mmol, Intravenous, Q3H  thiamine, 100 mg, Intravenous, Daily  tiotropium bromide monohydrate, 2 puff, Inhalation, Daily - RT  vitamin B-12, 500 mcg, Per G Tube, Daily         Adult Peripheral or Midline 2-in-1 TPN,   Pharmacy to Dose TPN,         Assessment & Plan   Acute hypoxic respiratory failure due to COPD with acute exacerbation  Paroxysmal atrial fibrillation with RVR , wasn't taking medications prior to admission. Easily back in to SR then had recurrent AF 7/15 and 7/16.  COPD   Coronary artery disease (PCI with Synergy stent to left circumflex and LAD extending to left main in January 2017), stable recent stress and echo. Denies angina.   Hypertension, losartan added this admission - now held due to hypotension.   Hyperlipidemia  Cachexia with malnitrition  Severe dysphagia, meds crushed per PEG (placed 7/24).  2:1 heart block , with sleep. Asymptomatic.     Poor candidate for anticoagulation.     She may continue to have paroxysms of atrial fibrillation. Currently in SR . Continue metoprolol tartrate per tube.     In general , I would not be aggressive in treating tachycardia or bradycardia unless she becomes terribly symptomatic. And fortunately she does not really notice her AF.     Continue beta blocker. Will see again Monday if she is still here. Please call over the weekend if needed.     EMORY Lopez  [facility]  07/26/24  12:30 EDT

## 2024-07-26 NOTE — THERAPY TREATMENT NOTE
Patient Name: Asha Krishnan  : 1950    MRN: [id number]                              Today's Date: 2024       Admit Date: 2024    Visit Dx:     ICD-10-CM ICD-9-CM   1. Acute hypoxemic respiratory failure  J96.01 518.81   2. COPD with exacerbation  J44.1 491.21   3. Atrial fibrillation with rapid ventricular response  I48.91 427.31   4. Severe malnutrition  E43 261     Patient Active Problem List   Diagnosis    Chest pain, atypical    Chest pain, unspecified type    CAD (coronary artery disease)    Dysuria    Dyspnea    Elevated d-dimer    COPD (chronic obstructive pulmonary disease)    HTN (hypertension)    HLD (hyperlipidemia)    Altered mental status, unspecified    PAF (paroxysmal atrial fibrillation)    Pulmonary nodule 1 cm or greater in diameter    Anemia    Acute respiratory failure with hypoxia    Severe malnutrition    Acute hypoxemic respiratory failure     Past Medical History:   Diagnosis Date    Asthma     Atrial fibrillation, unspecified type     Cataract     COPD (chronic obstructive pulmonary disease)     Hyperlipidemia     Hypertension      Past Surgical History:   Procedure Laterality Date    CARDIAC CATHETERIZATION N/A 2022    Procedure: Left Heart Cath;  Surgeon: Tashi De La Torre MD;  Location: [facility] CATH INVASIVE LOCATION;  Service: Cardiovascular;  Laterality: N/A;    CARDIAC CATHETERIZATION N/A 2022    Procedure: Coronary angiography;  Surgeon: Tashi De La Torre MD;  Location: [facility] CATH INVASIVE LOCATION;  Service: Cardiovascular;  Laterality: N/A;    CERVICAL LAMINECTOMY      1995    FEMUR FRACTURE SURGERY Left           General Information       Row Name 24 1401          Physical Therapy Time and Intention    Document Type therapy note (daily note)  -CB     Mode of Treatment physical therapy  -CB       Row Name 24 1401          General Information    Existing Precautions/Restrictions fall;oxygen therapy device and L/min  -CB       Row  Name 07/26/24 1401          Cognition    Orientation Status (Cognition) oriented x 3  -CB               User Key  (r) = Recorded By, (t) = Taken By, (c) = Cosigned By      Initials Name Provider Type    CB Ivet Vásquez, PT Physical Therapist                   Mobility       Row Name 07/26/24 1401          Bed Mobility    Comment, (Bed Mobility) only agreeable to ther ex in bed this date  -CB               User Key  (r) = Recorded By, (t) = Taken By, (c) = Cosigned By      Initials Name Provider Type    CB Ivet Vásquez, PT Physical Therapist                   Obj/Interventions       Row Name 07/26/24 1401          Motor Skills    Therapeutic Exercise --  AP, GS, HS x10 reps; shoulder flexion and forward punches x10 reps  -CB               User Key  (r) = Recorded By, (t) = Taken By, (c) = Cosigned By      Initials Name Provider Type    CB Ivet Vásquez, PT Physical Therapist                   Goals/Plan    No documentation.                  Clinical Impression       Row Name 07/26/24 1402          Pain    Pretreatment Pain Rating 0/10 - no pain  -CB     Posttreatment Pain Rating 0/10 - no pain  -CB       Row Name 07/26/24 1402          Plan of Care Review    Plan of Care Reviewed With patient  -CB     Progress declining  -CB     Outcome Evaluation Patient is only agreeable to ther ex in bed this afternoon. She completed UE and LE ther ex. VC and demo for proper technique. Pt denies pain.  Will progress as pt tolerates.  -CB       Row Name 07/26/24 1402          Positioning and Restraints    Pre-Treatment Position in bed  -CB     Post Treatment Position bed  -CB     In Bed notified nsg;fowlers;call light within reach;encouraged to call for assist;exit alarm on;side rails up x2;legs elevated  -CB               User Key  (r) = Recorded By, (t) = Taken By, (c) = Cosigned By      Initials Name Provider Type    Ivet Sandra, GABY Physical Therapist                   Outcome Measures       Row Name 07/26/24 1403  07/26/24 0955       How much help from another person do you currently need...    Turning from your back to your side while in flat bed without using bedrails? 3  -CB 3  -MR    Moving from lying on back to sitting on the side of a flat bed without bedrails? 3  -CB 3  -MR    Moving to and from a bed to a chair (including a wheelchair)? 2  -CB 3  -MR    Standing up from a chair using your arms (e.g., wheelchair, bedside chair)? 1  -CB 2  -MR    Climbing 3-5 steps with a railing? 1  -CB 2  -MR    To walk in hospital room? 1  -CB 2  -MR    AM-PAC 6 Clicks Score (PT) 11  -CB 15  -MR    Highest Level of Mobility Goal 4 --> Transfer to chair/commode  -CB 4 --> Transfer to chair/commode  -MR      Row Name 07/26/24 1403          Functional Assessment    Outcome Measure Options AM-PAC 6 Clicks Basic Mobility (PT)  -CB               User Key  (r) = Recorded By, (t) = Taken By, (c) = Cosigned By      Initials Name Provider Type    MR Hannah Hamilton, RN Registered Nurse    CB Ivet Vásquez, PT Physical Therapist                                 Physical Therapy Education       Title: PT OT SLP Therapies (In Progress)       Topic: Physical Therapy (In Progress)       Point: Mobility training (In Progress)       Learning Progress Summary             Patient Acceptance, E,TB, NR by MS at 7/25/2024 1532    Acceptance, E,TB,D, VU,NR by SM at 7/17/2024 1540    Acceptance, E, VU,NR by  at 7/15/2024 1123                         Point: Home exercise program (Done)       Learning Progress Summary             Patient Acceptance, E,TB,D, VU,NR by CB at 7/26/2024 1403    Acceptance, E,TB, NR by MS at 7/25/2024 1532    Acceptance, E,TB,D, VU,NR by  at 7/17/2024 1540                         Point: Body mechanics (In Progress)       Learning Progress Summary             Patient Acceptance, E,TB, NR by MS at 7/25/2024 1532    Acceptance, E,TB,D, VU,NR by  at 7/17/2024 1540                         Point: Precautions (In Progress)        Learning Progress Summary             Patient Acceptance, E,TB, NR by MS at 7/25/2024 1532    Acceptance, E,TB,D, VU,NR by  at 7/17/2024 1540                                         User Key       Initials Effective Dates Name Provider Type Discipline     03/07/18 -  Jennifer Barkre, PTA Physical Therapist Assistant PT    MS 06/16/21 -  Kaitlyn Torres, PT Physical Therapist PT    CW 12/13/22 -  Helene Pike, PT Physical Therapist PT    CB 10/22/21 -  Ivet Vásquez, PT Physical Therapist PT                  PT Recommendation and Plan     Plan of Care Reviewed With: patient  Progress: declining  Outcome Evaluation: Patient is only agreeable to ther ex in bed this afternoon. She completed UE and LE ther ex. VC and demo for proper technique. Pt denies pain.  Will progress as pt tolerates.     Time Calculation:         PT Charges       Row Name 07/26/24 1403             Time Calculation    Start Time 1354  -CB      Stop Time 1404  -CB      Time Calculation (min) 10 min  -CB      PT Received On 07/26/24  -CB      PT - Next Appointment 07/29/24  -CB         Time Calculation- PT    Total Timed Code Minutes- PT 10 minute(s)  -CB         Timed Charges    50696 - PT Therapeutic Exercise Minutes 10  -CB         Total Minutes    Timed Charges Total Minutes 10  -CB       Total Minutes 10  -CB                User Key  (r) = Recorded By, (t) = Taken By, (c) = Cosigned By      Initials Name Provider Type    CB Ivet Vásquez, PT Physical Therapist                  Therapy Charges for Today       Code Description Service Date Service Provider Modifiers Qty    [id number] HC PT THER PROC EA 15 MIN 7/26/2024 Ivet Vásquez, PT GP 1            PT G-Codes  Outcome Measure Options: AM-PAC 6 Clicks Basic Mobility (PT)  AM-PAC 6 Clicks Score (PT): 11  PT Discharge Summary  Anticipated Discharge Disposition (PT): skilled nursing facility, extended care facility    Ivet Vásquez PT  7/26/2024

## 2024-07-26 NOTE — PLAN OF CARE
Goal Outcome Evaluation:   Patient resting in bed. Multiple large loose Bms overnight. TF running at 10mls. No signs of intolerance. No PRN pain medication needed overnight. All questions answered. Significant other at bedside.

## 2024-07-26 NOTE — PLAN OF CARE
Goal Outcome Evaluation:  Plan of Care Reviewed With: patient      Progress: no change  Outcome Evaluation: VSS, pt denies pain, slept some during shift, c/o frequent diarrhea which pt reports was having prior to initiation of TF, TF remain at 10/h, changed IVF, worked c/ PT, SO at bedside, TPN via PIV

## 2024-07-26 NOTE — CASE MANAGEMENT/SOCIAL WORK
Continued Stay Note  [facility]     Patient Name: Asha Krishnan  MRN: [id number]  Today's Date: 7/26/2024    Admit Date: 7/13/2024    Plan: SNF referrals pending. Will need follow up. TF currently at 10cc/hr and receiving PPN.   Discharge Plan       Row Name 07/26/24 1925       Plan    Plan SNF referrals pending. Will need follow up. TF currently at 10cc/hr and receiving PPN.    Patient/Family in Agreement with Plan yes    Plan Comments [facility] can accept SNF to [facility]. Left message with [facility] with no return call. [facility] has accepted but will need to follow up. TF remain at 10 cc/hr. Receiving PPN at this time.  It is not safe for pt to return to [facility] with sig other as caregiver. APS report filed and accepted. Palliative Consult placed again today for goals of care. Helio García RN-BC                   Discharge Codes    No documentation.                 Expected Discharge Date and Time       Expected Discharge Date Expected Discharge Time    Jul 29, 2024               Helio García RN

## 2024-07-27 LAB
ALBUMIN SERPL-MCNC: 2.3 G/DL (ref 3.5–5.2)
ALBUMIN/GLOB SERPL: 0.8 G/DL
ALP SERPL-CCNC: 95 U/L (ref 39–117)
ALT SERPL W P-5'-P-CCNC: 26 U/L (ref 1–33)
ANION GAP SERPL CALCULATED.3IONS-SCNC: 6 MMOL/L (ref 5–15)
AST SERPL-CCNC: 25 U/L (ref 1–32)
BASOPHILS # BLD AUTO: 0.03 10*3/MM3 (ref 0–0.2)
BASOPHILS NFR BLD AUTO: 0.3 % (ref 0–1.5)
BILIRUB SERPL-MCNC: 0.4 MG/DL (ref 0–1.2)
BUN SERPL-MCNC: 12 MG/DL (ref 8–23)
BUN/CREAT SERPL: 50 (ref 7–25)
CALCIUM SPEC-SCNC: 8.3 MG/DL (ref 8.6–10.5)
CHLORIDE SERPL-SCNC: 104 MMOL/L (ref 98–107)
CO2 SERPL-SCNC: 28 MMOL/L (ref 22–29)
CREAT SERPL-MCNC: 0.24 MG/DL (ref 0.57–1)
DEPRECATED RDW RBC AUTO: 42.7 FL (ref 37–54)
EGFRCR SERPLBLD CKD-EPI 2021: 116.2 ML/MIN/1.73
EOSINOPHIL # BLD AUTO: 0.13 10*3/MM3 (ref 0–0.4)
EOSINOPHIL NFR BLD AUTO: 1.2 % (ref 0.3–6.2)
ERYTHROCYTE [DISTWIDTH] IN BLOOD BY AUTOMATED COUNT: 13.2 % (ref 12.3–15.4)
FOLATE SERPL-MCNC: 8.18 NG/ML (ref 4.78–24.2)
GLOBULIN UR ELPH-MCNC: 2.9 GM/DL
GLUCOSE BLDC GLUCOMTR-MCNC: 129 MG/DL (ref 70–130)
GLUCOSE BLDC GLUCOMTR-MCNC: 143 MG/DL (ref 70–130)
GLUCOSE BLDC GLUCOMTR-MCNC: 163 MG/DL (ref 70–130)
GLUCOSE BLDC GLUCOMTR-MCNC: 178 MG/DL (ref 70–130)
GLUCOSE BLDC GLUCOMTR-MCNC: 185 MG/DL (ref 70–130)
GLUCOSE SERPL-MCNC: 168 MG/DL (ref 65–99)
HCT VFR BLD AUTO: 28.3 % (ref 34–46.6)
HGB BLD-MCNC: 9.4 G/DL (ref 12–15.9)
IMM GRANULOCYTES # BLD AUTO: 0.06 10*3/MM3 (ref 0–0.05)
IMM GRANULOCYTES NFR BLD AUTO: 0.6 % (ref 0–0.5)
LYMPHOCYTES # BLD AUTO: 0.67 10*3/MM3 (ref 0.7–3.1)
LYMPHOCYTES NFR BLD AUTO: 6.3 % (ref 19.6–45.3)
MAGNESIUM SERPL-MCNC: 1.7 MG/DL (ref 1.6–2.4)
MCH RBC QN AUTO: 29.5 PG (ref 26.6–33)
MCHC RBC AUTO-ENTMCNC: 33.2 G/DL (ref 31.5–35.7)
MCV RBC AUTO: 88.7 FL (ref 79–97)
MONOCYTES # BLD AUTO: 0.57 10*3/MM3 (ref 0.1–0.9)
MONOCYTES NFR BLD AUTO: 5.4 % (ref 5–12)
NEUTROPHILS NFR BLD AUTO: 86.2 % (ref 42.7–76)
NEUTROPHILS NFR BLD AUTO: 9.19 10*3/MM3 (ref 1.7–7)
NRBC BLD AUTO-RTO: 0 /100 WBC (ref 0–0.2)
PHOSPHATE SERPL-MCNC: 2.3 MG/DL (ref 2.5–4.5)
PLATELET # BLD AUTO: 315 10*3/MM3 (ref 140–450)
PMV BLD AUTO: 10.1 FL (ref 6–12)
POTASSIUM SERPL-SCNC: 3.6 MMOL/L (ref 3.5–5.2)
POTASSIUM SERPL-SCNC: 4.7 MMOL/L (ref 3.5–5.2)
PROT SERPL-MCNC: 5.2 G/DL (ref 6–8.5)
RBC # BLD AUTO: 3.19 10*6/MM3 (ref 3.77–5.28)
SODIUM SERPL-SCNC: 138 MMOL/L (ref 136–145)
VIT B12 BLD-MCNC: 637 PG/ML (ref 211–946)
WBC NRBC COR # BLD AUTO: 10.65 10*3/MM3 (ref 3.4–10.8)

## 2024-07-27 PROCEDURE — 25010000002 CALCIUM GLUCONATE PER 10 ML: Performed by: STUDENT IN AN ORGANIZED HEALTH CARE EDUCATION/TRAINING PROGRAM

## 2024-07-27 PROCEDURE — 94799 UNLISTED PULMONARY SVC/PX: CPT

## 2024-07-27 PROCEDURE — 82948 REAGENT STRIP/BLOOD GLUCOSE: CPT

## 2024-07-27 PROCEDURE — 82607 VITAMIN B-12: CPT | Performed by: STUDENT IN AN ORGANIZED HEALTH CARE EDUCATION/TRAINING PROGRAM

## 2024-07-27 PROCEDURE — 82746 ASSAY OF FOLIC ACID SERUM: CPT | Performed by: STUDENT IN AN ORGANIZED HEALTH CARE EDUCATION/TRAINING PROGRAM

## 2024-07-27 PROCEDURE — 84132 ASSAY OF SERUM POTASSIUM: CPT | Performed by: STUDENT IN AN ORGANIZED HEALTH CARE EDUCATION/TRAINING PROGRAM

## 2024-07-27 PROCEDURE — 25010000002 MAGNESIUM SULFATE PER 500 MG OF MAGNESIUM: Performed by: STUDENT IN AN ORGANIZED HEALTH CARE EDUCATION/TRAINING PROGRAM

## 2024-07-27 PROCEDURE — 94761 N-INVAS EAR/PLS OXIMETRY MLT: CPT

## 2024-07-27 PROCEDURE — 85025 COMPLETE CBC W/AUTO DIFF WBC: CPT | Performed by: INTERNAL MEDICINE

## 2024-07-27 PROCEDURE — 25010000002 THIAMINE PER 100 MG: Performed by: HOSPITALIST

## 2024-07-27 PROCEDURE — 84100 ASSAY OF PHOSPHORUS: CPT | Performed by: STUDENT IN AN ORGANIZED HEALTH CARE EDUCATION/TRAINING PROGRAM

## 2024-07-27 PROCEDURE — 94664 DEMO&/EVAL PT USE INHALER: CPT

## 2024-07-27 PROCEDURE — 25010000002 POTASSIUM CHLORIDE PER 2 MEQ OF POTASSIUM: Performed by: STUDENT IN AN ORGANIZED HEALTH CARE EDUCATION/TRAINING PROGRAM

## 2024-07-27 PROCEDURE — 83735 ASSAY OF MAGNESIUM: CPT | Performed by: STUDENT IN AN ORGANIZED HEALTH CARE EDUCATION/TRAINING PROGRAM

## 2024-07-27 PROCEDURE — 63710000001 INSULIN REGULAR HUMAN PER 5 UNITS: Performed by: STUDENT IN AN ORGANIZED HEALTH CARE EDUCATION/TRAINING PROGRAM

## 2024-07-27 PROCEDURE — 80053 COMPREHEN METABOLIC PANEL: CPT | Performed by: STUDENT IN AN ORGANIZED HEALTH CARE EDUCATION/TRAINING PROGRAM

## 2024-07-27 RX ORDER — POTASSIUM CHLORIDE 1.5 G/1.58G
40 POWDER, FOR SOLUTION ORAL EVERY 4 HOURS
Status: COMPLETED | OUTPATIENT
Start: 2024-07-27 | End: 2024-07-27

## 2024-07-27 RX ADMIN — INSULIN HUMAN 2 UNITS: 100 INJECTION, SOLUTION PARENTERAL at 01:25

## 2024-07-27 RX ADMIN — CLOPIDOGREL BISULFATE 75 MG: 75 TABLET, FILM COATED ORAL at 09:43

## 2024-07-27 RX ADMIN — THIAMINE HYDROCHLORIDE 100 MG: 100 INJECTION, SOLUTION INTRAMUSCULAR; INTRAVENOUS at 09:43

## 2024-07-27 RX ADMIN — BUDESONIDE 0.5 MG: 0.5 INHALANT ORAL at 06:58

## 2024-07-27 RX ADMIN — TIOTROPIUM BROMIDE INHALATION SPRAY 2 PUFF: 3.12 SPRAY, METERED RESPIRATORY (INHALATION) at 06:59

## 2024-07-27 RX ADMIN — BUDESONIDE 0.5 MG: 0.5 INHALANT ORAL at 19:24

## 2024-07-27 RX ADMIN — METOPROLOL TARTRATE 25 MG: 25 TABLET, FILM COATED ORAL at 09:43

## 2024-07-27 RX ADMIN — DIBASIC SODIUM PHOSPHATE, MONOBASIC POTASSIUM PHOSPHATE AND MONOBASIC SODIUM PHOSPHATE 2 TABLET: 852; 155; 130 TABLET ORAL at 20:19

## 2024-07-27 RX ADMIN — Medication 500 MCG: at 09:43

## 2024-07-27 RX ADMIN — POTASSIUM CHLORIDE 40 MEQ: 1.5 POWDER, FOR SOLUTION ORAL at 06:55

## 2024-07-27 RX ADMIN — CASTOR OIL AND BALSAM, PERU 1 APPLICATION: 788; 87 OINTMENT TOPICAL at 09:43

## 2024-07-27 RX ADMIN — CALCIUM GLUCONATE: 98 INJECTION, SOLUTION INTRAVENOUS at 18:15

## 2024-07-27 RX ADMIN — INSULIN HUMAN 2 UNITS: 100 INJECTION, SOLUTION PARENTERAL at 06:56

## 2024-07-27 RX ADMIN — INSULIN HUMAN 2 UNITS: 100 INJECTION, SOLUTION PARENTERAL at 11:53

## 2024-07-27 RX ADMIN — ATORVASTATIN CALCIUM 80 MG: 80 TABLET, FILM COATED ORAL at 20:19

## 2024-07-27 RX ADMIN — DIBASIC SODIUM PHOSPHATE, MONOBASIC POTASSIUM PHOSPHATE AND MONOBASIC SODIUM PHOSPHATE 2 TABLET: 852; 155; 130 TABLET ORAL at 09:43

## 2024-07-27 RX ADMIN — CASTOR OIL AND BALSAM, PERU 1 APPLICATION: 788; 87 OINTMENT TOPICAL at 20:19

## 2024-07-27 RX ADMIN — POTASSIUM CHLORIDE 40 MEQ: 1.5 POWDER, FOR SOLUTION ORAL at 09:43

## 2024-07-27 NOTE — PLAN OF CARE
Problem: Adult Inpatient Plan of Care  Goal: Absence of Hospital-Acquired Illness or Injury  Intervention: Identify and Manage Fall Risk  Recent Flowsheet Documentation  Taken 7/27/2024 0600 by Aliya Boone RN  Safety Promotion/Fall Prevention:   clutter free environment maintained   safety round/check completed  Taken 7/27/2024 0400 by Aliya Boone RN  Safety Promotion/Fall Prevention:   clutter free environment maintained   safety round/check completed  Taken 7/27/2024 0200 by Aliya Boone RN  Safety Promotion/Fall Prevention:   clutter free environment maintained   safety round/check completed  Taken 7/27/2024 0000 by Aliya Boone RN  Safety Promotion/Fall Prevention:   clutter free environment maintained   safety round/check completed  Taken 7/26/2024 2200 by Aliya Boone RN  Safety Promotion/Fall Prevention:   clutter free environment maintained   safety round/check completed  Taken 7/26/2024 2000 by Aliya Boone RN  Safety Promotion/Fall Prevention:   clutter free environment maintained   safety round/check completed     Problem: Adult Inpatient Plan of Care  Goal: Absence of Hospital-Acquired Illness or Injury  Intervention: Prevent Skin Injury  Recent Flowsheet Documentation  Taken 7/27/2024 0600 by Aliya Boone RN  Body Position: weight shifting  Taken 7/27/2024 0400 by Aliya Boone RN  Body Position: weight shifting  Taken 7/27/2024 0200 by Aliya Boone RN  Body Position: weight shifting  Taken 7/27/2024 0000 by Aliya Boone RN  Body Position: weight shifting  Taken 7/26/2024 2200 by Aliya Boone RN  Body Position: weight shifting  Taken 7/26/2024 2000 by Aliya Boone RN  Body Position:   weight shifting   supine, legs elevated   Goal Outcome Evaluation:   Patient resting comfortably in bed. No complaints of pain or discomfort. Left forearm IV infiltrated PPN earlier in the shift. Extravasation order set followed. SubQ injections at site per order. Arm elevated. Swelling decreased  significantly. Safe report filed. Significant other at bedside. All questions answered.

## 2024-07-27 NOTE — PROGRESS NOTES
Name: Asha Krishnan ADMIT: 2024   : 1950  PCP: Capo Pedroza MD    MRN: [id number] LOS: 11 days   AGE/SEX: 74 y.o. female  ROOM: E4/     Subjective   Subjective    No acute events overnight per RN.  Had multiple BMs overnight and 2 so far this am.  Surgery has ok'd increasing TF with plan to titrate to goal.  Nutritionist has seen and made adjustments with rates today.  Should be to goal by tomorrow afternoon per RN if she continues to tolerate.  Remains weak.  Only able to sit on edge of bed with assistance.      Review of Systems   Constitutional:  Positive for fatigue. Negative for fever.   Respiratory:  Negative for cough and shortness of breath.    Cardiovascular:  Negative for chest pain and leg swelling.   Gastrointestinal:  Positive for diarrhea. Negative for abdominal distention and abdominal pain.   Genitourinary:  Negative for difficulty urinating and dysuria.      As above  Objective   Objective   Vital Signs  Temp:  [97.7 °F (36.5 °C)-98.4 °F (36.9 °C)] 98.1 °F (36.7 °C)  Heart Rate:  [53-67] 56  Resp:  [16-18] 18  BP: (124-168)/(49-55) 166/52  SpO2:  [99 %-100 %] 100 %  on  Flow (L/min):  [2] 2;   Device (Oxygen Therapy): nasal cannula  Body mass index is 10.54 kg/m².      Physical Exam  Vitals and nursing note reviewed.   Constitutional:       Appearance: She is ill-appearing (chronically ill appearing).      Comments: cachexia   HENT:      Head:      Comments: Severe Temporal muscle wasting noted     Mouth/Throat:      Mouth: Mucous membranes are dry.      Pharynx: Oropharynx is clear.   Eyes:      General: No scleral icterus.     Conjunctiva/sclera: Conjunctivae normal.   Cardiovascular:      Rate and Rhythm: Normal rate and regular rhythm.      Pulses: Normal pulses.      Heart sounds: Normal heart sounds.   Pulmonary:      Effort: Pulmonary effort is normal.      Breath sounds: Normal breath sounds.   Abdominal:      General: Abdomen is flat. Bowel sounds are  normal.      Palpations: Abdomen is soft.   Skin:     General: Skin is warm and dry.   Neurological:      General: No focal deficit present.      Mental Status: She is alert and oriented to person, place, and time.      Motor: Weakness (generalized weakness) present.   Psychiatric:      Comments: Flat affect             Tele SR     Results Review     I reviewed the patient's new clinical results.  Results from last 7 days   Lab Units 07/27/24 0300 07/26/24 0355 07/25/24 0329 07/24/24  0338   WBC 10*3/mm3 10.65 16.46* 17.92* 12.02*   HEMOGLOBIN g/dL 9.4* 10.5* 10.7* 12.1   PLATELETS 10*3/mm3 315 314 323 336     Results from last 7 days   Lab Units 07/27/24 0300 07/26/24 2032 07/26/24 0355 07/25/24 0329 07/24/24 0338   SODIUM mmol/L 138  --  136 133* 132*   POTASSIUM mmol/L 3.6 3.8 3.4* 4.1 3.9   CHLORIDE mmol/L 104  --  101 100 97*   CO2 mmol/L 28.0  --  26.0 25.6 28.1   BUN mg/dL 12  --  14 14 15   CREATININE mg/dL 0.24*  --  0.21* 0.27* 0.25*   GLUCOSE mg/dL 168*  --  75 137* 159*   Estimated Creatinine Clearance: 82.2 mL/min (A) (by C-G formula based on SCr of 0.24 mg/dL (L)).  Results from last 7 days   Lab Units 07/27/24 0300 07/26/24 0355 07/25/24 0329 07/24/24  0338   ALBUMIN g/dL 2.3* 2.6* 2.7* 2.9*   BILIRUBIN mg/dL 0.4 0.7 0.6 0.5   ALK PHOS U/L 95 103 76 73   AST (SGOT) U/L 25 24 10 11   ALT (SGPT) U/L 26 19 11 12     Results from last 7 days   Lab Units 07/27/24 0300 07/26/24 2032 07/26/24 0355 07/25/24 0329 07/24/24  0338   CALCIUM mg/dL 8.3*  --  8.5* 8.2* 8.5*   ALBUMIN g/dL 2.3*  --  2.6* 2.7* 2.9*   MAGNESIUM mg/dL 1.7  --  1.9 1.8 1.8   PHOSPHORUS mg/dL 2.3* 3.4 2.2* 2.3* 2.5     Results from last 7 days   Lab Units 07/21/24  0439   PROCALCITONIN ng/mL 0.02     COVID19   Date Value Ref Range Status   07/13/2024 Not Detected Not Detected - Ref. Range Final     Glucose   Date/Time Value Ref Range Status   07/27/2024 1133 163 (H) 70 - 130 mg/dL Final   07/27/2024 0628 185 (H) 70 - 130  mg/dL Final   07/27/2024 0105 178 (H) 70 - 130 mg/dL Final   07/26/2024 1822 100 70 - 130 mg/dL Final   07/26/2024 1148 118 70 - 130 mg/dL Final   07/26/2024 0449 100 70 - 130 mg/dL Final   07/25/2024 1808 134 (H) 70 - 130 mg/dL Final           XR Chest 1 View  Narrative: XR CHEST 1 VW-     INDICATION: Clinical concern for pneumonia     COMPARISON: Chest radiographs dating back to 11/11/2022     Impression: New patchy inferior left lower lobe opacities, suspicious  for infiltrate. Recommend imaging follow-up to clearance. No pleural  effusion or pneumothorax. Normal size cardiomediastinal silhouette. No  focal osseous abnormality.     This report was finalized on 7/20/2024 3:24 PM by Dr. Flavio Rubio M.D on Workstation: [id number]       Scheduled Medications  atorvastatin, 80 mg, Per G Tube, Nightly  budesonide, 0.5 mg, Nebulization, BID - RT  castor oil-balsam peru, 1 Application, Topical, Q12H  clopidogrel, 75 mg, Per G Tube, Daily  insulin regular, 2-7 Units, Subcutaneous, Q6H  [Held by provider] isosorbide dinitrate, 10 mg, Per G Tube, TID - Nitrates  [Held by provider] losartan, 25 mg, Per G Tube, Nightly  metoprolol tartrate, 25 mg, Per G Tube, Q12H  phosphorus, 500 mg, Per G Tube, BID  [START ON 7/28/2024] thiamine, 100 mg, Per G Tube, Daily  tiotropium bromide monohydrate, 2 puff, Inhalation, Daily - RT  vitamin B-12, 500 mcg, Per G Tube, Daily    Infusions  Adult Peripheral or Midline 2-in-1 TPN, , Last Rate: 75 mL/hr at 07/26/24 1849  Pharmacy to Dose TPN,       Diet  NPO Diet NPO Type: Tube Feeding  Adult Peripheral or Midline 2-in-1 TPN    I have personally reviewed     [x]  Laboratory   []  Microbiology   []  Radiology   [x]  EKG/Telemetry  []  Cardiology/Vascular   []  Pathology    []  Records       Assessment/Plan     Active Hospital Problems    Diagnosis  POA    **Acute respiratory failure with hypoxia [J96.01]  Yes    Severe malnutrition [E43]  Yes    Acute hypoxemic respiratory failure [J96.01]   Unknown    PAF (paroxysmal atrial fibrillation) [I48.0]  Yes    CAD (coronary artery disease) [I25.10]  Yes    Elevated d-dimer [R79.89]  Yes    COPD (chronic obstructive pulmonary disease) [J44.9]  Yes    Dyspnea [R06.00]  Yes    HTN (hypertension) [I10]  Yes      Resolved Hospital Problems   No resolved problems to display.       COPD with Exacerbation  -Status post steroids  -Continue breathing treatments pulmicort and spiriva  -Pulm has signed off and is available as needed.       Dysphagia  -unsuccessful Core track  placement trials  -Patient is severely malnourished/cachectic.  TPN ordered and central line/PICC line placed after obtaining consent from spouse.   -Initiated on TPN through 7/21  -General surgery following, status post PEG tube placement 07/24  -Tube placed initiated 07/25-advance tube feeds as tolerated per surgery.  RD has seen and made adjustments with rate today with plans to uptitrate and be at goal by 7/28 noon if she tolerates.   -Patient is very high risk for refeeding syndrome, advancing tube feeds gradually  -Continue TPN until tube feeds at goal.   -Phosphorus 2.3, potassium 3.6.  is on scheduled phos replacement.  Continue to monitor electrolytes.   - no nausea or vomiting.  Continue aspiration precautions with PEG feedings.      PAF/RVR  - not on AC chronically, poor candidate per cardiology  -IV metoprolol was transitioned back to p.o. metoprolol via PEG tube on 07/25  - currently in sinus rhythm.    - some 2:1 hear block noted on this admit while sleeping. Monitor.  Cardiology is aware.   - cardiology plans to see again Monday 7/29. Available if need over weekend.     CAD/ HTN  - has multiple prior stents to left circ and lad extending to left main 1/2017.   - no anginal symptoms  - continue on plavix and lipitor  - Had been on imdur, metoprolol and bumex as outpt  - continue metoprolol.  Was started on losartan this admission but later held 7/25 d/t hypotension.   Imdur dose  reduced 7/24 but then 7/25 stopped d/t hypotension.   - BP labile.  Monitor.  This should improve with improvement in nutritional status and volume repletion.     Leukocytosis   - Suspect Reactive secondary to TPN/Steroids/surgery  - resolved 7/27.    Monitor.    Anemia  -Hemoglobin 9.4 (10.5 <--10.7)  -Iron panel consistent with anemia of chronic disease   -B12 and folate normal  -Monitor CBC          SCDs for DVT prophylaxis.  DNR/ DNI.  Palliative care evaluated.  Discussed with patient, nurse, and significant other at bedside.   Anticipate discharge SNF, likely early next week.       is note has been reviewed and is accurate as of 07/27/24.         EMORY Campbell  [facility]  07/27/24  12:47 EDT

## 2024-07-27 NOTE — PROGRESS NOTES
Nutrition Services    Patient Name:  Asha Krishnan  YOB: 1950  MRN: [id number]  Admit Date:  7/13/2024    Spoke with RN, pt is tolerating trickle feeds of 10mL/hr .Gen surge note 7/26 indicate OK to adv slowly to goal. Will adv feeds 10 mL q 12 hrs to goal of 35. Continue to monitor lytes daily.    Electronically signed by:  Lucille Kaur RD  07/27/24 11:33 EDT

## 2024-07-27 NOTE — PROGRESS NOTES
"[facility] Clinical [facility] Services: Total Parental Nutrition  Consult    Indication:  malnutrition  Route: peripheral  Type: standard    Relevant clinical data and objective history reviewed:  74 y.o. female 157.5 cm (62\") 26.4 kg (58 lb 3.2 oz)    Results from last 7 days   Lab Units 07/27/24  0300 07/23/24  1254 07/22/24  0612   SODIUM mmol/L 138   < > 132*   POTASSIUM mmol/L 3.6   < > 4.5   CHLORIDE mmol/L 104   < > 92*   CO2 mmol/L 28.0   < > 28.1   BUN mg/dL 12   < > 15   CREATININE mg/dL 0.24*   < > 0.32*   CALCIUM mg/dL 8.3*   < > 9.6   ALBUMIN g/dL 2.3*   < >  --    BILIRUBIN mg/dL 0.4   < >  --    ALK PHOS U/L 95   < >  --    ALT (SGPT) U/L 26   < >  --    AST (SGOT) U/L 25   < >  --    GLUCOSE mg/dL 168*   < > 146*   MAGNESIUM mg/dL 1.7   < > 1.8   PHOSPHORUS mg/dL 2.3*   < > 2.7   PREALBUMIN mg/dL  --   --  10.5*    < > = values in this interval not displayed.        Estimated Creatinine Clearance: 95 mL/min (A) (by C-G formula based on SCr of 0.21 mg/dL (L)).    Dietary Orders (From admission, onward)       Start     Ordered    07/27/24 1133  Tube Feeding: Formula: Nutren 1.5 (Osmolite 1.5); Feeding Type: Continuous; Start at: 10 mL/hr; Then Advance By: 10 mL/hr; Every: 12 hours; To Goal Rate of: 35 mL/hr; Water Flush: 20 mL; Every: 4 hours; Water Bolus: None  (Tube Feeding + NPO)  Diet Effective Now        Question Answer Comment   Formula Nutren 1.5 (Osmolite 1.5)    Feeding Type Continuous    Start at 10 mL/hr    Then Advance By 10 mL/hr    Every 12 hours    To Goal Rate of 35 mL/hr    Water Flush 20 mL    Every 4 hours    Water Bolus None        07/27/24 1133    07/25/24 0949  NPO Diet NPO Type: Tube Feeding  (Tube Feeding + NPO)  Diet Effective Now        Question:  NPO Type  Answer:  Tube Feeding    07/25/24 0949                  Active fluid orders: D5W with LR scheduled to start this AM (likely since TPN start time will be 1800 this evening)    Additional electrolytes:   - Started Kphos " yesterday (500 mg BID)   -  potassium chloride 40mEq po x2 doses  -   Corrected calcium: 9.6 mg/dL    Goal              Protein: 1.5-2.0 grams/kg/day (45-60 grams/day)              Dextrose: 750 Kcal/day              Lipids: 100 ml of 20% lipid infusion 7 days/week    Plan  Patient has started tube feeds, but plan is to continue TPN until tube feeds at goal. Rate has been increased to 20 ml/hr with goal rate of 35 ml/hr. Also at high risk for refeeding, so will continue to watch electrolytes closely.      Protein/Dextrose/Lipids: 60g/600 kcal/Will not start lipids  as tapering up tube feeds    Volume: 1800 ml (75 mL/hr over 24 hrs daily)    Based on the patient's labs, will add the following electrolytes/additives to the TPN:      Sodium Chloride: 70 mEq   Sodium Acetate: 0 mEq   Sodium Phosphate: 5 mEq    Potassium Chloride: 50 mEq   Potassium Acetate: 0 mEq   Potassium Phosphate: 10 mEq   Calcium Gluconate: 10 mEq   Magnesium Sulfate: 9 mEq   MVI for TPN   Trace Elements              Other Additives: none    Labs to be ordered: Daily CMP, phos and mag while on TPN.     Pharmacy will continue to follow.   Ginger Hearn, [facility]    Clinical Pharmacist

## 2024-07-28 LAB
ALBUMIN SERPL-MCNC: 2.2 G/DL (ref 3.5–5.2)
ALBUMIN/GLOB SERPL: 0.8 G/DL
ALP SERPL-CCNC: 87 U/L (ref 39–117)
ALT SERPL W P-5'-P-CCNC: 25 U/L (ref 1–33)
ANION GAP SERPL CALCULATED.3IONS-SCNC: 6 MMOL/L (ref 5–15)
AST SERPL-CCNC: 20 U/L (ref 1–32)
BASOPHILS # BLD AUTO: 0.02 10*3/MM3 (ref 0–0.2)
BASOPHILS NFR BLD AUTO: 0.2 % (ref 0–1.5)
BILIRUB SERPL-MCNC: 0.4 MG/DL (ref 0–1.2)
BUN SERPL-MCNC: 10 MG/DL (ref 8–23)
BUN/CREAT SERPL: ABNORMAL
CALCIUM SPEC-SCNC: 8.2 MG/DL (ref 8.6–10.5)
CHLORIDE SERPL-SCNC: 98 MMOL/L (ref 98–107)
CO2 SERPL-SCNC: 29 MMOL/L (ref 22–29)
CREAT SERPL-MCNC: <0.17 MG/DL (ref 0.57–1)
DEPRECATED RDW RBC AUTO: 43.9 FL (ref 37–54)
EOSINOPHIL # BLD AUTO: 0.2 10*3/MM3 (ref 0–0.4)
EOSINOPHIL NFR BLD AUTO: 2.3 % (ref 0.3–6.2)
ERYTHROCYTE [DISTWIDTH] IN BLOOD BY AUTOMATED COUNT: 13.2 % (ref 12.3–15.4)
GLOBULIN UR ELPH-MCNC: 2.7 GM/DL
GLUCOSE BLDC GLUCOMTR-MCNC: 160 MG/DL (ref 70–130)
GLUCOSE BLDC GLUCOMTR-MCNC: 172 MG/DL (ref 70–130)
GLUCOSE BLDC GLUCOMTR-MCNC: 201 MG/DL (ref 70–130)
GLUCOSE SERPL-MCNC: 177 MG/DL (ref 65–99)
HCT VFR BLD AUTO: 28.8 % (ref 34–46.6)
HGB BLD-MCNC: 9.4 G/DL (ref 12–15.9)
IMM GRANULOCYTES # BLD AUTO: 0.04 10*3/MM3 (ref 0–0.05)
IMM GRANULOCYTES NFR BLD AUTO: 0.5 % (ref 0–0.5)
LYMPHOCYTES # BLD AUTO: 0.76 10*3/MM3 (ref 0.7–3.1)
LYMPHOCYTES NFR BLD AUTO: 8.7 % (ref 19.6–45.3)
MAGNESIUM SERPL-MCNC: 1.6 MG/DL (ref 1.6–2.4)
MCH RBC QN AUTO: 29.7 PG (ref 26.6–33)
MCHC RBC AUTO-ENTMCNC: 32.6 G/DL (ref 31.5–35.7)
MCV RBC AUTO: 90.9 FL (ref 79–97)
MONOCYTES # BLD AUTO: 0.59 10*3/MM3 (ref 0.1–0.9)
MONOCYTES NFR BLD AUTO: 6.8 % (ref 5–12)
NEUTROPHILS NFR BLD AUTO: 7.08 10*3/MM3 (ref 1.7–7)
NEUTROPHILS NFR BLD AUTO: 81.5 % (ref 42.7–76)
NRBC BLD AUTO-RTO: 0 /100 WBC (ref 0–0.2)
PHOSPHATE SERPL-MCNC: 2.6 MG/DL (ref 2.5–4.5)
PLATELET # BLD AUTO: 301 10*3/MM3 (ref 140–450)
PMV BLD AUTO: 10 FL (ref 6–12)
POTASSIUM SERPL-SCNC: 4.4 MMOL/L (ref 3.5–5.2)
PROT SERPL-MCNC: 4.9 G/DL (ref 6–8.5)
RBC # BLD AUTO: 3.17 10*6/MM3 (ref 3.77–5.28)
SODIUM SERPL-SCNC: 133 MMOL/L (ref 136–145)
WBC NRBC COR # BLD AUTO: 8.69 10*3/MM3 (ref 3.4–10.8)

## 2024-07-28 PROCEDURE — 94799 UNLISTED PULMONARY SVC/PX: CPT

## 2024-07-28 PROCEDURE — 84100 ASSAY OF PHOSPHORUS: CPT | Performed by: STUDENT IN AN ORGANIZED HEALTH CARE EDUCATION/TRAINING PROGRAM

## 2024-07-28 PROCEDURE — 83735 ASSAY OF MAGNESIUM: CPT | Performed by: STUDENT IN AN ORGANIZED HEALTH CARE EDUCATION/TRAINING PROGRAM

## 2024-07-28 PROCEDURE — 25010000002 CALCIUM GLUCONATE PER 10 ML: Performed by: STUDENT IN AN ORGANIZED HEALTH CARE EDUCATION/TRAINING PROGRAM

## 2024-07-28 PROCEDURE — 85025 COMPLETE CBC W/AUTO DIFF WBC: CPT | Performed by: INTERNAL MEDICINE

## 2024-07-28 PROCEDURE — 25010000002 MAGNESIUM SULFATE PER 500 MG OF MAGNESIUM: Performed by: STUDENT IN AN ORGANIZED HEALTH CARE EDUCATION/TRAINING PROGRAM

## 2024-07-28 PROCEDURE — 82948 REAGENT STRIP/BLOOD GLUCOSE: CPT

## 2024-07-28 PROCEDURE — 25010000002 POTASSIUM CHLORIDE PER 2 MEQ OF POTASSIUM: Performed by: STUDENT IN AN ORGANIZED HEALTH CARE EDUCATION/TRAINING PROGRAM

## 2024-07-28 PROCEDURE — 63710000001 INSULIN REGULAR HUMAN PER 5 UNITS: Performed by: STUDENT IN AN ORGANIZED HEALTH CARE EDUCATION/TRAINING PROGRAM

## 2024-07-28 PROCEDURE — 80053 COMPREHEN METABOLIC PANEL: CPT | Performed by: STUDENT IN AN ORGANIZED HEALTH CARE EDUCATION/TRAINING PROGRAM

## 2024-07-28 RX ADMIN — CALCIUM GLUCONATE: 98 INJECTION, SOLUTION INTRAVENOUS at 17:35

## 2024-07-28 RX ADMIN — DIBASIC SODIUM PHOSPHATE, MONOBASIC POTASSIUM PHOSPHATE AND MONOBASIC SODIUM PHOSPHATE 2 TABLET: 852; 155; 130 TABLET ORAL at 09:34

## 2024-07-28 RX ADMIN — BUDESONIDE 0.5 MG: 0.5 INHALANT ORAL at 19:51

## 2024-07-28 RX ADMIN — INSULIN HUMAN 2 UNITS: 100 INJECTION, SOLUTION PARENTERAL at 18:12

## 2024-07-28 RX ADMIN — Medication 500 MCG: at 09:34

## 2024-07-28 RX ADMIN — INSULIN HUMAN 2 UNITS: 100 INJECTION, SOLUTION PARENTERAL at 11:48

## 2024-07-28 RX ADMIN — DIBASIC SODIUM PHOSPHATE, MONOBASIC POTASSIUM PHOSPHATE AND MONOBASIC SODIUM PHOSPHATE 2 TABLET: 852; 155; 130 TABLET ORAL at 20:44

## 2024-07-28 RX ADMIN — INSULIN HUMAN 3 UNITS: 100 INJECTION, SOLUTION PARENTERAL at 06:00

## 2024-07-28 RX ADMIN — METOPROLOL TARTRATE 25 MG: 25 TABLET, FILM COATED ORAL at 20:44

## 2024-07-28 RX ADMIN — ATORVASTATIN CALCIUM 80 MG: 80 TABLET, FILM COATED ORAL at 20:44

## 2024-07-28 RX ADMIN — CASTOR OIL AND BALSAM, PERU 1 APPLICATION: 788; 87 OINTMENT TOPICAL at 09:34

## 2024-07-28 RX ADMIN — CASTOR OIL AND BALSAM, PERU 1 APPLICATION: 788; 87 OINTMENT TOPICAL at 20:44

## 2024-07-28 RX ADMIN — CLOPIDOGREL BISULFATE 75 MG: 75 TABLET, FILM COATED ORAL at 09:34

## 2024-07-28 RX ADMIN — Medication 100 MG: at 09:34

## 2024-07-28 NOTE — PLAN OF CARE
Problem: Adult Inpatient Plan of Care  Goal: Plan of Care Review  Outcome: Ongoing, Progressing  Flowsheets (Taken 7/28/2024 5187)  Plan of Care Reviewed With: patient  Outcome Evaluation: Vital signs stable. Scheduled metoprolol held due to diastolic BP out of parameters- on call cardiology made aware. Hall in place. Tube feeds continued, rate increased to 30mL/hr. PPN continued.  at bedside. Plan of care ongoing.

## 2024-07-28 NOTE — PLAN OF CARE
Goal Outcome Evaluation:  Plan of Care Reviewed With: patient        Progress: improving  Outcome Evaluation: No c/o pain or soa. Tolerating tube feeds at 35cc/hr. Plan on stopping PPN after bag that will be hung this evening is completed. Turn [id number], mepilex changed to coccyx. IS given and performed by patient. +BM x1 so far today. Metoprolol held this morning d/t low DBP, parameters adjusted for this. Possible discharge in the next few days.

## 2024-07-28 NOTE — PROGRESS NOTES
"[facility] Clinical Pharmacy Services: Total Parental Nutrition  Consult    Indication:  malnutrition  Route: peripheral  Type: standard    Relevant clinical data and objective history reviewed:  74 y.o. female 157.5 cm (62\") 26.4 kg (58 lb 3.2 oz)    Results from last 7 days   Lab Units 07/27/24  0300 07/23/24  1254 07/22/24  0612   SODIUM mmol/L 138   < > 132*   POTASSIUM mmol/L 3.6   < > 4.5   CHLORIDE mmol/L 104   < > 92*   CO2 mmol/L 28.0   < > 28.1   BUN mg/dL 12   < > 15   CREATININE mg/dL 0.24*   < > 0.32*   CALCIUM mg/dL 8.3*   < > 9.6   ALBUMIN g/dL 2.3*   < >  --    BILIRUBIN mg/dL 0.4   < >  --    ALK PHOS U/L 95   < >  --    ALT (SGPT) U/L 26   < >  --    AST (SGOT) U/L 25   < >  --    GLUCOSE mg/dL 168*   < > 146*   MAGNESIUM mg/dL 1.7   < > 1.8   PHOSPHORUS mg/dL 2.3*   < > 2.7   PREALBUMIN mg/dL  --   --  10.5*    < > = values in this interval not displayed.        Estimated Creatinine Clearance: 95 mL/min (A) (by C-G formula based on SCr of 0.21 mg/dL (L)).    Dietary Orders (From admission, onward)       Start     Ordered    07/27/24 1133  Tube Feeding: Formula: Nutren 1.5 (Osmolite 1.5); Feeding Type: Continuous; Start at: 10 mL/hr; Then Advance By: 10 mL/hr; Every: 12 hours; To Goal Rate of: 35 mL/hr; Water Flush: 20 mL; Every: 4 hours; Water Bolus: None  (Tube Feeding + NPO)  Diet Effective Now        Question Answer Comment   Formula Nutren 1.5 (Osmolite 1.5)    Feeding Type Continuous    Start at 10 mL/hr    Then Advance By 10 mL/hr    Every 12 hours    To Goal Rate of 35 mL/hr    Water Flush 20 mL    Every 4 hours    Water Bolus None        07/27/24 1133    07/25/24 0949  NPO Diet NPO Type: Tube Feeding  (Tube Feeding + NPO)  Diet Effective Now        Question:  NPO Type  Answer:  Tube Feeding    07/25/24 0949                  Active fluid orders: D5W with LR scheduled to start this AM (likely since TPN start time will be 1800 this evening)    Additional electrolytes:   -  Kphos   po (500 " mg BID)     -       Goal              Protein: 1.5-2.0 grams/kg/day (45-60 grams/day)              Dextrose: 750 Kcal/day              Lipids: 100 ml of 20% lipid infusion 7 days/week    Plan  Patient continues on  tube feeds,  plan is to continue TPN until tube feeds at goal. Rate has been increased to 30 ml/hr with goal rate of 35 ml/hr. Also at high risk for refeeding, so will continue to watch electrolytes closely.    Due to peripheral administration , keep osmolarity < 900        ( adjust electrolytes and macros)      Protein/Dextrose/Lipids: today: 55g/550 kcal/Will not start lipids  as tapering up tube feeds    Volume: 1800 ml (75 mL/hr over 24 hrs daily)    Based on the patient's labs, will add the following electrolytes/additives to the TPN:      Sodium Chloride: 80 mEq (increased from 70 mEq)   Sodium Acetate: 0 mEq   Sodium Phosphate: 20 mEq ( increased from 5 mEq)   Potassium Chloride: 50 mEq   Potassium Acetate: 0 mEq   Potassium Phosphate: 0 mEq ( decreased from 10 mEq)   Calcium Gluconate: 9 mEq   Magnesium Sulfate: 12 mEq ( increased from 10 mEq)   MVI for TPN   Trace Elements              Other Additives: none    Labs to be ordered: Daily CMP, phos and mag while on TPN.     Pharmacy will continue to follow.   Ginger Hearn, [facility]    Clinical Pharmacist

## 2024-07-28 NOTE — PROGRESS NOTES
Name: Asha Krishnan ADMIT: 2024   : 1950  PCP: Capo Pedroza MD    MRN: [id number] LOS: 12 days   AGE/SEX: 74 y.o. female  ROOM: [facility]     Subjective   Subjective    No acute events overnight per RN.  Bowels are moving less often.  Was have multiple loose stools.  She has had only 2 thus far today.  RN reports she is at goal as of noon today.  So far she is tolerating the rate of 35 cc/hr.   Remains weak.  Only able to sit on edge of bed with assistance.  Significant other is at bedside. RN reports he has been having health issues and went to ER last night.       Review of Systems   Constitutional:  Positive for fatigue. Negative for fever.   Respiratory:  Negative for cough and shortness of breath.    Cardiovascular:  Negative for chest pain and leg swelling.   Gastrointestinal:  Negative for abdominal distention, abdominal pain and diarrhea.   Genitourinary:  Negative for difficulty urinating and dysuria.   Neurological:  Positive for weakness.      As above  Objective   Objective   Vital Signs  Temp:  [97.3 °F (36.3 °C)-97.7 °F (36.5 °C)] 97.4 °F (36.3 °C)  Heart Rate:  [60-67] 64  Resp:  [14-16] 16  BP: (112-143)/(40-52) 130/52  SpO2:  [100 %] 100 %  on  Flow (L/min):  [2] 2;   Device (Oxygen Therapy): nasal cannula  Body mass index is 10.54 kg/m².      Physical Exam  Vitals and nursing note reviewed.   Constitutional:       Appearance: She is ill-appearing (chronically ill appearing).      Comments: cachexia   HENT:      Head:      Comments: Severe Temporal muscle wasting noted     Mouth/Throat:      Mouth: Mucous membranes are dry.      Pharynx: Oropharynx is clear.   Eyes:      General: No scleral icterus.     Conjunctiva/sclera: Conjunctivae normal.   Cardiovascular:      Rate and Rhythm: Normal rate and regular rhythm.      Pulses: Normal pulses.      Heart sounds: Normal heart sounds.   Pulmonary:      Effort: Pulmonary effort is normal.      Breath sounds: Normal breath  sounds.   Abdominal:      General: Abdomen is flat. Bowel sounds are normal.      Palpations: Abdomen is soft.   Skin:     General: Skin is warm and dry.   Neurological:      General: No focal deficit present.      Mental Status: She is alert and oriented to person, place, and time.      Motor: Weakness (generalized weakness) present.   Psychiatric:      Comments: Flat affect             Tele SR with PAC's    Results Review     I reviewed the patient's new clinical results.  Results from last 7 days   Lab Units 07/28/24 0329 07/27/24 0300 07/26/24 0355 07/25/24 0329   WBC 10*3/mm3 8.69 10.65 16.46* 17.92*   HEMOGLOBIN g/dL 9.4* 9.4* 10.5* 10.7*   PLATELETS 10*3/mm3 301 315 314 323     Results from last 7 days   Lab Units 07/28/24 0329 07/27/24  1409 07/27/24 0300 07/26/24 2032 07/26/24 0355 07/25/24 0329   SODIUM mmol/L 133*  --  138  --  136 133*   POTASSIUM mmol/L 4.4 4.7 3.6 3.8 3.4* 4.1   CHLORIDE mmol/L 98  --  104  --  101 100   CO2 mmol/L 29.0  --  28.0  --  26.0 25.6   BUN mg/dL 10  --  12  --  14 14   CREATININE mg/dL <0.17*  --  0.24*  --  0.21* 0.27*   GLUCOSE mg/dL 177*  --  168*  --  75 137*   CrCl cannot be calculated (This lab value cannot be used to calculate CrCl because it is not a number: <0.17).  Results from last 7 days   Lab Units 07/28/24 0329 07/27/24 0300 07/26/24 0355 07/25/24 0329   ALBUMIN g/dL 2.2* 2.3* 2.6* 2.7*   BILIRUBIN mg/dL 0.4 0.4 0.7 0.6   ALK PHOS U/L 87 95 103 76   AST (SGOT) U/L 20 25 24 10   ALT (SGPT) U/L 25 26 19 11     Results from last 7 days   Lab Units 07/28/24  0329 07/27/24 0300 07/26/24 2032 07/26/24  0355 07/25/24  0329   CALCIUM mg/dL 8.2* 8.3*  --  8.5* 8.2*   ALBUMIN g/dL 2.2* 2.3*  --  2.6* 2.7*   MAGNESIUM mg/dL 1.6 1.7  --  1.9 1.8   PHOSPHORUS mg/dL 2.6 2.3* 3.4 2.2* 2.3*           COVID19   Date Value Ref Range Status   07/13/2024 Not Detected Not Detected - Ref. Range Final     Glucose   Date/Time Value Ref Range Status   07/28/2024 1136 160  (H) 70 - 130 mg/dL Final   07/28/2024 0548 201 (H) 70 - 130 mg/dL Final   07/27/2024 2341 129 70 - 130 mg/dL Final   07/27/2024 1738 143 (H) 70 - 130 mg/dL Final   07/27/2024 1133 163 (H) 70 - 130 mg/dL Final   07/27/2024 0628 185 (H) 70 - 130 mg/dL Final   07/27/2024 0105 178 (H) 70 - 130 mg/dL Final           XR Chest 1 View  Narrative: XR CHEST 1 VW-     INDICATION: Clinical concern for pneumonia     COMPARISON: Chest radiographs dating back to 11/11/2022     Impression: New patchy inferior left lower lobe opacities, suspicious  for infiltrate. Recommend imaging follow-up to clearance. No pleural  effusion or pneumothorax. Normal size cardiomediastinal silhouette. No  focal osseous abnormality.     This report was finalized on 7/20/2024 3:24 PM by Dr. Flavio Rubio M.D on Workstation: [id number]       Scheduled Medications  atorvastatin, 80 mg, Per G Tube, Nightly  budesonide, 0.5 mg, Nebulization, BID - RT  castor oil-balsam peru, 1 Application, Topical, Q12H  clopidogrel, 75 mg, Per G Tube, Daily  insulin regular, 2-7 Units, Subcutaneous, Q6H  [Held by provider] isosorbide dinitrate, 10 mg, Per G Tube, TID - Nitrates  [Held by provider] losartan, 25 mg, Per G Tube, Nightly  metoprolol tartrate, 25 mg, Per G Tube, Q12H  phosphorus, 500 mg, Per G Tube, BID  thiamine, 100 mg, Per G Tube, Daily  tiotropium bromide monohydrate, 2 puff, Inhalation, Daily - RT  vitamin B-12, 500 mcg, Per G Tube, Daily    Infusions  Adult Peripheral or Midline 2-in-1 TPN, , Last Rate: 75 mL/hr at 07/27/24 1815  Adult Peripheral or Midline 2-in-1 TPN,   Pharmacy to Dose TPN,       Diet  NPO Diet NPO Type: Tube Feeding  Adult Peripheral or Midline 2-in-1 TPN  Adult Peripheral or Midline 2-in-1 TPN    I have personally reviewed     [x]  Laboratory   []  Microbiology   []  Radiology   [x]  EKG/Telemetry  []  Cardiology/Vascular   []  Pathology    []  Records       Assessment/Plan     Active Hospital Problems    Diagnosis  POA    **Acute  respiratory failure with hypoxia [J96.01]  Yes    Severe malnutrition [E43]  Yes    Acute hypoxemic respiratory failure [J96.01]  Unknown    PAF (paroxysmal atrial fibrillation) [I48.0]  Yes    CAD (coronary artery disease) [I25.10]  Yes    Elevated d-dimer [R79.89]  Yes    COPD (chronic obstructive pulmonary disease) [J44.9]  Yes    Dyspnea [R06.00]  Yes    HTN (hypertension) [I10]  Yes      Resolved Hospital Problems   No resolved problems to display.       COPD with Exacerbation  -Status post steroids  -Continue breathing treatments pulmicort and spiriva  -Pulm has signed off and is available as needed.       Dysphagia  -unsuccessful Core track  placement   -Patient severely malnourished/cachectic.  TPN ordered and central line/PICC line placed after obtaining consent from spouse.   -Initiated on TPN through 7/21  -General surgery following, status post PEG tube placement 07/24  -TF initiated 07/25-advance tube feeds as tolerated per surgery.  RD has seen and made adjustments with rate with plans to uptitrate and be at goal by 7/28 noon.  She is to goal 35 cc/hr and so far is tolerating.  Loose stooling has improved.  She has had only 2 BM's today.   -Patient is very high risk for refeeding syndrome, advancing tube feeds gradually  -Continue TPN until she is tolerating tube feeds at goal.   -Phosphorus 2.3, potassium 3.6.  is on scheduled phos replacement.  Continue to monitor electrolytes.   - no nausea or vomiting.  Continue aspiration precautions with PEG feedings.     Hyponatremia  Na 133 down from 138.  Trend Na.  She may need adjustment in her free water.  Currently she is on 20 ml every 4 hours per PEG.  She is currently on TF and TPN.       PAF/RVR  - not on AC chronically, poor candidate per cardiology  -IV metoprolol was transitioned back to p.o. metoprolol via PEG tube on 07/25.  She has had some doses held d/t low DBP.  Will adjust parameters to hold for DBP < 50 instead of 60.  Her MAPS have been  good.    - currently in sinus rhythm with occ. PAC's    - some 2:1 heart block noted on this admit while sleeping. Monitor.  Cardiology is aware.     - cardiology plans to see again Monday 7/29. Available if need over weekend.     CAD/ HTN  - has multiple prior stents to left circ and lad extending to left main 1/2017.   - no anginal symptoms  - continue on plavix and lipitor  - Had been on imdur, metoprolol and bumex as outpt  - continue metoprolol.  Was started on losartan this admission but later held 7/25 d/t hypotension.   Imdur dose reduced 7/24 but then 7/25 stopped d/t hypotension.   - BP labile.  Monitor.  This should improve with improvement in nutritional status and volume repletion.     Leukocytosis (resolved)  - Suspect Reactive secondary to TPN/Steroids/surgery  - resolved 7/27.    Monitor.    Anemia  -Hemoglobin 9.4 (9.4 <--10.5 <--10.7)  -Iron panel c/w anemia of chronic disease   -B12 and folate normal  -Monitor CBC        SCDs for DVT prophylaxis.  DNR/ DNI.  Palliative care evaluated.  Discussed with patient, nurse, and significant other at bedside.   Anticipate discharge SNF, likely early next week.       is note has been reviewed and is accurate as of 07/28/24.         EMORY Campbell  [facility]  07/28/24  15:18 EDT

## 2024-07-29 LAB
ALBUMIN SERPL-MCNC: 2.4 G/DL (ref 3.5–5.2)
ALBUMIN/GLOB SERPL: 0.9 G/DL
ALP SERPL-CCNC: 89 U/L (ref 39–117)
ALT SERPL W P-5'-P-CCNC: 30 U/L (ref 1–33)
ANION GAP SERPL CALCULATED.3IONS-SCNC: 8.6 MMOL/L (ref 5–15)
AST SERPL-CCNC: 22 U/L (ref 1–32)
BASOPHILS # BLD AUTO: 0.03 10*3/MM3 (ref 0–0.2)
BASOPHILS NFR BLD AUTO: 0.3 % (ref 0–1.5)
BILIRUB SERPL-MCNC: 0.4 MG/DL (ref 0–1.2)
BUN SERPL-MCNC: 13 MG/DL (ref 8–23)
BUN/CREAT SERPL: 56.5 (ref 7–25)
CA-I BLD-MCNC: 5 MG/DL (ref 4.6–5.4)
CA-I SERPL ISE-MCNC: 1.24 MMOL/L (ref 1.15–1.35)
CALCIUM SPEC-SCNC: 8.2 MG/DL (ref 8.6–10.5)
CHLORIDE SERPL-SCNC: 94 MMOL/L (ref 98–107)
CO2 SERPL-SCNC: 30.4 MMOL/L (ref 22–29)
CREAT SERPL-MCNC: 0.23 MG/DL (ref 0.57–1)
CRP SERPL-MCNC: 1.96 MG/DL (ref 0–0.5)
DEPRECATED RDW RBC AUTO: 41.8 FL (ref 37–54)
EGFRCR SERPLBLD CKD-EPI 2021: 117.4 ML/MIN/1.73
EOSINOPHIL # BLD AUTO: 0.23 10*3/MM3 (ref 0–0.4)
EOSINOPHIL NFR BLD AUTO: 2.2 % (ref 0.3–6.2)
ERYTHROCYTE [DISTWIDTH] IN BLOOD BY AUTOMATED COUNT: 12.9 % (ref 12.3–15.4)
GLOBULIN UR ELPH-MCNC: 2.7 GM/DL
GLUCOSE BLDC GLUCOMTR-MCNC: 134 MG/DL (ref 70–130)
GLUCOSE BLDC GLUCOMTR-MCNC: 146 MG/DL (ref 70–130)
GLUCOSE BLDC GLUCOMTR-MCNC: 170 MG/DL (ref 70–130)
GLUCOSE BLDC GLUCOMTR-MCNC: 201 MG/DL (ref 70–130)
GLUCOSE SERPL-MCNC: 179 MG/DL (ref 65–99)
HCT VFR BLD AUTO: 28.8 % (ref 34–46.6)
HGB BLD-MCNC: 9.7 G/DL (ref 12–15.9)
IMM GRANULOCYTES # BLD AUTO: 0.05 10*3/MM3 (ref 0–0.05)
IMM GRANULOCYTES NFR BLD AUTO: 0.5 % (ref 0–0.5)
LYMPHOCYTES # BLD AUTO: 0.72 10*3/MM3 (ref 0.7–3.1)
LYMPHOCYTES NFR BLD AUTO: 6.9 % (ref 19.6–45.3)
MAGNESIUM SERPL-MCNC: 1.8 MG/DL (ref 1.6–2.4)
MCH RBC QN AUTO: 29.9 PG (ref 26.6–33)
MCHC RBC AUTO-ENTMCNC: 33.7 G/DL (ref 31.5–35.7)
MCV RBC AUTO: 88.9 FL (ref 79–97)
MONOCYTES # BLD AUTO: 0.64 10*3/MM3 (ref 0.1–0.9)
MONOCYTES NFR BLD AUTO: 6.1 % (ref 5–12)
NEUTROPHILS NFR BLD AUTO: 8.77 10*3/MM3 (ref 1.7–7)
NEUTROPHILS NFR BLD AUTO: 84 % (ref 42.7–76)
NRBC BLD AUTO-RTO: 0 /100 WBC (ref 0–0.2)
PHOSPHATE SERPL-MCNC: 3.3 MG/DL (ref 2.5–4.5)
PLATELET # BLD AUTO: 308 10*3/MM3 (ref 140–450)
PMV BLD AUTO: 10.2 FL (ref 6–12)
POTASSIUM SERPL-SCNC: 4.6 MMOL/L (ref 3.5–5.2)
PREALB SERPL-MCNC: 5.7 MG/DL (ref 20–40)
PROT SERPL-MCNC: 5.1 G/DL (ref 6–8.5)
RBC # BLD AUTO: 3.24 10*6/MM3 (ref 3.77–5.28)
SODIUM SERPL-SCNC: 133 MMOL/L (ref 136–145)
WBC NRBC COR # BLD AUTO: 10.44 10*3/MM3 (ref 3.4–10.8)

## 2024-07-29 PROCEDURE — 94761 N-INVAS EAR/PLS OXIMETRY MLT: CPT

## 2024-07-29 PROCEDURE — 94799 UNLISTED PULMONARY SVC/PX: CPT

## 2024-07-29 PROCEDURE — 86140 C-REACTIVE PROTEIN: CPT | Performed by: STUDENT IN AN ORGANIZED HEALTH CARE EDUCATION/TRAINING PROGRAM

## 2024-07-29 PROCEDURE — 99232 SBSQ HOSP IP/OBS MODERATE 35: CPT | Performed by: INTERNAL MEDICINE

## 2024-07-29 PROCEDURE — 83735 ASSAY OF MAGNESIUM: CPT | Performed by: STUDENT IN AN ORGANIZED HEALTH CARE EDUCATION/TRAINING PROGRAM

## 2024-07-29 PROCEDURE — 94760 N-INVAS EAR/PLS OXIMETRY 1: CPT

## 2024-07-29 PROCEDURE — 82330 ASSAY OF CALCIUM: CPT | Performed by: STUDENT IN AN ORGANIZED HEALTH CARE EDUCATION/TRAINING PROGRAM

## 2024-07-29 PROCEDURE — 84100 ASSAY OF PHOSPHORUS: CPT | Performed by: STUDENT IN AN ORGANIZED HEALTH CARE EDUCATION/TRAINING PROGRAM

## 2024-07-29 PROCEDURE — 80053 COMPREHEN METABOLIC PANEL: CPT | Performed by: STUDENT IN AN ORGANIZED HEALTH CARE EDUCATION/TRAINING PROGRAM

## 2024-07-29 PROCEDURE — 82948 REAGENT STRIP/BLOOD GLUCOSE: CPT

## 2024-07-29 PROCEDURE — 63710000001 INSULIN REGULAR HUMAN PER 5 UNITS: Performed by: STUDENT IN AN ORGANIZED HEALTH CARE EDUCATION/TRAINING PROGRAM

## 2024-07-29 PROCEDURE — 85025 COMPLETE CBC W/AUTO DIFF WBC: CPT | Performed by: INTERNAL MEDICINE

## 2024-07-29 PROCEDURE — 84134 ASSAY OF PREALBUMIN: CPT | Performed by: STUDENT IN AN ORGANIZED HEALTH CARE EDUCATION/TRAINING PROGRAM

## 2024-07-29 PROCEDURE — 94664 DEMO&/EVAL PT USE INHALER: CPT

## 2024-07-29 RX ADMIN — BUDESONIDE 0.5 MG: 0.5 INHALANT ORAL at 19:32

## 2024-07-29 RX ADMIN — DIBASIC SODIUM PHOSPHATE, MONOBASIC POTASSIUM PHOSPHATE AND MONOBASIC SODIUM PHOSPHATE 2 TABLET: 852; 155; 130 TABLET ORAL at 09:13

## 2024-07-29 RX ADMIN — CASTOR OIL AND BALSAM, PERU 1 APPLICATION: 788; 87 OINTMENT TOPICAL at 09:15

## 2024-07-29 RX ADMIN — CLOPIDOGREL BISULFATE 75 MG: 75 TABLET, FILM COATED ORAL at 09:13

## 2024-07-29 RX ADMIN — Medication 100 MG: at 09:13

## 2024-07-29 RX ADMIN — INSULIN HUMAN 3 UNITS: 100 INJECTION, SOLUTION PARENTERAL at 06:45

## 2024-07-29 RX ADMIN — Medication 500 MCG: at 09:13

## 2024-07-29 RX ADMIN — BUDESONIDE 0.5 MG: 0.5 INHALANT ORAL at 07:21

## 2024-07-29 RX ADMIN — INSULIN HUMAN 2 UNITS: 100 INJECTION, SOLUTION PARENTERAL at 12:35

## 2024-07-29 RX ADMIN — TIOTROPIUM BROMIDE INHALATION SPRAY 2 PUFF: 3.12 SPRAY, METERED RESPIRATORY (INHALATION) at 07:21

## 2024-07-29 RX ADMIN — ATORVASTATIN CALCIUM 80 MG: 80 TABLET, FILM COATED ORAL at 21:40

## 2024-07-29 RX ADMIN — METOPROLOL TARTRATE 25 MG: 25 TABLET, FILM COATED ORAL at 09:13

## 2024-07-29 RX ADMIN — CASTOR OIL AND BALSAM, PERU 1 APPLICATION: 788; 87 OINTMENT TOPICAL at 21:41

## 2024-07-29 NOTE — CASE MANAGEMENT/SOCIAL WORK
Continued Stay Note  [facility]     Patient Name: Asha Krishnan  MRN: [id number]  Today's Date: 7/29/2024    Admit Date: 7/13/2024    Plan: [facility]- accepted- will need to initiate pre-cert once patient works with PT   Discharge Plan       Row Name 07/29/24 1452       Plan    Plan [facility]- accepted- will need to initiate pre-cert once patient works with PT    Patient/Family in Agreement with Plan yes    Plan Comments Spoke with patient at bedside. She wants whatever SNF is closest to [facility] that can accept her. Spoke with [facility] and [facility] accept. [place] can accept as well. Explained to patient that [place] is the closest to the \[facility] [place] [facility]\"" so she is agreeable to going there. Will need to initiate pre-cert once patient works with PT. Di/Sudhir [phone], updated. Patient tolerating TF @ goal rate of 35cc/hr. CCP will follow.                   Discharge Codes    No documentation.                 Expected Discharge Date and Time       Expected Discharge Date Expected Discharge Time    Jul 31, 2024               oCri Shields RN

## 2024-07-29 NOTE — PROGRESS NOTES
Name: Asha Krishnan ADMIT: 2024   : 1950  PCP: Capo Pedroza MD    MRN: [id number] LOS: 13 days   AGE/SEX: 74 y.o. female  ROOM: [facility]     Subjective   Subjective    No acute events overnight.  is at bedside concerned about discharge plans, He wants to make sure where she is placed he has the ability to visit  her.      Tube feedings are now at goal at 35 ml/her she is tolerating well.   No pain, bloating, nausea, vomiting, or excessive bowel movements.  She offers no complaints is resting well in bed.     Objective   Objective   Vital Signs  Temp:  [97.7 °F (36.5 °C)-98.3 °F (36.8 °C)] 98.1 °F (36.7 °C)  Heart Rate:  [61-72] 61  Resp:  [12-20] 16  BP: (120-135)/(36-56) 135/48  SpO2:  [98 %-100 %] 98 %  on  Flow (L/min):  [2] 2;   Device (Oxygen Therapy): nasal cannula  Body mass index is 10.26 kg/m².      Physical Exam  Vitals and nursing note reviewed.   Constitutional:       General: She is awake.      Appearance: She is cachectic. She is ill-appearing (chronically ill appearing).      Interventions: Nasal cannula in place.      Comments: cachexia   HENT:      Head:      Comments: Severe Temporal muscle wasting noted     Mouth/Throat:      Mouth: Mucous membranes are dry.      Pharynx: Oropharynx is clear.   Eyes:      General: No scleral icterus.     Conjunctiva/sclera: Conjunctivae normal.   Cardiovascular:      Rate and Rhythm: Normal rate and regular rhythm.      Pulses: Normal pulses.      Heart sounds: Normal heart sounds.   Pulmonary:      Effort: Pulmonary effort is normal.      Breath sounds: Normal breath sounds.   Abdominal:      General: Abdomen is flat. Bowel sounds are normal.      Palpations: Abdomen is soft.   Skin:     General: Skin is warm and dry.      Capillary Refill: Capillary refill takes less than 2 seconds.   Neurological:      General: No focal deficit present.      Mental Status: She is oriented to person, place, and time.      Motor: Weakness  (generalized weakness) present.   Psychiatric:         Mood and Affect: Affect is flat.         Behavior: Behavior is cooperative.      Comments: Flat affect         Results Review     I reviewed the patient's new clinical results.  Results from last 7 days   Lab Units 07/29/24 0647 07/28/24 0329 07/27/24 0300 07/26/24  0355   WBC 10*3/mm3 10.44 8.69 10.65 16.46*   HEMOGLOBIN g/dL 9.7* 9.4* 9.4* 10.5*   PLATELETS 10*3/mm3 308 301 315 314     Results from last 7 days   Lab Units 07/29/24 0647 07/28/24 0329 07/27/24 1409 07/27/24 0300 07/26/24 2032 07/26/24  0355   SODIUM mmol/L 133* 133*  --  138  --  136   POTASSIUM mmol/L 4.6 4.4 4.7 3.6   < > 3.4*   CHLORIDE mmol/L 94* 98  --  104  --  101   CO2 mmol/L 30.4* 29.0  --  28.0  --  26.0   BUN mg/dL 13 10  --  12  --  14   CREATININE mg/dL 0.23* <0.17*  --  0.24*  --  0.21*   GLUCOSE mg/dL 179* 177*  --  168*  --  75    < > = values in this interval not displayed.   Estimated Creatinine Clearance: 83.4 mL/min (A) (by C-G formula based on SCr of 0.23 mg/dL (L)).  Results from last 7 days   Lab Units 07/29/24 0647 07/28/24 0329 07/27/24 0300 07/26/24  0355   ALBUMIN g/dL 2.4* 2.2* 2.3* 2.6*   BILIRUBIN mg/dL 0.4 0.4 0.4 0.7   ALK PHOS U/L 89 87 95 103   AST (SGOT) U/L 22 20 25 24   ALT (SGPT) U/L 30 25 26 19     Results from last 7 days   Lab Units 07/29/24 0647 07/28/24 0329 07/27/24 0300 07/26/24 2032 07/26/24  0355   CALCIUM mg/dL 8.2* 8.2* 8.3*  --  8.5*   ALBUMIN g/dL 2.4* 2.2* 2.3*  --  2.6*   MAGNESIUM mg/dL 1.8 1.6 1.7  --  1.9   PHOSPHORUS mg/dL 3.3 2.6 2.3* 3.4 2.2*           COVID19   Date Value Ref Range Status   07/13/2024 Not Detected Not Detected - Ref. Range Final     Glucose   Date/Time Value Ref Range Status   07/29/2024 1147 170 (H) 70 - 130 mg/dL Final   07/29/2024 0619 201 (H) 70 - 130 mg/dL Final   07/29/2024 0014 134 (H) 70 - 130 mg/dL Final   07/28/2024 1756 172 (H) 70 - 130 mg/dL Final   07/28/2024 1136 160 (H) 70 - 130 mg/dL Final    07/28/2024 0548 201 (H) 70 - 130 mg/dL Final   07/27/2024 2341 129 70 - 130 mg/dL Final           XR Chest 1 View  Narrative: XR CHEST 1 VW-     INDICATION: Clinical concern for pneumonia     COMPARISON: Chest radiographs dating back to 11/11/2022     Impression: New patchy inferior left lower lobe opacities, suspicious  for infiltrate. Recommend imaging follow-up to clearance. No pleural  effusion or pneumothorax. Normal size cardiomediastinal silhouette. No  focal osseous abnormality.     This report was finalized on 7/20/2024 3:24 PM by Dr. Flavio Rubio M.D on Workstation: [id number]       Scheduled Medications  atorvastatin, 80 mg, Per G Tube, Nightly  budesonide, 0.5 mg, Nebulization, BID - RT  castor oil-balsam peru, 1 Application, Topical, Q12H  clopidogrel, 75 mg, Per G Tube, Daily  insulin regular, 2-7 Units, Subcutaneous, Q6H  [Held by provider] isosorbide dinitrate, 10 mg, Per G Tube, TID - Nitrates  [Held by provider] losartan, 25 mg, Per G Tube, Nightly  metoprolol tartrate, 25 mg, Per G Tube, Q12H  thiamine, 100 mg, Per G Tube, Daily  tiotropium bromide monohydrate, 2 puff, Inhalation, Daily - RT  vitamin B-12, 500 mcg, Per G Tube, Daily    Infusions  Adult Peripheral or Midline 2-in-1 TPN, , Last Rate: 75 mL/hr at 07/28/24 1735  Pharmacy to Dose TPN,       Diet  NPO Diet NPO Type: Tube Feeding  Adult Peripheral or Midline 2-in-1 TPN    I have personally reviewed     [x]  Laboratory   []  Microbiology   []  Radiology   [x]  EKG/Telemetry  []  Cardiology/Vascular   []  Pathology    []  Records       Assessment/Plan     Active Hospital Problems    Diagnosis  POA   • **Acute respiratory failure with hypoxia [J96.01]  Yes   • Severe malnutrition [E43]  Yes   • Acute hypoxemic respiratory failure [J96.01]  Unknown   • PAF (paroxysmal atrial fibrillation) [I48.0]  Yes   • CAD (coronary artery disease) [I25.10]  Yes   • Elevated d-dimer [R79.89]  Yes   • COPD (chronic obstructive pulmonary disease)  [J44.9]  Yes   • Dyspnea [R06.00]  Yes   • HTN (hypertension) [I10]  Yes      Resolved Hospital Problems   No resolved problems to display.       COPD with Exacerbation  Status post steroids  Continue breathing treatments pulmicort and Spiriva  Encourage pulmonary hygiene turn cough deep breath  Currently on 2 L nasal cannula, she is not oxygen dependent at baseline  Exacerbation appears to have resolved.  Noted to have leukocytosis initially, reactive to steroids this is since resolved     Dysphagia  Severely malnourished S/P peg placement   Was unable to place core track initially, PEG and TPN were ordered,  Surgery was consulted placed PEG tube on 724.  She had initially had some loose stools with initiation of tube feeds.  Resolved.  She is at risk for refeeding syndrome.  Since she has tolerated tube feedings over 24 hours will discontinue TPN today  Dietary following, appreciate their recommendations and assistance with this patient.      Hyponatremia  Na stable at 133 down from 138.   Monitor BMP  Adjust free water as needed.-Currently on 20 mL every 4.  Currently of TN and TPN -continue today.     PAF/RVR  Rate controlled at this time on metoprolol per PEG.  He is in normal sinus rhythm with PACs on bedside monitor.  Noted due to 1 heart block while sleeping she is completely asymptomatic, cardiology is aware.  Not on AC chronically, poor candidate per cardiology  .     CAD/ HTN  S/P prior PCI with multiple stents to Left circ and lad extending to left main in 2017/  She denies chest pain  or shortness of breath   Continue Statin and Plavix   Continue Toprol.  She has very labile blood pressure, she had previously been on Imdur, metoprolol, and Bumex.  As well as losartan - on hold now.   has multiple prior stents to left circ and lad extending to left main 1/2017.     Anemia  Iron profile - anemia of chronic disease   Monitor CBC and transfuse as needed.          SCDs for DVT prophylaxis.  DNR/ DNI.   Palliative care evaluated.-She confirms she does not want to be a DNR/DNI  Discussed with patient, nurse, and significant other at bedside.   Anticipate discharge SNF, when arrangements have been made..     This note has been reviewed and is accurate as of 07/29/24.         EMORY Mackenzie  [facility]  07/29/24  13:43 EDT

## 2024-07-29 NOTE — PLAN OF CARE
Goal Outcome Evaluation:  Plan of Care Reviewed With: patient        Progress: improving  Outcome Evaluation: Tube feeding maintained at goal rate. No c/o pain. Dsging changed on PEG site in the morning, still CDI. Venelex applied per order. VSS, call light within reach by family/ pt.

## 2024-07-29 NOTE — SIGNIFICANT NOTE
07/29/24 1439   OTHER   Discipline physical therapist   Rehab Time/Intention   Session Not Performed patient/family declined treatment  (Pt declined therapy today despite encouragement from therapist to at least participate with bed exercises. Pt states she is waiting on a phone call. PT will follow up tomorrow)

## 2024-07-29 NOTE — PLAN OF CARE
Problem: Adult Inpatient Plan of Care  Goal: Plan of Care Review  Outcome: Ongoing, Progressing  Flowsheets (Taken 7/29/2024 9696)  Progress: improving  Plan of Care Reviewed With: patient  Outcome Evaluation: No complaints of pain this shift. Tube feeds continued at goal rate of 35mL/hr. PPN to be completed after current bag is finished infusing. Mepilex changed to coccyx. Q2turn. +BM this shift. Hall in place. Significant other at bedside. Plan of care ongoing.

## 2024-07-29 NOTE — PROGRESS NOTES
Hospital Follow Up    LOS: 13  Patient Name: Asha Krishnan  Age/Sex: 74 y.o. female  : 1950  MRN: [id number]    Day of Service: 24   Length of Stay: 13  Encounter Provider: Wally Doe MD  Place of Service: [facility] CARDIOLOGY  Patient Care Team:  Capo Pedroza MD as PCP - General (Family Medicine)    Subjective:     Chief Complaint: Paroxysmal atrial fibrillation    Interval History: Patient is doing some better today.  For the most part patient is remained in sinus rhythm.    Objective:     Objective:  Temp:  [97.4 °F (36.3 °C)-98.3 °F (36.8 °C)] 97.7 °F (36.5 °C)  Heart Rate:  [62-72] 62  Resp:  [12-20] 18  BP: (120-135)/(36-56) 135/48     Intake/Output Summary (Last 24 hours) at 2024 0935  Last data filed at 2024 0849  Gross per 24 hour   Intake 898 ml   Output 2000 ml   Net -1102 ml     Body mass index is 10.26 kg/m².      24  0655 24  0500 24  0600   Weight: 26.1 kg (57 lb 9.6 oz) 26.1 kg (57 lb 9.6 oz) 25.4 kg (56 lb 1.6 oz)     Weight change: -0.68 kg (-1 lb 8 oz)      Physical Exam:   General :  Alert, cooperative, in no acute distress.  Neuro:   Alert,cooperative and oriented.  Lungs:  CTAB. Normal respiratory effort and rate.  CV:  Regular rate and rhythm, normal S1 and S2, no murmurs, gallops or rubs.   ABD:  Soft, nontender, nondistended. Positive bowel sounds.  Extr:  No edema or cyanosis, moves all extremities.    Lab Review:   Results from last 7 days   Lab Units 24  0647 24  0329   SODIUM mmol/L 133* 133*   POTASSIUM mmol/L 4.6 4.4   CHLORIDE mmol/L 94* 98   CO2 mmol/L 30.4* 29.0   BUN mg/dL 13 10   CREATININE mg/dL 0.23* <0.17*   GLUCOSE mg/dL 179* 177*   CALCIUM mg/dL 8.2* 8.2*   AST (SGOT) U/L 22 20   ALT (SGPT) U/L 30 25         Results from last 7 days   Lab Units 24  0647 24  0329   WBC 10*3/mm3 10.44 8.69   HEMOGLOBIN g/dL 9.7* 9.4*   HEMATOCRIT % 28.8* 28.8*   PLATELETS  "10*3/mm3 308 301         Results from last 7 days   Lab Units 07/29/24  0647 07/28/24  0329   MAGNESIUM mg/dL 1.8 1.6           Invalid input(s): \"LDLCALC\"            Current Medications:   Scheduled Meds:atorvastatin, 80 mg, Per G Tube, Nightly  budesonide, 0.5 mg, Nebulization, BID - RT  castor oil-balsam peru, 1 Application, Topical, Q12H  clopidogrel, 75 mg, Per G Tube, Daily  insulin regular, 2-7 Units, Subcutaneous, Q6H  [Held by provider] isosorbide dinitrate, 10 mg, Per G Tube, TID - Nitrates  [Held by provider] losartan, 25 mg, Per G Tube, Nightly  metoprolol tartrate, 25 mg, Per G Tube, Q12H  thiamine, 100 mg, Per G Tube, Daily  tiotropium bromide monohydrate, 2 puff, Inhalation, Daily - RT  vitamin B-12, 500 mcg, Per G Tube, Daily      Continuous Infusions:Adult Peripheral or Midline 2-in-1 TPN, , Last Rate: 75 mL/hr at 07/28/24 1735  Pharmacy to Dose TPN,         Allergies:  Allergies   Allergen Reactions    Latex Anaphylaxis     Pt states whole body swells including throat    Tylenol [Acetaminophen] Anaphylaxis     States whole body swells including throat    Ibuprofen Hives       Assessment:       Acute respiratory failure with hypoxia    CAD (coronary artery disease)    Dyspnea    Elevated d-dimer    COPD (chronic obstructive pulmonary disease)    HTN (hypertension)    PAF (paroxysmal atrial fibrillation)    Severe malnutrition    Acute hypoxemic respiratory failure        Plan:   Acute hypoxic respiratory failure due to COPD with acute exacerbation  Paroxysmal atrial fibrillation with RVR , patient remains in sinus rhythm.  COPD   Coronary artery disease (PCI with Synergy stent to left circumflex and LAD extending to left main in January 2017), stable recent stress and echo. Denies angina.   Hypertension, losartan added this admission - now held due to hypotension.   Hyperlipidemia  Cachexia with malnitrition  Severe dysphagia, meds crushed per PEG (placed 7/24).  2:1 heart block , with sleep. " Asymptomatic.            Wally Doe MD  07/29/24  09:35 EDT

## 2024-07-30 LAB
ALBUMIN SERPL-MCNC: 2.7 G/DL (ref 3.5–5.2)
ALBUMIN/GLOB SERPL: 0.9 G/DL
ALP SERPL-CCNC: 95 U/L (ref 39–117)
ALT SERPL W P-5'-P-CCNC: 44 U/L (ref 1–33)
ANION GAP SERPL CALCULATED.3IONS-SCNC: 5.6 MMOL/L (ref 5–15)
AST SERPL-CCNC: 34 U/L (ref 1–32)
BASOPHILS # BLD AUTO: 0.05 10*3/MM3 (ref 0–0.2)
BASOPHILS NFR BLD AUTO: 0.4 % (ref 0–1.5)
BILIRUB SERPL-MCNC: 0.4 MG/DL (ref 0–1.2)
BUN SERPL-MCNC: 14 MG/DL (ref 8–23)
BUN/CREAT SERPL: 63.6 (ref 7–25)
CALCIUM SPEC-SCNC: 8.7 MG/DL (ref 8.6–10.5)
CHLORIDE SERPL-SCNC: 92 MMOL/L (ref 98–107)
CO2 SERPL-SCNC: 34.4 MMOL/L (ref 22–29)
CREAT SERPL-MCNC: 0.22 MG/DL (ref 0.57–1)
DEPRECATED RDW RBC AUTO: 41.3 FL (ref 37–54)
EGFRCR SERPLBLD CKD-EPI 2021: 118.6 ML/MIN/1.73
EOSINOPHIL # BLD AUTO: 0.2 10*3/MM3 (ref 0–0.4)
EOSINOPHIL NFR BLD AUTO: 1.7 % (ref 0.3–6.2)
ERYTHROCYTE [DISTWIDTH] IN BLOOD BY AUTOMATED COUNT: 12.7 % (ref 12.3–15.4)
GLOBULIN UR ELPH-MCNC: 3.1 GM/DL
GLUCOSE BLDC GLUCOMTR-MCNC: 123 MG/DL (ref 70–130)
GLUCOSE BLDC GLUCOMTR-MCNC: 132 MG/DL (ref 70–130)
GLUCOSE BLDC GLUCOMTR-MCNC: 144 MG/DL (ref 70–130)
GLUCOSE BLDC GLUCOMTR-MCNC: 157 MG/DL (ref 70–130)
GLUCOSE BLDC GLUCOMTR-MCNC: 170 MG/DL (ref 70–130)
GLUCOSE SERPL-MCNC: 111 MG/DL (ref 65–99)
HCT VFR BLD AUTO: 29.3 % (ref 34–46.6)
HGB BLD-MCNC: 9.6 G/DL (ref 12–15.9)
IMM GRANULOCYTES # BLD AUTO: 0.06 10*3/MM3 (ref 0–0.05)
IMM GRANULOCYTES NFR BLD AUTO: 0.5 % (ref 0–0.5)
LYMPHOCYTES # BLD AUTO: 0.94 10*3/MM3 (ref 0.7–3.1)
LYMPHOCYTES NFR BLD AUTO: 8.2 % (ref 19.6–45.3)
MAGNESIUM SERPL-MCNC: 1.8 MG/DL (ref 1.6–2.4)
MCH RBC QN AUTO: 29.1 PG (ref 26.6–33)
MCHC RBC AUTO-ENTMCNC: 32.8 G/DL (ref 31.5–35.7)
MCV RBC AUTO: 88.8 FL (ref 79–97)
MONOCYTES # BLD AUTO: 0.57 10*3/MM3 (ref 0.1–0.9)
MONOCYTES NFR BLD AUTO: 5 % (ref 5–12)
NEUTROPHILS NFR BLD AUTO: 84.2 % (ref 42.7–76)
NEUTROPHILS NFR BLD AUTO: 9.67 10*3/MM3 (ref 1.7–7)
NRBC BLD AUTO-RTO: 0 /100 WBC (ref 0–0.2)
PHOSPHATE SERPL-MCNC: 3.3 MG/DL (ref 2.5–4.5)
PLATELET # BLD AUTO: 338 10*3/MM3 (ref 140–450)
PMV BLD AUTO: 10.4 FL (ref 6–12)
POTASSIUM SERPL-SCNC: 4.4 MMOL/L (ref 3.5–5.2)
PROT SERPL-MCNC: 5.8 G/DL (ref 6–8.5)
RBC # BLD AUTO: 3.3 10*6/MM3 (ref 3.77–5.28)
SODIUM SERPL-SCNC: 132 MMOL/L (ref 136–145)
WBC NRBC COR # BLD AUTO: 11.49 10*3/MM3 (ref 3.4–10.8)

## 2024-07-30 PROCEDURE — 94799 UNLISTED PULMONARY SVC/PX: CPT

## 2024-07-30 PROCEDURE — 94664 DEMO&/EVAL PT USE INHALER: CPT

## 2024-07-30 PROCEDURE — 94761 N-INVAS EAR/PLS OXIMETRY MLT: CPT

## 2024-07-30 PROCEDURE — 94760 N-INVAS EAR/PLS OXIMETRY 1: CPT

## 2024-07-30 PROCEDURE — 99232 SBSQ HOSP IP/OBS MODERATE 35: CPT | Performed by: INTERNAL MEDICINE

## 2024-07-30 PROCEDURE — 84100 ASSAY OF PHOSPHORUS: CPT | Performed by: STUDENT IN AN ORGANIZED HEALTH CARE EDUCATION/TRAINING PROGRAM

## 2024-07-30 PROCEDURE — 97530 THERAPEUTIC ACTIVITIES: CPT

## 2024-07-30 PROCEDURE — 80053 COMPREHEN METABOLIC PANEL: CPT | Performed by: STUDENT IN AN ORGANIZED HEALTH CARE EDUCATION/TRAINING PROGRAM

## 2024-07-30 PROCEDURE — 83735 ASSAY OF MAGNESIUM: CPT | Performed by: STUDENT IN AN ORGANIZED HEALTH CARE EDUCATION/TRAINING PROGRAM

## 2024-07-30 PROCEDURE — 63710000001 INSULIN REGULAR HUMAN PER 5 UNITS: Performed by: STUDENT IN AN ORGANIZED HEALTH CARE EDUCATION/TRAINING PROGRAM

## 2024-07-30 PROCEDURE — 85025 COMPLETE CBC W/AUTO DIFF WBC: CPT | Performed by: INTERNAL MEDICINE

## 2024-07-30 PROCEDURE — 82948 REAGENT STRIP/BLOOD GLUCOSE: CPT

## 2024-07-30 RX ADMIN — CLOPIDOGREL BISULFATE 75 MG: 75 TABLET, FILM COATED ORAL at 09:29

## 2024-07-30 RX ADMIN — TIOTROPIUM BROMIDE INHALATION SPRAY 2 PUFF: 3.12 SPRAY, METERED RESPIRATORY (INHALATION) at 07:28

## 2024-07-30 RX ADMIN — CASTOR OIL AND BALSAM, PERU 1 APPLICATION: 788; 87 OINTMENT TOPICAL at 21:01

## 2024-07-30 RX ADMIN — BUDESONIDE 0.5 MG: 0.5 INHALANT ORAL at 07:24

## 2024-07-30 RX ADMIN — Medication 100 MG: at 09:27

## 2024-07-30 RX ADMIN — CASTOR OIL AND BALSAM, PERU 1 APPLICATION: 788; 87 OINTMENT TOPICAL at 09:27

## 2024-07-30 RX ADMIN — METOPROLOL TARTRATE 25 MG: 25 TABLET, FILM COATED ORAL at 09:29

## 2024-07-30 RX ADMIN — ATORVASTATIN CALCIUM 80 MG: 80 TABLET, FILM COATED ORAL at 21:00

## 2024-07-30 RX ADMIN — METOPROLOL TARTRATE 25 MG: 25 TABLET, FILM COATED ORAL at 21:00

## 2024-07-30 RX ADMIN — Medication 10 ML: at 21:00

## 2024-07-30 RX ADMIN — Medication 10 ML: at 21:01

## 2024-07-30 RX ADMIN — INSULIN HUMAN 2 UNITS: 100 INJECTION, SOLUTION PARENTERAL at 13:52

## 2024-07-30 RX ADMIN — Medication 500 MCG: at 09:27

## 2024-07-30 NOTE — PLAN OF CARE
Goal Outcome Evaluation:  Plan of Care Reviewed With: patient, spouse        Progress: improving  Outcome Evaluation: Pt seen for PT tx this PM. Pt agreeable to participate. Pt completed supine to sit with CGA needing increased time. Upon sitting, pt felt dizzy. Pt needed SBA with sitting balance and able to complete clemente LE exercises. Pt then stood with CGA at EOB. Pt was able to side step along EOB, 3ft with rwx. Stayed close to EOB d/t pt's dizziness. Pt was able to return to supine with CGA. Pt encouraged to get up to a bsc with nsg throughout the day. Will continue to follow pt and progress as able.

## 2024-07-30 NOTE — NURSING NOTE
07/30/24 0916   Wound sacral spine   No placement date or time found.   Present on Original Admission: No  Location: sacral spine   Pressure Injury Stage DTPI   Dressing Appearance dry;intact  (peeled back silicone border to assess)   Base maroon/purple;pink   Edges irregular   Drainage Amount none     WOCN follow up pressure injury coccyx, DTI color is fading. Continue current treatment.  Needs assist of 2 to reposition. Currently on low air loss mattress. Patient is now receiving feedings.  Heels blanchable. Bony prominences are padded with silicone border dressings.

## 2024-07-30 NOTE — CASE MANAGEMENT/SOCIAL WORK
Continued Stay Note  [facility]     Patient Name: Asha Krishnan  MRN: [id number]  Today's Date: 7/30/2024    Admit Date: 7/13/2024    Plan: [facility]. Pre cert submitted and pending. Will need stretcher transport at D/C   Discharge Plan       Row Name 07/30/24 1439       Plan    Plan [facility]. Pre cert submitted and pending. Will need stretcher transport at D/C    Patient/Family in Agreement with Plan yes    Plan Comments Tube feeds @ goal rate of 35cc/hr. Worked with PT today. Submitted for pre cert for [facility]. Pre cert pending. Needs stretcher transport at D/C. Helio García RN-BC                   Discharge Codes    No documentation.                 Expected Discharge Date and Time       Expected Discharge Date Expected Discharge Time    Aug 1, 2024               Helio García, RN

## 2024-07-30 NOTE — CASE MANAGEMENT/SOCIAL WORK
Post-Acute Authorization Submission      Post Acute Pre-Cert Documentation  Request Submitted by Facility - Type:: Hospital  Post-Acute Authorization Type Submitted:: SNF  Date Post Acute Pre-Cert Inititated per Facility: 07/30/24  Accepting Facility: [facility] Discharge Date Requested: 07/30/24  All Clinicals Submitted?: Yes  Had Accepting Facility at Time of Submission: Yes  Response Communicated to::   Authorization Number:: PENDING [id number]              MAUREEN Wang

## 2024-07-30 NOTE — PROGRESS NOTES
Patient COPD Patient.  Target SpO2 88 to 94%.  This RT found patient on 2lpm nasal cannula with SpO2 100% with complaint of some SOA.  Patient BS clear/diminished.  Removed nasal cannula from patient at this time for SpO2 96% on RA.  Patient stating breathing improved with removal of oxygen.

## 2024-07-30 NOTE — NURSING NOTE
Voiding trial started at 1136. Voided 200ml in bedpan at 1322 (using bedpan due to out of bucket for BSC at the moment).

## 2024-07-30 NOTE — PROGRESS NOTES
Name: Asha Krishnan ADMIT: 2024   : 1950  PCP: Capo Pedroza MD    MRN: [id number] LOS: 14 days   AGE/SEX: 74 y.o. female  ROOM: [facility]     Subjective   Subjective    No acute events overnight. She offers no complaints. She states she ask PT to come back later because everyone showed up at the same time and she did not have energy to participate at that time. We dicussed the necessity of working with PT for discharge and placement. She is tolerating tube feeds.    is at bedside, states she does not do much at home, due to weakness, and cardiac stents.     Objective   Objective   Vital Signs  Temp:  [97.9 °F (36.6 °C)-98.2 °F (36.8 °C)] 98.2 °F (36.8 °C)  Heart Rate:  [61-77] 74  Resp:  [15-16] 16  BP: (109-115)/(44-80) 113/80  SpO2:  [95 %-100 %] 95 %  on  Flow (L/min):  [2] 2;   Device (Oxygen Therapy): nasal cannula  Body mass index is 10.66 kg/m².      Physical Exam  Vitals and nursing note reviewed.   Constitutional:       General: She is awake.      Appearance: She is cachectic. She is ill-appearing (chronically ill appearing).      Interventions: Nasal cannula in place.      Comments: cachexia   HENT:      Head:      Comments: Severe Temporal muscle wasting noted     Mouth/Throat:      Mouth: Mucous membranes are dry.      Pharynx: Oropharynx is clear.   Eyes:      General: No scleral icterus.     Conjunctiva/sclera: Conjunctivae normal.   Cardiovascular:      Rate and Rhythm: Normal rate and regular rhythm.      Pulses: Normal pulses.      Heart sounds: Normal heart sounds.   Pulmonary:      Effort: Pulmonary effort is normal.      Breath sounds: Normal breath sounds.   Abdominal:      General: Abdomen is flat. Bowel sounds are normal.      Palpations: Abdomen is soft.   Skin:     General: Skin is warm and dry.      Capillary Refill: Capillary refill takes less than 2 seconds.   Neurological:      General: No focal deficit present.      Mental Status: She is oriented to  person, place, and time.      Motor: Weakness (generalized weakness) present.   Psychiatric:         Mood and Affect: Affect is flat.         Behavior: Behavior is cooperative.      Comments: Flat affect         Results Review     I reviewed the patient's new clinical results.  Results from last 7 days   Lab Units 07/30/24 0346 07/29/24 0647 07/28/24 0329 07/27/24  0300   WBC 10*3/mm3 11.49* 10.44 8.69 10.65   HEMOGLOBIN g/dL 9.6* 9.7* 9.4* 9.4*   PLATELETS 10*3/mm3 338 308 301 315     Results from last 7 days   Lab Units 07/30/24  0346 07/29/24  0647 07/28/24 0329 07/27/24  1409 07/27/24  0300   SODIUM mmol/L 132* 133* 133*  --  138   POTASSIUM mmol/L 4.4 4.6 4.4 4.7 3.6   CHLORIDE mmol/L 92* 94* 98  --  104   CO2 mmol/L 34.4* 30.4* 29.0  --  28.0   BUN mg/dL 14 13 10  --  12   CREATININE mg/dL 0.22* 0.23* <0.17*  --  0.24*   GLUCOSE mg/dL 111* 179* 177*  --  168*   Estimated Creatinine Clearance: 90.7 mL/min (A) (by C-G formula based on SCr of 0.22 mg/dL (L)).  Results from last 7 days   Lab Units 07/30/24 0346 07/29/24 0647 07/28/24 0329 07/27/24  0300   ALBUMIN g/dL 2.7* 2.4* 2.2* 2.3*   BILIRUBIN mg/dL 0.4 0.4 0.4 0.4   ALK PHOS U/L 95 89 87 95   AST (SGOT) U/L 34* 22 20 25   ALT (SGPT) U/L 44* 30 25 26     Results from last 7 days   Lab Units 07/30/24 0346 07/29/24 0647 07/28/24 0329 07/27/24  0300   CALCIUM mg/dL 8.7 8.2* 8.2* 8.3*   ALBUMIN g/dL 2.7* 2.4* 2.2* 2.3*   MAGNESIUM mg/dL 1.8 1.8 1.6 1.7   PHOSPHORUS mg/dL 3.3 3.3 2.6 2.3*           COVID19   Date Value Ref Range Status   07/13/2024 Not Detected Not Detected - Ref. Range Final     Glucose   Date/Time Value Ref Range Status   07/30/2024 0753 157 (H) 70 - 130 mg/dL Final   07/30/2024 0559 144 (H) 70 - 130 mg/dL Final   07/30/2024 0013 123 70 - 130 mg/dL Final   07/29/2024 1647 146 (H) 70 - 130 mg/dL Final   07/29/2024 1147 170 (H) 70 - 130 mg/dL Final   07/29/2024 0619 201 (H) 70 - 130 mg/dL Final   07/29/2024 0014 134 (H) 70 - 130 mg/dL  Final           XR Chest 1 View  Narrative: XR CHEST 1 VW-     INDICATION: Clinical concern for pneumonia     COMPARISON: Chest radiographs dating back to 11/11/2022     Impression: New patchy inferior left lower lobe opacities, suspicious  for infiltrate. Recommend imaging follow-up to clearance. No pleural  effusion or pneumothorax. Normal size cardiomediastinal silhouette. No  focal osseous abnormality.     This report was finalized on 7/20/2024 3:24 PM by Dr. Flavio Rubio M.D on Workstation: [id number]       Scheduled Medications  atorvastatin, 80 mg, Per G Tube, Nightly  budesonide, 0.5 mg, Nebulization, BID - RT  castor oil-balsam peru, 1 Application, Topical, Q12H  clopidogrel, 75 mg, Per G Tube, Daily  insulin regular, 2-7 Units, Subcutaneous, Q6H  [Held by provider] isosorbide dinitrate, 10 mg, Per G Tube, TID - Nitrates  [Held by provider] losartan, 25 mg, Per G Tube, Nightly  metoprolol tartrate, 25 mg, Per G Tube, Q12H  thiamine, 100 mg, Per G Tube, Daily  tiotropium bromide monohydrate, 2 puff, Inhalation, Daily - RT  vitamin B-12, 500 mcg, Per G Tube, Daily    Infusions       Diet  NPO Diet NPO Type: Tube Feeding    I have personally reviewed     [x]  Laboratory   []  Microbiology   []  Radiology   [x]  EKG/Telemetry  []  Cardiology/Vascular   []  Pathology    []  Records       Assessment/Plan     Active Hospital Problems    Diagnosis  POA   • **Acute respiratory failure with hypoxia [J96.01]  Yes   • Severe malnutrition [E43]  Yes   • Acute hypoxemic respiratory failure [J96.01]  Unknown   • PAF (paroxysmal atrial fibrillation) [I48.0]  Yes   • CAD (coronary artery disease) [I25.10]  Yes   • Elevated d-dimer [R79.89]  Yes   • COPD (chronic obstructive pulmonary disease) [J44.9]  Yes   • Dyspnea [R06.00]  Yes   • HTN (hypertension) [I10]  Yes      Resolved Hospital Problems   No resolved problems to display.       COPD with Exacerbation  Status post steroids  Continue breathing treatments pulmicort  and Spiriva  Encourage pulmonary hygiene turn cough deep breath  Currently on 2 L nasal cannula, she is not oxygen dependent at baseline  Exacerbation appears to have resolved.  Noted to have leukocytosis initially, reactive to steroids this is since resolved     Dysphagia  Severely malnourished S/P peg placement   Was unable to place core track initially, PEG and TPN were ordered,  Surgery was consulted placed PEG tube on 724.  She had initially had some loose stools with initiation of tube feeds.  Resolved.  She is at risk for refeeding syndrome.  She continues to  tolerated tube feedings no N/V/D or abdominal pain.  TPN discontinued 07/29  Dietary following, appreciate their recommendations and assistance with this patient.      Hyponatremia  Na stable at 133 down from 138.   Monitor BMP  Adjust free water as needed.-Currently on 20 mL every 4.  Currently of TN and TPN -continue today.     PAF/RVR  Rate controlled at this time on metoprolol per PEG.  He is in normal sinus rhythm with PACs on bedside monitor.  Noted due to 1 heart block while sleeping she is completely asymptomatic, cardiology is aware.  Not on AC chronically, poor candidate per cardiology  .     CAD/ HTN  S/P prior PCI with multiple stents to Left circ and lad extending to left main in 2017/  She denies chest pain  or shortness of breath   Continue Statin and Plavix   Continue Toprol.  She has very labile blood pressure, she had previously been on Imdur, metoprolol, and Bumex.  As well as losartan - on hold now.   has multiple prior stents to left circ and lad extending to left main 1/2017.     Anemia  Iron profile - anemia of chronic disease   Monitor CBC and transfuse as needed.       SCDs for DVT prophylaxis.  DNR/ DNI.  Palliative care evaluated.-She confirms she does want to be a DNR/DNI  Discussed with patient, nurse, and significant other at bedside.   Anticipate discharge SNF, when arrangements have been made..     This note has been  reviewed and is accurate as of 07/30/24.         EMORY Mackenzie  [facility]  07/30/24  10:27 EDT

## 2024-07-30 NOTE — PROGRESS NOTES
"CC: Persistent atrial fibrillation    Interval History: No acute events overnight      Vital Signs  Temp:  [97.9 °F (36.6 °C)-98.2 °F (36.8 °C)] 98.2 °F (36.8 °C)  Heart Rate:  [61-77] 74  Resp:  [15-16] 16  BP: (109-115)/(44-80) 113/80    Intake/Output Summary (Last 24 hours) at 7/30/2024 1127  Last data filed at 7/30/2024 0500  Gross per 24 hour   Intake 0 ml   Output 1650 ml   Net -1650 ml     Flowsheet Rows      Flowsheet Row First Filed Value   Admission Height 157.5 cm (62\") Documented at 07/13/2024 2156   Admission Weight 40.8 kg (90 lb) Documented at 07/13/2024 2156            PHYSICAL EXAM:  General: No acute distress, chronically sick looking  Resp:NL Rate, symmetric chest expansion,unlabored, clear  CV:NL rate and rhythm, NL PMI, NL S1 and S2, no Murmur, no gallop, no rub, No JVD.   ABD:Nl sounds, no masses or tenderness, nondistended, no guarding or rebound  Neuro: alert,cooperative and oriented  Extr:Normal pedal pulses, No edema or cyanosis, moves all extremities      Results Review:    Results from last 7 days   Lab Units 07/30/24  0346   SODIUM mmol/L 132*   POTASSIUM mmol/L 4.4   CHLORIDE mmol/L 92*   CO2 mmol/L 34.4*   BUN mg/dL 14   CREATININE mg/dL 0.22*   GLUCOSE mg/dL 111*   CALCIUM mg/dL 8.7         Results from last 7 days   Lab Units 07/30/24  0346   WBC 10*3/mm3 11.49*   HEMOGLOBIN g/dL 9.6*   HEMATOCRIT % 29.3*   PLATELETS 10*3/mm3 338             Results from last 7 days   Lab Units 07/30/24  0346   MAGNESIUM mg/dL 1.8         I reviewed the patient's new clinical results.  I personally viewed and interpreted the patient's EKG/Telemetry data        Medication Review:   Meds reviewed         Assessment/Plan        Acute hypoxic respiratory failure due to COPD with acute exacerbation  Paroxysmal atrial fibrillation with RVR , patient remains in sinus rhythm.  COPD   Coronary artery disease (PCI with Synergy stent to left circumflex and LAD extending to left main in January 2017), stable " recent stress and echo. Denies angina.   Hypertension, losartan added this admission - now held due to hypotension.   Hyperlipidemia  Cachexia with malnutrition/Anemia   Severe dysphagia, meds crushed per PEG (placed 7/24).  2:1 heart block , with sleep. Asymptomatic.     No acute events overnight  Remains in sinus rhythm  No further recommendation from cardiac standpoint.  Cardiology sign off but call with any questions.    Raphael Addison MD  07/30/24  11:27 EDT

## 2024-07-30 NOTE — PLAN OF CARE
Goal Outcome Evaluation:   Received on tube feeing of Nutren 1.5 at 35 ml/hour tolerated. She claimed she feel better today. Metoprolol dose at 2100 hr held because her DBP were below 50. Di not give any insulin her BS below 150. VS stable ,keep dry and comfortable .Cotinue to monitor.

## 2024-07-30 NOTE — THERAPY TREATMENT NOTE
Patient Name: Asha Krishnan  : 1950    MRN: [id number]                              Today's Date: 2024       Admit Date: 2024    Visit Dx:     ICD-10-CM ICD-9-CM   1. Acute hypoxemic respiratory failure  J96.01 518.81   2. COPD with exacerbation  J44.1 491.21   3. Atrial fibrillation with rapid ventricular response  I48.91 427.31   4. Severe malnutrition  E43 261     Patient Active Problem List   Diagnosis    Chest pain, atypical    Chest pain, unspecified type    CAD (coronary artery disease)    Dysuria    Dyspnea    Elevated d-dimer    COPD (chronic obstructive pulmonary disease)    HTN (hypertension)    HLD (hyperlipidemia)    Altered mental status, unspecified    PAF (paroxysmal atrial fibrillation)    Pulmonary nodule 1 cm or greater in diameter    Anemia    Acute respiratory failure with hypoxia    Severe malnutrition    Acute hypoxemic respiratory failure     Past Medical History:   Diagnosis Date    Asthma     Atrial fibrillation, unspecified type     Cataract     COPD (chronic obstructive pulmonary disease)     Hyperlipidemia     Hypertension      Past Surgical History:   Procedure Laterality Date    CARDIAC CATHETERIZATION N/A 2022    Procedure: Left Heart Cath;  Surgeon: Tashi De La Torre MD;  Location: [facility] CATH INVASIVE LOCATION;  Service: Cardiovascular;  Laterality: N/A;    CARDIAC CATHETERIZATION N/A 2022    Procedure: Coronary angiography;  Surgeon: Tashi De La Torre MD;  Location: [facility] CATH INVASIVE LOCATION;  Service: Cardiovascular;  Laterality: N/A;    CERVICAL LAMINECTOMY      1995    FEMUR FRACTURE SURGERY Left           General Information       Row Name 24 1348          Physical Therapy Time and Intention    Document Type therapy note (daily note)  -EB     Mode of Treatment physical therapy  -EB       Row Name 24 5823          General Information    Patient Profile Reviewed yes  -EB     Existing Precautions/Restrictions fall;oxygen  therapy device and L/min  -EB       Row Name 07/30/24 1348          Cognition    Orientation Status (Cognition) oriented x 3  -EB       Row Name 07/30/24 1348          Safety Issues, Functional Mobility    Safety Issues Affecting Function (Mobility) judgment;insight into deficits/self-awareness  -EB     Impairments Affecting Function (Mobility) balance;endurance/activity tolerance;strength  -EB               User Key  (r) = Recorded By, (t) = Taken By, (c) = Cosigned By      Initials Name Provider Type     Nilam Beard PTA Physical Therapist Assistant                   Mobility       Row Name 07/30/24 1348          Bed Mobility    Supine-Sit Bland (Bed Mobility) contact guard;verbal cues  -EB     Sit-Supine Bland (Bed Mobility) contact guard;verbal cues  -EB     Assistive Device (Bed Mobility) bed rails;head of bed elevated  -EB     Comment, (Bed Mobility) increased time needed  -EB       Row Name 07/30/24 1348          Sit-Stand Transfer    Sit-Stand Bland (Transfers) contact guard  -EB     Assistive Device (Sit-Stand Transfers) walker, front-wheeled  -EB       Row Name 07/30/24 1348          Gait/Stairs (Locomotion)    Bland Level (Gait) contact guard  -EB     Assistive Device (Gait) walker, front-wheeled  -EB     Distance in Feet (Gait) 3  -EB     Deviations/Abnormal Patterns (Gait) gait speed decreased;stride length decreased  -EB     Bilateral Gait Deviations forward flexed posture  -EB     Comment, (Gait/Stairs) side steps along EOB. Pt feeling dizzy when sitting/standing.  Encouraged pt to get up to bsc with nsg throughout the day.  -EB               User Key  (r) = Recorded By, (t) = Taken By, (c) = Cosigned By      Initials Name Provider Type    Nilam Diaz PTA Physical Therapist Assistant                   Obj/Interventions       Row Name 07/30/24 1350          Motor Skills    Therapeutic Exercise --  BLE: LAQs and AP (X10)  -       Row Name 07/30/24 1353           Balance    Balance Assessment sitting static balance;sitting dynamic balance;standing static balance;standing dynamic balance  -EB     Static Sitting Balance standby assist  -EB     Dynamic Sitting Balance standby assist  -EB     Position, Sitting Balance sitting edge of bed  -EB     Static Standing Balance contact guard  -EB     Dynamic Standing Balance contact guard  -EB     Position/Device Used, Standing Balance supported;walker, front-wheeled  -EB               User Key  (r) = Recorded By, (t) = Taken By, (c) = Cosigned By      Initials Name Provider Type    Nilam Diaz PTA Physical Therapist Assistant                   Goals/Plan    No documentation.                  Clinical Impression       Row Name 07/30/24 1350          Pain    Pretreatment Pain Rating 0/10 - no pain  -EB     Posttreatment Pain Rating 0/10 - no pain  -EB       Row Name 07/30/24 1350          Plan of Care Review    Plan of Care Reviewed With patient;spouse  -     Progress improving  -     Outcome Evaluation Pt seen for PT tx this PM. Pt agreeable to participate. Pt completed supine to sit with CGA needing increased time. Upon sitting, pt felt dizzy. Pt needed SBA with sitting balance and able to complete clemente LE exercises. Pt then stood with CGA at EOB. Pt was able to side step along EOB, 3ft with rwx. Stayed close to EOB d/t pt's dizziness. Pt was able to return to supine with CGA. Pt encouraged to get up to a bsc with nsg throughout the day. Will continue to follow pt and progress as able.  -EB       Row Name 07/30/24 5070          Therapy Assessment/Plan (PT)    Therapy Frequency (PT) 5 times/wk  -       Row Name 07/30/24 2820          Positioning and Restraints    Pre-Treatment Position in bed  -EB     Post Treatment Position bed  -EB     In Bed supine;call light within reach;encouraged to call for assist;exit alarm on  HOB elevated  -EB               User Key  (r) = Recorded By, (t) = Taken By, (c) = Cosigned By      Initials  Name Provider Type    Nilam Diaz PTA Physical Therapist Assistant                   Outcome Measures       Row Name 07/30/24 1354          How much help from another person do you currently need...    Turning from your back to your side while in flat bed without using bedrails? 3  -EB     Moving from lying on back to sitting on the side of a flat bed without bedrails? 3  -EB     Moving to and from a bed to a chair (including a wheelchair)? 3  -EB     Standing up from a chair using your arms (e.g., wheelchair, bedside chair)? 3  -EB     Climbing 3-5 steps with a railing? 1  -EB     To walk in hospital room? 2  -EB     AM-PAC 6 Clicks Score (PT) 15  -EB     Highest Level of Mobility Goal 4 --> Transfer to chair/commode  -EB               User Key  (r) = Recorded By, (t) = Taken By, (c) = Cosigned By      Initials Name Provider Type    Nilam Diaz PTA Physical Therapist Assistant                                 Physical Therapy Education       Title: PT OT SLP Therapies (In Progress)       Topic: Physical Therapy (In Progress)       Point: Mobility training (Done)       Learning Progress Summary             Patient Acceptance, E,D, VU,NR by EB at 7/30/2024 1355    Acceptance, E,TB, NR by MS at 7/25/2024 1532    Acceptance, E,TB,D, VU,NR by SM at 7/17/2024 1540    Acceptance, E, VU,NR by CW at 7/15/2024 1123                         Point: Home exercise program (Done)       Learning Progress Summary             Patient Acceptance, E,D, VU,NR by EB at 7/30/2024 1355    Acceptance, E,TB,D, VU,NR by CB at 7/26/2024 1403    Acceptance, E,TB, NR by MS at 7/25/2024 1532    Acceptance, E,TB,D, VU,NR by SM at 7/17/2024 1540                         Point: Body mechanics (In Progress)       Learning Progress Summary             Patient Acceptance, E,TB, NR by MS at 7/25/2024 1532    Acceptance, E,TB,D, VU,NR by SM at 7/17/2024 1540                         Point: Precautions (In Progress)       Learning Progress  Summary             Patient Acceptance, E,TB, NR by MS at 7/25/2024 1532    Acceptance, E,TB,D, VU,NR by  at 7/17/2024 1540                                         User Key       Initials Effective Dates Name Provider Type Discipline     03/07/18 -  Jennifer Barker PTA Physical Therapist Assistant PT    MS 06/16/21 -  Kaitlyn Torres, PT Physical Therapist PT    EB 02/14/23 -  Nilam Beard PTA Physical Therapist Assistant PT    CW 12/13/22 -  Helene Pike, PT Physical Therapist PT    CB 10/22/21 -  Ivet Vásquez, PT Physical Therapist PT                  PT Recommendation and Plan     Plan of Care Reviewed With: patient, spouse  Progress: improving  Outcome Evaluation: Pt seen for PT tx this PM. Pt agreeable to participate. Pt completed supine to sit with CGA needing increased time. Upon sitting, pt felt dizzy. Pt needed SBA with sitting balance and able to complete clemente LE exercises. Pt then stood with CGA at EOB. Pt was able to side step along EOB, 3ft with rwx. Stayed close to EOB d/t pt's dizziness. Pt was able to return to supine with CGA. Pt encouraged to get up to a bsc with nsg throughout the day. Will continue to follow pt and progress as able.     Time Calculation:         PT Charges       Row Name 07/30/24 1355             Time Calculation    Start Time 1326  -EB      Stop Time 1344  -EB      Time Calculation (min) 18 min  -EB      PT Received On 07/30/24  -EB      PT - Next Appointment 07/31/24  -EB         Time Calculation- PT    Total Timed Code Minutes- PT 18 minute(s)  -EB                User Key  (r) = Recorded By, (t) = Taken By, (c) = Cosigned By      Initials Name Provider Type    EB Nilam Beard PTA Physical Therapist Assistant                  Therapy Charges for Today       Code Description Service Date Service Provider Modifiers Qty    [id number]  PT THERAPEUTIC ACT EA 15 MIN 7/30/2024 Nilam Beard PTA GP 1            PT G-Codes  Outcome Measure Options: AM-PAC 6  Clicks Basic Mobility (PT)  AM-PAC 6 Clicks Score (PT): 15       Nilam Beard, ISABELLA  7/30/2024

## 2024-07-30 NOTE — PLAN OF CARE
Goal Outcome Evaluation:  Plan of Care Reviewed With: patient        Progress: improving  Outcome Evaluation: F/C removed and voiding trial succeed, output in chart. Worked with PT, ok with assist x1 to BSC. Tube feeding maintained, at goal rate, no residual. Venelex applied under meplex on coccyx. VSS, call light within reach.

## 2024-07-31 LAB
ALBUMIN SERPL-MCNC: 2.5 G/DL (ref 3.5–5.2)
ALBUMIN/GLOB SERPL: 0.8 G/DL
ALP SERPL-CCNC: 93 U/L (ref 39–117)
ALT SERPL W P-5'-P-CCNC: 62 U/L (ref 1–33)
ANION GAP SERPL CALCULATED.3IONS-SCNC: 6.4 MMOL/L (ref 5–15)
AST SERPL-CCNC: 54 U/L (ref 1–32)
BASOPHILS # BLD AUTO: 0.02 10*3/MM3 (ref 0–0.2)
BASOPHILS NFR BLD AUTO: 0.2 % (ref 0–1.5)
BILIRUB SERPL-MCNC: 0.3 MG/DL (ref 0–1.2)
BUN SERPL-MCNC: 12 MG/DL (ref 8–23)
BUN/CREAT SERPL: 50 (ref 7–25)
CALCIUM SPEC-SCNC: 8.8 MG/DL (ref 8.6–10.5)
CHLORIDE SERPL-SCNC: 94 MMOL/L (ref 98–107)
CO2 SERPL-SCNC: 33.6 MMOL/L (ref 22–29)
CREAT SERPL-MCNC: 0.24 MG/DL (ref 0.57–1)
DEPRECATED RDW RBC AUTO: 41 FL (ref 37–54)
EGFRCR SERPLBLD CKD-EPI 2021: 116.2 ML/MIN/1.73
EOSINOPHIL # BLD AUTO: 0.16 10*3/MM3 (ref 0–0.4)
EOSINOPHIL NFR BLD AUTO: 1.8 % (ref 0.3–6.2)
ERYTHROCYTE [DISTWIDTH] IN BLOOD BY AUTOMATED COUNT: 12.7 % (ref 12.3–15.4)
GLOBULIN UR ELPH-MCNC: 3.2 GM/DL
GLUCOSE BLDC GLUCOMTR-MCNC: 136 MG/DL (ref 70–130)
GLUCOSE BLDC GLUCOMTR-MCNC: 161 MG/DL (ref 70–130)
GLUCOSE BLDC GLUCOMTR-MCNC: 167 MG/DL (ref 70–130)
GLUCOSE BLDC GLUCOMTR-MCNC: 173 MG/DL (ref 70–130)
GLUCOSE SERPL-MCNC: 134 MG/DL (ref 65–99)
HCT VFR BLD AUTO: 28.4 % (ref 34–46.6)
HGB BLD-MCNC: 9.4 G/DL (ref 12–15.9)
IMM GRANULOCYTES # BLD AUTO: 0.04 10*3/MM3 (ref 0–0.05)
IMM GRANULOCYTES NFR BLD AUTO: 0.5 % (ref 0–0.5)
LYMPHOCYTES # BLD AUTO: 0.92 10*3/MM3 (ref 0.7–3.1)
LYMPHOCYTES NFR BLD AUTO: 10.5 % (ref 19.6–45.3)
MAGNESIUM SERPL-MCNC: 1.7 MG/DL (ref 1.6–2.4)
MCH RBC QN AUTO: 29.1 PG (ref 26.6–33)
MCHC RBC AUTO-ENTMCNC: 33.1 G/DL (ref 31.5–35.7)
MCV RBC AUTO: 87.9 FL (ref 79–97)
MONOCYTES # BLD AUTO: 0.47 10*3/MM3 (ref 0.1–0.9)
MONOCYTES NFR BLD AUTO: 5.3 % (ref 5–12)
NEUTROPHILS NFR BLD AUTO: 7.19 10*3/MM3 (ref 1.7–7)
NEUTROPHILS NFR BLD AUTO: 81.7 % (ref 42.7–76)
NRBC BLD AUTO-RTO: 0 /100 WBC (ref 0–0.2)
PHOSPHATE SERPL-MCNC: 3 MG/DL (ref 2.5–4.5)
PLATELET # BLD AUTO: 325 10*3/MM3 (ref 140–450)
PMV BLD AUTO: 10.2 FL (ref 6–12)
POTASSIUM SERPL-SCNC: 4.5 MMOL/L (ref 3.5–5.2)
PROT SERPL-MCNC: 5.7 G/DL (ref 6–8.5)
RBC # BLD AUTO: 3.23 10*6/MM3 (ref 3.77–5.28)
SODIUM SERPL-SCNC: 134 MMOL/L (ref 136–145)
WBC NRBC COR # BLD AUTO: 8.8 10*3/MM3 (ref 3.4–10.8)

## 2024-07-31 PROCEDURE — 83735 ASSAY OF MAGNESIUM: CPT | Performed by: STUDENT IN AN ORGANIZED HEALTH CARE EDUCATION/TRAINING PROGRAM

## 2024-07-31 PROCEDURE — 94799 UNLISTED PULMONARY SVC/PX: CPT

## 2024-07-31 PROCEDURE — 82948 REAGENT STRIP/BLOOD GLUCOSE: CPT

## 2024-07-31 PROCEDURE — 94664 DEMO&/EVAL PT USE INHALER: CPT

## 2024-07-31 PROCEDURE — 94760 N-INVAS EAR/PLS OXIMETRY 1: CPT

## 2024-07-31 PROCEDURE — 84100 ASSAY OF PHOSPHORUS: CPT | Performed by: STUDENT IN AN ORGANIZED HEALTH CARE EDUCATION/TRAINING PROGRAM

## 2024-07-31 PROCEDURE — 94761 N-INVAS EAR/PLS OXIMETRY MLT: CPT

## 2024-07-31 PROCEDURE — 63710000001 INSULIN REGULAR HUMAN PER 5 UNITS: Performed by: STUDENT IN AN ORGANIZED HEALTH CARE EDUCATION/TRAINING PROGRAM

## 2024-07-31 PROCEDURE — 85025 COMPLETE CBC W/AUTO DIFF WBC: CPT | Performed by: INTERNAL MEDICINE

## 2024-07-31 PROCEDURE — 80053 COMPREHEN METABOLIC PANEL: CPT | Performed by: STUDENT IN AN ORGANIZED HEALTH CARE EDUCATION/TRAINING PROGRAM

## 2024-07-31 RX ORDER — CLOPIDOGREL BISULFATE 75 MG/1
75 TABLET ORAL DAILY
Start: 2024-07-31

## 2024-07-31 RX ORDER — LANOLIN ALCOHOL/MO/W.PET/CERES
100 CREAM (GRAM) TOPICAL DAILY
Start: 2024-07-31

## 2024-07-31 RX ORDER — LOSARTAN POTASSIUM 25 MG/1
25 TABLET ORAL NIGHTLY
Start: 2024-07-31

## 2024-07-31 RX ORDER — CASTOR OIL AND BALSAM, PERU 788; 87 MG/G; MG/G
1 OINTMENT TOPICAL EVERY 12 HOURS SCHEDULED
Start: 2024-07-31

## 2024-07-31 RX ORDER — BUDESONIDE 0.5 MG/2ML
0.5 INHALANT ORAL
Start: 2024-07-31

## 2024-07-31 RX ORDER — ATORVASTATIN CALCIUM 80 MG/1
80 TABLET, FILM COATED ORAL NIGHTLY
Start: 2024-07-31

## 2024-07-31 RX ADMIN — METOPROLOL TARTRATE 25 MG: 25 TABLET, FILM COATED ORAL at 08:20

## 2024-07-31 RX ADMIN — METOPROLOL TARTRATE 25 MG: 25 TABLET, FILM COATED ORAL at 21:05

## 2024-07-31 RX ADMIN — CASTOR OIL AND BALSAM, PERU 1 APPLICATION: 788; 87 OINTMENT TOPICAL at 08:19

## 2024-07-31 RX ADMIN — TIOTROPIUM BROMIDE INHALATION SPRAY 2 PUFF: 3.12 SPRAY, METERED RESPIRATORY (INHALATION) at 07:23

## 2024-07-31 RX ADMIN — CASTOR OIL AND BALSAM, PERU 1 APPLICATION: 788; 87 OINTMENT TOPICAL at 21:05

## 2024-07-31 RX ADMIN — INSULIN HUMAN 2 UNITS: 100 INJECTION, SOLUTION PARENTERAL at 12:15

## 2024-07-31 RX ADMIN — INSULIN HUMAN 2 UNITS: 100 INJECTION, SOLUTION PARENTERAL at 18:06

## 2024-07-31 RX ADMIN — ATORVASTATIN CALCIUM 80 MG: 80 TABLET, FILM COATED ORAL at 21:05

## 2024-07-31 RX ADMIN — BUDESONIDE 0.5 MG: 0.5 INHALANT ORAL at 19:25

## 2024-07-31 RX ADMIN — BUDESONIDE 0.5 MG: 0.5 INHALANT ORAL at 07:23

## 2024-07-31 RX ADMIN — IPRATROPIUM BROMIDE AND ALBUTEROL SULFATE 3 ML: .5; 3 SOLUTION RESPIRATORY (INHALATION) at 17:37

## 2024-07-31 RX ADMIN — INSULIN HUMAN 2 UNITS: 100 INJECTION, SOLUTION PARENTERAL at 07:05

## 2024-07-31 RX ADMIN — Medication 500 MCG: at 08:20

## 2024-07-31 RX ADMIN — CLOPIDOGREL BISULFATE 75 MG: 75 TABLET, FILM COATED ORAL at 08:20

## 2024-07-31 RX ADMIN — Medication 100 MG: at 08:20

## 2024-07-31 NOTE — PLAN OF CARE
Goal Outcome Evaluation:    Plan of Care Reviewed With: patient        Progress: improving  Outcome Evaluation: Pt A&Ox4, SR-SB, R/A until approx 2300 and pt needed n/c 2L for suspected apnea at rest causing O2 to drop to 70s% and recover quickly repeatedly until n/c was in place.  Q2 hr turns, heels floated, SCDs on, TF continued and TF bag changed, no residuals, pt was up to BSC to void - urine still dark eufemia/tea colored.  Metoprolol was able to be administered due to meeting parameters for admin.  Venelex cream applied, PEG site healing with split gauze in place.  Pt's spouse at bedside.

## 2024-07-31 NOTE — PLAN OF CARE
Goal Outcome Evaluation:    Problem: Adult Inpatient Plan of Care  Goal: Plan of Care Review  Recent Flowsheet Documentation  Taken 7/31/2024 1447 by Mirian Johnson, RN  Progress: improving  Plan of Care Reviewed With: patient  Outcome Evaluation: Vitals stable. Tolerating tube feeds - at goal. Q2 turns and skin care. Up to BSC with x1 assist. +Voiding. Pt stable for discharged - waiting for precert for SNF. Plan of care ongoing.

## 2024-07-31 NOTE — DISCHARGE SUMMARY
Patient Name: Asha Krishnan  : 1950  MRN: [id number]    Date of Admission: 2024  Date of Discharge:  2024  Primary Care Physician: Capo Pedroza MD      Chief Complaint:   Shortness of Breath      Discharge Diagnoses     Active Hospital Problems    Diagnosis  POA    **Acute respiratory failure with hypoxia [J96.01]  Yes    Severe malnutrition [E43]  Yes    Acute hypoxemic respiratory failure [J96.01]  Unknown    PAF (paroxysmal atrial fibrillation) [I48.0]  Yes    CAD (coronary artery disease) [I25.10]  Yes    Elevated d-dimer [R79.89]  Yes    COPD (chronic obstructive pulmonary disease) [J44.9]  Yes    Dyspnea [R06.00]  Yes    HTN (hypertension) [I10]  Yes      Resolved Hospital Problems   No resolved problems to display.        Hospital Course     Ms. Krishnan is a 74 y.o. female with a history of PAF, HTN, COPD, severe malnutrition, dysphagia and  CAD with stable angina, last heart catheterization 2022 that showed severe two-vessel coronary artery disease with 100% ostial stenosis consistent with total occlusion and distal RCA as well is an 80% ostial in-stent stenosis.  Medical management was recommended at that time.  at who presented to [facility] initially on 2024 complaining of shortness of air .  Please see the admitting history and physical for further details.  She was found to have acute hypoxic respiratory failure, atrial fibrillation with RVR, hypokalemia, elevated D-dimer and was admitted to the hospital for further evaluation and treatment.  She was converted back to sinus rhythm with IV Cardizem while in the emergency room.  She had elevated D-dimer at time of admission, but patient and family refused CTA.  She has been tolerating room air for the last several days.  However she drops to the 70s with quick recovery at at bedtime and requiring 2 L at that time.    Shortness of breath Acute exacerbation of COPD, Acute Respiratory  distress: Pulmonary has been following during her hospitalization she was started on scheduled bronchodilators, and glucocorticosteroids for COPD symptoms.  There is suspicion for tracheal aspiration. Speech Therapy was consulted to evaluated which she did not do well on. Supportive treatment with Symbicort.  She was also on DuoNebs for some time scheduled for COPD exacerbation, but this aggravated her atrial fibrillation and was made as needed.  She has tolerated change well, requiring 2L of oxygen to maintain adequate oxygen saturations, she has had no further complaints of shortness of breath, or wheezing on exams.   PAF, CAD, and Hypertension   Cardiology has been following during her admission, who recommended continued medication management of her CAD.  Felt she would continue to have atrial fibrillation secondary to her cachectic state and pulmonary issues.  Has not been started on anticoagulation as she is a poor candidate secondary to risk of bleeding secondary to frequent falls.  She has responded well to increase dose of metoprolol 37.5 mg twice daily, her rate remains controlled. She is in SR with a 2:1 AB block during sleep.   History of multiple prior stents , thankfully she has been free of chest pain, and was continued on Plavix, Lipitor, Imdur, and metoprolol    Dysphagia severe malnutrition, cachectic:  Endorses several years of difficulty swallowing, and GERD there is high suspicion of noncompliance with medications as she normally takes her medications crushed, unfortunately her  was hospitalized and he normally grudges her medication for her she is too weak to crush them herself so he admittedly has not been taking her medications regularly.   Speech therapy was consulted and evaluated she did not do well, she was unable to tuck her chin or swallow anything per her report.She was made NPO, and nutrition was consulted.  Attempts to place core track were unsuccessful up to 07/21/24.    Patient was unable to consent for a PICC, so TPN was started temporarily for 72 hours.  Ultimately PEG tube was placed by General  surgery on 07/24/24. Tube feedings initiated, and slowly titrated to 35 mL/h per dietary recommendations as she is at high risk of refeeding syndrome. She has tolerated tube feedings well, with no nausea, abdominal pain, vomiting or diarrhea reported.   She has been evaluated by palliative care team during her hospitalization due to her overall poor prognosis, she has severe malnutrition, protein deficient,  she is cachectic with a BMI of 10.04  and multiple comorbidities.  She requested to be made DNR/DNI at that time.  Her  and she met the  to complete advanced directives.   was resistant to any hospice services at that time but did emphasize that she wanted no further hospitalizations.  She has been resistant to physical therapy, complaints of dizziness.  She is required assistance getting to and from bedside commode.  Physical therapy plan for continued strengthening 5 times per week.   Leukocytosis on 7/27 felt to be  secondary to TPN, steroids and surgery that has since resolved.  Chronic anemia with hemoglobin's stable between 9.4-10.7.  At baseline.  Chronic hyponatremia sodium 132-134 at her baseline. Free water flushes via her peg tube continue without issue.     Day of Discharge     Subjective:  No acute events over night, she complains of back pain and a headache today, Treated with lidocaine patch and ASA.  She is stable for discharge to SNF when transportation has been secured.      Physical Exam:  Temp:  [97.5 °F (36.4 °C)-98.2 °F (36.8 °C)] 98.2 °F (36.8 °C)  Heart Rate:  [58-62] 58  Resp:  [14-16] 16  BP: (120-159)/(49-56) 120/49  Body mass index is 10.04 kg/m².  Physical Exam  Vitals and nursing note reviewed.   Constitutional:       General: She is awake. She is not in acute distress.     Appearance: Normal appearance. She is cachectic. She  is ill-appearing.      Interventions: Nasal cannula in place.   HENT:      Head: Normocephalic and atraumatic.      Mouth/Throat:      Mouth: Mucous membranes are dry.      Pharynx: No posterior oropharyngeal erythema.   Eyes:      General: No scleral icterus.     Conjunctiva/sclera: Conjunctivae normal.   Neck:      Vascular: No JVD.   Cardiovascular:      Rate and Rhythm: Normal rate and regular rhythm.      Pulses: Normal pulses.           Radial pulses are 2+ on the right side and 2+ on the left side.      Heart sounds: Normal heart sounds. No murmur heard.  Pulmonary:      Effort: Pulmonary effort is normal. No respiratory distress.      Breath sounds: Normal breath sounds. Decreased air movement present. No decreased breath sounds.   Abdominal:      General: Bowel sounds are normal. There is no distension.      Palpations: Abdomen is soft.      Tenderness: There is no abdominal tenderness.      Comments: PEG    Musculoskeletal:         General: No swelling or tenderness. Normal range of motion.      Cervical back: Neck supple.      Right lower leg: No edema.      Left lower leg: No edema.   Skin:     General: Skin is warm and dry.      Capillary Refill: Capillary refill takes less than 2 seconds.      Coloration: Skin is pale. Skin is not jaundiced.      Findings: No rash.   Neurological:      General: No focal deficit present.      Mental Status: She is alert and oriented to person, place, and time. Mental status is at baseline.   Psychiatric:         Mood and Affect: Mood normal. Affect is flat.         Behavior: Behavior normal. Behavior is cooperative.         Consultants     Consult Orders (all) (From admission, onward)       Start     Ordered    07/26/24 1156  Inpatient Palliative Care Team Consult  Once,   Status:  Canceled        Provider:  (Not yet assigned)    07/26/24 1155    07/25/24 0748  Inpatient Nutrition Consult  Once        Comments: Ok to start tube feeds at 10 ml/hour per MD; do not advance    Provider:  (Not yet assigned)    07/25/24 0748    07/23/24 1241  Inpatient Cardiology Consult  Once        Specialty:  Cardiology  Provider:  Hema Aguiar MD    07/23/24 1240    07/22/24 0757  Inpatient General Surgery Consult  Once        Specialty:  General Surgery  Provider:  Violet Koenig MD    07/22/24 0757    07/21/24 1815  Inpatient IV Team Consult Midline 2 Lumens  Once,   Status:  Canceled        Provider:  (Not yet assigned)    07/21/24 1815    07/21/24 1347  Inpatient Nutrition Consult  Once        Provider:  (Not yet assigned)    07/21/24 1350    07/21/24 1309  Inpatient Palliative Care Team Consult  Once        Provider:  (Not yet assigned)    07/21/24 1308    07/21/24 1220  Inpatient Nutrition Consult  Once        Provider:  (Not yet assigned)    07/21/24 1220    07/20/24 1436  Inpatient Nutrition Consult  Once        Provider:  (Not yet assigned)    07/20/24 1435    07/17/24 1335  Inpatient Palliative Care Team Consult  Once        Provider:  (Not yet assigned)    07/17/24 1334    07/17/24 1133  Inpatient Pulmonology Consult  Once        Specialty:  Pulmonary Disease  Provider:  Andrew Chapa MD    07/17/24 1133    07/16/24 0807  Inpatient Cardiology Consult  Once,   Status:  Canceled        Specialty:  Cardiology  Provider:  Padmaja Carr MD    07/16/24 0807    07/15/24 1840  Inpatient Cardiology Consult  Once,   Status:  Canceled        Specialty:  Cardiology  Provider:  Padmaja Carr MD    07/15/24 1840    07/14/24 0223  Inpatient Nutrition Consult  Once        Provider:  (Not yet assigned)    07/14/24 0222    07/14/24 0222  Inpatient Case Management  Consult  Once        Provider:  (Not yet assigned)    07/14/24 0222    07/13/24 2313  Inpatient Cardiology Consult  Once        Specialty:  Cardiology  Provider:  Amrit Garcia MD    07/13/24 2312    07/13/24 2147  A (on-call MD unless specified) Details  Once        Specialty:  Hospitalist   "Provider:  (Not yet assigned)    07/13/24 2146                  Procedures     ESOPHAGOGASTRODUODENOSCOPY WITH PERCUTANEOUS ENDOSCOPIC GASTROSTOMY TUBE INSERTION    Imaging Results (All)       Procedure Component Value Units Date/Time    XR Chest 1 View [[id number]] Collected: 07/20/24 1522     Updated: 07/20/24 1527    Narrative:      XR CHEST 1 VW-     INDICATION: Clinical concern for pneumonia     COMPARISON: Chest radiographs dating back to 11/11/2022       Impression:      New patchy inferior left lower lobe opacities, suspicious  for infiltrate. Recommend imaging follow-up to clearance. No pleural  effusion or pneumothorax. Normal size cardiomediastinal silhouette. No  focal osseous abnormality.     This report was finalized on 7/20/2024 3:24 PM by Dr. Flavio Rubio M.D on Workstation: [id number]       FL Video Swallow Single Contrast [[id number]] Collected: 07/17/24 1055     Updated: 07/17/24 1058    Narrative:      VIDEO SWALLOWING EXAMINATION BY SPEECH PATHOLOGY     Clinical: Dysphasia     Video swallowing examination performed under the direction of speech  pathology. Imaging reviewed by radiologist who concurs with the  findings.     Speech pathology summary: Radiologist, Marissa Costa, present. Limited  exam. Pt reports that she \"can't swallow anything\" and declined VFSS  bolus trials. After discussion, pt agreeable to two bolus trials.  Acceptance of small bolus volume could potentially have negative impact  on pharyngeal swallow initiation. Oral phase: exam limited due to  logistical reasons not related to impairment. Pharyngeal phase most  notable for swallow safety and efficiency considerations. Safety:  Tracheal aspiration after the swallow with nectar thick liquids.  Suboptimal expiratory reflex and throat clear response. Efficiency:  Minimal to absent initiation of pharyngeal swallow. Minimal to no  pharyngeal clearance of puree and nectar thick liquids.     By electronically signing this report, I, " the supervising radiologist,  attest that I was not present for the procedure(s) but agree with the  final edited report.         FLUOROSCOPY TIME: 1 minute 19 seconds, 2067 images.     This report was finalized on 7/17/2024 10:55 AM by Dr. Darrell Sheriff M.D on Workstation: [id number]       XR Chest 1 View [[id number]] Collected: 07/13/24 2130     Updated: 07/13/24 2135    Narrative:      XR CHEST 1 VW-     HISTORY: Shortness of air.     COMPARISON: Chest radiograph 11/11/2022     FINDINGS: A single view of the chest was obtained.     Support Devices:  None.  Cardiac Silhouette/Mediastinum/Mary Beth: The cardiac silhouette is normal in  size. There are coronary artery stents. There is calcific aortic  atherosclerosis.  Lungs/Pleural Spaces: There is emphysema. There is no large pleural  effusion. There is no focal consolidation.  Chest Wall/Diaphragm/Upper Abdomen: There is degenerative disc disease.  There are old rib fractures.        CONCLUSION(S):       1.  Emphysema. No focal consolidation or large pleural effusion.     This report was finalized on 7/13/2024 9:31 PM by Dr. Nette Vázquez M.D  on Workstation: [id number]             Results for orders placed during the hospital encounter of 11/11/22    Duplex Venous Lower Extremity - Bilateral CAR    Interpretation Summary    Normal bilateral lower extremity venous duplex scan.    Results for orders placed during the hospital encounter of 11/11/22    Adult Transthoracic Echo Complete W/ Cont if Necessary Per Protocol    Interpretation Summary    Left ventricular systolic function is normal. Calculated left ventricular EF = 59.4% Normal left ventricular cavity size and wall thickness noted. All left ventricular wall segments contract normally. Left ventricular diastolic function is consistent with (grade I) impaired relaxation.    Pertinent Labs     Results from last 7 days   Lab Units 07/31/24  0230 07/30/24  0346 07/29/24  0647 07/28/24  0329   WBC 10*3/mm3 8.80  "11.49* 10.44 8.69   HEMOGLOBIN g/dL 9.4* 9.6* 9.7* 9.4*   PLATELETS 10*3/mm3 325 338 308 301     Results from last 7 days   Lab Units 07/31/24  0230 07/30/24  0346 07/29/24  0647 07/28/24  0329 07/27/24  1409 07/27/24  0300   SODIUM mmol/L 134* 132* 133* 133*  --  138   POTASSIUM mmol/L 4.5 4.4 4.6 4.4   < > 3.6   CHLORIDE mmol/L 94* 92* 94* 98  --  104   CO2 mmol/L 33.6* 34.4* 30.4* 29.0  --  28.0   BUN mg/dL 12 14 13 10  --  12   CREATININE mg/dL 0.24* 0.22* 0.23* <0.17*  --  0.24*   GLUCOSE mg/dL 134* 111* 179* 177*  --  168*   EGFR mL/min/1.73 116.2 118.6 117.4  --   --  116.2    < > = values in this interval not displayed.     Results from last 7 days   Lab Units 07/31/24  0230 07/30/24  0346 07/29/24  0647 07/28/24  0329   ALBUMIN g/dL 2.5* 2.7* 2.4* 2.2*   BILIRUBIN mg/dL 0.3 0.4 0.4 0.4   ALK PHOS U/L 93 95 89 87   AST (SGOT) U/L 54* 34* 22 20   ALT (SGPT) U/L 62* 44* 30 25     Results from last 7 days   Lab Units 07/31/24  0230 07/30/24  0346 07/29/24  0647 07/28/24  0329   CALCIUM mg/dL 8.8 8.7 8.2* 8.2*   ALBUMIN g/dL 2.5* 2.7* 2.4* 2.2*   MAGNESIUM mg/dL 1.7 1.8 1.8 1.6   PHOSPHORUS mg/dL 3.0 3.3 3.3 2.6               Invalid input(s): \"LDLCALC\"          Test Results Pending at Discharge       Discharge Details        Discharge Medications        New Medications        Instructions Start Date   albuterol sulfate  (90 Base) MCG/ACT inhaler  Commonly known as: PROVENTIL HFA;VENTOLIN HFA;PROAIR HFA   2 puffs, Inhalation, Every 4 Hours PRN      budesonide 0.5 MG/2ML nebulizer solution  Commonly known as: PULMICORT   0.5 mg, Nebulization, 2 Times Daily - RT      castor oil-balsam peru ointment   1 Application, Topical, Every 12 Hours Scheduled      losartan 25 MG tablet  Commonly known as: COZAAR   25 mg, Per G Tube, Nightly      polyethylene Glycol 400 1 % solution ophthalmic solution   1 drop, Both Eyes, Every 1 Hour PRN             Changes to Medications        Instructions Start Date   atorvastatin " 80 MG tablet  Commonly known as: LIPITOR  What changed: how to take this   80 mg, Per G Tube, Nightly      clopidogrel 75 MG tablet  Commonly known as: PLAVIX  What changed: how to take this   75 mg, Per G Tube, Daily      cyanocobalamin 500 MCG tablet  Commonly known as: VITAMIN B-12  What changed:   medication strength  how to take this   500 mcg, Per G Tube, Daily      metoprolol tartrate 25 MG tablet  Commonly known as: LOPRESSOR  What changed:   medication strength  how much to take  how to take this   25 mg, Per G Tube, Every 12 Hours Scheduled      thiamine 100 MG tablet  Commonly known as: VITAMIN B1  What changed: how to take this   100 mg, Per G Tube, Daily             Continue These Medications        Instructions Start Date   bumetanide 2 MG tablet  Commonly known as: BUMEX   2 mg, Oral, Daily      isosorbide mononitrate 30 MG 24 hr tablet  Commonly known as: IMDUR   30 mg, Oral, Every 24 Hours Scheduled      nitroglycerin 0.4 MG SL tablet  Commonly known as: NITROSTAT   0.4 mg, Sublingual, Every 5 Minutes PRN, Take no more than 3 doses in 15 minutes.      tiotropium bromide monohydrate 2.5 MCG/ACT aerosol solution inhaler  Commonly known as: SPIRIVA RESPIMAT   2 puffs, Inhalation, Daily - RT   Start Date: August 1, 2024              Allergies   Allergen Reactions    Latex Anaphylaxis     Pt states whole body swells including throat    Tylenol [Acetaminophen] Anaphylaxis     States whole body swells including throat    Ibuprofen Hives       Discharge Disposition:  Skilled Nursing Facility (DC - External)      Discharge Diet:  Diet Order   Procedures    NPO Diet NPO Type: Tube Feeding       Discharge Activity:       CODE STATUS:    Code Status and Medical Interventions:   Ordered at: 07/19/24 1742     Medical Intervention Limits:    No intubation (DNI)    No cardioversion     Code Status (Patient has no pulse and is not breathing):    No CPR (Do Not Attempt to Resuscitate)     Medical Interventions  (Patient has pulse or is breathing):    Limited Support       Future Appointments   Date Time Provider Department Center   10/30/2024 10:00 AM Shivani Young APRN [facility]     Additional Instructions for the Follow-ups that You Need to Schedule       Discharge Follow-up with Specialty: PCP in 1-2 weeks. Cardiology in 2 weeks   As directed      Specialty: PCP in 1-2 weeks. Cardiology in 2 weeks               Contact information for follow-up providers       Capo Pedroza MD .    Specialty: Family Medicine  Contact information:  [address] [address]  [phone]                       Contact information for after-discharge care       Destination       [facility] .    Service: Skilled Nursing  Contact information:  [address] [facility] [address]  [phone]                                   Additional Instructions for the Follow-ups that You Need to Schedule       Discharge Follow-up with Specialty: PCP in 1-2 weeks. Cardiology in 2 weeks   As directed      Specialty: PCP in 1-2 weeks. Cardiology in 2 weeks            Time Spent on Discharge:  Greater than 30 minutes      EMORY Mackenzie  [facility]  07/31/24  08:03 EDT

## 2024-07-31 NOTE — CASE MANAGEMENT/SOCIAL WORK
Post-Acute Authorization Submission      Post Acute Pre-Cert Documentation  Request Submitted by Facility - Type:: Hospital  Post-Acute Authorization Type Submitted:: SNF  Date Post Acute Pre-Cert Inititated per Facility: 07/30/24  Accepting Facility: [facility] Discharge Date Requested: 07/30/24  All Clinicals Submitted?: Yes  Had Accepting Facility at Time of Submission: Yes  Response Communicated to::   Authorization Number:: PENDING [id number]  Post Acute Pre-Cert Initiated Comment: SPOKE TO RAQUEL - CASE UNDER CLINICAL REVIEW CURRENTLY. AWAITING DETERMINATION.              Dale Cr, PCT

## 2024-07-31 NOTE — PROGRESS NOTES
Name: Asha Krishnan ADMIT: 2024   : 1950  PCP: Capo Pedroza MD    MRN: [id number] LOS: 15 days   AGE/SEX: 74 y.o. female  ROOM: [facility]     Subjective   Subjective    No acute events overnight. She offers no complaints.  She is ready for discharge has bed at [facility] pending pre cert.    Will continued to follow .      Objective   Objective   Vital Signs  Temp:  [97.5 °F (36.4 °C)-98.2 °F (36.8 °C)] 97.8 °F (36.6 °C)  Heart Rate:  [58-63] 62  Resp:  [14-16] 16  BP: (120-159)/(49-59) 134/50  SpO2:  [94 %-100 %] 95 %  on  Flow (L/min):  [2] 2;   Device (Oxygen Therapy): room air  Body mass index is 10.04 kg/m².      Physical Exam  Vitals and nursing note reviewed.   Constitutional:       General: She is awake.      Appearance: She is cachectic. She is ill-appearing (chronically ill appearing).      Interventions: Nasal cannula in place.      Comments: cachexia   HENT:      Head:      Comments: Severe Temporal muscle wasting noted     Mouth/Throat:      Mouth: Mucous membranes are dry.      Pharynx: Oropharynx is clear.   Eyes:      General: No scleral icterus.     Conjunctiva/sclera: Conjunctivae normal.   Cardiovascular:      Rate and Rhythm: Normal rate and regular rhythm.      Pulses: Normal pulses.      Heart sounds: Normal heart sounds.   Pulmonary:      Effort: Pulmonary effort is normal.      Breath sounds: Normal breath sounds.   Abdominal:      General: Abdomen is flat. Bowel sounds are normal.      Palpations: Abdomen is soft.   Skin:     General: Skin is warm and dry.      Capillary Refill: Capillary refill takes less than 2 seconds.   Neurological:      General: No focal deficit present.      Mental Status: She is oriented to person, place, and time.      Motor: Weakness (generalized weakness) present.   Psychiatric:         Mood and Affect: Affect is flat.         Behavior: Behavior is cooperative.      Comments: Flat affect         Results Review     I reviewed the  patient's new clinical results.  Results from last 7 days   Lab Units 07/31/24  0230 07/30/24  0346 07/29/24  0647 07/28/24  0329   WBC 10*3/mm3 8.80 11.49* 10.44 8.69   HEMOGLOBIN g/dL 9.4* 9.6* 9.7* 9.4*   PLATELETS 10*3/mm3 325 338 308 301     Results from last 7 days   Lab Units 07/31/24  0230 07/30/24  0346 07/29/24  0647 07/28/24  0329   SODIUM mmol/L 134* 132* 133* 133*   POTASSIUM mmol/L 4.5 4.4 4.6 4.4   CHLORIDE mmol/L 94* 92* 94* 98   CO2 mmol/L 33.6* 34.4* 30.4* 29.0   BUN mg/dL 12 14 13 10   CREATININE mg/dL 0.24* 0.22* 0.23* <0.17*   GLUCOSE mg/dL 134* 111* 179* 177*   Estimated Creatinine Clearance: 78.4 mL/min (A) (by C-G formula based on SCr of 0.24 mg/dL (L)).  Results from last 7 days   Lab Units 07/31/24  0230 07/30/24  0346 07/29/24  0647 07/28/24  0329   ALBUMIN g/dL 2.5* 2.7* 2.4* 2.2*   BILIRUBIN mg/dL 0.3 0.4 0.4 0.4   ALK PHOS U/L 93 95 89 87   AST (SGOT) U/L 54* 34* 22 20   ALT (SGPT) U/L 62* 44* 30 25     Results from last 7 days   Lab Units 07/31/24  0230 07/30/24  0346 07/29/24  0647 07/28/24  0329   CALCIUM mg/dL 8.8 8.7 8.2* 8.2*   ALBUMIN g/dL 2.5* 2.7* 2.4* 2.2*   MAGNESIUM mg/dL 1.7 1.8 1.8 1.6   PHOSPHORUS mg/dL 3.0 3.3 3.3 2.6           COVID19   Date Value Ref Range Status   07/13/2024 Not Detected Not Detected - Ref. Range Final     Glucose   Date/Time Value Ref Range Status   07/31/2024 1155 173 (H) 70 - 130 mg/dL Final   07/31/2024 0625 161 (H) 70 - 130 mg/dL Final   07/31/2024 0019 136 (H) 70 - 130 mg/dL Final   07/30/2024 1823 132 (H) 70 - 130 mg/dL Final   07/30/2024 1322 170 (H) 70 - 130 mg/dL Final   07/30/2024 0753 157 (H) 70 - 130 mg/dL Final   07/30/2024 0559 144 (H) 70 - 130 mg/dL Final           XR Chest 1 View  Narrative: XR CHEST 1 VW-     INDICATION: Clinical concern for pneumonia     COMPARISON: Chest radiographs dating back to 11/11/2022     Impression: New patchy inferior left lower lobe opacities, suspicious  for infiltrate. Recommend imaging follow-up to  clearance. No pleural  effusion or pneumothorax. Normal size cardiomediastinal silhouette. No  focal osseous abnormality.     This report was finalized on 7/20/2024 3:24 PM by Dr. Flavio Rubio M.D on Workstation: [id number]       Scheduled Medications  atorvastatin, 80 mg, Per G Tube, Nightly  budesonide, 0.5 mg, Nebulization, BID - RT  castor oil-balsam peru, 1 Application, Topical, Q12H  clopidogrel, 75 mg, Per G Tube, Daily  insulin regular, 2-7 Units, Subcutaneous, Q6H  [Held by provider] isosorbide dinitrate, 10 mg, Per G Tube, TID - Nitrates  [Held by provider] losartan, 25 mg, Per G Tube, Nightly  metoprolol tartrate, 25 mg, Per G Tube, Q12H  thiamine, 100 mg, Per G Tube, Daily  tiotropium bromide monohydrate, 2 puff, Inhalation, Daily - RT  vitamin B-12, 500 mcg, Per G Tube, Daily    Infusions       Diet  NPO Diet NPO Type: Tube Feeding    I have personally reviewed     [x]  Laboratory   []  Microbiology   []  Radiology   [x]  EKG/Telemetry  []  Cardiology/Vascular   []  Pathology    []  Records       Assessment/Plan     Active Hospital Problems    Diagnosis  POA    **Acute respiratory failure with hypoxia [J96.01]  Yes    Severe malnutrition [E43]  Yes    Acute hypoxemic respiratory failure [J96.01]  Unknown    PAF (paroxysmal atrial fibrillation) [I48.0]  Yes    CAD (coronary artery disease) [I25.10]  Yes    Elevated d-dimer [R79.89]  Yes    COPD (chronic obstructive pulmonary disease) [J44.9]  Yes    Dyspnea [R06.00]  Yes    HTN (hypertension) [I10]  Yes      Resolved Hospital Problems   No resolved problems to display.       COPD with Exacerbation  Status post steroids  Continue breathing treatments pulmicort and Spiriva  Encourage pulmonary hygiene turn cough deep breath  Currently on 2 L nasal cannula, she is not oxygen dependent at baseline  Exacerbation appears to have resolved.  Noted to have leukocytosis initially, reactive to steroids this is since resolved     Dysphagia  Severely malnourished  S/P peg placement   Was unable to place core track initially, PEG and TPN were ordered,  Surgery was consulted placed PEG tube on 724.  She had initially had some loose stools with initiation of tube feeds.  Resolved.  She is at risk for refeeding syndrome.  She continues to  tolerated tube feedings no N/V/D or abdominal pain.  TPN discontinued 07/29  Dietary following, appreciate their recommendations and assistance with this patient.      Hyponatremia  Na stable at 133 down from 138.   Monitor BMP  Adjust free water as needed.-Currently on 20 mL every 4.  Currently of TN and TPN -continue today.     PAF/RVR  Rate controlled at this time on metoprolol per PEG.  He is in normal sinus rhythm with PACs on bedside monitor.  Noted due to 1 heart block while sleeping she is completely asymptomatic, cardiology is aware.  Not on AC chronically, poor candidate per cardiology  .     CAD/ HTN  S/P prior PCI with multiple stents to Left circ and lad extending to left main in 2017/  She denies chest pain  or shortness of breath   Continue Statin and Plavix   Continue Toprol.  She has very labile blood pressure, she had previously been on Imdur, metoprolol, and Bumex.  As well as losartan - on hold now.   has multiple prior stents to left circ and lad extending to left main 1/2017.     Anemia  Iron profile - anemia of chronic disease   Monitor CBC and transfuse as needed.       SCDs for DVT prophylaxis.  DNR/ DNI.  Palliative care evaluated.-She confirms she does want to be a DNR/DNI  Discussed with patient, nurse, and significant other at bedside.   Anticipate discharge SNF, when arrangements have been made..     This note has been reviewed and is accurate as of 07/31/24.         EMORY Mackenzie  [facility]  07/31/24  15:03 EDT

## 2024-08-01 VITALS
SYSTOLIC BLOOD PRESSURE: 131 MMHG | RESPIRATION RATE: 16 BRPM | DIASTOLIC BLOOD PRESSURE: 47 MMHG | HEART RATE: 68 BPM | HEIGHT: 62 IN | BODY MASS INDEX: 10.2 KG/M2 | TEMPERATURE: 98.2 F | OXYGEN SATURATION: 95 % | WEIGHT: 55.4 LBS

## 2024-08-01 LAB
ALBUMIN SERPL-MCNC: 2.7 G/DL (ref 3.5–5.2)
ALBUMIN/GLOB SERPL: 0.9 G/DL
ALP SERPL-CCNC: 95 U/L (ref 39–117)
ALT SERPL W P-5'-P-CCNC: 53 U/L (ref 1–33)
ANION GAP SERPL CALCULATED.3IONS-SCNC: 4.8 MMOL/L (ref 5–15)
AST SERPL-CCNC: 28 U/L (ref 1–32)
BASOPHILS # BLD AUTO: 0.05 10*3/MM3 (ref 0–0.2)
BASOPHILS NFR BLD AUTO: 0.5 % (ref 0–1.5)
BILIRUB SERPL-MCNC: 0.3 MG/DL (ref 0–1.2)
BUN SERPL-MCNC: 12 MG/DL (ref 8–23)
BUN/CREAT SERPL: 50 (ref 7–25)
CALCIUM SPEC-SCNC: 8.8 MG/DL (ref 8.6–10.5)
CHLORIDE SERPL-SCNC: 99 MMOL/L (ref 98–107)
CO2 SERPL-SCNC: 31.2 MMOL/L (ref 22–29)
CREAT SERPL-MCNC: 0.24 MG/DL (ref 0.57–1)
DEPRECATED RDW RBC AUTO: 42.5 FL (ref 37–54)
EGFRCR SERPLBLD CKD-EPI 2021: 116.2 ML/MIN/1.73
EOSINOPHIL # BLD AUTO: 0.12 10*3/MM3 (ref 0–0.4)
EOSINOPHIL NFR BLD AUTO: 1.3 % (ref 0.3–6.2)
ERYTHROCYTE [DISTWIDTH] IN BLOOD BY AUTOMATED COUNT: 13.1 % (ref 12.3–15.4)
GLOBULIN UR ELPH-MCNC: 2.9 GM/DL
GLUCOSE BLDC GLUCOMTR-MCNC: 127 MG/DL (ref 70–130)
GLUCOSE BLDC GLUCOMTR-MCNC: 174 MG/DL (ref 70–130)
GLUCOSE BLDC GLUCOMTR-MCNC: 184 MG/DL (ref 70–130)
GLUCOSE SERPL-MCNC: 169 MG/DL (ref 65–99)
HCT VFR BLD AUTO: 28.7 % (ref 34–46.6)
HGB BLD-MCNC: 9.5 G/DL (ref 12–15.9)
IMM GRANULOCYTES # BLD AUTO: 0.04 10*3/MM3 (ref 0–0.05)
IMM GRANULOCYTES NFR BLD AUTO: 0.4 % (ref 0–0.5)
LYMPHOCYTES # BLD AUTO: 0.91 10*3/MM3 (ref 0.7–3.1)
LYMPHOCYTES NFR BLD AUTO: 9.5 % (ref 19.6–45.3)
MAGNESIUM SERPL-MCNC: 1.6 MG/DL (ref 1.6–2.4)
MCH RBC QN AUTO: 29.6 PG (ref 26.6–33)
MCHC RBC AUTO-ENTMCNC: 33.1 G/DL (ref 31.5–35.7)
MCV RBC AUTO: 89.4 FL (ref 79–97)
MONOCYTES # BLD AUTO: 0.49 10*3/MM3 (ref 0.1–0.9)
MONOCYTES NFR BLD AUTO: 5.1 % (ref 5–12)
NEUTROPHILS NFR BLD AUTO: 7.99 10*3/MM3 (ref 1.7–7)
NEUTROPHILS NFR BLD AUTO: 83.2 % (ref 42.7–76)
NRBC BLD AUTO-RTO: 0 /100 WBC (ref 0–0.2)
PHOSPHATE SERPL-MCNC: 3.1 MG/DL (ref 2.5–4.5)
PLATELET # BLD AUTO: 320 10*3/MM3 (ref 140–450)
PMV BLD AUTO: 10 FL (ref 6–12)
POTASSIUM SERPL-SCNC: 4.3 MMOL/L (ref 3.5–5.2)
PROT SERPL-MCNC: 5.6 G/DL (ref 6–8.5)
RBC # BLD AUTO: 3.21 10*6/MM3 (ref 3.77–5.28)
SODIUM SERPL-SCNC: 135 MMOL/L (ref 136–145)
WBC NRBC COR # BLD AUTO: 9.6 10*3/MM3 (ref 3.4–10.8)

## 2024-08-01 PROCEDURE — 84100 ASSAY OF PHOSPHORUS: CPT | Performed by: STUDENT IN AN ORGANIZED HEALTH CARE EDUCATION/TRAINING PROGRAM

## 2024-08-01 PROCEDURE — 82948 REAGENT STRIP/BLOOD GLUCOSE: CPT

## 2024-08-01 PROCEDURE — 63710000001 INSULIN REGULAR HUMAN PER 5 UNITS: Performed by: STUDENT IN AN ORGANIZED HEALTH CARE EDUCATION/TRAINING PROGRAM

## 2024-08-01 PROCEDURE — 85025 COMPLETE CBC W/AUTO DIFF WBC: CPT | Performed by: INTERNAL MEDICINE

## 2024-08-01 PROCEDURE — 83735 ASSAY OF MAGNESIUM: CPT | Performed by: STUDENT IN AN ORGANIZED HEALTH CARE EDUCATION/TRAINING PROGRAM

## 2024-08-01 PROCEDURE — 94799 UNLISTED PULMONARY SVC/PX: CPT

## 2024-08-01 PROCEDURE — 80053 COMPREHEN METABOLIC PANEL: CPT | Performed by: STUDENT IN AN ORGANIZED HEALTH CARE EDUCATION/TRAINING PROGRAM

## 2024-08-01 PROCEDURE — 94664 DEMO&/EVAL PT USE INHALER: CPT

## 2024-08-01 RX ORDER — LIDOCAINE 4 G/G
1 PATCH TOPICAL
Start: 2024-08-01

## 2024-08-01 RX ORDER — ACETAMINOPHEN 325 MG/1
650 TABLET ORAL EVERY 6 HOURS PRN
Status: CANCELLED | OUTPATIENT
Start: 2024-08-01

## 2024-08-01 RX ORDER — LIDOCAINE 4 G/G
1 PATCH TOPICAL
Status: CANCELLED
Start: 2024-08-01

## 2024-08-01 RX ORDER — LIDOCAINE 4 G/G
1 PATCH TOPICAL
Status: DISCONTINUED | OUTPATIENT
Start: 2024-08-01 | End: 2024-08-01 | Stop reason: HOSPADM

## 2024-08-01 RX ORDER — ASPIRIN 81 MG/1
81 TABLET ORAL ONCE
Status: COMPLETED | OUTPATIENT
Start: 2024-08-01 | End: 2024-08-01

## 2024-08-01 RX ADMIN — Medication 100 MG: at 08:18

## 2024-08-01 RX ADMIN — ASPIRIN 81 MG: 81 TABLET, COATED ORAL at 09:48

## 2024-08-01 RX ADMIN — CASTOR OIL AND BALSAM, PERU 1 APPLICATION: 788; 87 OINTMENT TOPICAL at 08:19

## 2024-08-01 RX ADMIN — Medication 500 MCG: at 08:18

## 2024-08-01 RX ADMIN — ALBUTEROL SULFATE 2.5 MG: 2.5 SOLUTION RESPIRATORY (INHALATION) at 00:27

## 2024-08-01 RX ADMIN — INSULIN HUMAN 2 UNITS: 100 INJECTION, SOLUTION PARENTERAL at 12:09

## 2024-08-01 RX ADMIN — METOPROLOL TARTRATE 25 MG: 25 TABLET, FILM COATED ORAL at 08:18

## 2024-08-01 RX ADMIN — BUDESONIDE 0.5 MG: 0.5 INHALANT ORAL at 08:06

## 2024-08-01 RX ADMIN — CLOPIDOGREL BISULFATE 75 MG: 75 TABLET, FILM COATED ORAL at 08:18

## 2024-08-01 RX ADMIN — TIOTROPIUM BROMIDE INHALATION SPRAY 2 PUFF: 3.12 SPRAY, METERED RESPIRATORY (INHALATION) at 08:12

## 2024-08-01 RX ADMIN — INSULIN HUMAN 2 UNITS: 100 INJECTION, SOLUTION PARENTERAL at 06:57

## 2024-08-01 RX ADMIN — LIDOCAINE 1 PATCH: 4 PATCH TOPICAL at 12:09

## 2024-08-01 NOTE — PLAN OF CARE
Problem: Adult Inpatient Plan of Care  Goal: Plan of Care Review  Outcome: Ongoing, Progressing  Flowsheets (Taken 8/1/2024 0449)  Progress: improving  Plan of Care Reviewed With: patient  Outcome Evaluation: Vital signs stable. No complaints of pain. Tube feeds continued at goal rate- pt tolerating. Significant other at bedside. Pt stable for d/c- waiting on precert for SNF. Plan of care ongoing.

## 2024-08-01 NOTE — CASE MANAGEMENT/SOCIAL WORK
Post-Acute Authorization Submission      Post Acute Pre-Cert Documentation  Request Submitted by Facility - Type:: Hospital  Post-Acute Authorization Type Submitted:: SNF  Date Post Acute Pre-Cert Inititated per Facility: 07/30/24  Date Post Acute Pre-Cert Completed: 08/01/24  Accepting Facility: [facility] Discharge Date Requested: 07/30/24  All Clinicals Submitted?: Yes  Had Accepting Facility at Time of Submission: Yes  Response Received from Insurance?: Approval  Response Communicated to:: , Accepting Facility Liaison, Accepting Facility Auth Department  Authorization Number:: APPROVED [id number]  Post Acute Pre-Cert Initiated Comment: VALID TO ADMIT UPTO 8/6/2024. EMAILED/FAXED APPROVAL TO TIFFANY ATKINSON AT FAX# [phone]              MAUREEN Wang

## 2024-08-01 NOTE — CASE MANAGEMENT/SOCIAL WORK
Continued Stay Note  [facility]     Patient Name: Asha Krishnan  MRN: [id number]  Today's Date: 8/1/2024    Admit Date: 7/13/2024    Plan: [facility]. Pre cert pending. Needs stretcher transport   Discharge Plan       Row Name 08/01/24 0858       Plan    Plan [facility]. Pre cert pending. Needs stretcher transport    Patient/Family in Agreement with Plan yes    Plan Comments Pre cert pending. Tube feeds at 35 cc/hr. Plan to D/C once pre cert obtained. Helio Gacría RN-BC                   Discharge Codes    No documentation.                 Expected Discharge Date and Time       Expected Discharge Date Expected Discharge Time    Aug 1, 2024               Helio García RN

## 2024-08-01 NOTE — CASE MANAGEMENT/SOCIAL WORK
"Physicians Statement of Medical Necessity for  Ambulance Transportation    GENERAL INFORMATION     Name: Asha Krishnan  YOB: 1950  Medicare #: [id number]   Transport Date: 8/1/2024  (Valid for round trips this date, or for scheduled repetitive trips for 60 days from the date signed below.)  Origin: [facility]   Destination: [facility]   Is the Patient's stay covered under Medicare Part A (PPS/DRG?)Yes  Closest appropriate facility? Yes  If this a hosp-hosp transfer? No  Is this a hospice patient? No    MEDICAL NECESSITY QUESTIONAIRE    Ambulance Transportation is medically necessary only if other means of transportation are contraindicated or would be potentially harmful to the patient.  To meet this requirement, the patient must be either \"bed confined\" or suffer from a condition such that transport by means other than an ambulance is contraindicated by the patient's condition.  The following questions must be answered by the healthcare professional signing below for this form to be valid:     1) Describe the MEDICAL CONDITION (physical and/or mental) of this patient AT THE TIME OF AMBULANCE TRANSPORT that requires the patient to be transported in an ambulance, and why transport by other means is contraindicated by the patient's condition:   Past Medical History:   Diagnosis Date    Asthma     Atrial fibrillation, unspecified type     Cataract     COPD (chronic obstructive pulmonary disease)     Hyperlipidemia     Hypertension       Past Surgical History:   Procedure Laterality Date    CARDIAC CATHETERIZATION N/A 11/11/2022    Procedure: Left Heart Cath;  Surgeon: Tashi De La Torre MD;  Location: [facility] CATH INVASIVE LOCATION;  Service: Cardiovascular;  Laterality: N/A;    CARDIAC CATHETERIZATION N/A 11/11/2022    Procedure: Coronary angiography;  Surgeon: Tashi De La Torre MD;  Location: [facility] CATH INVASIVE LOCATION;  Service: Cardiovascular;  Laterality: N/A;    CERVICAL " "LAMINECTOMY      1995    FEMUR FRACTURE SURGERY Left     2008      2) Is this patient \"bed confined\" as defined below?Yes   To be \"bed confined\" the patient must satisfy all three of the following criteria:  (1) unable to get up from bed without assistance; AND (2) unable to ambulate;  AND (3) unable to sit in a chair or wheelchair.  3) Can this patient safely be transported by car or wheelchair van (I.e., may safely sit during transport, without an attendant or monitoring?)No   4. In addition to completing questions 1-3 above, please check any of the following conditions that apply*:          *Note: supporting documentation for any boxes checked must be maintained in the patient's medical records Patient is confused, Moderate/severe pain on movement, Unable to tolerate seated position for time needed to transport, Unable to sit in a chair or wheelchair due to decubitus ulcers or other wounds, and Other Satartia ris      SIGNATURE OF PHYSICIAN OR OTHER AUTHORIZED HEALTHCARE PROFESSIONAL    I certify that the above information is true and correct based on my evaluation of this patient, and represent that the patient requires transport by ambulance and that other forms of transport are contraindicated.  I understand that this information will be used by the Centers for Medicare and Medicaid Services (CMS) to support the determiniation of medical necessity for ambulance services, and I represent that I have personal knowledge of the patient's condition at the time of transport.       If this box is checked, I also certify that the patient is physically or mentally incapable of signing the ambulance service's claim form and that the institution with which I am affiliated has furnished care, services or assistance to the patient.  My signature below is made on behalf of the patient pursuant to 42 .36(b)(4). In accordance with 42 .37, the specific reason(s) that the patient is physically or mentally " incapable of signing the claim for is as follows:     Signature of Physician or Healthcare Professional  Date/Time:        (For Scheduled repetitive transport, this form is not valid for transports performed more than 60 days after this date).                                                                                                                                            --------------------------------------------------------------------------------------------  Printed Name and Credentials of Physician or Authorized Healthcare Professional     *Form must be signed by patient's attending physician for scheduled, repetitive transports,.  For non-repetitive ambulance transports, if unable to obtain the signature of the attending physician, any of the following may sign (please select below):     Physician  Clinical Nurse Specialist  Registered Nurse     Physician Assistant  Discharge Planner  Licensed Practical Nurse     Nurse Practitioner

## 2024-08-01 NOTE — CASE MANAGEMENT/SOCIAL WORK
Post-Acute Authorization Submission      Post Acute Pre-Cert Documentation  Request Submitted by Facility - Type:: Hospital  Post-Acute Authorization Type Submitted:: SNF  Date Post Acute Pre-Cert Inititated per Facility: 07/30/24  Accepting Facility: [facility] Discharge Date Requested: 07/30/24  All Clinicals Submitted?: Yes  Had Accepting Facility at Time of Submission: Yes  Response Received from Insurance?: Approval  Response Communicated to:: , Accepting Facility Liaison, Accepting Facility Auth Department  Authorization Number:: APPROVED [id number]  Post Acute Pre-Cert Initiated Comment: VALID TO ADMIT UPTO 8/6/2024.              Dale Cr, PCT

## 2024-08-01 NOTE — PLAN OF CARE
Problem: Adult Inpatient Plan of Care  Goal: Plan of Care Review  Recent Flowsheet Documentation  Taken 8/1/2024 0953 by Mirian Johnson, RN  Progress: improving  Plan of Care Reviewed With: patient  Outcome Evaluation: Vitals stable. Tolerating tube feeds. Q2 turns and skin care. Pt discharged to SNF - ambulance  at 1300.    Report called to Monalisa at [facility] - sending tube feed bag with pt.

## 2024-08-02 NOTE — CASE MANAGEMENT/SOCIAL WORK
Case Management Discharge Note      Final Note: [facility] to LTC. Pre cert received. [facility]    Provided Post Acute Provider List?: Yes  Post Acute Provider List: Nursing Home  Provided Post Acute Provider Quality & Resource List?: Yes  Post Acute Provider Quality and Resource List: Nursing Home  Delivered To: Patient, Support Person  Support Person: Sig other  Method of Delivery: In person    Selected Continued Care - Discharged on 8/1/2024 Admission date: 7/13/2024 - Discharge disposition: Skilled Nursing Facility (DC - External)      Destination Coordination complete.      Service Provider Selected Services Address Phone Fax Patient Preferred    [facility] [address] [address] [phone] --              Durable Medical Equipment    No services have been selected for the patient.                Dialysis/Infusion    No services have been selected for the patient.                Home Medical Care    No services have been selected for the patient.                Therapy    No services have been selected for the patient.                Community Resources    No services have been selected for the patient.                Community & DME    No services have been selected for the patient.                    Transportation Services  Ambulance: [facility]    Final Discharge Disposition Code: 03 - skilled nursing facility (SNF)

## 2024-08-14 ENCOUNTER — OFFICE VISIT (OUTPATIENT)
Dept: CARDIOLOGY | Facility: CLINIC | Age: 74
End: 2024-08-14
Payer: MEDICARE

## 2024-08-14 VITALS
DIASTOLIC BLOOD PRESSURE: 60 MMHG | HEIGHT: 62 IN | OXYGEN SATURATION: 95 % | BODY MASS INDEX: 10.12 KG/M2 | WEIGHT: 55 LBS | SYSTOLIC BLOOD PRESSURE: 110 MMHG | HEART RATE: 85 BPM

## 2024-08-14 DIAGNOSIS — I48.0 PAF (PAROXYSMAL ATRIAL FIBRILLATION): ICD-10-CM

## 2024-08-14 DIAGNOSIS — I10 PRIMARY HYPERTENSION: ICD-10-CM

## 2024-08-14 DIAGNOSIS — I25.10 CORONARY ARTERY DISEASE INVOLVING NATIVE CORONARY ARTERY OF NATIVE HEART WITHOUT ANGINA PECTORIS: Primary | ICD-10-CM

## 2024-08-14 DIAGNOSIS — E43 SEVERE MALNUTRITION: ICD-10-CM

## 2024-08-14 RX ORDER — METOPROLOL SUCCINATE 50 MG/1
50 TABLET, EXTENDED RELEASE ORAL DAILY
COMMUNITY
Start: 2024-04-30 | End: 2024-08-14 | Stop reason: ALTCHOICE

## 2024-08-14 RX ORDER — TRAMADOL HYDROCHLORIDE 50 MG/1
50 TABLET ORAL EVERY 6 HOURS PRN
COMMUNITY

## 2024-08-14 RX ORDER — ESCITALOPRAM OXALATE 10 MG/1
10 TABLET ORAL DAILY
COMMUNITY
Start: 2024-03-25 | End: 2025-03-25

## 2024-08-14 RX ORDER — CYCLOBENZAPRINE HCL 10 MG
10 TABLET ORAL 3 TIMES DAILY PRN
COMMUNITY

## 2024-08-14 RX ORDER — POTASSIUM CHLORIDE 20 MEQ/1
20 TABLET, EXTENDED RELEASE ORAL DAILY
COMMUNITY
Start: 2024-03-15

## 2024-08-14 NOTE — ASSESSMENT & PLAN NOTE
BP controlled at 110/60  Continue the following regimen:  Metoprolol tartrate 25 mg twice daily  Losartan 25 mg/day  Bumex 2 mg/day

## 2024-08-14 NOTE — ASSESSMENT & PLAN NOTE
Denies angina or dyspnea  Continue GDMT:  Atorvastatin 80 mg/day  Clopidogrel 75 mg/day  Metoprolol tartrate 25 mg twice daily  Losartan 25 mg/day

## 2024-08-14 NOTE — ASSESSMENT & PLAN NOTE
Patient with episode of atrial fibrillation with RVR during hospitalization  Heart rate currently controlled in the 80s  No systemic anticoagulation due to risk for falls and frailty  Continue metoprolol tartrate 25 mg per G-tube twice daily

## 2024-08-14 NOTE — PROGRESS NOTES
CARDIOLOGY        Patient Name: Asha Krishnan  :1950  Age: 74 y.o.  Primary Cardiologist: Padmaja Carr MD  Encounter Provider:  EMORY Smith    Date of Service: 24    CHIEF COMPLAINT / REASON FOR OFFICE VISIT     Atrial Fibrillation and Coronary Artery Disease      HISTORY OF PRESENT ILLNESS       HPI  Asha Krishnan is a 74 y.o. female who presents today for hospital follow-up.     Pt has a  history significant for respiratory failure, malnutrition, atrial fibrillation, CAD, COPD, HTN.    Patient has history of CAD with most recent cardiac catheterization in 2022 revealing two-vessel coronary disease with 100% ostial stenosis consistent with  of distal RCA as well as a 80% ostial in-stent stenosis.  Recommended medical management.    Medical record review reveals patient was hospitalized with discharge date 2024.  Patient presented with dyspnea and was found to have hypoxic respiratory failure, atrial fibrillation with RVR, elevated D-dimer.  Patient converted back to sinus rhythm with IV diltiazem in the emergency department.  Oxygen saturations dropped quickly in the ED.  Family refused a CTA of her chest although D-dimer was elevated.  Patient was treated for COPD exacerbation and suspicion for tracheal aspiration.  She was thought to be a poor candidate for systemic anticoagulation secondary to frequent falls.  She was discharged with metoprolol 37.5 mg twice daily.  During hospitalization patient was also treated for dysphagia with severe malnutrition and cachexia.  Ultimately a PEG tube was placed and she was placed on tube feeding.    Patient currently residing at a long-term care facility.  She arrives today via EMS and examined in a stretcher.  She has a caregiver from facility with her.  Patient reports that she is no longer experiencing shortness of breath.  She is using nebulizer treatments for intermittent flareups.  She has not had any heart  "palpitations or tachycardia.  Heart rate has remained controlled.  She is following speech therapy and gradually taking intermittent oral feeds as well as tube feedings.    The following portions of the patient's history were reviewed and updated as appropriate: allergies, current medications, past family history, past medical history, past social history, past surgical history and problem list.      VITAL SIGNS     Visit Vitals  /60 (BP Location: Left arm, Patient Position: Sitting)   Pulse 85   Ht 157.5 cm (62\")   Wt 24.9 kg (55 lb)   SpO2 95%   BMI 10.06 kg/m²         Wt Readings from Last 3 Encounters:   08/14/24 24.9 kg (55 lb)   08/01/24 25.1 kg (55 lb 6.4 oz)   08/01/23 49.9 kg (110 lb)     Body mass index is 10.06 kg/m².      REVIEW OF SYSTEMS     Review of Systems   Constitutional: Positive for malaise/fatigue and weight loss. Negative for chills and fever.   Cardiovascular:  Negative for leg swelling.   Respiratory:  Positive for shortness of breath. Negative for cough, snoring and wheezing.    Hematologic/Lymphatic: Negative for bleeding problem. Does not bruise/bleed easily.   Skin:  Negative for color change.   Musculoskeletal:  Negative for falls, joint pain and myalgias.   Gastrointestinal:  Negative for melena.   Genitourinary:  Negative for hematuria.   Neurological:  Negative for excessive daytime sleepiness.   Psychiatric/Behavioral:  Negative for depression. The patient is not nervous/anxious.            PHYSICAL EXAMINATION     Vitals and nursing note reviewed.   Constitutional:       Appearance: Normal appearance. Well-developed. Cachectic.   Eyes:      Conjunctiva/sclera: Conjunctivae normal.   Neck:      Vascular: No carotid bruit.   Pulmonary:      Breath sounds: Normal breath sounds.   Cardiovascular:      Normal rate. Regular rhythm. Normal S1 with normal intensity. Normal S2 with normal intensity.       Murmurs: There is no murmur.      No gallop.  No click. No rub.   Edema:     " Peripheral edema absent.   Musculoskeletal: Normal range of motion. Skin:     General: Skin is warm and dry.   Neurological:      Mental Status: Alert and oriented to person, place, and time.      GCS: GCS eye subscore is 4. GCS verbal subscore is 5. GCS motor subscore is 6.   Psychiatric:         Speech: Speech normal.         Behavior: Behavior normal.         Thought Content: Thought content normal.         Judgment: Judgment normal.           REVIEWED DATA     Procedures    Cardiac Procedures:  Cardiac catheterization 11/11/2022. Severe two-vessel coronary artery disease with 100% ostial RCA stenoses consistent with chronic total occlusion with epicardial left-to-right collaterals filling the PDA and distal RCA as well as 80% ostial in-stent stenoses. Otherwise patent stents from the left main into the mid LAD with no significant in-stent Antonia stenoses.   Echocardiogram 11/12/2022.  LVEF 59%.  Grade 1 diastolic dysfunction.        Lab Results   Component Value Date     (L) 08/01/2024     (L) 07/31/2024    K 4.3 08/01/2024    K 4.5 07/31/2024    CL 99 08/01/2024    CL 94 (L) 07/31/2024    CO2 31.2 (H) 08/01/2024    CO2 33.6 (H) 07/31/2024    BUN 12 08/01/2024    BUN 12 07/31/2024    CREATININE 0.24 (L) 08/01/2024    CREATININE 0.24 (L) 07/31/2024    EGFRIFAFRI >60 11/10/2022    EGFRIFAFRI >60 11/09/2022    GLUCOSE 169 (H) 08/01/2024    GLUCOSE 134 (H) 07/31/2024    CALCIUM 8.8 08/01/2024    CALCIUM 8.8 07/31/2024    ALBUMIN 2.7 (L) 08/01/2024    ALBUMIN 2.5 (L) 07/31/2024    BILITOT 0.3 08/01/2024    BILITOT 0.3 07/31/2024    AST 28 08/01/2024    AST 54 (H) 07/31/2024    ALT 53 (H) 08/01/2024    ALT 62 (H) 07/31/2024     Lab Results   Component Value Date    WBC 9.60 08/01/2024    WBC 8.80 07/31/2024    HGB 9.5 (L) 08/01/2024    HGB 9.4 (L) 07/31/2024    HCT 28.7 (L) 08/01/2024    HCT 28.4 (L) 07/31/2024    MCV 89.4 08/01/2024    MCV 87.9 07/31/2024     08/01/2024     07/31/2024     Lab  Results   Component Value Date    PROBNP 1,789.0 07/13/2024    PROBNP 499.0 08/01/2023    BNP 58.9 03/14/2024    BNP 84.1 12/18/2023     Lab Results   Component Value Date    CKTOTAL 180 08/02/2023    TROPONINI 0.013 03/15/2024    TROPONINT 28 (H) 07/21/2024     Lab Results   Component Value Date    TSH 1.360 07/17/2024    TSH 2.460 08/01/2023             ASSESSMENT & PLAN     Diagnoses and all orders for this visit:    1. Coronary artery disease involving native coronary artery of native heart without angina pectoris (Primary)  Assessment & Plan:  Denies angina or dyspnea  Continue GDMT:  Atorvastatin 80 mg/day  Clopidogrel 75 mg/day  Metoprolol tartrate 25 mg twice daily  Losartan 25 mg/day      2. Primary hypertension  Assessment & Plan:  BP controlled at 110/60  Continue the following regimen:  Metoprolol tartrate 25 mg twice daily  Losartan 25 mg/day  Bumex 2 mg/day      3. PAF (paroxysmal atrial fibrillation)  Assessment & Plan:  Patient with episode of atrial fibrillation with RVR during hospitalization  Heart rate currently controlled in the 80s  No systemic anticoagulation due to risk for falls and frailty  Continue metoprolol tartrate 25 mg per G-tube twice daily      4. Severe malnutrition        Future Appointments         Provider Department Center    10/30/2024 10:00 AM Shivani Young APRN [facility] CARDIOLOGY [place]              MEDICATIONS         Discharge Medications            Accurate as of August 14, 2024  1:04 PM. If you have any questions, ask your nurse or doctor.                Changes to Medications        Instructions Start Date   isosorbide mononitrate 30 MG 24 hr tablet  Commonly known as: IMDUR  What changed: how much to take   30 mg, Oral, Every 24 Hours Scheduled             Continue These Medications        Instructions Start Date   albuterol sulfate  (90 Base) MCG/ACT inhaler  Commonly known as: PROVENTIL HFA;VENTOLIN HFA;PROAIR HFA   2 puffs, Inhalation,  Every 4 Hours PRN      atorvastatin 80 MG tablet  Commonly known as: LIPITOR   80 mg, Per G Tube, Nightly      budesonide 0.5 MG/2ML nebulizer solution  Commonly known as: PULMICORT   0.5 mg, Nebulization, 2 Times Daily - RT      bumetanide 2 MG tablet  Commonly known as: BUMEX   2 mg, Oral, Daily      castor oil-balsam peru ointment   1 Application, Topical, Every 12 Hours Scheduled      clopidogrel 75 MG tablet  Commonly known as: PLAVIX   75 mg, Per G Tube, Daily      cyanocobalamin 500 MCG tablet  Commonly known as: VITAMIN B-12   500 mcg, Per G Tube, Daily      cyclobenzaprine 10 MG tablet  Commonly known as: FLEXERIL   10 mg, Oral, 3 Times Daily PRN, for muscle spams      escitalopram 10 MG tablet  Commonly known as: LEXAPRO   10 mg, Oral, Daily      Lidocaine 4 %   1 patch, Transdermal, Every 24 Hours Scheduled, Remove & Discard patch within 12 hours or as directed by MD      losartan 25 MG tablet  Commonly known as: COZAAR   25 mg, Per G Tube, Nightly      metoprolol tartrate 25 MG tablet  Commonly known as: LOPRESSOR   25 mg, Per G Tube, Every 12 Hours Scheduled      nitroglycerin 0.4 MG SL tablet  Commonly known as: NITROSTAT   0.4 mg, Sublingual, Every 5 Minutes PRN, Take no more than 3 doses in 15 minutes.      polyethylene Glycol 400 1 % solution ophthalmic solution   1 drop, Both Eyes, Every 1 Hour PRN      potassium chloride 20 MEQ CR tablet  Commonly known as: KLOR-CON M20   20 mEq, Oral, Daily      thiamine 100 MG tablet  Commonly known as: VITAMIN B1   100 mg, Per G Tube, Daily      tiotropium bromide monohydrate 2.5 MCG/ACT aerosol solution inhaler  Commonly known as: SPIRIVA RESPIMAT   2 puffs, Inhalation, Daily - RT      traMADol 50 MG tablet  Commonly known as: ULTRAM   50 mg, Oral, Every 6 Hours PRN                   **Dragon Disclaimer:   Much of this encounter note is an electronic transcription/translation of spoken language to printed text. The electronic translation of spoken language may  permit erroneous, or at times, nonsensical words or phrases to be inadvertently transcribed. Although I have reviewed the note for such errors, some may still exist.
